# Patient Record
Sex: MALE | Race: WHITE | NOT HISPANIC OR LATINO | Employment: OTHER | URBAN - NONMETROPOLITAN AREA
[De-identification: names, ages, dates, MRNs, and addresses within clinical notes are randomized per-mention and may not be internally consistent; named-entity substitution may affect disease eponyms.]

---

## 2017-09-01 ENCOUNTER — HOSPITAL ENCOUNTER (OUTPATIENT)
Dept: GENERAL RADIOLOGY | Facility: HOSPITAL | Age: 62
Discharge: HOME | End: 2017-09-01
Admitting: NURSE PRACTITIONER

## 2017-09-01 ENCOUNTER — TRANSCRIBE ORDERS (OUTPATIENT)
Dept: ADMINISTRATIVE | Facility: HOSPITAL | Age: 62
End: 2017-09-01

## 2017-09-01 DIAGNOSIS — R10.9 ABDOMINAL PAIN, UNSPECIFIED LOCATION: ICD-10-CM

## 2017-09-01 DIAGNOSIS — R52 PAIN: ICD-10-CM

## 2017-09-01 DIAGNOSIS — R52 PAIN: Primary | ICD-10-CM

## 2017-09-01 PROCEDURE — 74000 HC ABDOMEN KUB: CPT

## 2017-09-01 PROCEDURE — 72110 X-RAY EXAM L-2 SPINE 4/>VWS: CPT

## 2019-04-19 ENCOUNTER — TRANSCRIBE ORDERS (OUTPATIENT)
Dept: ADMINISTRATIVE | Facility: HOSPITAL | Age: 64
End: 2019-04-19

## 2019-04-19 DIAGNOSIS — I65.29 CAROTID ARTERY STENOSIS, UNILATERAL: Primary | ICD-10-CM

## 2019-04-19 DIAGNOSIS — I65.23 CAROTID OCCLUSION, BILATERAL: ICD-10-CM

## 2019-04-25 ENCOUNTER — HOSPITAL ENCOUNTER (OUTPATIENT)
Dept: ULTRASOUND IMAGING | Facility: HOSPITAL | Age: 64
Discharge: HOME | End: 2019-04-25
Admitting: INTERNAL MEDICINE

## 2019-04-25 DIAGNOSIS — I65.23 CAROTID OCCLUSION, BILATERAL: ICD-10-CM

## 2019-04-25 PROCEDURE — 93880 EXTRACRANIAL BILAT STUDY: CPT

## 2019-04-25 PROCEDURE — 93880 EXTRACRANIAL BILAT STUDY: CPT | Performed by: SURGERY

## 2020-03-23 DIAGNOSIS — R07.89 OTHER CHEST PAIN: Primary | ICD-10-CM

## 2020-07-06 ENCOUNTER — OFFICE VISIT (OUTPATIENT)
Dept: WOUND CARE | Facility: HOSPITAL | Age: 65
End: 2020-07-06

## 2020-07-06 PROCEDURE — 99203 OFFICE O/P NEW LOW 30 MIN: CPT | Performed by: PODIATRIST

## 2020-07-06 PROCEDURE — 11042 DBRDMT SUBQ TIS 1ST 20SQCM/<: CPT | Performed by: PODIATRIST

## 2020-07-13 ENCOUNTER — OFFICE VISIT (OUTPATIENT)
Dept: WOUND CARE | Facility: HOSPITAL | Age: 65
End: 2020-07-13

## 2020-07-13 PROCEDURE — 11042 DBRDMT SUBQ TIS 1ST 20SQCM/<: CPT | Performed by: PODIATRIST

## 2020-07-20 ENCOUNTER — OFFICE VISIT (OUTPATIENT)
Dept: WOUND CARE | Facility: HOSPITAL | Age: 65
End: 2020-07-20

## 2020-07-20 PROCEDURE — 11042 DBRDMT SUBQ TIS 1ST 20SQCM/<: CPT | Performed by: NURSE PRACTITIONER

## 2020-07-20 PROCEDURE — 99204 OFFICE O/P NEW MOD 45 MIN: CPT | Performed by: NURSE PRACTITIONER

## 2020-07-27 ENCOUNTER — OFFICE VISIT (OUTPATIENT)
Dept: WOUND CARE | Facility: HOSPITAL | Age: 65
End: 2020-07-27

## 2020-07-27 PROCEDURE — 11042 DBRDMT SUBQ TIS 1ST 20SQCM/<: CPT | Performed by: PODIATRIST

## 2020-08-03 ENCOUNTER — OFFICE VISIT (OUTPATIENT)
Dept: WOUND CARE | Facility: HOSPITAL | Age: 65
End: 2020-08-03

## 2020-08-03 PROCEDURE — 11042 DBRDMT SUBQ TIS 1ST 20SQCM/<: CPT | Performed by: PODIATRIST

## 2020-08-10 ENCOUNTER — OFFICE VISIT (OUTPATIENT)
Dept: WOUND CARE | Facility: HOSPITAL | Age: 65
End: 2020-08-10

## 2020-08-10 PROCEDURE — 97597 DBRDMT OPN WND 1ST 20 CM/<: CPT | Performed by: NURSE PRACTITIONER

## 2020-08-17 ENCOUNTER — OFFICE VISIT (OUTPATIENT)
Dept: WOUND CARE | Facility: HOSPITAL | Age: 65
End: 2020-08-17

## 2020-08-17 PROCEDURE — 11042 DBRDMT SUBQ TIS 1ST 20SQCM/<: CPT | Performed by: PODIATRIST

## 2020-08-24 ENCOUNTER — APPOINTMENT (OUTPATIENT)
Dept: WOUND CARE | Facility: HOSPITAL | Age: 65
End: 2020-08-24

## 2020-08-25 ENCOUNTER — OFFICE VISIT (OUTPATIENT)
Dept: WOUND CARE | Facility: HOSPITAL | Age: 65
End: 2020-08-25

## 2020-08-25 PROCEDURE — 97597 DBRDMT OPN WND 1ST 20 CM/<: CPT | Performed by: NURSE PRACTITIONER

## 2020-09-01 ENCOUNTER — OFFICE VISIT (OUTPATIENT)
Dept: WOUND CARE | Facility: HOSPITAL | Age: 65
End: 2020-09-01

## 2020-09-01 PROCEDURE — 97597 DBRDMT OPN WND 1ST 20 CM/<: CPT | Performed by: NURSE PRACTITIONER

## 2020-09-09 ENCOUNTER — OFFICE VISIT (OUTPATIENT)
Dept: WOUND CARE | Facility: HOSPITAL | Age: 65
End: 2020-09-09

## 2020-09-09 PROCEDURE — 97597 DBRDMT OPN WND 1ST 20 CM/<: CPT | Performed by: NURSE PRACTITIONER

## 2020-09-18 ENCOUNTER — APPOINTMENT (OUTPATIENT)
Dept: WOUND CARE | Facility: HOSPITAL | Age: 65
End: 2020-09-18

## 2020-10-01 ENCOUNTER — OFFICE VISIT (OUTPATIENT)
Dept: WOUND CARE | Facility: HOSPITAL | Age: 65
End: 2020-10-01

## 2020-10-01 PROCEDURE — 97597 DBRDMT OPN WND 1ST 20 CM/<: CPT | Performed by: NURSE PRACTITIONER

## 2020-10-08 ENCOUNTER — APPOINTMENT (OUTPATIENT)
Dept: WOUND CARE | Facility: HOSPITAL | Age: 65
End: 2020-10-08

## 2022-06-21 ENCOUNTER — APPOINTMENT (OUTPATIENT)
Dept: GENERAL RADIOLOGY | Facility: HOSPITAL | Age: 67
End: 2022-06-21

## 2022-06-21 ENCOUNTER — HOSPITAL ENCOUNTER (EMERGENCY)
Facility: HOSPITAL | Age: 67
Discharge: HOME | End: 2022-06-21
Admitting: FAMILY MEDICINE

## 2022-06-21 VITALS
DIASTOLIC BLOOD PRESSURE: 90 MMHG | HEART RATE: 77 BPM | HEIGHT: 68 IN | WEIGHT: 200 LBS | BODY MASS INDEX: 30.31 KG/M2 | SYSTOLIC BLOOD PRESSURE: 150 MMHG | TEMPERATURE: 98.6 F | RESPIRATION RATE: 20 BRPM | OXYGEN SATURATION: 97 %

## 2022-06-21 DIAGNOSIS — S92.422B OPEN DISPLACED FRACTURE OF DISTAL PHALANX OF LEFT GREAT TOE, INITIAL ENCOUNTER: Primary | ICD-10-CM

## 2022-06-21 PROCEDURE — 90471 IMMUNIZATION ADMIN: CPT | Performed by: NURSE PRACTITIONER

## 2022-06-21 PROCEDURE — 96365 THER/PROPH/DIAG IV INF INIT: CPT

## 2022-06-21 PROCEDURE — 73630 X-RAY EXAM OF FOOT: CPT

## 2022-06-21 PROCEDURE — 25010000002 TETANUS-DIPHTH-ACELL PERTUSSIS 5-2.5-18.5 LF-MCG/0.5 SUSPENSION PREFILLED SYRINGE: Performed by: NURSE PRACTITIONER

## 2022-06-21 PROCEDURE — 99282 EMERGENCY DEPT VISIT SF MDM: CPT

## 2022-06-21 PROCEDURE — 90715 TDAP VACCINE 7 YRS/> IM: CPT | Performed by: NURSE PRACTITIONER

## 2022-06-21 PROCEDURE — 25010000002 CEFAZOLIN 1-4 GM/50ML-% SOLUTION: Performed by: NURSE PRACTITIONER

## 2022-06-21 RX ORDER — LIDOCAINE HYDROCHLORIDE 10 MG/ML
5 INJECTION, SOLUTION INFILTRATION; PERINEURAL ONCE
Status: DISCONTINUED | OUTPATIENT
Start: 2022-06-21 | End: 2022-06-21

## 2022-06-21 RX ORDER — AMOXICILLIN AND CLAVULANATE POTASSIUM 875; 125 MG/1; MG/1
1 TABLET, FILM COATED ORAL 2 TIMES DAILY
Qty: 20 TABLET | Refills: 0 | Status: SHIPPED | OUTPATIENT
Start: 2022-06-21 | End: 2022-07-01

## 2022-06-21 RX ORDER — LIDOCAINE HYDROCHLORIDE 10 MG/ML
10 INJECTION, SOLUTION INFILTRATION; PERINEURAL ONCE
Status: DISCONTINUED | OUTPATIENT
Start: 2022-06-21 | End: 2022-06-21

## 2022-06-21 RX ORDER — HYDROCODONE BITARTRATE AND ACETAMINOPHEN 5; 325 MG/1; MG/1
1 TABLET ORAL 4 TIMES DAILY PRN
Qty: 6 TABLET | Refills: 0 | Status: SHIPPED | OUTPATIENT
Start: 2022-06-21 | End: 2022-06-24

## 2022-06-21 RX ORDER — CEFAZOLIN SODIUM 1 G/50ML
1 INJECTION, SOLUTION INTRAVENOUS ONCE
Status: COMPLETED | OUTPATIENT
Start: 2022-06-21 | End: 2022-06-21

## 2022-06-21 RX ORDER — LIDOCAINE HYDROCHLORIDE 10 MG/ML
5 INJECTION, SOLUTION INFILTRATION; PERINEURAL ONCE
Status: COMPLETED | OUTPATIENT
Start: 2022-06-21 | End: 2022-06-21

## 2022-06-21 RX ADMIN — CEFAZOLIN SODIUM 1 G: 1 INJECTION, SOLUTION INTRAVENOUS at 20:04

## 2022-06-21 RX ADMIN — LIDOCAINE HYDROCHLORIDE 5 ML: 10 INJECTION, SOLUTION EPIDURAL; INFILTRATION; INTRACAUDAL; PERINEURAL at 21:13

## 2022-06-21 RX ADMIN — TETANUS TOXOID, REDUCED DIPHTHERIA TOXOID AND ACELLULAR PERTUSSIS VACCINE, ADSORBED 0.5 ML: 5; 2.5; 8; 8; 2.5 SUSPENSION INTRAMUSCULAR at 19:57

## 2024-01-09 ENCOUNTER — APPOINTMENT (OUTPATIENT)
Dept: CT IMAGING | Facility: HOSPITAL | Age: 69
End: 2024-01-09
Payer: MEDICARE

## 2024-01-09 ENCOUNTER — APPOINTMENT (OUTPATIENT)
Dept: GENERAL RADIOLOGY | Facility: HOSPITAL | Age: 69
End: 2024-01-09
Payer: MEDICARE

## 2024-01-09 ENCOUNTER — HOSPITAL ENCOUNTER (EMERGENCY)
Facility: HOSPITAL | Age: 69
Discharge: HOME | End: 2024-01-09
Admitting: EMERGENCY MEDICINE
Payer: MEDICARE

## 2024-01-09 VITALS
RESPIRATION RATE: 18 BRPM | HEART RATE: 87 BPM | SYSTOLIC BLOOD PRESSURE: 127 MMHG | WEIGHT: 201 LBS | BODY MASS INDEX: 30.46 KG/M2 | HEIGHT: 68 IN | TEMPERATURE: 98.1 F | OXYGEN SATURATION: 98 % | DIASTOLIC BLOOD PRESSURE: 88 MMHG

## 2024-01-09 DIAGNOSIS — I10 PRIMARY HYPERTENSION: Primary | ICD-10-CM

## 2024-01-09 DIAGNOSIS — R07.89 CHEST WALL PAIN: ICD-10-CM

## 2024-01-09 LAB
ALBUMIN SERPL-MCNC: 4.3 G/DL (ref 3.5–5.2)
ALBUMIN/GLOB SERPL: 1.5 G/DL
ALP SERPL-CCNC: 103 U/L (ref 39–117)
ALT SERPL W P-5'-P-CCNC: 14 U/L (ref 1–41)
ANION GAP SERPL CALCULATED.3IONS-SCNC: 9 MMOL/L (ref 5–15)
AST SERPL-CCNC: 19 U/L (ref 1–40)
B PARAPERT DNA SPEC QL NAA+PROBE: NOT DETECTED
B PERT DNA SPEC QL NAA+PROBE: NOT DETECTED
BASOPHILS # BLD AUTO: 0.05 10*3/MM3 (ref 0–0.2)
BASOPHILS NFR BLD AUTO: 0.6 % (ref 0–1.5)
BILIRUB SERPL-MCNC: 0.5 MG/DL (ref 0–1.2)
BUN SERPL-MCNC: 13 MG/DL (ref 8–23)
BUN/CREAT SERPL: 14.3 (ref 7–25)
C PNEUM DNA NPH QL NAA+NON-PROBE: NOT DETECTED
CALCIUM SPEC-SCNC: 9.5 MG/DL (ref 8.6–10.5)
CHLORIDE SERPL-SCNC: 102 MMOL/L (ref 98–107)
CO2 SERPL-SCNC: 28 MMOL/L (ref 22–29)
CREAT SERPL-MCNC: 0.91 MG/DL (ref 0.76–1.27)
D DIMER PPP FEU-MCNC: 1.62 MCGFEU/ML (ref 0–0.7)
DEPRECATED RDW RBC AUTO: 46.6 FL (ref 37–54)
EGFRCR SERPLBLD CKD-EPI 2021: 90.7 ML/MIN/1.73
EOSINOPHIL # BLD AUTO: 0.11 10*3/MM3 (ref 0–0.4)
EOSINOPHIL NFR BLD AUTO: 1.2 % (ref 0.3–6.2)
ERYTHROCYTE [DISTWIDTH] IN BLOOD BY AUTOMATED COUNT: 14 % (ref 12.3–15.4)
FLUAV SUBTYP SPEC NAA+PROBE: NOT DETECTED
FLUBV RNA ISLT QL NAA+PROBE: NOT DETECTED
GLOBULIN UR ELPH-MCNC: 2.9 GM/DL
GLUCOSE SERPL-MCNC: 127 MG/DL (ref 65–99)
HADV DNA SPEC NAA+PROBE: NOT DETECTED
HCOV 229E RNA SPEC QL NAA+PROBE: NOT DETECTED
HCOV HKU1 RNA SPEC QL NAA+PROBE: NOT DETECTED
HCOV NL63 RNA SPEC QL NAA+PROBE: NOT DETECTED
HCOV OC43 RNA SPEC QL NAA+PROBE: NOT DETECTED
HCT VFR BLD AUTO: 47.6 % (ref 37.5–51)
HGB BLD-MCNC: 15.3 G/DL (ref 13–17.7)
HMPV RNA NPH QL NAA+NON-PROBE: NOT DETECTED
HOLD SPECIMEN: NORMAL
HOLD SPECIMEN: NORMAL
HPIV1 RNA ISLT QL NAA+PROBE: NOT DETECTED
HPIV2 RNA SPEC QL NAA+PROBE: NOT DETECTED
HPIV3 RNA NPH QL NAA+PROBE: NOT DETECTED
HPIV4 P GENE NPH QL NAA+PROBE: NOT DETECTED
IMM GRANULOCYTES # BLD AUTO: 0.03 10*3/MM3 (ref 0–0.05)
IMM GRANULOCYTES NFR BLD AUTO: 0.3 % (ref 0–0.5)
INR PPP: 1 (ref 0.91–1.09)
LYMPHOCYTES # BLD AUTO: 2.22 10*3/MM3 (ref 0.7–3.1)
LYMPHOCYTES NFR BLD AUTO: 24.6 % (ref 19.6–45.3)
M PNEUMO IGG SER IA-ACNC: NOT DETECTED
MCH RBC QN AUTO: 29.1 PG (ref 26.6–33)
MCHC RBC AUTO-ENTMCNC: 32.1 G/DL (ref 31.5–35.7)
MCV RBC AUTO: 90.5 FL (ref 79–97)
MONOCYTES # BLD AUTO: 0.62 10*3/MM3 (ref 0.1–0.9)
MONOCYTES NFR BLD AUTO: 6.9 % (ref 5–12)
NEUTROPHILS NFR BLD AUTO: 6 10*3/MM3 (ref 1.7–7)
NEUTROPHILS NFR BLD AUTO: 66.4 % (ref 42.7–76)
NRBC BLD AUTO-RTO: 0 /100 WBC (ref 0–0.2)
NT-PROBNP SERPL-MCNC: 70.1 PG/ML (ref 0–900)
PLATELET # BLD AUTO: 296 10*3/MM3 (ref 140–450)
PMV BLD AUTO: 9.3 FL (ref 6–12)
POTASSIUM SERPL-SCNC: 4.3 MMOL/L (ref 3.5–5.2)
PROT SERPL-MCNC: 7.2 G/DL (ref 6–8.5)
PROTHROMBIN TIME: 13.3 SECONDS (ref 11.8–14.8)
RBC # BLD AUTO: 5.26 10*6/MM3 (ref 4.14–5.8)
RHINOVIRUS RNA SPEC NAA+PROBE: NOT DETECTED
RSV RNA NPH QL NAA+NON-PROBE: NOT DETECTED
SARS-COV-2 RNA NPH QL NAA+NON-PROBE: NOT DETECTED
SODIUM SERPL-SCNC: 139 MMOL/L (ref 136–145)
TROPONIN T SERPL HS-MCNC: 12 NG/L
TROPONIN T SERPL HS-MCNC: 13 NG/L
WBC NRBC COR # BLD AUTO: 9.03 10*3/MM3 (ref 3.4–10.8)
WHOLE BLOOD HOLD COAG: NORMAL
WHOLE BLOOD HOLD SPECIMEN: NORMAL

## 2024-01-09 PROCEDURE — 71275 CT ANGIOGRAPHY CHEST: CPT

## 2024-01-09 PROCEDURE — 96375 TX/PRO/DX INJ NEW DRUG ADDON: CPT

## 2024-01-09 PROCEDURE — 83880 ASSAY OF NATRIURETIC PEPTIDE: CPT | Performed by: NURSE PRACTITIONER

## 2024-01-09 PROCEDURE — 85025 COMPLETE CBC W/AUTO DIFF WBC: CPT | Performed by: NURSE PRACTITIONER

## 2024-01-09 PROCEDURE — 25010000002 LABETALOL 5 MG/ML SOLUTION: Performed by: NURSE PRACTITIONER

## 2024-01-09 PROCEDURE — 0202U NFCT DS 22 TRGT SARS-COV-2: CPT | Performed by: NURSE PRACTITIONER

## 2024-01-09 PROCEDURE — 71045 X-RAY EXAM CHEST 1 VIEW: CPT

## 2024-01-09 PROCEDURE — 80053 COMPREHEN METABOLIC PANEL: CPT | Performed by: NURSE PRACTITIONER

## 2024-01-09 PROCEDURE — 84484 ASSAY OF TROPONIN QUANT: CPT | Performed by: NURSE PRACTITIONER

## 2024-01-09 PROCEDURE — 99285 EMERGENCY DEPT VISIT HI MDM: CPT

## 2024-01-09 PROCEDURE — 93005 ELECTROCARDIOGRAM TRACING: CPT | Performed by: NURSE PRACTITIONER

## 2024-01-09 PROCEDURE — 25010000002 MORPHINE PER 10 MG: Performed by: NURSE PRACTITIONER

## 2024-01-09 PROCEDURE — 93005 ELECTROCARDIOGRAM TRACING: CPT | Performed by: EMERGENCY MEDICINE

## 2024-01-09 PROCEDURE — 96374 THER/PROPH/DIAG INJ IV PUSH: CPT

## 2024-01-09 PROCEDURE — 85610 PROTHROMBIN TIME: CPT | Performed by: NURSE PRACTITIONER

## 2024-01-09 PROCEDURE — 36415 COLL VENOUS BLD VENIPUNCTURE: CPT

## 2024-01-09 PROCEDURE — 25510000001 IOPAMIDOL PER 1 ML: Performed by: NURSE PRACTITIONER

## 2024-01-09 PROCEDURE — 25010000002 ONDANSETRON PER 1 MG: Performed by: NURSE PRACTITIONER

## 2024-01-09 PROCEDURE — 85379 FIBRIN DEGRADATION QUANT: CPT | Performed by: NURSE PRACTITIONER

## 2024-01-09 RX ORDER — CLONIDINE HYDROCHLORIDE 0.2 MG/1
0.2 TABLET ORAL 2 TIMES DAILY
COMMUNITY

## 2024-01-09 RX ORDER — ATORVASTATIN CALCIUM 10 MG/1
10 TABLET, FILM COATED ORAL DAILY
COMMUNITY

## 2024-01-09 RX ORDER — ONDANSETRON 2 MG/ML
4 INJECTION INTRAMUSCULAR; INTRAVENOUS ONCE
Status: COMPLETED | OUTPATIENT
Start: 2024-01-09 | End: 2024-01-09

## 2024-01-09 RX ORDER — LABETALOL HYDROCHLORIDE 5 MG/ML
20 INJECTION, SOLUTION INTRAVENOUS ONCE
Status: COMPLETED | OUTPATIENT
Start: 2024-01-09 | End: 2024-01-09

## 2024-01-09 RX ORDER — SODIUM CHLORIDE 0.9 % (FLUSH) 0.9 %
10 SYRINGE (ML) INJECTION AS NEEDED
Status: DISCONTINUED | OUTPATIENT
Start: 2024-01-09 | End: 2024-01-09 | Stop reason: HOSPADM

## 2024-01-09 RX ORDER — AMLODIPINE BESYLATE 10 MG/1
10 TABLET ORAL DAILY
COMMUNITY

## 2024-01-09 RX ORDER — LOSARTAN POTASSIUM 25 MG/1
25 TABLET ORAL DAILY
COMMUNITY

## 2024-01-09 RX ADMIN — MORPHINE SULFATE 4 MG: 4 INJECTION, SOLUTION INTRAMUSCULAR; INTRAVENOUS at 19:24

## 2024-01-09 RX ADMIN — LABETALOL HYDROCHLORIDE 20 MG: 5 INJECTION INTRAVENOUS at 19:00

## 2024-01-09 RX ADMIN — ONDANSETRON 4 MG: 2 INJECTION INTRAMUSCULAR; INTRAVENOUS at 19:24

## 2024-01-09 RX ADMIN — IOPAMIDOL 100 ML: 755 INJECTION, SOLUTION INTRAVENOUS at 18:42

## 2024-01-09 NOTE — ED PROVIDER NOTES
Subjective   History of Present Illness  Patient is a 68-year-old male presents to the emergency department with hypertension and right-sided back pain and shortness of breath.  He states he has had right-sided back pain for the past 3 days.  He states he went to his primary care provider today and was sent to the emergency department due to his blood pressure being so elevated.  He denies any chest pain.  He does have some shortness of breath which she states he has chronic shortness of breath with ambulation however he was tachypneic with conversation today.  He states he has a chronic cough because he is a smoker.  He states his cough has been nonproductive.  He denies any injury to the back.  He denies any headache.  He does have chronic numbness to the left side from previous CVA.  He denies headache or blurred vision.  He denies any abdominal pain.  No nausea or vomiting.    History provided by:  Patient   used: No        Review of Systems   Constitutional:         Patient is a 68-year-old male presents to the emergency department with hypertension and right-sided back pain and shortness of breath.  He states he has had right-sided back pain for the past 3 days.  He states he went to his primary care provider today and was sent to the emergency department due to his blood pressure being so elevated.  He denies any chest pain.  He does have some shortness of breath which she states he has chronic shortness of breath with ambulation however he was tachypneic with conversation today.  He states he has a chronic cough because he is a smoker.  He states his cough has been nonproductive.  He denies any injury to the back.  He denies any headache.  He does have chronic numbness to the left side from previous CVA.  He denies headache or blurred vision.  He denies any abdominal pain.  No nausea or vomiting.     Respiratory:  Positive for cough and shortness of breath.    Cardiovascular:          "Elevated blood pressure        Past Medical History:   Diagnosis Date    Hypertension     Stroke        No Known Allergies    Past Surgical History:   Procedure Laterality Date    CAROTID ENDARTERECTOMY      x 2       History reviewed. No pertinent family history.    Social History     Socioeconomic History    Marital status:    Tobacco Use    Smoking status: Every Day     Packs/day: 1     Types: Cigarettes   Substance and Sexual Activity    Alcohol use: Not Currently    Drug use: Not Currently       Prior to Admission medications    Not on File       /88 (BP Location: Right arm, Patient Position: Sitting)   Pulse 87   Temp 98.1 °F (36.7 °C) (Oral)   Resp 18   Ht 172.7 cm (68\")   Wt 91.2 kg (201 lb)   SpO2 98%   BMI 30.56 kg/m²     Objective   Physical Exam  Vitals and nursing note reviewed.   Constitutional:       Appearance: He is well-developed.      Comments: Chronically ill appearing. No acute distress   HENT:      Head: Normocephalic and atraumatic.   Eyes:      Conjunctiva/sclera: Conjunctivae normal.      Pupils: Pupils are equal, round, and reactive to light.   Cardiovascular:      Rate and Rhythm: Normal rate and regular rhythm.      Heart sounds: Normal heart sounds.   Pulmonary:      Effort: Tachypnea present.      Comments: Expiratory wheezing throughout - diminished lung sounds bilat bases. Becomes short of breath with conversation   Abdominal:      General: Bowel sounds are normal.      Palpations: Abdomen is soft.   Musculoskeletal:         General: Normal range of motion.      Cervical back: Normal range of motion and neck supple.      Comments: No tenderness on palpation of midthoracic area. No soft tissue swelling noted.    Skin:     General: Skin is warm and dry.   Neurological:      Mental Status: He is alert and oriented to person, place, and time.      Deep Tendon Reflexes: Reflexes are normal and symmetric.   Psychiatric:         Behavior: Behavior normal.         Thought " Content: Thought content normal.         Judgment: Judgment normal.         Procedures         Lab Results (last 24 hours)       Procedure Component Value Units Date/Time    CBC & Differential [[id number]]  (Normal) Collected: 01/09/24 1408    Specimen: Blood from Arm, Right Updated: 01/09/24 1650    Narrative:      The following orders were created for panel order CBC & Differential.  Procedure                               Abnormality         Status                     ---------                               -----------         ------                     CBC Auto Differential[[id number]]        Normal              Final result                 Please view results for these tests on the individual orders.    Comprehensive Metabolic Panel [[id number]]  (Abnormal) Collected: 01/09/24 1408    Specimen: Blood from Arm, Right Updated: 01/09/24 1659     Glucose 127 mg/dL      BUN 13 mg/dL      Creatinine 0.91 mg/dL      Sodium 139 mmol/L      Potassium 4.3 mmol/L      Comment: Slight hemolysis detected by analyzer. Result may be falsely elevated.        Chloride 102 mmol/L      CO2 28.0 mmol/L      Calcium 9.5 mg/dL      Total Protein 7.2 g/dL      Albumin 4.3 g/dL      ALT (SGPT) 14 U/L      AST (SGOT) 19 U/L      Comment: Slight hemolysis detected by analyzer. Result may be falsely elevated.        Alkaline Phosphatase 103 U/L      Total Bilirubin 0.5 mg/dL      Globulin 2.9 gm/dL      A/G Ratio 1.5 g/dL      BUN/Creatinine Ratio 14.3     Anion Gap 9.0 mmol/L      eGFR 90.7 mL/min/1.73     Narrative:      GFR Normal >60  Chronic Kidney Disease <60  Kidney Failure <15      Protime-INR [[id number]]  (Normal) Collected: 01/09/24 1408    Specimen: Blood from Arm, Right Updated: 01/09/24 1653     Protime 13.3 Seconds      INR 1.00    BNP [[id number]]  (Normal) Collected: 01/09/24 1408    Specimen: Blood from Arm, Right Updated: 01/09/24 1657     proBNP 70.1 pg/mL     Narrative:      This assay is used as an aid in the diagnosis  "of individuals suspected of having heart failure. It can be used as an aid in the diagnosis of acute decompensated heart failure (ADHF) in patients presenting with signs and symptoms of ADHF to the emergency department (ED). In addition, NT-proBNP of <300 pg/mL indicates ADHF is not likely.    Age Range Result Interpretation  NT-proBNP Concentration (pg/mL:      <50             Positive            >450                   Gray                 300-450                    Negative             <300    50-75           Positive            >900                  Gray                300-900                  Negative            <300      >75             Positive            >1800                  Gray                300-1800                  Negative            <300    D-dimer, Quantitative [[id number]]  (Abnormal) Collected: 01/09/24 1408    Specimen: Blood from Arm, Right Updated: 01/09/24 1653     D-Dimer, Quantitative 1.62 MCGFEU/mL     Narrative:      According to the assay 's published package insert, a normal (<0.50 MCGFEU/mL) D-dimer result in conjunction with a non-high clinical probability assessment, excludes deep vein thrombosis (DVT) and pulmonary embolism (PE) with high sensitivity.    D-dimer values increase with age and this can make VTE exclusion of an older population difficult. To address this, the [employer], based on best available evidence and recent guidelines, recommends that clinicians use age-adjusted D-dimer thresholds in patients greater than 50 years of age with: a) a low probability of PE who do not meet all Pulmonary Embolism Rule Out Criteria, or b) in those with intermediate probability of PE.   The formula for an age-adjusted D-dimer cut-off is \"age/100\".  For example, a 60 year old patient would have an age-adjusted cut-off of 0.60 MCGFEU/mL and an 80 year old 0.80 MCGFEU/mL.    Single High Sensitivity Troponin T [[id number]]  (Normal) Collected: 01/09/24 1408    " Specimen: Blood from Arm, Right Updated: 01/09/24 1656     HS Troponin T 13 ng/L     Narrative:      High Sensitive Troponin T Reference Range:  <14.0 ng/L- Negative Female for AMI  <22.0 ng/L- Negative Male for AMI  >=14 - Abnormal Female indicating possible myocardial injury.  >=22 - Abnormal Male indicating possible myocardial injury.   Clinicians would have to utilize clinical acumen, EKG, Troponin, and serial changes to determine if it is an Acute Myocardial Infarction or myocardial injury due to an underlying chronic condition.         CBC Auto Differential [[id number]]  (Normal) Collected: 01/09/24 1408    Specimen: Blood from Arm, Right Updated: 01/09/24 1650     WBC 9.03 10*3/mm3      RBC 5.26 10*6/mm3      Hemoglobin 15.3 g/dL      Hematocrit 47.6 %      MCV 90.5 fL      MCH 29.1 pg      MCHC 32.1 g/dL      RDW 14.0 %      RDW-SD 46.6 fl      MPV 9.3 fL      Platelets 296 10*3/mm3      Neutrophil % 66.4 %      Lymphocyte % 24.6 %      Monocyte % 6.9 %      Eosinophil % 1.2 %      Basophil % 0.6 %      Immature Grans % 0.3 %      Neutrophils, Absolute 6.00 10*3/mm3      Lymphocytes, Absolute 2.22 10*3/mm3      Monocytes, Absolute 0.62 10*3/mm3      Eosinophils, Absolute 0.11 10*3/mm3      Basophils, Absolute 0.05 10*3/mm3      Immature Grans, Absolute 0.03 10*3/mm3      nRBC 0.0 /100 WBC     Respiratory Panel PCR w/COVID-19(SARS-CoV-2) RAULITO/RAFFI/JUDITH/PAD/COR/LILY In-House, NP Swab in [facility], 2 HR TAT - Swab, Nasopharynx [[id number]]  (Normal) Collected: 01/09/24 1701    Specimen: Swab from Nasopharynx Updated: 01/09/24 1836     ADENOVIRUS, PCR Not Detected     Coronavirus 229E Not Detected     Coronavirus HKU1 Not Detected     Coronavirus NL63 Not Detected     Coronavirus OC43 Not Detected     COVID19 Not Detected     Human Metapneumovirus Not Detected     Human Rhinovirus/Enterovirus Not Detected     Influenza A PCR Not Detected     Influenza B PCR Not Detected     Parainfluenza Virus 1 Not Detected      Parainfluenza Virus 2 Not Detected     Parainfluenza Virus 3 Not Detected     Parainfluenza Virus 4 Not Detected     RSV, PCR Not Detected     Bordetella pertussis pcr Not Detected     Bordetella parapertussis PCR Not Detected     Chlamydophila pneumoniae PCR Not Detected     Mycoplasma pneumo by PCR Not Detected    Narrative:      In the setting of a positive respiratory panel with a viral infection PLUS a negative procalcitonin without other underlying concern for bacterial infection, consider observing off antibiotics or discontinuation of antibiotics and continue supportive care. If the respiratory panel is positive for atypical bacterial infection (Bordetella pertussis, Chlamydophila pneumoniae, or Mycoplasma pneumoniae), consider antibiotic de-escalation to target atypical bacterial infection.    Single High Sensitivity Troponin T [[id number]]  (Normal) Collected: 01/09/24 1921    Specimen: Blood Updated: 01/09/24 1944     HS Troponin T 12 ng/L     Narrative:      High Sensitive Troponin T Reference Range:  <14.0 ng/L- Negative Female for AMI  <22.0 ng/L- Negative Male for AMI  >=14 - Abnormal Female indicating possible myocardial injury.  >=22 - Abnormal Male indicating possible myocardial injury.   Clinicians would have to utilize clinical acumen, EKG, Troponin, and serial changes to determine if it is an Acute Myocardial Infarction or myocardial injury due to an underlying chronic condition.                 CT Angiogram Chest   Final Result   1. Respiratory motion artifact is limiting, however no convincing   evidence of acute PE is identified. Diffuse coronary artery   calcifications are present.   2. Normal aortic caliber, no evidence of dissection.   3. Diffuse changes of centrilobular emphysema, no pneumonia is   identified. Small 3 mm noncalcified nodule in the right upper lobe is   most likely benign. No evidence of intrathoracic lymphadenopathy.       This report was signed and finalized on 1/9/2024  "7:06 PM by Dr. Kieran Burnett MD.          XR Chest 1 View   Final Result           No acute abnormality.                                                                               This report was signed and finalized on 1/9/2024 5:15 PM by Dr. Kieran Burnett MD.              ED Course  ED Course as of 01/10/24 0814   Tue Jan 09, 2024   1700 Pt has elevated dimer and has dyspnea. Have ordered cta chest at this time for further.  [CW]   1911 CTA of the chest is negative for any acute pulmonary embolus.  No pneumonia.  Respiratory panel is negative.  First troponin is negative.  CMP is negative.  CBC no leukocytosis.  Chest x-ray was unremarkable.  Pending second set of heart enzymes and EKG at this time.pt is still having pain under right shoulder - will give pain medication at this time  [CW]   1919 Reviewed pt and pt care plan with Dr. Arroyo- also assessed pt and in agreement with care plan. Pt does not have pain on palpation. He states that \"it feels like its deeper\" it states that the pain is worse when he moves. Pt b/p is better at this time 156/93. Pending 2nd troponin at this time.  [CW]   1945 2nd troponin is negative. Dr arroyo has also assessed pt - advised pt can be discharged to follow up with his pcp. Pt is in agreement with care plan. Advised that pain was probably musculoskeletal  [CW]      ED Course User Index  [CW] Snehal Caro APRN        Medical Decision Making  Patient is a 68-year-old male presents to the emergency department with hypertension and right-sided back pain and shortness of breath.  He states he has had right-sided back pain for the past 3 days.  He states he went to his primary care provider today and was sent to the emergency department due to his blood pressure being so elevated.  He denies any chest pain.  He does have some shortness of breath which she states he has chronic shortness of breath with ambulation however he was tachypneic with conversation " today.  He states he has a chronic cough because he is a smoker.  He states his cough has been nonproductive.  He denies any injury to the back.  He denies any headache.  He does have chronic numbness to the left side from previous CVA.  He denies headache or blurred vision.  He denies any abdominal pain.  No nausea or vomiting.  Course of treatment in the er: Patient is a 68-year-old male presents the emergency department with hypertension.  He states he went to go see his doctor today and his blood pressure was systolic 200s over diastolic of 110s.  He denies any chest pain.  He does have some dyspnea however he does smoke about 2 packs a day.  He states he is also had some right mid scapular pain for the past 3 days.  He denies any injury.  He denies any chest pain.  No abdominal pain.  No nausea or vomiting.  His exam reveals some residual left-sided weakness from previous CVA.  Lungs with some expiratory wheezing noted throughout.  Clears with cough.  CV normal sinus rhythm.  Laboratory studies and labetalol had been ordered initially for his blood pressure which was 210/110 on his initial exam in the emergency department.  Labs Reviewed  COMPREHENSIVE METABOLIC PANEL - Abnormal; Notable for the following components:     Glucose                       127 (*)             All other components within normal limits         Narrative: GFR Normal >60                  Chronic Kidney Disease <60                  Kidney Failure <15                    D-DIMER, QUANTITATIVE - Abnormal; Notable for the following components:     D-Dimer, Quantitative         1.62 (*)            All other components within normal limits         Narrative: According to the assay 's published package insert, a normal (<0.50 MCGFEU/mL) D-dimer result in conjunction with a non-high clinical probability assessment, excludes deep vein thrombosis (DVT) and pulmonary embolism (PE) with high sensitivity.                                     "D-dimer values increase with age and this can make VTE exclusion of an older population difficult. To address this, the [employer], based on best available evidence and recent guidelines, recommends that clinicians use age-adjusted D-dimer thresholds in patients greater than 50 years of age with: a) a low probability of PE who do not meet all Pulmonary Embolism Rule Out Criteria, or b) in those with intermediate probability of PE.                   The formula for an age-adjusted D-dimer cut-off is \"age/100\".                  For example, a 60 year old patient would have an age-adjusted cut-off of 0.60 MCGFEU/mL and an 80 year old 0.80 MCGFEU/mL.  RESPIRATORY PANEL PCR W/ COVID-19 (SARS-COV-2), NP SWAB IN UTM/VTP, 2 HR TAT - Normal         Narrative: In the setting of a positive respiratory panel with a viral infection PLUS a negative procalcitonin without other underlying concern for bacterial infection, consider observing off antibiotics or discontinuation of antibiotics and continue supportive care. If the respiratory panel is positive for atypical bacterial infection (Bordetella pertussis, Chlamydophila pneumoniae, or Mycoplasma pneumoniae), consider antibiotic de-escalation to target atypical bacterial infection.  PROTIME-INR - Normal  BNP (IN-HOUSE) - Normal         Narrative: This assay is used as an aid in the diagnosis of individuals suspected of having heart failure. It can be used as an aid in the diagnosis of acute decompensated heart failure (ADHF) in patients presenting with signs and symptoms of ADHF to the emergency department (ED). In addition, NT-proBNP of <300 pg/mL indicates ADHF is not likely.                                    Age Range Result Interpretation  NT-proBNP Concentration (pg/mL:                                                      <50             Positive            >450                                   Gray                 300-450                                "     Negative             <300                                    50-75           Positive            >900                                  Carbone                300-900                                  Negative            <300                                                      >75             Positive            >1800                                  Carbone                300-1800                                  Negative            <300  SINGLE HSTROPONIN T - Normal         Narrative: High Sensitive Troponin T Reference Range:                  <14.0 ng/L- Negative Female for AMI                  <22.0 ng/L- Negative Male for AMI                  >=14 - Abnormal Female indicating possible myocardial injury.                  >=22 - Abnormal Male indicating possible myocardial injury.                   Clinicians would have to utilize clinical acumen, EKG, Troponin, and serial changes to determine if it is an Acute Myocardial Infarction or myocardial injury due to an underlying chronic condition.                                       CBC WITH AUTO DIFFERENTIAL - Normal  SINGLE HSTROPONIN T - Normal         Narrative: High Sensitive Troponin T Reference Range:                  <14.0 ng/L- Negative Female for AMI                  <22.0 ng/L- Negative Male for AMI                  >=14 - Abnormal Female indicating possible myocardial injury.                  >=22 - Abnormal Male indicating possible myocardial injury.                   Clinicians would have to utilize clinical acumen, EKG, Troponin, and serial changes to determine if it is an Acute Myocardial Infarction or myocardial injury due to an underlying chronic condition.                                       RAINBOW DRAW         Narrative: The following orders were created for panel order [place] Draw.                  Procedure                               Abnormality         Status                                     ---------                                -----------         ------                                     Green Top (Gel)[[id number]]                                  Final result                               Lavender Top[[id number]]                                     Final result                               Red Top[[id number]]                                          Final result                               Light Blue Top[[id number]]                                   Final result                                                 Please view results for these tests on the individual orders.  GREEN TOP  LAVENDER TOP  RED TOP  LIGHT BLUE TOP  CBC AND DIFFERENTIAL  CT Angiogram Chest   Final Result    1. Respiratory motion artifact is limiting, however no convincing    evidence of acute PE is identified. Diffuse coronary artery    calcifications are present.    2. Normal aortic caliber, no evidence of dissection.    3. Diffuse changes of centrilobular emphysema, no pneumonia is    identified. Small 3 mm noncalcified nodule in the right upper lobe is    most likely benign. No evidence of intrathoracic lymphadenopathy.         This report was signed and finalized on 1/9/2024 7:06 PM by Dr. Kieran Burnett MD.          XR Chest 1 View   Final Result             No acute abnormality.                                                                                     This report was signed and finalized on 1/9/2024 5:15 PM by Dr. Kieran Burnett MD.          His dimer was elevated and he did have shortness of breath however he states he does have chronic shortness of breath secondary to smoking.  He does not wear oxygen at home and his O2 sat was 98% on room air.  He did get a CTA of the chest to rule out pulmonary embolus which was negative for any pulmonary embolus.  He has had 2 sets of negative troponins.  He was given morphine for his shoulder pain.  He states the pain is worse with movement not tender on palpation but is worse with movement.  He states  that his pain was resolved after the pain medication.  His vitals have been stable 127/88, heart rate 87, respirations 18, O2 sat 98% on room air.  He states he is feeling much better and is ready to go home.  I reviewed the patient and patient care plan with Dr. Rebolledo who also assessed patient in agreement with patient care plan.  Patient was sent home to follow-up with his primary care doctor this week regarding his blood pressure.  Advised the patient to return the emergency department if he had increased pain, vomiting, chest pain, shortness of breath or any of his symptoms worsen.  Patient is in agreement with the care plan voices understanding of instructions and was discharged in stable condition.    Problems Addressed:  Chest wall pain: complicated acute illness or injury  Primary hypertension: complicated acute illness or injury    Amount and/or Complexity of Data Reviewed  Labs: ordered. Decision-making details documented in ED Course.  Radiology: ordered. Decision-making details documented in ED Course.  ECG/medicine tests: ordered. Decision-making details documented in ED Course.    Risk  Prescription drug management.         Final diagnoses:   Primary hypertension   Chest wall pain          Snehal Caro, APRN  01/10/24 0814

## 2024-01-09 NOTE — ED NOTES
[facility] Counselor, [place], on video conference with patient and mother.

## 2024-01-10 LAB
QT INTERVAL: 366 MS
QT INTERVAL: 366 MS
QTC INTERVAL: 386 MS
QTC INTERVAL: 411 MS

## 2025-01-03 ENCOUNTER — APPOINTMENT (OUTPATIENT)
Dept: CT IMAGING | Facility: HOSPITAL | Age: 70
End: 2025-01-03
Payer: MEDICARE

## 2025-01-03 ENCOUNTER — HOSPITAL ENCOUNTER (INPATIENT)
Facility: HOSPITAL | Age: 70
LOS: 12 days | Discharge: HOME | End: 2025-01-15
Attending: STUDENT IN AN ORGANIZED HEALTH CARE EDUCATION/TRAINING PROGRAM | Admitting: INTERNAL MEDICINE
Payer: MEDICARE

## 2025-01-03 ENCOUNTER — ANESTHESIA EVENT (OUTPATIENT)
Dept: PERIOP | Facility: HOSPITAL | Age: 70
End: 2025-01-03
Payer: MEDICARE

## 2025-01-03 ENCOUNTER — ANESTHESIA (OUTPATIENT)
Dept: PERIOP | Facility: HOSPITAL | Age: 70
End: 2025-01-03
Payer: MEDICARE

## 2025-01-03 ENCOUNTER — APPOINTMENT (OUTPATIENT)
Dept: GENERAL RADIOLOGY | Facility: HOSPITAL | Age: 70
End: 2025-01-03
Payer: MEDICARE

## 2025-01-03 ENCOUNTER — APPOINTMENT (OUTPATIENT)
Dept: INTERVENTIONAL RADIOLOGY/VASCULAR | Facility: HOSPITAL | Age: 70
End: 2025-01-03
Payer: MEDICARE

## 2025-01-03 DIAGNOSIS — I10 PRIMARY HYPERTENSION: ICD-10-CM

## 2025-01-03 DIAGNOSIS — I71.43 ANEURYSM OF INFRARENAL ABDOMINAL AORTA, UNSPECIFIED WHETHER RUPTURED: Primary | ICD-10-CM

## 2025-01-03 DIAGNOSIS — R10.9 ABDOMINAL PAIN, UNSPECIFIED ABDOMINAL LOCATION: ICD-10-CM

## 2025-01-03 DIAGNOSIS — I71.40 ABDOMINAL AORTIC ANEURYSM (AAA) WITHOUT RUPTURE, UNSPECIFIED PART: ICD-10-CM

## 2025-01-03 DIAGNOSIS — Z74.09 IMPAIRED MOBILITY: ICD-10-CM

## 2025-01-03 DIAGNOSIS — I48.0 PAROXYSMAL ATRIAL FIBRILLATION: ICD-10-CM

## 2025-01-03 LAB
ABO GROUP BLD: NORMAL
ALBUMIN SERPL-MCNC: 4.1 G/DL (ref 3.5–5.2)
ALBUMIN/GLOB SERPL: 1.4 G/DL
ALP SERPL-CCNC: 106 U/L (ref 39–117)
ALT SERPL W P-5'-P-CCNC: 14 U/L (ref 1–41)
ANION GAP SERPL CALCULATED.3IONS-SCNC: 10 MMOL/L (ref 5–15)
ANION GAP SERPL CALCULATED.3IONS-SCNC: 10 MMOL/L (ref 5–15)
APTT PPP: 40.6 SECONDS (ref 24.5–36)
APTT PPP: 56.1 SECONDS (ref 24.5–36)
ARTERIAL PATENCY WRIST A: ABNORMAL
ARTERIAL PATENCY WRIST A: ABNORMAL
AST SERPL-CCNC: 16 U/L (ref 1–40)
ATMOSPHERIC PRESS: 762 MMHG
ATMOSPHERIC PRESS: 763 MMHG
BASE EXCESS BLDA CALC-SCNC: -0.3 MMOL/L (ref 0–2)
BASE EXCESS BLDA CALC-SCNC: 0.6 MMOL/L (ref 0–2)
BASOPHILS # BLD AUTO: 0.02 10*3/MM3 (ref 0–0.2)
BASOPHILS # BLD AUTO: 0.03 10*3/MM3 (ref 0–0.2)
BASOPHILS # BLD AUTO: 0.05 10*3/MM3 (ref 0–0.2)
BASOPHILS NFR BLD AUTO: 0.1 % (ref 0–1.5)
BASOPHILS NFR BLD AUTO: 0.2 % (ref 0–1.5)
BASOPHILS NFR BLD AUTO: 0.5 % (ref 0–1.5)
BDY SITE: ABNORMAL
BDY SITE: ABNORMAL
BILIRUB SERPL-MCNC: 0.3 MG/DL (ref 0–1.2)
BILIRUB UR QL STRIP: NEGATIVE
BLD GP AB SCN SERPL QL: NEGATIVE
BODY TEMPERATURE: 37
BODY TEMPERATURE: 37.4
BUN SERPL-MCNC: 13 MG/DL (ref 8–23)
BUN SERPL-MCNC: 9 MG/DL (ref 8–23)
BUN/CREAT SERPL: 14.1 (ref 7–25)
BUN/CREAT SERPL: 9.6 (ref 7–25)
CA-I BLD-MCNC: 4.57 MG/DL (ref 4.6–5.4)
CALCIUM SPEC-SCNC: 7.6 MG/DL (ref 8.6–10.5)
CALCIUM SPEC-SCNC: 9.1 MG/DL (ref 8.6–10.5)
CHLORIDE SERPL-SCNC: 101 MMOL/L (ref 98–107)
CHLORIDE SERPL-SCNC: 105 MMOL/L (ref 98–107)
CLARITY UR: CLEAR
CO2 SERPL-SCNC: 24 MMOL/L (ref 22–29)
CO2 SERPL-SCNC: 28 MMOL/L (ref 22–29)
COHGB MFR BLD: 1.2 % (ref 0–5)
COLOR UR: YELLOW
CREAT SERPL-MCNC: 0.92 MG/DL (ref 0.76–1.27)
CREAT SERPL-MCNC: 0.94 MG/DL (ref 0.76–1.27)
DEPRECATED RDW RBC AUTO: 46.6 FL (ref 37–54)
DEPRECATED RDW RBC AUTO: 47 FL (ref 37–54)
DEPRECATED RDW RBC AUTO: 47.1 FL (ref 37–54)
EGFRCR SERPLBLD CKD-EPI 2021: 86.7 ML/MIN/1.73
EGFRCR SERPLBLD CKD-EPI 2021: 88.9 ML/MIN/1.73
EOSINOPHIL # BLD AUTO: 0 10*3/MM3 (ref 0–0.4)
EOSINOPHIL # BLD AUTO: 0 10*3/MM3 (ref 0–0.4)
EOSINOPHIL # BLD AUTO: 0.13 10*3/MM3 (ref 0–0.4)
EOSINOPHIL NFR BLD AUTO: 0 % (ref 0.3–6.2)
EOSINOPHIL NFR BLD AUTO: 0 % (ref 0.3–6.2)
EOSINOPHIL NFR BLD AUTO: 1.4 % (ref 0.3–6.2)
ERYTHROCYTE [DISTWIDTH] IN BLOOD BY AUTOMATED COUNT: 13.8 % (ref 12.3–15.4)
ERYTHROCYTE [DISTWIDTH] IN BLOOD BY AUTOMATED COUNT: 13.8 % (ref 12.3–15.4)
ERYTHROCYTE [DISTWIDTH] IN BLOOD BY AUTOMATED COUNT: 14.1 % (ref 12.3–15.4)
GLOBULIN UR ELPH-MCNC: 3 GM/DL
GLUCOSE SERPL-MCNC: 172 MG/DL (ref 65–99)
GLUCOSE SERPL-MCNC: 184 MG/DL (ref 65–99)
GLUCOSE UR STRIP-MCNC: ABNORMAL MG/DL
HCO3 BLDA-SCNC: 26.1 MMOL/L (ref 20–26)
HCO3 BLDA-SCNC: 26.8 MMOL/L (ref 20–26)
HCT VFR BLD AUTO: 35.5 % (ref 37.5–51)
HCT VFR BLD AUTO: 38.5 % (ref 37.5–51)
HCT VFR BLD AUTO: 44.8 % (ref 37.5–51)
HCT VFR BLD CALC: 40.2 % (ref 38–51)
HGB BLD-MCNC: 11.2 G/DL (ref 13–17.7)
HGB BLD-MCNC: 12.4 G/DL (ref 13–17.7)
HGB BLD-MCNC: 14.7 G/DL (ref 13–17.7)
HGB BLDA-MCNC: 13.1 G/DL (ref 14–18)
HGB UR QL STRIP.AUTO: NEGATIVE
HOLD SPECIMEN: NORMAL
IMM GRANULOCYTES # BLD AUTO: 0.03 10*3/MM3 (ref 0–0.05)
IMM GRANULOCYTES # BLD AUTO: 0.1 10*3/MM3 (ref 0–0.05)
IMM GRANULOCYTES # BLD AUTO: 0.16 10*3/MM3 (ref 0–0.05)
IMM GRANULOCYTES NFR BLD AUTO: 0.3 % (ref 0–0.5)
IMM GRANULOCYTES NFR BLD AUTO: 0.7 % (ref 0–0.5)
IMM GRANULOCYTES NFR BLD AUTO: 0.8 % (ref 0–0.5)
INHALED O2 CONCENTRATION: 100 %
INHALED O2 CONCENTRATION: 50 %
INR PPP: 1.11 (ref 0.91–1.09)
INR PPP: 1.19 (ref 0.91–1.09)
KETONES UR QL STRIP: NEGATIVE
LEUKOCYTE ESTERASE UR QL STRIP.AUTO: NEGATIVE
LYMPHOCYTES # BLD AUTO: 0.58 10*3/MM3 (ref 0.7–3.1)
LYMPHOCYTES # BLD AUTO: 0.7 10*3/MM3 (ref 0.7–3.1)
LYMPHOCYTES # BLD AUTO: 1.72 10*3/MM3 (ref 0.7–3.1)
LYMPHOCYTES NFR BLD AUTO: 18.1 % (ref 19.6–45.3)
LYMPHOCYTES NFR BLD AUTO: 3.7 % (ref 19.6–45.3)
LYMPHOCYTES NFR BLD AUTO: 3.9 % (ref 19.6–45.3)
Lab: ABNORMAL
Lab: ABNORMAL
MCH RBC QN AUTO: 29 PG (ref 26.6–33)
MCH RBC QN AUTO: 29.4 PG (ref 26.6–33)
MCH RBC QN AUTO: 30 PG (ref 26.6–33)
MCHC RBC AUTO-ENTMCNC: 31.5 G/DL (ref 31.5–35.7)
MCHC RBC AUTO-ENTMCNC: 32.2 G/DL (ref 31.5–35.7)
MCHC RBC AUTO-ENTMCNC: 32.8 G/DL (ref 31.5–35.7)
MCV RBC AUTO: 91.2 FL (ref 79–97)
MCV RBC AUTO: 91.4 FL (ref 79–97)
MCV RBC AUTO: 92 FL (ref 79–97)
METHGB BLD QL: 1 % (ref 0–3)
MODALITY: ABNORMAL
MODALITY: ABNORMAL
MONOCYTES # BLD AUTO: 0.65 10*3/MM3 (ref 0.1–0.9)
MONOCYTES # BLD AUTO: 0.9 10*3/MM3 (ref 0.1–0.9)
MONOCYTES # BLD AUTO: 1.25 10*3/MM3 (ref 0.1–0.9)
MONOCYTES NFR BLD AUTO: 6 % (ref 5–12)
MONOCYTES NFR BLD AUTO: 6.5 % (ref 5–12)
MONOCYTES NFR BLD AUTO: 6.8 % (ref 5–12)
NEUTROPHILS NFR BLD AUTO: 13.28 10*3/MM3 (ref 1.7–7)
NEUTROPHILS NFR BLD AUTO: 16.96 10*3/MM3 (ref 1.7–7)
NEUTROPHILS NFR BLD AUTO: 6.92 10*3/MM3 (ref 1.7–7)
NEUTROPHILS NFR BLD AUTO: 72.9 % (ref 42.7–76)
NEUTROPHILS NFR BLD AUTO: 88.9 % (ref 42.7–76)
NEUTROPHILS NFR BLD AUTO: 89.2 % (ref 42.7–76)
NITRITE UR QL STRIP: NEGATIVE
NRBC BLD AUTO-RTO: 0 /100 WBC (ref 0–0.2)
OXYHGB MFR BLDV: 96.9 % (ref 94–99)
PCO2 BLDA: 48.6 MM HG (ref 35–45)
PCO2 BLDA: 49 MM HG (ref 35–45)
PCO2 TEMP ADJ BLD: 49 MM HG (ref 35–45)
PCO2 TEMP ADJ BLD: 49.4 MM HG (ref 35–45)
PEEP RESPIRATORY: 5 CM[H2O]
PH BLDA: 7.33 PH UNITS (ref 7.35–7.45)
PH BLDA: 7.35 PH UNITS (ref 7.35–7.45)
PH UR STRIP.AUTO: 6 [PH] (ref 5–8)
PH, TEMP CORRECTED: 7.33 PH UNITS (ref 7.35–7.45)
PH, TEMP CORRECTED: 7.34 PH UNITS (ref 7.35–7.45)
PLATELET # BLD AUTO: 224 10*3/MM3 (ref 140–450)
PLATELET # BLD AUTO: 261 10*3/MM3 (ref 140–450)
PLATELET # BLD AUTO: 268 10*3/MM3 (ref 140–450)
PMV BLD AUTO: 9 FL (ref 6–12)
PO2 BLD: 148 MM[HG] (ref 0–500)
PO2 BLDA: 136 MM HG (ref 83–108)
PO2 BLDA: 74.1 MM HG (ref 83–108)
PO2 TEMP ADJ BLD: 138 MM HG (ref 83–108)
PO2 TEMP ADJ BLD: 74.1 MM HG (ref 83–108)
POTASSIUM BLDA-SCNC: 3.4 MMOL/L (ref 3.5–5.2)
POTASSIUM SERPL-SCNC: 3.9 MMOL/L (ref 3.5–5.2)
POTASSIUM SERPL-SCNC: 4 MMOL/L (ref 3.5–5.2)
PROT SERPL-MCNC: 7.1 G/DL (ref 6–8.5)
PROT UR QL STRIP: NEGATIVE
PROTHROMBIN TIME: 14.9 SECONDS (ref 11.8–14.8)
PROTHROMBIN TIME: 15.7 SECONDS (ref 11.8–14.8)
RBC # BLD AUTO: 3.86 10*6/MM3 (ref 4.14–5.8)
RBC # BLD AUTO: 4.22 10*6/MM3 (ref 4.14–5.8)
RBC # BLD AUTO: 4.9 10*6/MM3 (ref 4.14–5.8)
RH BLD: POSITIVE
SAO2 % BLDCOA: 94.8 % (ref 94–99)
SAO2 % BLDCOA: 99.1 % (ref 94–99)
SET MECH RESP RATE: 18
SODIUM BLDA-SCNC: 139 MMOL/L (ref 136–145)
SODIUM SERPL-SCNC: 139 MMOL/L (ref 136–145)
SODIUM SERPL-SCNC: 139 MMOL/L (ref 136–145)
SP GR UR STRIP: 1.02 (ref 1–1.03)
T&S EXPIRATION DATE: NORMAL
UROBILINOGEN UR QL STRIP: ABNORMAL
VENTILATOR MODE: ABNORMAL
VENTILATOR MODE: AC
VT ON VENT VENT: 550 ML
WBC NRBC COR # BLD AUTO: 14.89 10*3/MM3 (ref 3.4–10.8)
WBC NRBC COR # BLD AUTO: 19.09 10*3/MM3 (ref 3.4–10.8)
WBC NRBC COR # BLD AUTO: 9.5 10*3/MM3 (ref 3.4–10.8)
WHOLE BLOOD HOLD COAG: NORMAL
WHOLE BLOOD HOLD SPECIMEN: NORMAL

## 2025-01-03 PROCEDURE — B41G1ZZ FLUOROSCOPY OF LEFT LOWER EXTREMITY ARTERIES USING LOW OSMOLAR CONTRAST: ICD-10-PCS | Performed by: STUDENT IN AN ORGANIZED HEALTH CARE EDUCATION/TRAINING PROGRAM

## 2025-01-03 PROCEDURE — C1894 INTRO/SHEATH, NON-LASER: HCPCS | Performed by: SURGERY

## 2025-01-03 PROCEDURE — 25010000002 CEFAZOLIN PER 500 MG: Performed by: SURGERY

## 2025-01-03 PROCEDURE — 04VJ3DZ RESTRICTION OF LEFT EXTERNAL ILIAC ARTERY WITH INTRALUMINAL DEVICE, PERCUTANEOUS APPROACH: ICD-10-PCS | Performed by: STUDENT IN AN ORGANIZED HEALTH CARE EDUCATION/TRAINING PROGRAM

## 2025-01-03 PROCEDURE — 25010000002 FENTANYL CITRATE (PF) 250 MCG/5ML SOLUTION: Performed by: NURSE ANESTHETIST, CERTIFIED REGISTERED

## 2025-01-03 PROCEDURE — 94799 UNLISTED PULMONARY SVC/PX: CPT

## 2025-01-03 PROCEDURE — 85610 PROTHROMBIN TIME: CPT | Performed by: STUDENT IN AN ORGANIZED HEALTH CARE EDUCATION/TRAINING PROGRAM

## 2025-01-03 PROCEDURE — 94761 N-INVAS EAR/PLS OXIMETRY MLT: CPT

## 2025-01-03 PROCEDURE — 35226 REPAIR BLOOD VESSEL DIR LXTR: CPT | Performed by: SURGERY

## 2025-01-03 PROCEDURE — C1769 GUIDE WIRE: HCPCS | Performed by: SURGERY

## 2025-01-03 PROCEDURE — 34706 EVASC RPR A-BIILIAC RPT: CPT | Performed by: STUDENT IN AN ORGANIZED HEALTH CARE EDUCATION/TRAINING PROGRAM

## 2025-01-03 PROCEDURE — C1889 IMPLANT/INSERT DEVICE, NOC: HCPCS | Performed by: SURGERY

## 2025-01-03 PROCEDURE — 85025 COMPLETE CBC W/AUTO DIFF WBC: CPT | Performed by: STUDENT IN AN ORGANIZED HEALTH CARE EDUCATION/TRAINING PROGRAM

## 2025-01-03 PROCEDURE — 25010000002 ONDANSETRON PER 1 MG: Performed by: NURSE ANESTHETIST, CERTIFIED REGISTERED

## 2025-01-03 PROCEDURE — 25010000002 HYDROMORPHONE 1 MG/ML SOLUTION

## 2025-01-03 PROCEDURE — 74178 CT ABD&PLV WO CNTR FLWD CNTR: CPT

## 2025-01-03 PROCEDURE — 25810000003 SODIUM CHLORIDE 0.9 % SOLUTION: Performed by: STUDENT IN AN ORGANIZED HEALTH CARE EDUCATION/TRAINING PROGRAM

## 2025-01-03 PROCEDURE — 25510000001 IOPAMIDOL 61 % SOLUTION: Performed by: STUDENT IN AN ORGANIZED HEALTH CARE EDUCATION/TRAINING PROGRAM

## 2025-01-03 PROCEDURE — C1760 CLOSURE DEV, VASC: HCPCS

## 2025-01-03 PROCEDURE — 35226 REPAIR BLOOD VESSEL DIR LXTR: CPT | Performed by: STUDENT IN AN ORGANIZED HEALTH CARE EDUCATION/TRAINING PROGRAM

## 2025-01-03 PROCEDURE — 25510000001 IOPAMIDOL 61 % SOLUTION: Performed by: SURGERY

## 2025-01-03 PROCEDURE — 71045 X-RAY EXAM CHEST 1 VIEW: CPT

## 2025-01-03 PROCEDURE — C1876 STENT, NON-COA/NON-COV W/DEL: HCPCS | Performed by: SURGERY

## 2025-01-03 PROCEDURE — 25010000002 HEPARIN (PORCINE) PER 1000 UNITS: Performed by: SURGERY

## 2025-01-03 PROCEDURE — 81003 URINALYSIS AUTO W/O SCOPE: CPT | Performed by: STUDENT IN AN ORGANIZED HEALTH CARE EDUCATION/TRAINING PROGRAM

## 2025-01-03 PROCEDURE — 25010000002 HEPARIN (PORCINE) 25000-0.45 UT/250ML-% SOLUTION: Performed by: STUDENT IN AN ORGANIZED HEALTH CARE EDUCATION/TRAINING PROGRAM

## 2025-01-03 PROCEDURE — 94002 VENT MGMT INPAT INIT DAY: CPT

## 2025-01-03 PROCEDURE — 02HV33Z INSERTION OF INFUSION DEVICE INTO SUPERIOR VENA CAVA, PERCUTANEOUS APPROACH: ICD-10-PCS | Performed by: STUDENT IN AN ORGANIZED HEALTH CARE EDUCATION/TRAINING PROGRAM

## 2025-01-03 PROCEDURE — 25010000002 GLYCOPYRROLATE 0.4 MG/2ML SOLUTION

## 2025-01-03 PROCEDURE — 25010000002 PROPOFOL 1000 MG/100ML EMULSION

## 2025-01-03 PROCEDURE — 25010000002 NICARDIPINE HCL IN NACL 20-0.9 MG/200ML-% SOLUTION: Performed by: PHYSICIAN ASSISTANT

## 2025-01-03 PROCEDURE — 99285 EMERGENCY DEPT VISIT HI MDM: CPT

## 2025-01-03 PROCEDURE — 25810000003 LACTATED RINGERS PER 1000 ML: Performed by: ANESTHESIOLOGY

## 2025-01-03 PROCEDURE — C1887 CATHETER, GUIDING: HCPCS | Performed by: SURGERY

## 2025-01-03 PROCEDURE — 76000 FLUOROSCOPY <1 HR PHYS/QHP: CPT

## 2025-01-03 PROCEDURE — 85730 THROMBOPLASTIN TIME PARTIAL: CPT | Performed by: STUDENT IN AN ORGANIZED HEALTH CARE EDUCATION/TRAINING PROGRAM

## 2025-01-03 PROCEDURE — 82803 BLOOD GASES ANY COMBINATION: CPT

## 2025-01-03 PROCEDURE — 80053 COMPREHEN METABOLIC PANEL: CPT | Performed by: STUDENT IN AN ORGANIZED HEALTH CARE EDUCATION/TRAINING PROGRAM

## 2025-01-03 PROCEDURE — 86901 BLOOD TYPING SEROLOGIC RH(D): CPT | Performed by: PHYSICIAN ASSISTANT

## 2025-01-03 PROCEDURE — 25010000002 MORPHINE PER 10 MG: Performed by: STUDENT IN AN ORGANIZED HEALTH CARE EDUCATION/TRAINING PROGRAM

## 2025-01-03 PROCEDURE — 25010000002 CEFAZOLIN PER 500 MG: Performed by: NURSE PRACTITIONER

## 2025-01-03 PROCEDURE — 82805 BLOOD GASES W/O2 SATURATION: CPT

## 2025-01-03 PROCEDURE — C1751 CATH, INF, PER/CENT/MIDLINE: HCPCS

## 2025-01-03 PROCEDURE — 34713 PERQ ACCESS & CLSR FEM ART: CPT | Performed by: STUDENT IN AN ORGANIZED HEALTH CARE EDUCATION/TRAINING PROGRAM

## 2025-01-03 PROCEDURE — 86850 RBC ANTIBODY SCREEN: CPT | Performed by: PHYSICIAN ASSISTANT

## 2025-01-03 PROCEDURE — C1725 CATH, TRANSLUMIN NON-LASER: HCPCS | Performed by: SURGERY

## 2025-01-03 PROCEDURE — 04V03DZ RESTRICTION OF ABDOMINAL AORTA WITH INTRALUMINAL DEVICE, PERCUTANEOUS APPROACH: ICD-10-PCS | Performed by: STUDENT IN AN ORGANIZED HEALTH CARE EDUCATION/TRAINING PROGRAM

## 2025-01-03 PROCEDURE — 25010000002 PROPOFOL 10 MG/ML EMULSION: Performed by: ANESTHESIOLOGY

## 2025-01-03 PROCEDURE — 83050 HGB METHEMOGLOBIN QUAN: CPT

## 2025-01-03 PROCEDURE — 86900 BLOOD TYPING SEROLOGIC ABO: CPT | Performed by: PHYSICIAN ASSISTANT

## 2025-01-03 PROCEDURE — 25010000002 LIDOCAINE PF 2% 2 % SOLUTION: Performed by: NURSE ANESTHETIST, CERTIFIED REGISTERED

## 2025-01-03 PROCEDURE — 25010000002 MIDAZOLAM PER 1 MG: Performed by: ANESTHESIOLOGY

## 2025-01-03 PROCEDURE — 37221 PR REVSC OPN/PRQ ILIAC ART W/STNT PLMT & ANGIOPLSTY: CPT | Performed by: STUDENT IN AN ORGANIZED HEALTH CARE EDUCATION/TRAINING PROGRAM

## 2025-01-03 PROCEDURE — 25010000002 BUPIVACAINE 0.5 % SOLUTION: Performed by: SURGERY

## 2025-01-03 PROCEDURE — 37221 PR REVSC OPN/PRQ ILIAC ART W/STNT PLMT & ANGIOPLSTY: CPT | Performed by: SURGERY

## 2025-01-03 PROCEDURE — 25010000002 PROPOFOL 10 MG/ML EMULSION: Performed by: NURSE ANESTHETIST, CERTIFIED REGISTERED

## 2025-01-03 PROCEDURE — 25010000002 HEPARIN (PORCINE) PER 1000 UNITS: Performed by: NURSE ANESTHETIST, CERTIFIED REGISTERED

## 2025-01-03 PROCEDURE — 25010000002 PROTAMINE SULFATE PER 10 MG: Performed by: NURSE ANESTHETIST, CERTIFIED REGISTERED

## 2025-01-03 PROCEDURE — 25010000002 FUROSEMIDE PER 20 MG: Performed by: NURSE ANESTHETIST, CERTIFIED REGISTERED

## 2025-01-03 PROCEDURE — B4101ZZ FLUOROSCOPY OF ABDOMINAL AORTA USING LOW OSMOLAR CONTRAST: ICD-10-PCS | Performed by: STUDENT IN AN ORGANIZED HEALTH CARE EDUCATION/TRAINING PROGRAM

## 2025-01-03 PROCEDURE — 25010000002 DROPERIDOL PER 5 MG: Performed by: NURSE ANESTHETIST, CERTIFIED REGISTERED

## 2025-01-03 PROCEDURE — 82375 ASSAY CARBOXYHB QUANT: CPT

## 2025-01-03 PROCEDURE — 047J3DZ DILATION OF LEFT EXTERNAL ILIAC ARTERY WITH INTRALUMINAL DEVICE, PERCUTANEOUS APPROACH: ICD-10-PCS | Performed by: STUDENT IN AN ORGANIZED HEALTH CARE EDUCATION/TRAINING PROGRAM

## 2025-01-03 DEVICE — STENTGR AAA ENDURANT2 LW 14F 16X16X124MM: Type: IMPLANTABLE DEVICE | Site: ARTERY ILIAC | Status: FUNCTIONAL

## 2025-01-03 DEVICE — OMNILINK ELITE VASCULAR BALLOON-EXPANDABLE STENT SYSTEM 9.0 MM X 39 MM X 80 CM OVER-THE-WIRE
Type: IMPLANTABLE DEVICE | Site: ARTERY ILIAC | Status: FUNCTIONAL
Brand: OMNILINK ELITE

## 2025-01-03 DEVICE — STENTGR AAA ENDURANT2 LW 16F 16X10X156MM: Type: IMPLANTABLE DEVICE | Site: ARTERY ILIAC | Status: FUNCTIONAL

## 2025-01-03 DEVICE — STENTGR AAA ENDURANT2 LW 16F 16X20X124MM: Type: IMPLANTABLE DEVICE | Site: ARTERY ILIAC | Status: FUNCTIONAL

## 2025-01-03 DEVICE — IMPLANTABLE DEVICE: Type: IMPLANTABLE DEVICE | Site: AORTA | Status: FUNCTIONAL

## 2025-01-03 RX ORDER — SODIUM CHLORIDE 0.9 % (FLUSH) 0.9 %
10 SYRINGE (ML) INJECTION AS NEEDED
Status: DISCONTINUED | OUTPATIENT
Start: 2025-01-03 | End: 2025-01-15 | Stop reason: HOSPADM

## 2025-01-03 RX ORDER — SODIUM CHLORIDE 9 MG/ML
110 INJECTION, SOLUTION INTRAVENOUS CONTINUOUS
Status: DISPENSED | OUTPATIENT
Start: 2025-01-03 | End: 2025-01-05

## 2025-01-03 RX ORDER — CHLORHEXIDINE GLUCONATE 500 MG/1
1 CLOTH TOPICAL EVERY 24 HOURS
Status: DISCONTINUED | OUTPATIENT
Start: 2025-01-04 | End: 2025-01-08

## 2025-01-03 RX ORDER — NEOSTIGMINE METHYLSULFATE 5 MG/5 ML
SYRINGE (ML) INTRAVENOUS AS NEEDED
Status: DISCONTINUED | OUTPATIENT
Start: 2025-01-03 | End: 2025-01-03 | Stop reason: SURG

## 2025-01-03 RX ORDER — HEPARIN SODIUM 1000 [USP'U]/ML
4000 INJECTION, SOLUTION INTRAVENOUS; SUBCUTANEOUS AS NEEDED
Status: DISCONTINUED | OUTPATIENT
Start: 2025-01-03 | End: 2025-01-05

## 2025-01-03 RX ORDER — NITROGLYCERIN 0.4 MG/1
0.4 TABLET SUBLINGUAL
Status: DISCONTINUED | OUTPATIENT
Start: 2025-01-03 | End: 2025-01-03 | Stop reason: SDUPTHER

## 2025-01-03 RX ORDER — SODIUM CHLORIDE 9 MG/ML
40 INJECTION, SOLUTION INTRAVENOUS AS NEEDED
Status: DISCONTINUED | OUTPATIENT
Start: 2025-01-03 | End: 2025-01-15 | Stop reason: HOSPADM

## 2025-01-03 RX ORDER — SODIUM CHLORIDE 0.9 % (FLUSH) 0.9 %
10 SYRINGE (ML) INJECTION EVERY 12 HOURS SCHEDULED
Status: DISCONTINUED | OUTPATIENT
Start: 2025-01-03 | End: 2025-01-15 | Stop reason: HOSPADM

## 2025-01-03 RX ORDER — FENTANYL CITRATE 50 UG/ML
50 INJECTION, SOLUTION INTRAMUSCULAR; INTRAVENOUS
Status: DISCONTINUED | OUTPATIENT
Start: 2025-01-03 | End: 2025-01-03 | Stop reason: HOSPADM

## 2025-01-03 RX ORDER — ENALAPRILAT 1.25 MG/ML
1.25 INJECTION INTRAVENOUS ONCE
Status: COMPLETED | OUTPATIENT
Start: 2025-01-03 | End: 2025-01-03

## 2025-01-03 RX ORDER — PROTAMINE SULFATE 10 MG/ML
INJECTION, SOLUTION INTRAVENOUS AS NEEDED
Status: DISCONTINUED | OUTPATIENT
Start: 2025-01-03 | End: 2025-01-03 | Stop reason: SURG

## 2025-01-03 RX ORDER — ACETAMINOPHEN 325 MG/1
650 TABLET ORAL EVERY 8 HOURS
Status: DISPENSED | OUTPATIENT
Start: 2025-01-03 | End: 2025-01-05

## 2025-01-03 RX ORDER — CLONIDINE HYDROCHLORIDE 0.1 MG/1
0.2 TABLET ORAL 2 TIMES DAILY
Status: DISCONTINUED | OUTPATIENT
Start: 2025-01-03 | End: 2025-01-03

## 2025-01-03 RX ORDER — BISACODYL 5 MG/1
5 TABLET, DELAYED RELEASE ORAL DAILY PRN
Status: DISCONTINUED | OUTPATIENT
Start: 2025-01-03 | End: 2025-01-08

## 2025-01-03 RX ORDER — HEPARIN SODIUM 1000 [USP'U]/ML
INJECTION, SOLUTION INTRAVENOUS; SUBCUTANEOUS AS NEEDED
Status: DISCONTINUED | OUTPATIENT
Start: 2025-01-03 | End: 2025-01-03 | Stop reason: SURG

## 2025-01-03 RX ORDER — PROPOFOL 10 MG/ML
INJECTION, EMULSION INTRAVENOUS
Status: COMPLETED
Start: 2025-01-03 | End: 2025-01-03

## 2025-01-03 RX ORDER — FENTANYL CITRATE 50 UG/ML
INJECTION, SOLUTION INTRAMUSCULAR; INTRAVENOUS AS NEEDED
Status: DISCONTINUED | OUTPATIENT
Start: 2025-01-03 | End: 2025-01-03 | Stop reason: SURG

## 2025-01-03 RX ORDER — AMOXICILLIN 250 MG
2 CAPSULE ORAL 2 TIMES DAILY
Status: DISCONTINUED | OUTPATIENT
Start: 2025-01-03 | End: 2025-01-08

## 2025-01-03 RX ORDER — LOSARTAN POTASSIUM 50 MG/1
25 TABLET ORAL DAILY
Status: DISCONTINUED | OUTPATIENT
Start: 2025-01-03 | End: 2025-01-03

## 2025-01-03 RX ORDER — AMLODIPINE BESYLATE 10 MG/1
10 TABLET ORAL DAILY
Status: DISCONTINUED | OUTPATIENT
Start: 2025-01-04 | End: 2025-01-08

## 2025-01-03 RX ORDER — LABETALOL HYDROCHLORIDE 5 MG/ML
5 INJECTION, SOLUTION INTRAVENOUS
Status: DISCONTINUED | OUTPATIENT
Start: 2025-01-03 | End: 2025-01-03 | Stop reason: HOSPADM

## 2025-01-03 RX ORDER — ONDANSETRON 4 MG/1
4 TABLET, ORALLY DISINTEGRATING ORAL EVERY 6 HOURS PRN
Status: DISCONTINUED | OUTPATIENT
Start: 2025-01-03 | End: 2025-01-15 | Stop reason: HOSPADM

## 2025-01-03 RX ORDER — LIDOCAINE HYDROCHLORIDE 20 MG/ML
INJECTION, SOLUTION EPIDURAL; INFILTRATION; INTRACAUDAL; PERINEURAL AS NEEDED
Status: DISCONTINUED | OUTPATIENT
Start: 2025-01-03 | End: 2025-01-03 | Stop reason: SURG

## 2025-01-03 RX ORDER — NOREPINEPHRINE BITARTRATE 0.03 MG/ML
.02-.3 INJECTION, SOLUTION INTRAVENOUS
Status: DISCONTINUED | OUTPATIENT
Start: 2025-01-03 | End: 2025-01-04

## 2025-01-03 RX ORDER — NALOXONE HCL 0.4 MG/ML
0.04 VIAL (ML) INJECTION AS NEEDED
Status: DISCONTINUED | OUTPATIENT
Start: 2025-01-03 | End: 2025-01-03 | Stop reason: HOSPADM

## 2025-01-03 RX ORDER — METOPROLOL TARTRATE 25 MG/1
25 TABLET, FILM COATED ORAL 2 TIMES DAILY
Status: DISCONTINUED | OUTPATIENT
Start: 2025-01-03 | End: 2025-01-03

## 2025-01-03 RX ORDER — HYDROCODONE BITARTRATE AND ACETAMINOPHEN 5; 325 MG/1; MG/1
1 TABLET ORAL EVERY 6 HOURS PRN
Status: DISCONTINUED | OUTPATIENT
Start: 2025-01-03 | End: 2025-01-08

## 2025-01-03 RX ORDER — CLONIDINE HYDROCHLORIDE 0.1 MG/1
0.2 TABLET ORAL 2 TIMES DAILY
Status: DISCONTINUED | OUTPATIENT
Start: 2025-01-04 | End: 2025-01-08

## 2025-01-03 RX ORDER — SODIUM CHLORIDE, SODIUM LACTATE, POTASSIUM CHLORIDE, CALCIUM CHLORIDE 600; 310; 30; 20 MG/100ML; MG/100ML; MG/100ML; MG/100ML
100 INJECTION, SOLUTION INTRAVENOUS CONTINUOUS
Status: DISCONTINUED | OUTPATIENT
Start: 2025-01-03 | End: 2025-01-04

## 2025-01-03 RX ORDER — ACETAMINOPHEN 160 MG/5ML
650 SOLUTION ORAL EVERY 8 HOURS
Status: ACTIVE | OUTPATIENT
Start: 2025-01-03 | End: 2025-01-05

## 2025-01-03 RX ORDER — EPHEDRINE SULFATE 50 MG/ML
INJECTION INTRAVENOUS AS NEEDED
Status: DISCONTINUED | OUTPATIENT
Start: 2025-01-03 | End: 2025-01-03 | Stop reason: SURG

## 2025-01-03 RX ORDER — SODIUM CHLORIDE 0.9 % (FLUSH) 0.9 %
3-10 SYRINGE (ML) INJECTION AS NEEDED
Status: DISCONTINUED | OUTPATIENT
Start: 2025-01-03 | End: 2025-01-03 | Stop reason: HOSPADM

## 2025-01-03 RX ORDER — CHLORHEXIDINE GLUCONATE 500 MG/1
1 CLOTH TOPICAL ONCE
Status: DISCONTINUED | OUTPATIENT
Start: 2025-01-03 | End: 2025-01-06

## 2025-01-03 RX ORDER — GLYCOPYRROLATE 0.2 MG/ML
INJECTION INTRAMUSCULAR; INTRAVENOUS AS NEEDED
Status: DISCONTINUED | OUTPATIENT
Start: 2025-01-03 | End: 2025-01-03 | Stop reason: SURG

## 2025-01-03 RX ORDER — SODIUM CHLORIDE 0.9 % (FLUSH) 0.9 %
3 SYRINGE (ML) INJECTION EVERY 12 HOURS SCHEDULED
Status: DISCONTINUED | OUTPATIENT
Start: 2025-01-03 | End: 2025-01-03 | Stop reason: HOSPADM

## 2025-01-03 RX ORDER — HEPARIN SODIUM 10000 [USP'U]/100ML
10.97 INJECTION, SOLUTION INTRAVENOUS
Status: DISCONTINUED | OUTPATIENT
Start: 2025-01-03 | End: 2025-01-05

## 2025-01-03 RX ORDER — HYDRALAZINE HYDROCHLORIDE 25 MG/1
25 TABLET, FILM COATED ORAL 3 TIMES DAILY
COMMUNITY
End: 2025-01-15 | Stop reason: HOSPADM

## 2025-01-03 RX ORDER — BISACODYL 10 MG
10 SUPPOSITORY, RECTAL RECTAL DAILY PRN
Status: DISCONTINUED | OUTPATIENT
Start: 2025-01-03 | End: 2025-01-08

## 2025-01-03 RX ORDER — HEPARIN SODIUM 1000 [USP'U]/ML
2000 INJECTION, SOLUTION INTRAVENOUS; SUBCUTANEOUS AS NEEDED
Status: DISCONTINUED | OUTPATIENT
Start: 2025-01-03 | End: 2025-01-05

## 2025-01-03 RX ORDER — HYDROMORPHONE HYDROCHLORIDE 1 MG/ML
0.5 INJECTION, SOLUTION INTRAMUSCULAR; INTRAVENOUS; SUBCUTANEOUS
Status: DISCONTINUED | OUTPATIENT
Start: 2025-01-03 | End: 2025-01-03 | Stop reason: HOSPADM

## 2025-01-03 RX ORDER — OXYCODONE AND ACETAMINOPHEN 10; 325 MG/1; MG/1
1 TABLET ORAL EVERY 4 HOURS PRN
Status: DISCONTINUED | OUTPATIENT
Start: 2025-01-03 | End: 2025-01-03 | Stop reason: HOSPADM

## 2025-01-03 RX ORDER — BUPIVACAINE HYDROCHLORIDE 5 MG/ML
INJECTION, SOLUTION PERINEURAL AS NEEDED
Status: DISCONTINUED | OUTPATIENT
Start: 2025-01-03 | End: 2025-01-03 | Stop reason: HOSPADM

## 2025-01-03 RX ORDER — FUROSEMIDE 10 MG/ML
INJECTION INTRAMUSCULAR; INTRAVENOUS AS NEEDED
Status: DISCONTINUED | OUTPATIENT
Start: 2025-01-03 | End: 2025-01-03 | Stop reason: SURG

## 2025-01-03 RX ORDER — IOPAMIDOL 612 MG/ML
INJECTION, SOLUTION INTRAVASCULAR AS NEEDED
Status: DISCONTINUED | OUTPATIENT
Start: 2025-01-03 | End: 2025-01-03 | Stop reason: HOSPADM

## 2025-01-03 RX ORDER — METOPROLOL SUCCINATE 50 MG/1
50 TABLET, EXTENDED RELEASE ORAL 2 TIMES DAILY
COMMUNITY
End: 2025-01-15 | Stop reason: HOSPADM

## 2025-01-03 RX ORDER — IOPAMIDOL 612 MG/ML
100 INJECTION, SOLUTION INTRAVASCULAR
Status: COMPLETED | OUTPATIENT
Start: 2025-01-03 | End: 2025-01-03

## 2025-01-03 RX ORDER — POLYETHYLENE GLYCOL 3350 17 G/17G
17 POWDER, FOR SOLUTION ORAL DAILY PRN
Status: DISCONTINUED | OUTPATIENT
Start: 2025-01-03 | End: 2025-01-08

## 2025-01-03 RX ORDER — HYDRALAZINE HYDROCHLORIDE 50 MG/1
25 TABLET, FILM COATED ORAL 3 TIMES DAILY
Status: DISCONTINUED | OUTPATIENT
Start: 2025-01-04 | End: 2025-01-03

## 2025-01-03 RX ORDER — HYDRALAZINE HYDROCHLORIDE 50 MG/1
25 TABLET, FILM COATED ORAL 3 TIMES DAILY
Status: DISCONTINUED | OUTPATIENT
Start: 2025-01-03 | End: 2025-01-03

## 2025-01-03 RX ORDER — AMLODIPINE BESYLATE 5 MG/1
10 TABLET ORAL DAILY
Status: DISCONTINUED | OUTPATIENT
Start: 2025-01-04 | End: 2025-01-03

## 2025-01-03 RX ORDER — ASPIRIN 325 MG
325 TABLET, DELAYED RELEASE (ENTERIC COATED) ORAL EVERY 6 HOURS PRN
COMMUNITY
End: 2025-01-15 | Stop reason: HOSPADM

## 2025-01-03 RX ORDER — ASPIRIN 81 MG/1
81 TABLET, CHEWABLE ORAL DAILY
Status: DISCONTINUED | OUTPATIENT
Start: 2025-01-04 | End: 2025-01-15 | Stop reason: HOSPADM

## 2025-01-03 RX ORDER — VECURONIUM BROMIDE 1 MG/ML
INJECTION, POWDER, LYOPHILIZED, FOR SOLUTION INTRAVENOUS AS NEEDED
Status: DISCONTINUED | OUTPATIENT
Start: 2025-01-03 | End: 2025-01-03 | Stop reason: SURG

## 2025-01-03 RX ORDER — FLUMAZENIL 0.1 MG/ML
0.2 INJECTION INTRAVENOUS AS NEEDED
Status: DISCONTINUED | OUTPATIENT
Start: 2025-01-03 | End: 2025-01-03 | Stop reason: HOSPADM

## 2025-01-03 RX ORDER — NALOXONE HCL 0.4 MG/ML
0.4 VIAL (ML) INJECTION
Status: DISCONTINUED | OUTPATIENT
Start: 2025-01-03 | End: 2025-01-05

## 2025-01-03 RX ORDER — HYDRALAZINE HYDROCHLORIDE 25 MG/1
25 TABLET, FILM COATED ORAL 3 TIMES DAILY
Status: DISCONTINUED | OUTPATIENT
Start: 2025-01-04 | End: 2025-01-05

## 2025-01-03 RX ORDER — ATORVASTATIN CALCIUM 10 MG/1
10 TABLET, FILM COATED ORAL DAILY
Status: DISCONTINUED | OUTPATIENT
Start: 2025-01-03 | End: 2025-01-15 | Stop reason: HOSPADM

## 2025-01-03 RX ORDER — CLONIDINE HYDROCHLORIDE 0.1 MG/1
0.2 TABLET ORAL 2 TIMES DAILY
Status: DISCONTINUED | OUTPATIENT
Start: 2025-01-04 | End: 2025-01-03

## 2025-01-03 RX ORDER — PROPOFOL 10 MG/ML
VIAL (ML) INTRAVENOUS AS NEEDED
Status: DISCONTINUED | OUTPATIENT
Start: 2025-01-03 | End: 2025-01-03 | Stop reason: SURG

## 2025-01-03 RX ORDER — HYDRALAZINE HYDROCHLORIDE 20 MG/ML
10 INJECTION INTRAMUSCULAR; INTRAVENOUS EVERY 4 HOURS PRN
Status: DISCONTINUED | OUTPATIENT
Start: 2025-01-03 | End: 2025-01-15 | Stop reason: HOSPADM

## 2025-01-03 RX ORDER — MIDAZOLAM HYDROCHLORIDE 2 MG/2ML
INJECTION, SOLUTION INTRAMUSCULAR; INTRAVENOUS
Status: DISPENSED
Start: 2025-01-03 | End: 2025-01-04

## 2025-01-03 RX ORDER — DROPERIDOL 2.5 MG/ML
INJECTION, SOLUTION INTRAMUSCULAR; INTRAVENOUS AS NEEDED
Status: DISCONTINUED | OUTPATIENT
Start: 2025-01-03 | End: 2025-01-03 | Stop reason: SURG

## 2025-01-03 RX ORDER — METOPROLOL TARTRATE 25 MG/1
25 TABLET, FILM COATED ORAL 2 TIMES DAILY
Status: DISCONTINUED | OUTPATIENT
Start: 2025-01-04 | End: 2025-01-04

## 2025-01-03 RX ORDER — MIDAZOLAM HYDROCHLORIDE 1 MG/ML
INJECTION, SOLUTION INTRAMUSCULAR; INTRAVENOUS AS NEEDED
Status: DISCONTINUED | OUTPATIENT
Start: 2025-01-03 | End: 2025-01-03 | Stop reason: SURG

## 2025-01-03 RX ORDER — ONDANSETRON 2 MG/ML
INJECTION INTRAMUSCULAR; INTRAVENOUS AS NEEDED
Status: DISCONTINUED | OUTPATIENT
Start: 2025-01-03 | End: 2025-01-03 | Stop reason: SURG

## 2025-01-03 RX ORDER — ONDANSETRON 2 MG/ML
4 INJECTION INTRAMUSCULAR; INTRAVENOUS
Status: DISCONTINUED | OUTPATIENT
Start: 2025-01-03 | End: 2025-01-03 | Stop reason: HOSPADM

## 2025-01-03 RX ORDER — HYDROMORPHONE HYDROCHLORIDE 1 MG/ML
0.5 INJECTION, SOLUTION INTRAMUSCULAR; INTRAVENOUS; SUBCUTANEOUS
Status: DISCONTINUED | OUTPATIENT
Start: 2025-01-03 | End: 2025-01-05

## 2025-01-03 RX ORDER — NITROGLYCERIN 0.4 MG/1
0.4 TABLET SUBLINGUAL
Status: DISCONTINUED | OUTPATIENT
Start: 2025-01-03 | End: 2025-01-15 | Stop reason: HOSPADM

## 2025-01-03 RX ORDER — AMLODIPINE BESYLATE 10 MG/1
10 TABLET ORAL DAILY
Status: DISCONTINUED | OUTPATIENT
Start: 2025-01-03 | End: 2025-01-03

## 2025-01-03 RX ORDER — LOSARTAN POTASSIUM 25 MG/1
25 TABLET ORAL DAILY
Status: DISCONTINUED | OUTPATIENT
Start: 2025-01-04 | End: 2025-01-05

## 2025-01-03 RX ORDER — SODIUM CHLORIDE 9 MG/ML
40 INJECTION, SOLUTION INTRAVENOUS AS NEEDED
Status: DISCONTINUED | OUTPATIENT
Start: 2025-01-03 | End: 2025-01-03 | Stop reason: HOSPADM

## 2025-01-03 RX ADMIN — MIDAZOLAM 2 MG: 1 INJECTION INTRAMUSCULAR; INTRAVENOUS at 16:51

## 2025-01-03 RX ADMIN — HYDROMORPHONE HYDROCHLORIDE 1 MG: 1 INJECTION, SOLUTION INTRAMUSCULAR; INTRAVENOUS; SUBCUTANEOUS at 16:29

## 2025-01-03 RX ADMIN — EPHEDRINE SULFATE 5 MG: 50 INJECTION INTRAVENOUS at 13:18

## 2025-01-03 RX ADMIN — HEPARIN SODIUM 3000 UNITS: 1000 INJECTION, SOLUTION INTRAVENOUS; SUBCUTANEOUS at 15:47

## 2025-01-03 RX ADMIN — ONDANSETRON 4 MG: 2 INJECTION INTRAMUSCULAR; INTRAVENOUS at 12:54

## 2025-01-03 RX ADMIN — NOREPINEPHRINE BITARTRATE 0.02 MCG/KG/MIN: 0.03 INJECTION, SOLUTION INTRAVENOUS at 19:47

## 2025-01-03 RX ADMIN — CEFAZOLIN 2 G: 2 INJECTION, POWDER, FOR SOLUTION INTRAMUSCULAR; INTRAVENOUS at 12:54

## 2025-01-03 RX ADMIN — FENTANYL CITRATE 100 MCG: 50 INJECTION, SOLUTION INTRAMUSCULAR; INTRAVENOUS at 13:54

## 2025-01-03 RX ADMIN — MORPHINE SULFATE 4 MG: 4 INJECTION, SOLUTION INTRAMUSCULAR; INTRAVENOUS at 03:56

## 2025-01-03 RX ADMIN — EPHEDRINE SULFATE 10 MG: 50 INJECTION INTRAVENOUS at 13:34

## 2025-01-03 RX ADMIN — FENTANYL CITRATE 250 MCG: 50 INJECTION, SOLUTION INTRAMUSCULAR; INTRAVENOUS at 12:56

## 2025-01-03 RX ADMIN — DROPERIDOL 1.25 MG: 2.5 INJECTION, SOLUTION INTRAMUSCULAR; INTRAVENOUS at 12:54

## 2025-01-03 RX ADMIN — Medication 3 MG: at 16:26

## 2025-01-03 RX ADMIN — HEPARIN SODIUM 2000 UNITS: 1000 INJECTION, SOLUTION INTRAVENOUS; SUBCUTANEOUS at 14:27

## 2025-01-03 RX ADMIN — PROPOFOL 100 MG: 10 INJECTION, EMULSION INTRAVENOUS at 12:56

## 2025-01-03 RX ADMIN — SODIUM CHLORIDE, POTASSIUM CHLORIDE, SODIUM LACTATE AND CALCIUM CHLORIDE: 600; 310; 30; 20 INJECTION, SOLUTION INTRAVENOUS at 12:54

## 2025-01-03 RX ADMIN — EPHEDRINE SULFATE 10 MG: 50 INJECTION INTRAVENOUS at 13:06

## 2025-01-03 RX ADMIN — PROTAMINE SULFATE 20 MG: 10 INJECTION, SOLUTION INTRAVENOUS at 15:38

## 2025-01-03 RX ADMIN — Medication 1 APPLICATION: at 20:33

## 2025-01-03 RX ADMIN — PROPOFOL INJECTABLE EMULSION 50 MCG/KG/MIN: 10 INJECTION, EMULSION INTRAVENOUS at 17:39

## 2025-01-03 RX ADMIN — IOPAMIDOL 100 ML: 612 INJECTION, SOLUTION INTRAVENOUS at 04:19

## 2025-01-03 RX ADMIN — HEPARIN SODIUM 10.97 UNITS/KG/HR: 10000 INJECTION, SOLUTION INTRAVENOUS at 20:21

## 2025-01-03 RX ADMIN — PROPOFOL INJECTABLE EMULSION 40 MCG/KG/MIN: 10 INJECTION, EMULSION INTRAVENOUS at 22:03

## 2025-01-03 RX ADMIN — NICARDIPINE HYDROCHLORIDE 5 MG/HR: 0.1 INJECTION INTRAVENOUS at 11:21

## 2025-01-03 RX ADMIN — FENTANYL CITRATE 100 MCG: 50 INJECTION, SOLUTION INTRAMUSCULAR; INTRAVENOUS at 14:57

## 2025-01-03 RX ADMIN — FUROSEMIDE 10 MG: 10 INJECTION, SOLUTION INTRAVENOUS at 13:10

## 2025-01-03 RX ADMIN — LIDOCAINE HYDROCHLORIDE 200 MG: 20 INJECTION, SOLUTION EPIDURAL; INFILTRATION; INTRACAUDAL; PERINEURAL at 12:56

## 2025-01-03 RX ADMIN — EPHEDRINE SULFATE 10 MG: 50 INJECTION INTRAVENOUS at 14:46

## 2025-01-03 RX ADMIN — GLYCOPYRROLATE 0.4 MG: 0.2 INJECTION INTRAMUSCULAR; INTRAVENOUS at 16:26

## 2025-01-03 RX ADMIN — PROPOFOL 50 MCG/KG/MIN: 10 INJECTION, EMULSION INTRAVENOUS at 17:39

## 2025-01-03 RX ADMIN — Medication 10 ML: at 20:34

## 2025-01-03 RX ADMIN — HEPARIN SODIUM 1000 UNITS: 1000 INJECTION, SOLUTION INTRAVENOUS; SUBCUTANEOUS at 13:17

## 2025-01-03 RX ADMIN — ENALAPRILAT 1.25 MG: 2.5 INJECTION INTRAVENOUS at 08:17

## 2025-01-03 RX ADMIN — SODIUM CHLORIDE 1000 ML: 9 INJECTION, SOLUTION INTRAVENOUS at 03:56

## 2025-01-03 RX ADMIN — HEPARIN SODIUM 7000 UNITS: 1000 INJECTION, SOLUTION INTRAVENOUS; SUBCUTANEOUS at 13:24

## 2025-01-03 RX ADMIN — VECURONIUM BROMIDE 3 MG: 1 INJECTION, POWDER, LYOPHILIZED, FOR SOLUTION INTRAVENOUS at 14:56

## 2025-01-03 RX ADMIN — VECURONIUM BROMIDE 3 MG: 1 INJECTION, POWDER, LYOPHILIZED, FOR SOLUTION INTRAVENOUS at 14:07

## 2025-01-03 RX ADMIN — EPHEDRINE SULFATE 10 MG: 50 INJECTION INTRAVENOUS at 15:07

## 2025-01-03 RX ADMIN — VECURONIUM BROMIDE 7 MG: 1 INJECTION, POWDER, LYOPHILIZED, FOR SOLUTION INTRAVENOUS at 12:56

## 2025-01-03 RX ADMIN — FUROSEMIDE 10 MG: 10 INJECTION, SOLUTION INTRAVENOUS at 14:03

## 2025-01-03 NOTE — ANESTHESIA PREPROCEDURE EVALUATION
Anesthesia Evaluation     Patient summary reviewed   no history of anesthetic complications:   NPO Solid Status: > 8 hours  NPO Liquid Status: > 8 hours           Airway   Mallampati: II  TM distance: >3 FB  Neck ROM: full  No difficulty expected  Dental - normal exam     Pulmonary    (+) a smoker Current,  (-) asthma, sleep apnea  Cardiovascular     (+) hypertension 2 medications or greater, PVD,  carotid artery disease    ROS comment: CT Abdomen  IMPRESSION:  1. Fusiform aneurysm of the distal abdominal aorta as detailed above.  There is partial lumen circumferential mural thrombosis of the aneurysm.  There is evidence of hematoma/hemorrhage at the posterior wall of the  aneurysm as detailed above. Further evaluation with CT angiography of  the abdomen may be obtained.  2. A saccular aneurysm of the mid abdominal aorta adjacent and below the  level of the right renal arteries and posterior to the IVC. Details  given above.  3. No other acute findings in the abdomen or pelvis to account for  patient's symptoms. No renal or ureteral calculi or obstructive  uropathy.  4. No gallstones. No finding to suggest appendicitis.            Neuro/Psych  (+) CVA (9 years ago, weakness and numbness on left upper and lower extremity uses cane for mobility)  (-) seizures, TIA  GI/Hepatic/Renal/Endo    (+) diabetes mellitus (borderline)  (-) liver disease, no renal disease    Musculoskeletal     Abdominal    Substance History      OB/GYN          Other                      Anesthesia Plan    ASA 5 - emergent     general and Kelly     intravenous induction     Anesthetic plan, risks, benefits, and alternatives have been provided, discussed and informed consent has been obtained with: patient.    Use of blood products discussed with patient  Consented to blood products.      CODE STATUS:    Code Status (Patient has no pulse and is not breathing): CPR (Attempt to Resuscitate)  Medical Interventions (Patient has pulse or is  breathing): Full Support

## 2025-01-03 NOTE — ED PROVIDER NOTES
Subjective   History of Present Illness  This is a 69 y.o M with hx of stroke, cigarette smokin,HTN, L sided residual deficits, negative for hx of kidney stone or abd surgeries, in the emergency room for concerns of L flank pain, and LLQ pain that started at 1am after he had a BM. Positive for resolved nausea. No diaphoresis, chest pain or shortness of breath. Negative for bloody stools, abd distention or urinary symptoms. Pain radiates to his L groin. Denies swelling or concerns for hernia. Negative for previous similar pain in the past.         Review of Systems   Gastrointestinal:  Positive for abdominal pain.   All other systems reviewed and are negative.      Past Medical History:   Diagnosis Date    Hypertension     Stroke        No Known Allergies    Past Surgical History:   Procedure Laterality Date    CAROTID ENDARTERECTOMY      x 2       History reviewed. No pertinent family history.    Social History     Socioeconomic History    Marital status:    Tobacco Use    Smoking status: Every Day     Current packs/day: 1.00     Types: Cigarettes   Substance and Sexual Activity    Alcohol use: Not Currently    Drug use: Not Currently           Objective   Physical Exam  Constitutional:       Appearance: He is well-developed.   HENT:      Head: Normocephalic and atraumatic.   Cardiovascular:      Rate and Rhythm: Normal rate and regular rhythm.      Heart sounds: Normal heart sounds. No murmur heard.  Pulmonary:      Effort: No respiratory distress.      Breath sounds: Normal breath sounds. No stridor. No wheezing, rhonchi or rales.   Chest:      Chest wall: No tenderness.   Abdominal:      General: Bowel sounds are normal. There is no distension.      Comments: LLQ tenderness to palpation, L cva tenderness.    No guarding or rebound. No distention   Skin:     General: Skin is warm.      Capillary Refill: Capillary refill takes less than 2 seconds.   Neurological:      Mental Status: He is alert and oriented  to person, place, and time.      Comments: L residual weakness   Psychiatric:         Mood and Affect: Mood normal.         Procedures           ED Course  ED Course as of 01/03/25 0953   Fri Jan 03, 2025   0328 [place] Urine - Urine, Clean Catch [SG]   0806 Please refer to  report for the details vascular surgery was consulted on this patient with a possible aneurysm.  His blood pressure has been elevated in the ED.  Vascular surgery came and saw the patient was the patient be admitted to medicine service but he will be going to the OR to repair this. [TS]   0810 Vascular surgery does not think that the patient requires emergent surgery but they will take to the OR later today to fix aneurysms.  The 1 medicine admit the patient.  The patient blood pressure is elevated his pain is under control.  Will give Vasotec in the ED and admitted to medicine service. [TS]   0951 Case were discussed with the medicine service they wanted the patient to be admitted to ICU Case discussed with ICU the patient will be admitted on a Cardene drip for blood pressure control. [TS]      ED Course User Index  [SG] Santosh Terrell MD  [TS] Aj Gamboa MD                                                       Medical Decision Making  69 y.o M stable, positive for LLQ, L flank pain s/p Bm. Pending evaluation with CT with and without contrast Abd pelvis. Pending UA, cbc, chemistry. Morphine ordered for pain and IV fluids. Concerns for kidney stone, vs colitis, vs diverticulitis, vs rule out perforation after BM    Blood work reassuring, cbc reassuring. Pending recommendations for concerns of aneurysm possibly bleeding by vascular surgery. Call out was made.     Spoke to vascular NP Joie, waiting for vascular attending recommendations.     Problems Addressed:  Abdominal aortic aneurysm (AAA) without rupture, unspecified part: complicated acute illness or injury  Abdominal pain, unspecified abdominal location: complicated acute  illness or injury  Primary hypertension: complicated acute illness or injury    Amount and/or Complexity of Data Reviewed  Labs: ordered. Decision-making details documented in ED Course.     Details: As reviewed  Radiology: ordered.  Discussion of management or test interpretation with external provider(s): Discussed with vascular surgery Dr. Finney and with the hospitalist RAMIREZ    Risk  Prescription drug management.        Final diagnoses:   Abdominal pain, unspecified abdominal location   Abdominal aortic aneurysm (AAA) without rupture, unspecified part   Primary hypertension       ED Disposition  ED Disposition       ED Disposition   Decision to Admit    Condition   --    Comment   Level of Care: Critical Care [6]   Diagnosis: Abdominal aortic aneurysm [[id number]]   Admitting Physician: BEATRICE VERDE [[id number]]   Attending Physician: BEATRICE VERDE [[id number]]   Certification: I Certify That Inpatient Hospital Services Are Medically Necessary For Greater Than 2 Midnights                 No follow-up provider specified.       Medication List      No changes were made to your prescriptions during this visit.            Aj Gamboa MD  01/03/25 0813       Aj Gamboa MD  01/03/25 0953

## 2025-01-03 NOTE — BRIEF OP NOTE
ABDOMINAL AORTIC ANEURYSM REPAIR WITH ENDOGRAFT  Progress Note    Brijesh Grande  1/3/2025    Pre-op Diagnosis:   Abdominal pain, unspecified abdominal location [R10.9]       Post-Op Diagnosis Codes:     * Abdominal pain, unspecified abdominal location [R10.9]    Procedure/CPT® Codes:  Percutaneous access and closure of right common femoral artery  Placement of aortobiiliac endograft rupture  Concurrent placement of the extension stent down into left external iliac artery  Attempted left internal iliac artery coil embolization  Left common femoral artery cutdown with primary repair      Procedure(s):  ABDOMINAL AORTIC ANEURYSM REPAIR WITH ENDOGRAFT              Surgeon(s):  Eduardo Whiteside DO Sapp, Alexander, MD    Anesthesia: General    Staff:   Circulator: Zara Balbuena RN  Scrub Person: Ryan Walton  Assistant: Sury Cisneros  Vascular Radiology Technician: Radha Robb  Assistant: Sury Cisneros      Estimated Blood Loss: 100ml    Urine Voided: 1000 mL    Specimens:                None          Drains:   Urethral Catheter Silicone 16 Fr. (Active)           Complications: Rupture left external iliac artery, left lower extremity ischemia    Assistant: Sury Cisneros  was responsible for performing the following activities: Retraction, Suction, Irrigation, and Suturing and their skilled assistance was necessary for the success of this case.    Timmy Finney MD     Date: 1/3/2025  Time: 16:39 CST

## 2025-01-03 NOTE — CONSULTS
Brijesh Grande  [id number]  [id number]  [facility] OR/MAIN OR  Nathan Cortes MD  1/3/2025    Chief Complaint   Patient presents with    Abdominal Pain       HPI: Brijesh Grande is a 69 y.o. male who presented with left lower quadrant abdominal pain, left flank pain, back pain.  The patient had a previous left-sided stroke and has sensory deficits to the left side.  He states that he had sudden onset pain starting this a.m.  He then presented to the ER where he was found to have a greater than 5 cm aneurysm along with a left common iliac artery aneurysm.  He also had 2 saccular aneurysms in the more proximal infrarenal abdominal aorta.  Patient states that his pain has improved but is still present after pain medicine.  He does smoke.  There is also concern for potential contained hemorrhage along the posterior wall aneurysm.    Past Medical History:   Diagnosis Date    Hypertension     Stroke        Past Surgical History:   Procedure Laterality Date    CAROTID ENDARTERECTOMY      x 2       History reviewed. No pertinent family history.    Social History     Socioeconomic History    Marital status:    Tobacco Use    Smoking status: Every Day     Current packs/day: 1.00     Types: Cigarettes   Substance and Sexual Activity    Alcohol use: Not Currently    Drug use: Not Currently       No Known Allergies    Hospital Medications (active)         Dose Frequency Start End    amLODIPine (NORVASC) tablet 10 mg 10 mg Daily 1/4/2025 --    Admin Instructions: Hold for SBP less than 100, DBP less than 60.  Caution: Look alike/sound alike drug alert. Avoid grapefruit juice.    Route: Oral    Cosign for Ordering: Required by Nathan Cortes MD    atorvastatin (LIPITOR) tablet 10 mg 10 mg Daily 1/3/2025 --    Admin Instructions: Avoid grapefruit juice.    Route: Oral    Cosign for Ordering: Required by Nathan Cortes MD    bisacodyl (DULCOLAX) EC tablet 5 mg 5 mg Daily PRN 1/3/2025 --    Admin Instructions: Use if no bowel  "movement after 12 hours.  Swallow whole. Do not crush, split, or chew tablet.    Route: Oral    Cosign for Ordering: Required by Nathan Cortes MD    Linked Group 1: Placed in \"And\" Linked Group        bisacodyl (DULCOLAX) suppository 10 mg 10 mg Daily PRN 1/3/2025 --    Admin Instructions: Use if no bowel movement after 12 hours.  Hold for diarrhea    Route: Rectal    Cosign for Ordering: Required by Nathan Cortes MD    Linked Group 1: Placed in \"And\" Linked Group        ceFAZolin 2000 mg IVPB in 100 mL NS (MBP) 2,000 mg Once 1/3/2025 --    Admin Instructions: Administer Within 1 Hour of Surgical Incision.  Redose 4 Hours From Pre-Op Dose if Procedure Ongoing or Blood Loss Greater  Than 1.5 L    Caution: Look alike/sound alike drug alert    Route: Intravenous    Chlorhexidine Gluconate Cloth 2 % pads 1 Application 1 Application Once 1/3/2025 --    Admin Instructions: Use for admission bath and continue for length of critical care stay.    Route: Topical    Cosign for Ordering: Required by Nathan Cortes MD    Chlorhexidine Gluconate Cloth 2 % pads 1 Application 1 Application Every 24 Hours 1/4/2025 --    Admin Instructions: Use for admission bath and continue for length of critical care stay.    Route: Topical    Cosign for Ordering: Required by Nathan Cortes MD    cloNIDine (CATAPRES) tablet 0.2 mg 0.2 mg 2 Times Daily 1/4/2025 --    Admin Instructions: Hold for SBP less than 100, DBP less than 60, or heart rate less than 50. If a dose is held, please contact the provider.  Caution: Look alike/sound alike drug alert.    Route: Oral    Cosign for Ordering: Required by Nathan Cortes MD    hydrALAZINE (APRESOLINE) injection 10 mg 10 mg Every 4 Hours PRN 1/3/2025 --    Admin Instructions: Hold for SBP less than 100, DBP less than 60.  Caution: Look alike/sound alike drug alert    Route: Intravenous    Cosign for Ordering: Required by Nathan Cortes MD    hydrALAZINE (APRESOLINE) tablet 25 mg 25 mg 3 " Times Daily 1/4/2025 --    Admin Instructions: Hold for SBP less than 100, DBP less than 60.  Caution: Look alike/sound alike drug alert    Route: Oral    Cosign for Ordering: Required by Nathan Cortes MD    lactated ringers infusion 100 mL/hr Continuous 1/3/2025 1/4/2025    Route: Intravenous    losartan (COZAAR) tablet 25 mg 25 mg Daily 1/4/2025 --    Admin Instructions: Hold for SBP less than 100, DBP less than 60.    Route: Oral    Cosign for Ordering: Required by Nathan Cortes MD    metoprolol tartrate (LOPRESSOR) tablet 25 mg 25 mg 2 Times Daily 1/4/2025 --    Admin Instructions: Hold for SBP less than 100, DBP less than 60, or heart rate less than 50. If a dose is held, please contact the provider.    Route: Oral    Cosign for Ordering: Required by Nathan Cortes MD    mupirocin (BACTROBAN) 2 % nasal ointment 1 Application 1 Application 2 Times Daily 1/3/2025 1/8/2025    Admin Instructions: Begin on day 1 of ICU admission and administer for 5 days, even if patient transferred out of critical care.    MUPIROCIN APPLICATION:  1. Place patient's bed at 30 degrees, if tolerated.  2. Wash your hands with warm soapy water or use hand  .  3. Open the tube of mupirocin 2%.  4. Squeeze about 0.5 g (blueberry-size) of mupirocin from the  tube onto a sterile applicator or a cotton swab.  5. Apply the swab directly into nostril. Ensure coating of the  sides of the nostril.  6. Repeat with second sterile applicator for other nostril.  7. Gently press the sides of the nostrils together and massage  gently for 60 seconds.        Route: Each Nare    Cosign for Ordering: Required by Nathan Cortes MD    niCARdipine (CARDENE) 20 mg in 200 mL NS infusion 5-15 mg/hr Titrated 1/3/2025 --    Admin Instructions: For Acute Aortic Dissection - Initiate infusion at 5 mg/hr and titrate up or down by 2.5 - 5 mg/hr every 15 minutes if HR 60-80 but unable to achieve SBP less than or equal to 120 mm Hg with pain control  "and parenteral beta=blockers.  Maximum Dose = 15 mg/hr. Hold infusion for SBP less than 100 mm Hg. Contact provider if unable to maintain SBP Less Than or equal to 120 mm Hg on maximum dose.  Once SBP target achieved obtain vitals a minimum of every 30 minutes. Administer as a slow continuous infusion via central line or through a large peripheral vein. Peripheral venous irritation may be minimized by changing the site of infusion every 12 hours. Nicardipine 0.2mg/mL concentration must be infused via central line ONLY.  Caution: Look alike/sound alike drug alert    Route: Intravenous    Cosign for Ordering: Required by Nathan Cortes MD    nitroglycerin (NITROSTAT) SL tablet 0.4 mg 0.4 mg Every 5 Minutes PRN 1/3/2025 --    Admin Instructions: If Pain Unrelieved After 3 Doses Notify MD  May administer up to 3 doses per episode.    Route: Sublingual    Cosign for Ordering: Required by Nathan Cortes MD    polyethylene glycol (MIRALAX) packet 17 g 17 g Daily PRN 1/3/2025 --    Admin Instructions: Use if no bowel movement after 12 hours. Mix in 6-8 ounces of water.  Use 4-8 ounces of water, tea, or juice for each 17 gram dose.    Route: Oral    Cosign for Ordering: Required by Nathan Cortes MD    Linked Group 1: Placed in \"And\" Linked Group        sennosides-docusate (PERICOLACE) 8.6-50 MG per tablet 2 tablet 2 tablet 2 Times Daily 1/3/2025 --    Admin Instructions: HOLD MEDICATION IF PATIENT HAS HAD BOWEL MOVEMENT. Start bowel management regimen if patient has not had a bowel movement after 12 hours.    Route: Oral    Cosign for Ordering: Required by Nathan Cortes MD    Linked Group 1: Placed in \"And\" Linked Group        sodium chloride 0.9 % flush 10 mL 10 mL As Needed 1/3/2025 --    Route: Intravenous    Cosign for Ordering: Required by Santosh Terrell MD    sodium chloride 0.9 % flush 10 mL 10 mL Every 12 Hours Scheduled 1/3/2025 --    Route: Intravenous    Cosign for Ordering: Required by Nathan Cortes " MD AL    sodium chloride 0.9 % flush 10 mL 10 mL As Needed 1/3/2025 --    Route: Intravenous    Cosign for Ordering: Required by Nathan Cortes MD    sodium chloride 0.9 % flush 3 mL 3 mL Every 12 Hours Scheduled 1/3/2025 --    Route: Intravenous    sodium chloride 0.9 % flush 3-10 mL 3-10 mL As Needed 1/3/2025 --    Route: Intravenous    sodium chloride 0.9 % infusion 40 mL 40 mL As Needed 1/3/2025 --    Admin Instructions: Following administration of an IV intermittent medication, flush line with 40mL NS at 100mL/hr.    Route: Intravenous    Cosign for Ordering: Required by Nathan Cortes MD    sodium chloride 0.9 % infusion 40 mL 40 mL As Needed 1/3/2025 1/4/2025    Admin Instructions: Following administration of an IV intermittent medication, flush line with 40mL NS at 100mL/hr.    Route: Intravenous            Review of Systems   Constitutional:  Negative for diaphoresis and fever.   HENT: Negative.     Eyes: Negative.    Respiratory: Negative.  Negative for shortness of breath and wheezing.    Cardiovascular: Negative.  Negative for chest pain and leg swelling.   Gastrointestinal:  Positive for abdominal pain.   Endocrine: Negative.    Genitourinary: Negative.    Musculoskeletal: Negative.    Skin: Negative.    Allergic/Immunologic: Negative.    Neurological:  Positive for weakness. Negative for dizziness.   Hematological: Negative.    Psychiatric/Behavioral: Negative.         /98   Pulse 87   Temp 97.5 °F (36.4 °C) (Oral)   Resp 18   SpO2 91%    Physical Exam  Vitals and nursing note reviewed.   Constitutional:       General: He is not in acute distress.     Appearance: Normal appearance. He is not diaphoretic.   HENT:      Head: Normocephalic. No right periorbital erythema or left periorbital erythema.      Nose: Nose normal.   Eyes:      General: No scleral icterus.     Pupils: Pupils are equal.   Cardiovascular:      Rate and Rhythm: Normal rate and regular rhythm.      Pulses:            Femoral pulses are 2+ on the right side and 2+ on the left side.       Posterior tibial pulses are 2+ on the left side.   Pulmonary:      Effort: Pulmonary effort is normal. No respiratory distress.      Breath sounds: Normal breath sounds.   Abdominal:      General: Bowel sounds are normal. There is no distension.      Palpations: Abdomen is soft.      Tenderness: There is no abdominal tenderness. There is no guarding.   Musculoskeletal:         General: No swelling or tenderness. Normal range of motion.      Cervical back: Normal range of motion and neck supple.      Right lower leg: No edema.      Left lower leg: No edema.      Comments: Residual left-sided weakness from previous stroke.   Feet:      Right foot:      Skin integrity: Skin integrity normal.      Left foot:      Skin integrity: Skin integrity normal.   Skin:     General: Skin is warm and dry.      Findings: No erythema or rash.   Neurological:      Mental Status: He is alert and oriented to person, place, and time. Mental status is at baseline.      Cranial Nerves: No cranial nerve deficit.   Psychiatric:         Attention and Perception: Attention normal.         Mood and Affect: Mood normal.         Behavior: Behavior normal.         Thought Content: Thought content normal.         Judgment: Judgment normal.         Laboratory Data:  Results from last 7 days   Lab Units 01/03/25  0309   WBC 10*3/mm3 9.50   HEMOGLOBIN g/dL 14.7   HEMATOCRIT % 44.8   PLATELETS 10*3/mm3 261       Results from last 7 days   Lab Units 01/03/25  0309   SODIUM mmol/L 139   POTASSIUM mmol/L 3.9   CHLORIDE mmol/L 101   CO2 mmol/L 28.0   BUN mg/dL 13   CREATININE mg/dL 0.92   CALCIUM mg/dL 9.1   BILIRUBIN mg/dL 0.3   ALK PHOS U/L 106   ALT (SGPT) U/L 14   AST (SGOT) U/L 16   GLUCOSE mg/dL 184*             Diagnostic Data:  Imaging Results (Last 24 Hours)       Procedure Component Value Units Date/Time    IR Prox Dis Prost Endov Rep Initial Ves [[id number]] Resulted: 01/03/25  1231     Updated: 01/03/25 1231    CT Abdomen Pelvis With & Without Contrast [[id number]] Collected: 01/03/25 0541     Updated: 01/03/25 0601    Narrative:      EXAMINATION: CT ABDOMEN PELVIS W WO CONTRAST-      1/3/2025 2:47 AM     HISTORY: LLQ pain, L flank pain     In order to have a CT radiation dose as low as reasonably achievable  Automated Exposure Control was utilized for adjustment of the mA and/or  KV according to patient size.     Total DLP = 650.92 mGy.cm     The CT scan of the abdomen and pelvis is performed before and after  intravenous contrast enhancement.     Images are acquired in the axial plane and subsequent reconstruction in  the coronal and sagittal planes.     There is no previous similar study for comparison.     The lung bases included in the study are unremarkable.     Limited visualized cardiomediastinal structures show moderate  cardiomegaly, atheromatous changes of the thoracic aorta and coronary  arteries.     The liver and spleen are normal.     No radiopaque gallstone.     Relatively small pancreas seen. No mass. No ductal dilatation.     Left adrenal nodule is seen measuring 2 cm x 1.4 cm. CT density is  nonfatty. Normal right adrenal gland.     Moderate lobulation of renal contour bilaterally. There is a tiny  exophytic nodule from the lower pole of the left kidney posteriorly  measuring 8 mm in diameter. This is too small to be further  characterized. No radiopaque calculi in either kidney. No hydronephrosis  on either side. Limited visualized ureters are nondilated. The urinary  bladder is incompletely distended with a mild to moderate wall  thickening. No focal intrinsic abnormality.     The prostate is enlarged with intrinsic calcification. It compresses and  elevates the floor of the urinary bladder.     There is small fat-containing inguinal hernias. There is a tiny  fat-containing umbilical hernia.     Stomach is decompressed with diffuse wall thickening. No  focal  abnormality. Duodenum is normal. Small bowel is nondistended. No finding  to suggest appendicitis. Moderate gas and stool is seen in the colon. No  finding to suggest obstruction.     Atheromatous changes of the abdominal aorta and iliac arteries. There is  a saccular aneurysm from the right posterolateral aspect of the mid  abdominal aorta opposite and posterior to the inferior vena cava,  measuring 2.3 x 1.8 cm. There is fusiform aneurysmal dilatation of  distal abdominal aorta with a maximum lumen of the abdominal aorta  measuring 5.7 x 4.7 at and just above the level of bifurcation. 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 There is an ill-defined area  of increased density in the posterior aspect of the thrombus which may  represent a hematoma in the posterior wall of the aneurysm?. The  aneurysm extends into the left common iliac artery which measures 3.7 x  3.4 cm.     There is no evidence of abdominal or pelvic lymphadenopathy.     Images reviewed in bone window show chronic degenerative changes of the  lumbar and thoracic spine. No acute bony abnormality.       Impression:      1. Fusiform aneurysm of the distal abdominal aorta as detailed above.  There is partial lumen circumferential mural thrombosis of the aneurysm.  There is evidence of hematoma/hemorrhage at the posterior wall of the  aneurysm as detailed above. Further evaluation with CT angiography of  the abdomen may be obtained.  2. A saccular aneurysm of the mid abdominal aorta adjacent and below the  level of the right renal arteries and posterior to the IVC. Details  given above.  3. No other acute findings in the abdomen or pelvis to account for  patient's symptoms. No renal or ureteral calculi or obstructive  uropathy.  4. No gallstones. No finding to suggest appendicitis.                    This report was signed and finalized on 1/3/2025 5:58 AM by   Andra Scott MD.               Impression: 69-year-old male presenting with left lower quadrant abdominal pain, flank pain and back pain who was found to have a contained ruptured infrarenal abdominal aortic aneurysm.    Abdominal aortic aneurysm    AAA (abdominal aortic aneurysm)    Abdominal pain      Plan: After thoroughly evaluating Brijesh AL Christiane, I believe the best course of action is to proceed with EVAR.  Risks/benefits were explained at great length to the patient which include but are not limited to bleeding, infection, vessel rupture, bowel damage, renal failure, MI, stroke, and death.  Also discussed with the patient the possibility of having to coil his internal iliac artery on the left with the risk of erectile dysfunction and pelvic ischemia.  The patient understands and would like to proceed with surgery.    Thank you for allowing me to participate in the care of your patient.  Please do not hesitate to call with any questions or concerns.  Timmy Finney MD   Vascular Surgery  [phone]

## 2025-01-03 NOTE — ANESTHESIA PROCEDURE NOTES
Airway  Urgency: elective    Date/Time: 1/3/2025 12:56 PM  Airway not difficult    General Information and Staff    Patient location during procedure: OR  CRNA/CAA: Hi Campos CRNA    Indications and Patient Condition  Indications for airway management: airway protection    Preoxygenated: yes  MILS maintained throughout  Mask difficulty assessment: 1 - vent by mask    Final Airway Details  Final airway type: endotracheal airway      Successful airway: ETT  Cuffed: yes   Successful intubation technique: direct laryngoscopy  Endotracheal tube insertion site: oral  Blade: Daigle  Blade size: 2  ETT size (mm): 7.5  Cormack-Lehane Classification: grade I - full view of glottis  Placement verified by: chest auscultation and capnometry   Cuff volume (mL): 5  Measured from: lips  ETT/EBT  to lips (cm): 20  Number of attempts at approach: 1  Assessment: lips, teeth, and gum same as pre-op and atraumatic intubation

## 2025-01-03 NOTE — ED NOTES
Nursing report ED to floor  Brijesh Grande  69 y.o.  male    HPI:   Chief Complaint   Patient presents with    Abdominal Pain       Admitting doctor:   Nathan Cortes MD    Consulting provider(s):  Consults       No orders found from 12/5/2024 to 1/4/2025.             Admitting diagnosis:   The primary encounter diagnosis was Abdominal pain, unspecified abdominal location. Diagnoses of Abdominal aortic aneurysm (AAA) without rupture, unspecified part and Primary hypertension were also pertinent to this visit.    Code status:   Current Code Status       Date Active Code Status Order ID Comments User Context       1/3/2025 0946 CPR (Attempt to Resuscitate) [id number]  Abrahan Haddad PA-C ED        Question Answer    Code Status (Patient has no pulse and is not breathing) CPR (Attempt to Resuscitate)    Medical Interventions (Patient has pulse or is breathing) Full Support                    Allergies:   Patient has no known allergies.    Intake and Output  No intake or output data in the 24 hours ending 01/03/25 1018    Weight:   There were no vitals filed for this visit.    Most recent vitals:   Vitals:    01/03/25 0846 01/03/25 0901 01/03/25 1001 01/03/25 1013   BP: 141/91 158/95 164/99 173/98   Pulse: 63 69 69 65   Resp:       Temp:       TempSrc:       SpO2: 92% 91% 90% 93%     Oxygen Therapy: .    Active LDAs/IV Access:   Lines, Drains & Airways       Active LDAs       Name Placement date Placement time Site Days    Peripheral IV 01/03/25 0314 Right Antecubital 01/03/25  0314  Antecubital  less than 1    Peripheral IV 01/03/25 1011 Posterior;Right Forearm 01/03/25  1011  Forearm  less than 1                    Labs (abnormal labs have a star):   Labs Reviewed   COMPREHENSIVE METABOLIC PANEL - Abnormal; Notable for the following components:       Result Value    Glucose 184 (*)     All other components within normal limits    Narrative:     GFR Categories in Chronic Kidney Disease (CKD)      GFR Category           GFR (mL/min/1.73)    Interpretation  G1                     90 or greater         Normal or high (1)  G2                      60-89                Mild decrease (1)  G3a                   45-59                Mild to moderate decrease  G3b                   30-44                Moderate to severe decrease  G4                    15-29                Severe decrease  G5                    14 or less           Kidney failure          (1)In the absence of evidence of kidney disease, neither GFR category G1 or G2 fulfill the criteria for CKD.    eGFR calculation 2021 CKD-EPI creatinine equation, which does not include race as a factor   URINALYSIS W/ CULTURE IF INDICATED - Abnormal; Notable for the following components:    Glucose,  mg/dL (1+) (*)     All other components within normal limits    Narrative:     In absence of clinical symptoms, the presence of pyuria, bacteria, and/or nitrites on the urinalysis result does not correlate with infection.  Urine microscopic not indicated.   CBC WITH AUTO DIFFERENTIAL - Abnormal; Notable for the following components:    Lymphocyte % 18.1 (*)     All other components within normal limits   RAINBOW DRAW    Narrative:     The following orders were created for panel order [place] Draw.  Procedure                               Abnormality         Status                     ---------                               -----------         ------                     Green Top (Gel)[[id number]]                                  Final result               Lavender Top[[id number]]                                     Final result               Red Top[[id number]]                                          Final result               Carbone Top[[id number]]                                         Final result               Light Blue Top[[id number]]                                   Final result                 Please view results for these tests on the individual orders.   RAINBOW URINE    COMPREHENSIVE METABOLIC PANEL   GREEN TOP   LAVENDER TOP   RED TOP   GRAY TOP   LIGHT BLUE TOP   CBC AND DIFFERENTIAL    Narrative:     The following orders were created for panel order CBC & Differential.  Procedure                               Abnormality         Status                     ---------                               -----------         ------                     CBC Auto Differential[[id number]]        Abnormal            Final result                 Please view results for these tests on the individual orders.       Meds given in ED:   Medications   sodium chloride 0.9 % flush 10 mL (has no administration in time range)   nitroglycerin (NITROSTAT) SL tablet 0.4 mg (has no administration in time range)   sodium chloride 0.9 % flush 10 mL (has no administration in time range)   sodium chloride 0.9 % flush 10 mL (has no administration in time range)   sodium chloride 0.9 % infusion 40 mL (has no administration in time range)   mupirocin (BACTROBAN) 2 % nasal ointment 1 Application (has no administration in time range)   Chlorhexidine Gluconate Cloth 2 % pads 1 Application (has no administration in time range)   Chlorhexidine Gluconate Cloth 2 % pads 1 Application (has no administration in time range)   sennosides-docusate (PERICOLACE) 8.6-50 MG per tablet 2 tablet (has no administration in time range)     And   polyethylene glycol (MIRALAX) packet 17 g (has no administration in time range)     And   bisacodyl (DULCOLAX) EC tablet 5 mg (has no administration in time range)     And   bisacodyl (DULCOLAX) suppository 10 mg (has no administration in time range)   niCARdipine (CARDENE) 20 mg in 200 mL NS infusion (has no administration in time range)   morphine injection 4 mg (4 mg Intravenous Given 1/3/25 1401)   sodium chloride 0.9 % bolus 1,000 mL (0 mL Intravenous Stopped 1/3/25 0110)   iopamidol (ISOVUE-300) 61 % injection 100 mL (100 mL Intravenous Given 1/3/25 4948)   Enalaprilat (VASOTEC) injection  1.25 mg (1.25 mg Intravenous Given 1/3/25 0817)     niCARdipine, 5-15 mg/hr         NIH Stroke Scale:       Isolation/Infection(s):  No active isolations   No active infections     COVID Testing  Collected .  Resulted .    Nursing report ED to floor:  Mental status: A&O x4.  Ambulatory status: up ad cathy.  Precautions: none.    ED nurse phone extentsion- [phone]..

## 2025-01-03 NOTE — H&P
[facility] Intensivist Services  HISTORY AND PHYSICAL    Date of Admission: 1/3/2025  Primary Care Physician: Eliseo Smith MD    Subjective   Primary Historian: Patient    Chief Complaint: Abdominal pain, left flank pain, back pain    History of Present Illness  69-year-old male with a past medical history of hypertension and previous stroke in January 2016 with residual left-sided weakness and abnormal sensation admitted after presenting to ED with complaints of abdominal pain, left flank pain, and back pain.  The patient is also a smoker, and he continues to smoke 2 packs/day.    Significant findings from ED include glucose elevated 194, but otherwise normal CMP.  CBC was completely normal.  UA was positive for glucose, but was otherwise normal.  CT scan of the abdomen and pelvis showed a fusiform aneurysm of the distal abdominal aorta.  There is partial lumen circumferential mural thrombus Boces of the aneurysm.  There is evidence of hematoma/hemorrhage at the posterior wall of the aneurysm.  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  Patient was noted to be hypertensive in the emergency department.    Patient is being admitted to the CCU for nicardipine infusion for blood pressure control and for close monitoring.  I saw the patient while he was still in the emergency department.  He states the pain he was having in his abdomen and back are improved.  He reports that he has had an abnormal sensation to the left side of his body since his stroke from 9 years ago.  However, he noticed very intense pain in his abdomen and back earlier this morning, which brought him to the emergency department.  He has no other complaints for me at this time.    Review of Systems   Otherwise complete ROS reviewed and negative except as mentioned in the HPI.    Past Medical History:    Past Medical History:   Diagnosis Date    Hypertension     Stroke      Past Surgical History:  Past Surgical History:   Procedure Laterality Date    CAROTID ENDARTERECTOMY      x 2     Social History:  reports that he has been smoking cigarettes. He does not have any smokeless tobacco history on file. He reports that he does not currently use alcohol. He reports that he does not currently use drugs.    Family History: family history is not on file.       Allergies:  No Known Allergies    Medications:  Prior to Admission medications    Medication Sig Start Date End Date Taking? Authorizing Provider   amLODIPine (NORVASC) 10 MG tablet Take 1 tablet by mouth Daily.    Raulito Gleason MD   aspirin 325 MG EC tablet Take 1 tablet by mouth Every 6 (Six) Hours As Needed for Mild Pain.    Raulito Gleason MD   atorvastatin (LIPITOR) 10 MG tablet Take 1 tablet by mouth Daily.    Raulito Gleason MD   cloNIDine (CATAPRES) 0.2 MG tablet Take 1 tablet by mouth 2 (Two) Times a Day.    Raulito Gleason MD   hydrALAZINE (APRESOLINE) 25 MG tablet Take 1 tablet by mouth 3 (Three) Times a Day.    Raulito Gleason MD   losartan (COZAAR) 25 MG tablet Take 1 tablet by mouth Daily.    Raulito Gleason MD   metoprolol tartrate (LOPRESSOR) 25 MG tablet Take 1 tablet by mouth 2 (Two) Times a Day.    Raulito Gleason MD     I have utilized all available immediate resources to obtain, update, or review the patient's current medications (including all prescriptions, over-the-counter products, herbals, cannabis/cannabidiol products, and vitamin/mineral/dietary (nutritional) supplements).    Objective     Vital Signs: /95   Pulse 69   Temp 97.5 °F (36.4 °C) (Oral)   Resp 18   SpO2 91%   Physical Exam  Constitutional:       General: He is not in acute distress.     Appearance: He is not toxic-appearing or diaphoretic.   HENT:      Head: Normocephalic and atraumatic.      Mouth/Throat:      Mouth:  Mucous membranes are moist.   Eyes:      Extraocular Movements: Extraocular movements intact.   Cardiovascular:      Rate and Rhythm: Normal rate and regular rhythm.      Comments: DP and PT pulses RLE 2+, DP and PT pulses LLE 1+.   Pulmonary:      Effort: Pulmonary effort is normal.      Breath sounds: Normal breath sounds.   Abdominal:      General: Bowel sounds are normal. There is distension.      Tenderness: There is no abdominal tenderness.   Skin:     General: Skin is warm.      Capillary Refill: Capillary refill takes less than 2 seconds.   Neurological:      Mental Status: He is alert and oriented to person, place, and time. Mental status is at baseline.      Comments: LUE and LLE weakness present. Patient states this is chronic since his stroke from January 2016.         Results Reviewed:  Lab Results (last 24 hours)       Procedure Component Value Units Date/Time    [place] Urine - Urine, Clean Catch [[id number]] Collected: 01/03/25 0351    Specimen: Urine, Clean Catch Updated: 01/03/25 0400     Extra Tube Hold for add-ons.     Comment: Auto resulted.       Urinalysis With Culture If Indicated - Urine, Clean Catch [[id number]]  (Abnormal) Collected: 01/03/25 0351    Specimen: Urine, Clean Catch Updated: 01/03/25 0359     Color, UA Yellow     Appearance, UA Clear     pH, UA 6.0     Specific Gravity, UA 1.019     Glucose,  mg/dL (1+)     Ketones, UA Negative     Bilirubin, UA Negative     Blood, UA Negative     Protein, UA Negative     Leuk Esterase, UA Negative     Nitrite, UA Negative     Urobilinogen, UA 0.2 E.U./dL    Narrative:      In absence of clinical symptoms, the presence of pyuria, bacteria, and/or nitrites on the urinalysis result does not correlate with infection.  Urine microscopic not indicated.    Comprehensive Metabolic Panel [[id number]]  (Abnormal) Collected: 01/03/25 0309    Specimen: Blood Updated: 01/03/25 0343     Glucose 184 mg/dL      BUN 13 mg/dL      Creatinine 0.92 mg/dL       Sodium 139 mmol/L      Potassium 3.9 mmol/L      Comment: Slight hemolysis detected by analyzer. Result may be falsely elevated.        Chloride 101 mmol/L      CO2 28.0 mmol/L      Calcium 9.1 mg/dL      Total Protein 7.1 g/dL      Albumin 4.1 g/dL      ALT (SGPT) 14 U/L      AST (SGOT) 16 U/L      Alkaline Phosphatase 106 U/L      Total Bilirubin 0.3 mg/dL      Globulin 3.0 gm/dL      A/G Ratio 1.4 g/dL      BUN/Creatinine Ratio 14.1     Anion Gap 10.0 mmol/L      eGFR 88.9 mL/min/1.73     Narrative:      GFR Categories in Chronic Kidney Disease (CKD)      GFR Category          GFR (mL/min/1.73)    Interpretation  G1                     90 or greater         Normal or high (1)  G2                      60-89                Mild decrease (1)  G3a                   45-59                Mild to moderate decrease  G3b                   30-44                Moderate to severe decrease  G4                    15-29                Severe decrease  G5                    14 or less           Kidney failure          (1)In the absence of evidence of kidney disease, neither GFR category G1 or G2 fulfill the criteria for CKD.    eGFR calculation 2021 CKD-EPI creatinine equation, which does not include race as a factor    CBC & Differential [[id number]]  (Abnormal) Collected: 01/03/25 0309    Specimen: Blood Updated: 01/03/25 0340    Narrative:      The following orders were created for panel order CBC & Differential.  Procedure                               Abnormality         Status                     ---------                               -----------         ------                     CBC Auto Differential[[id number]]        Abnormal            Final result                 Please view results for these tests on the individual orders.    CBC Auto Differential [[id number]]  (Abnormal) Collected: 01/03/25 0309    Specimen: Blood Updated: 01/03/25 0340     WBC 9.50 10*3/mm3      RBC 4.90 10*6/mm3      Hemoglobin 14.7 g/dL       Hematocrit 44.8 %      MCV 91.4 fL      MCH 30.0 pg      MCHC 32.8 g/dL      RDW 13.8 %      RDW-SD 46.6 fl      MPV 9.0 fL      Platelets 261 10*3/mm3      Neutrophil % 72.9 %      Lymphocyte % 18.1 %      Monocyte % 6.8 %      Eosinophil % 1.4 %      Basophil % 0.5 %      Immature Grans % 0.3 %      Neutrophils, Absolute 6.92 10*3/mm3      Lymphocytes, Absolute 1.72 10*3/mm3      Monocytes, Absolute 0.65 10*3/mm3      Eosinophils, Absolute 0.13 10*3/mm3      Basophils, Absolute 0.05 10*3/mm3      Immature Grans, Absolute 0.03 10*3/mm3      nRBC 0.0 /100 WBC     [place] Draw [[id number]] Collected: 01/03/25 0309    Specimen: Blood Updated: 01/03/25 0315    Narrative:      The following orders were created for panel order [place] Draw.  Procedure                               Abnormality         Status                     ---------                               -----------         ------                     Green Top (Gel)[[id number]]                                  Final result               Lavender Top[[id number]]                                     Final result               Red Top[[id number]]                                          Final result               Carbone Top[[id number]]                                         Final result               Light Blue Top[[id number]]                                   Final result                 Please view results for these tests on the individual orders.    Green Top (Gel) [[id number]] Collected: 01/03/25 0309    Specimen: Blood Updated: 01/03/25 0315     Extra Tube Hold for add-ons.     Comment: Auto resulted.       Lavender Top [[id number]] Collected: 01/03/25 0309    Specimen: Blood Updated: 01/03/25 0315     Extra Tube hold for add-on     Comment: Auto resulted       Red Top [[id number]] Collected: 01/03/25 0309    Specimen: Blood Updated: 01/03/25 0315     Extra Tube Hold for add-ons.     Comment: Auto resulted.       Gray Top [[id number]] Collected: 01/03/25 0309     Specimen: Blood Updated: 01/03/25 0315     Extra Tube Hold for add-ons.     Comment: Auto resulted.       Light Blue Top [[id number]] Collected: 01/03/25 0309    Specimen: Blood Updated: 01/03/25 0315     Extra Tube Hold for add-ons.     Comment: Auto resulted                CT Abdomen Pelvis With & Without Contrast    Result Date: 1/3/2025  1. Fusiform aneurysm of the distal abdominal aorta as detailed above. There is partial lumen circumferential mural thrombosis of the aneurysm. There is evidence of hematoma/hemorrhage at the posterior wall of the aneurysm as detailed above. Further evaluation with CT angiography of the abdomen may be obtained. 2. A saccular aneurysm of the mid abdominal aorta adjacent and below the level of the right renal arteries and posterior to the IVC. Details given above. 3. No other acute findings in the abdomen or pelvis to account for patient's symptoms. No renal or ureteral calculi or obstructive uropathy. 4. No gallstones. No finding to suggest appendicitis.       This report was signed and finalized on 1/3/2025 5:58 AM by Dr. Andra Scott MD.       Result Review:  I have personally reviewed the results from the time of this admission to 1/3/2025 09:44 CST and agree with these findings:  [x]  Laboratory list / accordion  []  Microbiology  [x]  Radiology  []  EKG/Telemetry   []  Cardiology/Vascular   []  Pathology  []  Old records  []  Other:  Most notable findings include: CT scan of the abdomen and pelvis showed a fusiform aneurysm of the distal abdominal aorta.  There is partial lumen circumferential mural thrombus Boces of the aneurysm.  There is evidence of hematoma/hemorrhage at the posterior wall of the aneurysm.  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      I have personally reviewed and interpreted the radiology studies and ECG obtained at time of admission.      Assessment / Plan   Assessment:   Active Hospital Problems    Diagnosis     AAA (abdominal aortic aneurysm)    69-year-old male with a past medical history of previous stroke, tobacco use, and hypertension admitted after presenting to the ED with complaints of abdominal pain and back pain.  He was found to have a AAA with evidence of hematoma/hemorrhage at the posterior wall of the aneurysm.  He is admitted to the CCU for close monitoring and will be started on a Cardene infusion to keep systolic blood pressure <120.  Vascular surgery has been consulted.  Patient may potentially go to the OR this afternoon.    Treatment Plan  The patient will be admitted to Intensivist service here at [facility].     AAA  -With evidence of hematoma/hemorrhage at the posterior wall of the aneurysm  -Starting patient on Cardene infusion now with goal SBP < 120  -Renal function is currently normal; however, given area of aneurysm, we will monitor UOP and renal function closely   -Serial pulse checks to BLE   -Vital signs per CCU protocol  -Pt to be kept NPO for possible surgery  -Further recommendations per vascular surgery  -Vascular surgery following, we appreciate their recommendations    Hypertension  -Starting cardene infusion to keep SBP <120 in setting of AAA as mentioned above  -Patient takes amlodipine, clonidine, hydralazine, losartan, and metoprolol. These medications have been ordered to start tomorrow as patient is currently NPO as he may be going to OR today.   -VS per CCU protocol    Tobacco abuse  -Patient smokes 2 PPD  -Nicotine patch ordered, but patient does not want one at this time.     4. History of Stroke  -Patient had stroke in Jan 2016 with left-sided residual deficits as mentioned above    Medical Decision Making  Number and Complexity of problems: 4  Differential Diagnosis: AAA, rupture of abdominal aortic aneurysm, renal colic, pyelonephritis, splenic injury, appendicitis    Conditions and  Status        Condition is unchanged as patient just arrived from ED.     [facility] Data  External documents reviewed: N/A  Cardiac tracing (EKG, telemetry) interpretation: Sinus rhythm, rate controlled at time my exam  Radiology interpretation: Yes, see above  Labs reviewed: Yes, see above  Any tests that were considered but not ordered: N/A     Decision rules/scores evaluated (example [id number], Wells, etc): N/A     Discussed with: Attending, patient, patient's ex-wife, nurse     Care Planning  Shared decision making: Attending  Code status and discussions: Full    Disposition  Social Determinants of Health that impact treatment or disposition: N/A  Estimated length of stay is 3-5 days.     I confirmed that the patient's advanced care plan is present, code status is documented, and a surrogate decision maker is listed in the patient's medical record.     The patient's surrogate decision maker is his son, Alexis.     The patient was seen and examined by me on 1/3/2025.    I provided 35 minutes of total critical care time. Due to the high probability of clinically significant, life-threatening deterioration, the patient required my direct and personal management. The critical care time does not include time spent on separately billable procedures.    Electronically signed by Abrahan Haddad PA-C on 1/3/2025 at 09:44 CST

## 2025-01-04 LAB
ALBUMIN SERPL-MCNC: 3.3 G/DL (ref 3.5–5.2)
ALBUMIN/GLOB SERPL: 1.3 G/DL
ALP SERPL-CCNC: 73 U/L (ref 39–117)
ALT SERPL W P-5'-P-CCNC: 10 U/L (ref 1–41)
ANION GAP SERPL CALCULATED.3IONS-SCNC: 10 MMOL/L (ref 5–15)
ANION GAP SERPL CALCULATED.3IONS-SCNC: 11 MMOL/L (ref 5–15)
APTT PPP: 68 SECONDS (ref 24.5–36)
APTT PPP: 82.3 SECONDS (ref 24.5–36)
APTT PPP: 89.4 SECONDS (ref 24.5–36)
ARTERIAL PATENCY WRIST A: ABNORMAL
AST SERPL-CCNC: 29 U/L (ref 1–40)
ATMOSPHERIC PRESS: 761 MMHG
BASE EXCESS BLDA CALC-SCNC: 1.8 MMOL/L (ref 0–2)
BASOPHILS # BLD AUTO: 0.03 10*3/MM3 (ref 0–0.2)
BASOPHILS # BLD AUTO: 0.04 10*3/MM3 (ref 0–0.2)
BASOPHILS NFR BLD AUTO: 0.2 % (ref 0–1.5)
BASOPHILS NFR BLD AUTO: 0.3 % (ref 0–1.5)
BDY SITE: ABNORMAL
BILIRUB SERPL-MCNC: 0.3 MG/DL (ref 0–1.2)
BODY TEMPERATURE: 37
BUN SERPL-MCNC: 13 MG/DL (ref 8–23)
BUN SERPL-MCNC: 13 MG/DL (ref 8–23)
BUN/CREAT SERPL: 14.1 (ref 7–25)
BUN/CREAT SERPL: 14.9 (ref 7–25)
CALCIUM SPEC-SCNC: 8 MG/DL (ref 8.6–10.5)
CALCIUM SPEC-SCNC: 8.2 MG/DL (ref 8.6–10.5)
CHLORIDE SERPL-SCNC: 100 MMOL/L (ref 98–107)
CHLORIDE SERPL-SCNC: 100 MMOL/L (ref 98–107)
CO2 SERPL-SCNC: 24 MMOL/L (ref 22–29)
CO2 SERPL-SCNC: 25 MMOL/L (ref 22–29)
CREAT SERPL-MCNC: 0.87 MG/DL (ref 0.76–1.27)
CREAT SERPL-MCNC: 0.92 MG/DL (ref 0.76–1.27)
DEPRECATED RDW RBC AUTO: 47.4 FL (ref 37–54)
DEPRECATED RDW RBC AUTO: 48.7 FL (ref 37–54)
EGFRCR SERPLBLD CKD-EPI 2021: 88.9 ML/MIN/1.73
EGFRCR SERPLBLD CKD-EPI 2021: 92.2 ML/MIN/1.73
EOSINOPHIL # BLD AUTO: 0 10*3/MM3 (ref 0–0.4)
EOSINOPHIL # BLD AUTO: 0.02 10*3/MM3 (ref 0–0.4)
EOSINOPHIL NFR BLD AUTO: 0 % (ref 0.3–6.2)
EOSINOPHIL NFR BLD AUTO: 0.1 % (ref 0.3–6.2)
ERYTHROCYTE [DISTWIDTH] IN BLOOD BY AUTOMATED COUNT: 14 % (ref 12.3–15.4)
ERYTHROCYTE [DISTWIDTH] IN BLOOD BY AUTOMATED COUNT: 14.1 % (ref 12.3–15.4)
GLOBULIN UR ELPH-MCNC: 2.5 GM/DL
GLUCOSE SERPL-MCNC: 161 MG/DL (ref 65–99)
GLUCOSE SERPL-MCNC: 164 MG/DL (ref 65–99)
HCO3 BLDA-SCNC: 28.2 MMOL/L (ref 20–26)
HCT VFR BLD AUTO: 37 % (ref 37.5–51)
HCT VFR BLD AUTO: 37.9 % (ref 37.5–51)
HGB BLD-MCNC: 11.6 G/DL (ref 13–17.7)
HGB BLD-MCNC: 12.3 G/DL (ref 13–17.7)
IMM GRANULOCYTES # BLD AUTO: 0.08 10*3/MM3 (ref 0–0.05)
IMM GRANULOCYTES # BLD AUTO: 0.08 10*3/MM3 (ref 0–0.05)
IMM GRANULOCYTES NFR BLD AUTO: 0.5 % (ref 0–0.5)
IMM GRANULOCYTES NFR BLD AUTO: 0.5 % (ref 0–0.5)
INHALED O2 CONCENTRATION: 40 %
INR PPP: 1.1 (ref 0.91–1.09)
LYMPHOCYTES # BLD AUTO: 0.98 10*3/MM3 (ref 0.7–3.1)
LYMPHOCYTES # BLD AUTO: 1.1 10*3/MM3 (ref 0.7–3.1)
LYMPHOCYTES NFR BLD AUTO: 5.8 % (ref 19.6–45.3)
LYMPHOCYTES NFR BLD AUTO: 7.2 % (ref 19.6–45.3)
Lab: ABNORMAL
MAGNESIUM SERPL-MCNC: 2 MG/DL (ref 1.6–2.4)
MCH RBC QN AUTO: 29.2 PG (ref 26.6–33)
MCH RBC QN AUTO: 29.7 PG (ref 26.6–33)
MCHC RBC AUTO-ENTMCNC: 31.4 G/DL (ref 31.5–35.7)
MCHC RBC AUTO-ENTMCNC: 32.5 G/DL (ref 31.5–35.7)
MCV RBC AUTO: 91.5 FL (ref 79–97)
MCV RBC AUTO: 93.2 FL (ref 79–97)
MODALITY: ABNORMAL
MONOCYTES # BLD AUTO: 1.44 10*3/MM3 (ref 0.1–0.9)
MONOCYTES # BLD AUTO: 1.5 10*3/MM3 (ref 0.1–0.9)
MONOCYTES NFR BLD AUTO: 8.6 % (ref 5–12)
MONOCYTES NFR BLD AUTO: 9.8 % (ref 5–12)
NEUTROPHILS NFR BLD AUTO: 12.54 10*3/MM3 (ref 1.7–7)
NEUTROPHILS NFR BLD AUTO: 14.26 10*3/MM3 (ref 1.7–7)
NEUTROPHILS NFR BLD AUTO: 82.1 % (ref 42.7–76)
NEUTROPHILS NFR BLD AUTO: 84.9 % (ref 42.7–76)
NRBC BLD AUTO-RTO: 0 /100 WBC (ref 0–0.2)
NRBC BLD AUTO-RTO: 0 /100 WBC (ref 0–0.2)
PCO2 BLDA: 51.4 MM HG (ref 35–45)
PCO2 TEMP ADJ BLD: 51.4 MM HG (ref 35–45)
PEEP RESPIRATORY: 5 CM[H2O]
PH BLDA: 7.35 PH UNITS (ref 7.35–7.45)
PH, TEMP CORRECTED: 7.35 PH UNITS (ref 7.35–7.45)
PHOSPHATE SERPL-MCNC: 4.1 MG/DL (ref 2.5–4.5)
PLATELET # BLD AUTO: 214 10*3/MM3 (ref 140–450)
PLATELET # BLD AUTO: 270 10*3/MM3 (ref 140–450)
PMV BLD AUTO: 9.1 FL (ref 6–12)
PMV BLD AUTO: 9.5 FL (ref 6–12)
PO2 BLD: 190 MM[HG] (ref 0–500)
PO2 BLDA: 75.8 MM HG (ref 83–108)
PO2 TEMP ADJ BLD: 75.8 MM HG (ref 83–108)
POTASSIUM SERPL-SCNC: 4.5 MMOL/L (ref 3.5–5.2)
POTASSIUM SERPL-SCNC: 4.6 MMOL/L (ref 3.5–5.2)
PROT SERPL-MCNC: 5.8 G/DL (ref 6–8.5)
PROTHROMBIN TIME: 14.8 SECONDS (ref 11.8–14.8)
PSV: 5 CMH2O
RBC # BLD AUTO: 3.97 10*6/MM3 (ref 4.14–5.8)
RBC # BLD AUTO: 4.14 10*6/MM3 (ref 4.14–5.8)
SAO2 % BLDCOA: 95.2 % (ref 94–99)
SODIUM SERPL-SCNC: 135 MMOL/L (ref 136–145)
SODIUM SERPL-SCNC: 135 MMOL/L (ref 136–145)
VENTILATOR MODE: ABNORMAL
WBC NRBC COR # BLD AUTO: 15.28 10*3/MM3 (ref 3.4–10.8)
WBC NRBC COR # BLD AUTO: 16.79 10*3/MM3 (ref 3.4–10.8)

## 2025-01-04 PROCEDURE — 25010000002 PROPOFOL 10 MG/ML EMULSION: Performed by: ANESTHESIOLOGY

## 2025-01-04 PROCEDURE — 0BH17EZ INSERTION OF ENDOTRACHEAL AIRWAY INTO TRACHEA, VIA NATURAL OR ARTIFICIAL OPENING: ICD-10-PCS | Performed by: STUDENT IN AN ORGANIZED HEALTH CARE EDUCATION/TRAINING PROGRAM

## 2025-01-04 PROCEDURE — 80053 COMPREHEN METABOLIC PANEL: CPT | Performed by: PHYSICIAN ASSISTANT

## 2025-01-04 PROCEDURE — 25010000002 HYDROMORPHONE PER 4 MG: Performed by: STUDENT IN AN ORGANIZED HEALTH CARE EDUCATION/TRAINING PROGRAM

## 2025-01-04 PROCEDURE — 85730 THROMBOPLASTIN TIME PARTIAL: CPT | Performed by: STUDENT IN AN ORGANIZED HEALTH CARE EDUCATION/TRAINING PROGRAM

## 2025-01-04 PROCEDURE — 94799 UNLISTED PULMONARY SVC/PX: CPT

## 2025-01-04 PROCEDURE — 25810000003 SODIUM CHLORIDE 0.9 % SOLUTION: Performed by: INTERNAL MEDICINE

## 2025-01-04 PROCEDURE — 25010000002 HEPARIN (PORCINE) PER 1000 UNITS: Performed by: STUDENT IN AN ORGANIZED HEALTH CARE EDUCATION/TRAINING PROGRAM

## 2025-01-04 PROCEDURE — 94003 VENT MGMT INPAT SUBQ DAY: CPT

## 2025-01-04 PROCEDURE — 25810000003 SODIUM CHLORIDE 0.9 % SOLUTION: Performed by: STUDENT IN AN ORGANIZED HEALTH CARE EDUCATION/TRAINING PROGRAM

## 2025-01-04 PROCEDURE — 99024 POSTOP FOLLOW-UP VISIT: CPT | Performed by: NURSE PRACTITIONER

## 2025-01-04 PROCEDURE — 25010000002 DEXAMETHASONE PER 1 MG: Performed by: INTERNAL MEDICINE

## 2025-01-04 PROCEDURE — 25010000002 FENTANYL CITRATE (PF) 2500 MCG/50ML SOLUTION: Performed by: INTERNAL MEDICINE

## 2025-01-04 PROCEDURE — 25010000002 HEPARIN (PORCINE) 25000-0.45 UT/250ML-% SOLUTION: Performed by: STUDENT IN AN ORGANIZED HEALTH CARE EDUCATION/TRAINING PROGRAM

## 2025-01-04 PROCEDURE — 85025 COMPLETE CBC W/AUTO DIFF WBC: CPT | Performed by: INTERNAL MEDICINE

## 2025-01-04 PROCEDURE — 94640 AIRWAY INHALATION TREATMENT: CPT

## 2025-01-04 PROCEDURE — 83735 ASSAY OF MAGNESIUM: CPT | Performed by: PHYSICIAN ASSISTANT

## 2025-01-04 PROCEDURE — 85610 PROTHROMBIN TIME: CPT | Performed by: STUDENT IN AN ORGANIZED HEALTH CARE EDUCATION/TRAINING PROGRAM

## 2025-01-04 PROCEDURE — 85025 COMPLETE CBC W/AUTO DIFF WBC: CPT | Performed by: STUDENT IN AN ORGANIZED HEALTH CARE EDUCATION/TRAINING PROGRAM

## 2025-01-04 PROCEDURE — 36415 COLL VENOUS BLD VENIPUNCTURE: CPT | Performed by: PHYSICIAN ASSISTANT

## 2025-01-04 PROCEDURE — 82803 BLOOD GASES ANY COMBINATION: CPT

## 2025-01-04 PROCEDURE — 94761 N-INVAS EAR/PLS OXIMETRY MLT: CPT

## 2025-01-04 PROCEDURE — 5A1935Z RESPIRATORY VENTILATION, LESS THAN 24 CONSECUTIVE HOURS: ICD-10-PCS | Performed by: STUDENT IN AN ORGANIZED HEALTH CARE EDUCATION/TRAINING PROGRAM

## 2025-01-04 PROCEDURE — 84100 ASSAY OF PHOSPHORUS: CPT | Performed by: PHYSICIAN ASSISTANT

## 2025-01-04 RX ORDER — DEXMEDETOMIDINE HYDROCHLORIDE 4 UG/ML
.2-1.5 INJECTION, SOLUTION INTRAVENOUS
Status: DISCONTINUED | OUTPATIENT
Start: 2025-01-04 | End: 2025-01-06

## 2025-01-04 RX ORDER — DEXAMETHASONE SODIUM PHOSPHATE 10 MG/ML
10 INJECTION INTRAMUSCULAR; INTRAVENOUS ONCE
Status: COMPLETED | OUTPATIENT
Start: 2025-01-04 | End: 2025-01-04

## 2025-01-04 RX ORDER — METOPROLOL SUCCINATE 50 MG/1
50 TABLET, EXTENDED RELEASE ORAL EVERY 12 HOURS SCHEDULED
Status: DISCONTINUED | OUTPATIENT
Start: 2025-01-04 | End: 2025-01-05

## 2025-01-04 RX ORDER — IPRATROPIUM BROMIDE AND ALBUTEROL SULFATE 2.5; .5 MG/3ML; MG/3ML
3 SOLUTION RESPIRATORY (INHALATION)
Status: DISCONTINUED | OUTPATIENT
Start: 2025-01-04 | End: 2025-01-07

## 2025-01-04 RX ADMIN — PROPOFOL INJECTABLE EMULSION 50 MCG/KG/MIN: 10 INJECTION, EMULSION INTRAVENOUS at 01:55

## 2025-01-04 RX ADMIN — IPRATROPIUM BROMIDE AND ALBUTEROL SULFATE 3 ML: .5; 3 SOLUTION RESPIRATORY (INHALATION) at 19:12

## 2025-01-04 RX ADMIN — IPRATROPIUM BROMIDE AND ALBUTEROL SULFATE 3 ML: .5; 3 SOLUTION RESPIRATORY (INHALATION) at 09:14

## 2025-01-04 RX ADMIN — Medication 10 ML: at 09:00

## 2025-01-04 RX ADMIN — FENTANYL CITRATE 50 MCG/HR: 50 INJECTION, SOLUTION INTRAMUSCULAR; INTRAVENOUS at 10:44

## 2025-01-04 RX ADMIN — DEXAMETHASONE SODIUM PHOSPHATE 10 MG: 10 INJECTION INTRAMUSCULAR; INTRAVENOUS at 17:43

## 2025-01-04 RX ADMIN — Medication 10 ML: at 21:54

## 2025-01-04 RX ADMIN — CHLORHEXIDINE GLUCONATE 1 APPLICATION: 500 CLOTH TOPICAL at 05:00

## 2025-01-04 RX ADMIN — ACETAMINOPHEN 650 MG: 325 SUPPOSITORY RECTAL at 01:55

## 2025-01-04 RX ADMIN — SODIUM CHLORIDE 110 ML/HR: 9 INJECTION, SOLUTION INTRAVENOUS at 19:53

## 2025-01-04 RX ADMIN — DEXMEDETOMIDINE HYDROCHLORIDE 0.2 MCG/KG/HR: 4 INJECTION, SOLUTION INTRAVENOUS at 22:20

## 2025-01-04 RX ADMIN — ACETAMINOPHEN 650 MG: 325 TABLET ORAL at 17:39

## 2025-01-04 RX ADMIN — DEXMEDETOMIDINE HYDROCHLORIDE 0.2 MCG/KG/HR: 4 INJECTION, SOLUTION INTRAVENOUS at 10:09

## 2025-01-04 RX ADMIN — HYDROMORPHONE HYDROCHLORIDE 0.5 MG: 1 INJECTION, SOLUTION INTRAMUSCULAR; INTRAVENOUS; SUBCUTANEOUS at 04:35

## 2025-01-04 RX ADMIN — SODIUM CHLORIDE 110 ML/HR: 9 INJECTION, SOLUTION INTRAVENOUS at 10:09

## 2025-01-04 RX ADMIN — PROPOFOL INJECTABLE EMULSION 45 MCG/KG/MIN: 10 INJECTION, EMULSION INTRAVENOUS at 08:59

## 2025-01-04 RX ADMIN — Medication 1 APPLICATION: at 09:10

## 2025-01-04 RX ADMIN — Medication 1 APPLICATION: at 21:54

## 2025-01-04 RX ADMIN — HEPARIN SODIUM 4000 UNITS: 1000 INJECTION INTRAVENOUS; SUBCUTANEOUS at 00:22

## 2025-01-04 RX ADMIN — IPRATROPIUM BROMIDE AND ALBUTEROL SULFATE 3 ML: .5; 3 SOLUTION RESPIRATORY (INHALATION) at 15:46

## 2025-01-04 RX ADMIN — HEPARIN SODIUM 13.16 UNITS/KG/HR: 10000 INJECTION, SOLUTION INTRAVENOUS at 17:33

## 2025-01-04 RX ADMIN — ACETAMINOPHEN 650 MG: 325 SUPPOSITORY RECTAL at 10:10

## 2025-01-04 RX ADMIN — HYDROCODONE BITARTRATE AND ACETAMINOPHEN 1 TABLET: 5; 325 TABLET ORAL at 18:41

## 2025-01-04 RX ADMIN — HYDROMORPHONE HYDROCHLORIDE 0.5 MG: 1 INJECTION, SOLUTION INTRAMUSCULAR; INTRAVENOUS; SUBCUTANEOUS at 08:25

## 2025-01-04 RX ADMIN — IPRATROPIUM BROMIDE AND ALBUTEROL SULFATE 3 ML: .5; 3 SOLUTION RESPIRATORY (INHALATION) at 23:49

## 2025-01-04 RX ADMIN — PROPOFOL INJECTABLE EMULSION 50 MCG/KG/MIN: 10 INJECTION, EMULSION INTRAVENOUS at 04:35

## 2025-01-04 NOTE — PROGRESS NOTES
Recieved pt intubated from PACU at 1800. Report received from Angie PIRES. ETT 7.5 @ 24 DEBRA. Settings AC 18  550 +5 60%. Dr. Cortes at bedside. Will  cont to monitor.

## 2025-01-04 NOTE — SIGNIFICANT NOTE
01/04/25 0836   B = Both Spontaneous Awakening and Breathing Trials   Spontaneous Breathing Trial (SBT) Outcome Other (see comments) - SBT Failure  (SBT failed due to agitation and increased HR)     O825 pt on PS 5 and 5 of peep. SBT failed and placed back on previous settings.   Dr. Gallego at bed side.

## 2025-01-04 NOTE — PROGRESS NOTES
[facility]  INPATIENT PROGRESS NOTE    Patient Name: Brijesh Grande  Date of Admission: 1/3/2025  Today's Date: 01/04/25  Length of Stay: 1  Primary Care Physician: Eliseo Smith MD    Subjective   Chief Complaint: Abdominal pain  Abdominal Pain       69-year-old male with a past medical history of hypertension and previous stroke in January 2016 with residual left-sided weakness and abnormal sensation admitted after presenting to ED with complaints of abdominal pain, left flank pain, and back pain.  The patient is also a smoker, and he continues to smoke 2 packs/day.     Significant findings from ED include glucose elevated 194, but otherwise normal CMP.  CBC was completely normal.  UA was positive for glucose, but was otherwise normal.  CT scan of the abdomen and pelvis showed a fusiform aneurysm of the distal abdominal aorta.  There is partial lumen circumferential mural thrombus Boces of the aneurysm.  There is evidence of hematoma/hemorrhage at the posterior wall of the aneurysm.  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  Patient was noted to be hypertensive in the emergency department.     Patient is being admitted to the CCU for nicardipine infusion for blood pressure control and for close monitoring.  I saw the patient while he was still in the emergency department.  He states the pain he was having in his abdomen and back are improved.  He reports that he has had an abnormal sensation to the left side of his body since his stroke from 9 years ago.  However, he noticed very intense pain in his abdomen and back earlier this morning, which brought him to the emergency department.  He has no other complaints for me at this time      Interval history:  1/4/2025 patient is intubated sedated he is status post placement of aortobiiliac endograft for  ruptured AAA and left external iliac artery stent.  Patient sedation was weaned down this morning and patient was very agitated not following commands and had to be resedated again.  Patient is being started on Precedex and fentanyl drip.  As per discussion with vascular surgery at bedside today patient had a similar episode after surgery yesterday he was very agitated and had to be reintubated and was found to have some vocal cord edema.    Review of Systems   Gastrointestinal:  Positive for abdominal pain.      Could not be obtained due to sedation.     Objective    Temp:  [97.3 °F (36.3 °C)-99.2 °F (37.3 °C)] 99.2 °F (37.3 °C)  Heart Rate:  [] 101  Resp:  [12-30] 18  BP: ()/() 129/72  FiO2 (%):  [50 %-60 %] 50 %  Physical Exam  Constitutional:       Appearance: He is ill-appearing.   HENT:      Head: Normocephalic.      Comments: Orally intubated     Mouth/Throat:      Mouth: Mucous membranes are moist.   Eyes:      Pupils: Pupils are equal, round, and reactive to light.   Cardiovascular:      Rate and Rhythm: Normal rate and regular rhythm.   Pulmonary:      Effort: Respiratory distress present.      Breath sounds: Wheezing present.   Abdominal:      General: Bowel sounds are normal. There is no distension.      Palpations: Abdomen is soft.      Tenderness: There is no abdominal tenderness.   Musculoskeletal:      Right lower leg: Edema present.      Left lower leg: Edema present.   Skin:     General: Skin is warm.   Neurological:      Comments: Moving all extremities opening his eyes but very agitated not following commands             Results Review:  Lab Results (last 24 hours)       Procedure Component Value Units Date/Time    Basic Metabolic Panel [[id number]]  (Abnormal) Collected: 01/04/25 0721    Specimen: Blood Updated: 01/04/25 0749     Glucose 161 mg/dL      BUN 13 mg/dL      Creatinine 0.87 mg/dL      Sodium 135 mmol/L      Potassium 4.6 mmol/L      Comment: Slight hemolysis detected  by analyzer. Result may be falsely elevated.        Chloride 100 mmol/L      CO2 25.0 mmol/L      Calcium 8.0 mg/dL      BUN/Creatinine Ratio 14.9     Anion Gap 10.0 mmol/L      eGFR 92.2 mL/min/1.73     Narrative:      GFR Categories in Chronic Kidney Disease (CKD)      GFR Category          GFR (mL/min/1.73)    Interpretation  G1                     90 or greater         Normal or high (1)  G2                      60-89                Mild decrease (1)  G3a                   45-59                Mild to moderate decrease  G3b                   30-44                Moderate to severe decrease  G4                    15-29                Severe decrease  G5                    14 or less           Kidney failure          (1)In the absence of evidence of kidney disease, neither GFR category G1 or G2 fulfill the criteria for CKD.    eGFR calculation 2021 CKD-EPI creatinine equation, which does not include race as a factor    Magnesium [[id number]]  (Normal) Collected: 01/04/25 0600    Specimen: Blood Updated: 01/04/25 0637     Magnesium 2.0 mg/dL     Comprehensive Metabolic Panel [[id number]]  (Abnormal) Collected: 01/04/25 0600    Specimen: Blood Updated: 01/04/25 0637     Glucose 164 mg/dL      BUN 13 mg/dL      Creatinine 0.92 mg/dL      Sodium 135 mmol/L      Potassium 4.5 mmol/L      Comment: Slight hemolysis detected by analyzer. Result may be falsely elevated.        Chloride 100 mmol/L      CO2 24.0 mmol/L      Calcium 8.2 mg/dL      Total Protein 5.8 g/dL      Albumin 3.3 g/dL      ALT (SGPT) 10 U/L      AST (SGOT) 29 U/L      Comment: Slight hemolysis detected by analyzer. Result may be falsely elevated.        Alkaline Phosphatase 73 U/L      Total Bilirubin 0.3 mg/dL      Globulin 2.5 gm/dL      A/G Ratio 1.3 g/dL      BUN/Creatinine Ratio 14.1     Anion Gap 11.0 mmol/L      eGFR 88.9 mL/min/1.73     Narrative:      GFR Categories in Chronic Kidney Disease (CKD)      GFR Category          GFR (mL/min/1.73)     Interpretation  G1                     90 or greater         Normal or high (1)  G2                      60-89                Mild decrease (1)  G3a                   45-59                Mild to moderate decrease  G3b                   30-44                Moderate to severe decrease  G4                    15-29                Severe decrease  G5                    14 or less           Kidney failure          (1)In the absence of evidence of kidney disease, neither GFR category G1 or G2 fulfill the criteria for CKD.    eGFR calculation 2021 CKD-EPI creatinine equation, which does not include race as a factor    Phosphorus [[id number]]  (Normal) Collected: 01/04/25 0600    Specimen: Blood Updated: 01/04/25 0636     Phosphorus 4.1 mg/dL     aPTT [[id number]]  (Abnormal) Collected: 01/04/25 0600    Specimen: Blood Updated: 01/04/25 0625     PTT 82.3 seconds     Protime-INR [[id number]]  (Abnormal) Collected: 01/04/25 0600    Specimen: Blood Updated: 01/04/25 0625     Protime 14.8 Seconds      INR 1.10    CBC & Differential [[id number]]  (Abnormal) Collected: 01/04/25 0600    Specimen: Blood Updated: 01/04/25 0613    Narrative:      The following orders were created for panel order CBC & Differential.  Procedure                               Abnormality         Status                     ---------                               -----------         ------                     CBC Auto Differential[[id number]]        Abnormal            Final result                 Please view results for these tests on the individual orders.    CBC Auto Differential [[id number]]  (Abnormal) Collected: 01/04/25 0600    Specimen: Blood Updated: 01/04/25 0613     WBC 16.79 10*3/mm3      RBC 4.14 10*6/mm3      Hemoglobin 12.3 g/dL      Hematocrit 37.9 %      MCV 91.5 fL      MCH 29.7 pg      MCHC 32.5 g/dL      RDW 14.0 %      RDW-SD 47.4 fl      MPV 9.5 fL      Platelets 270 10*3/mm3      Neutrophil % 84.9 %      Lymphocyte % 5.8 %      Monocyte %  8.6 %      Eosinophil % 0.0 %      Basophil % 0.2 %      Immature Grans % 0.5 %      Neutrophils, Absolute 14.26 10*3/mm3      Lymphocytes, Absolute 0.98 10*3/mm3      Monocytes, Absolute 1.44 10*3/mm3      Eosinophils, Absolute 0.00 10*3/mm3      Basophils, Absolute 0.03 10*3/mm3      Immature Grans, Absolute 0.08 10*3/mm3      nRBC 0.0 /100 WBC     aPTT [[id number]]  (Abnormal) Collected: 01/03/25 2331    Specimen: Blood Updated: 01/03/25 2355     PTT 40.6 seconds     Protime-INR [[id number]]  (Abnormal) Collected: 01/03/25 2331    Specimen: Blood Updated: 01/03/25 2355     Protime 14.9 Seconds      INR 1.11    CBC & Differential [[id number]]  (Abnormal) Collected: 01/03/25 2331    Specimen: Blood Updated: 01/03/25 2344    Narrative:      The following orders were created for panel order CBC & Differential.  Procedure                               Abnormality         Status                     ---------                               -----------         ------                     CBC Auto Differential[[id number]]        Abnormal            Final result                 Please view results for these tests on the individual orders.    CBC Auto Differential [[id number]]  (Abnormal) Collected: 01/03/25 2331    Specimen: Blood Updated: 01/03/25 2344     WBC 19.09 10*3/mm3      RBC 4.22 10*6/mm3      Hemoglobin 12.4 g/dL      Hematocrit 38.5 %      MCV 91.2 fL      MCH 29.4 pg      MCHC 32.2 g/dL      RDW 14.1 %      RDW-SD 47.1 fl      MPV 9.0 fL      Platelets 268 10*3/mm3      Neutrophil % 88.9 %      Lymphocyte % 3.7 %      Monocyte % 6.5 %      Eosinophil % 0.0 %      Basophil % 0.1 %      Immature Grans % 0.8 %      Neutrophils, Absolute 16.96 10*3/mm3      Lymphocytes, Absolute 0.70 10*3/mm3      Monocytes, Absolute 1.25 10*3/mm3      Eosinophils, Absolute 0.00 10*3/mm3      Basophils, Absolute 0.02 10*3/mm3      Immature Grans, Absolute 0.16 10*3/mm3      nRBC 0.0 /100 WBC     Blood Gas, Arterial - [[id number]]   (Abnormal) Collected: 01/03/25 2019    Specimen: Arterial Blood Updated: 01/03/25 2021     Site Arterial Line     Migue's Test N/A     pH, Arterial 7.335 pH units      Comment: 84 Value below reference range        pCO2, Arterial 49.0 mm Hg      Comment: 83 Value above reference range        pO2, Arterial 74.1 mm Hg      Comment: 84 Value below reference range        HCO3, Arterial 26.1 mmol/L      Comment: 83 Value above reference range        Base Excess, Arterial -0.3 mmol/L      Comment: 84 Value below reference range        O2 Saturation, Arterial 94.8 %      Temperature 37.0     Barometric Pressure for Blood Gas 762 mmHg      Modality Ventilator     FIO2 50 %      Ventilator Mode AC     Set Tidal Volume 550.000     Set Mech Resp Rate 18.0     PEEP 5.0     Collected by MIKE     Comment: Meter: [id number]     :  MIKE        pCO2, Temperature Corrected 49.0 mm Hg      pH, Temp Corrected 7.335 pH Units      pO2, Temperature Corrected 74.1 mm Hg      PO2/FIO2 148    Basic Metabolic Panel [[id number]]  (Abnormal) Collected: 01/03/25 1831    Specimen: Blood Updated: 01/03/25 1907     Glucose 172 mg/dL      BUN 9 mg/dL      Creatinine 0.94 mg/dL      Sodium 139 mmol/L      Potassium 4.0 mmol/L      Chloride 105 mmol/L      CO2 24.0 mmol/L      Calcium 7.6 mg/dL      BUN/Creatinine Ratio 9.6     Anion Gap 10.0 mmol/L      eGFR 86.7 mL/min/1.73     Narrative:      GFR Categories in Chronic Kidney Disease (CKD)      GFR Category          GFR (mL/min/1.73)    Interpretation  G1                     90 or greater         Normal or high (1)  G2                      60-89                Mild decrease (1)  G3a                   45-59                Mild to moderate decrease  G3b                   30-44                Moderate to severe decrease  G4                    15-29                Severe decrease  G5                    14 or less           Kidney failure          (1)In the absence of evidence of  kidney disease, neither GFR category G1 or G2 fulfill the criteria for CKD.    eGFR calculation 2021 CKD-EPI creatinine equation, which does not include race as a factor    aPTT [[id number]]  (Abnormal) Collected: 01/03/25 1831    Specimen: Blood Updated: 01/03/25 1859     PTT 56.1 seconds     Protime-INR [[id number]]  (Abnormal) Collected: 01/03/25 1831    Specimen: Blood Updated: 01/03/25 1859     Protime 15.7 Seconds      INR 1.19    CBC & Differential [[id number]]  (Abnormal) Collected: 01/03/25 1831    Specimen: Blood Updated: 01/03/25 1856    Narrative:      The following orders were created for panel order CBC & Differential.  Procedure                               Abnormality         Status                     ---------                               -----------         ------                     CBC Auto Differential[[id number]]        Abnormal            Final result                 Please view results for these tests on the individual orders.    CBC Auto Differential [[id number]]  (Abnormal) Collected: 01/03/25 1831    Specimen: Blood Updated: 01/03/25 1856     WBC 14.89 10*3/mm3      RBC 3.86 10*6/mm3      Hemoglobin 11.2 g/dL      Hematocrit 35.5 %      MCV 92.0 fL      MCH 29.0 pg      MCHC 31.5 g/dL      RDW 13.8 %      RDW-SD 47.0 fl      MPV 9.0 fL      Platelets 224 10*3/mm3      Neutrophil % 89.2 %      Lymphocyte % 3.9 %      Monocyte % 6.0 %      Eosinophil % 0.0 %      Basophil % 0.2 %      Immature Grans % 0.7 %      Neutrophils, Absolute 13.28 10*3/mm3      Lymphocytes, Absolute 0.58 10*3/mm3      Monocytes, Absolute 0.90 10*3/mm3      Eosinophils, Absolute 0.00 10*3/mm3      Basophils, Absolute 0.03 10*3/mm3      Immature Grans, Absolute 0.10 10*3/mm3      nRBC 0.0 /100 WBC     Blood Gas, Arterial With Co-Ox [[id number]]  (Abnormal) Collected: 01/03/25 1530    Specimen: Arterial Blood Updated: 01/03/25 1526     Site Arterial Line     Migue's Test N/A     pH, Arterial 7.350 pH units      pCO2,  Arterial 48.6 mm Hg      Comment: 83 Value above reference range        pO2, Arterial 136.0 mm Hg      Comment: 83 Value above reference range        HCO3, Arterial 26.8 mmol/L      Comment: 83 Value above reference range        Base Excess, Arterial 0.6 mmol/L      O2 Saturation, Arterial 99.1 %      Comment: 83 Value above reference range        Hemoglobin, Blood Gas 13.1 g/dL      Comment: 84 Value below reference range        Hematocrit, Blood Gas 40.2 %      Oxyhemoglobin 96.9 %      Methemoglobin 1.00 %      Carboxyhemoglobin 1.2 %      Temperature 37.4     Sodium, Arterial 139 mmol/L      Potassium, Arterial 3.4 mmol/L      Comment: 84 Value below reference range        Ionized Calcium 4.57 mg/dL      Comment: 84 Value below reference range        Barometric Pressure for Blood Gas 763 mmHg      Modality Ventilator     FIO2 100 %      Ventilator Mode NA     Collected by SURGERY     Comment: Meter: [id number]     :  Jessica Pickard, LEXIS        pH, Temp Corrected 7.345 pH Units      pCO2, Temperature Corrected 49.4 mm Hg      pO2, Temperature Corrected 138 mm Hg              XR Chest 1 View    Result Date: 1/3/2025  1. Right IJ central line placed and well-positioned without complication. 2. Endotracheal tube remains well-positioned. The lungs remain clear.  This report was signed and finalized on 1/3/2025 7:29 PM by Dr. Ryder Rolle MD.      XR Chest 1 View    Result Date: 1/3/2025  1.. Lungs clear. 2. Endotracheal tube well-positioned.  This report was signed and finalized on 1/3/2025 6:46 PM by Dr. Ryder Rolle MD.      CT Abdomen Pelvis With & Without Contrast    Result Date: 1/3/2025  1. Fusiform aneurysm of the distal abdominal aorta as detailed above. There is partial lumen circumferential mural thrombosis of the aneurysm. There is evidence of hematoma/hemorrhage at the posterior wall of the aneurysm as detailed above. Further evaluation with CT angiography of the abdomen may be  "obtained. 2. A saccular aneurysm of the mid abdominal aorta adjacent and below the level of the right renal arteries and posterior to the IVC. Details given above. 3. No other acute findings in the abdomen or pelvis to account for patient's symptoms. No renal or ureteral calculi or obstructive uropathy. 4. No gallstones. No finding to suggest appendicitis.       This report was signed and finalized on 1/3/2025 5:58 AM by Dr. Andra Scott MD.       Result Review:  I have personally reviewed the results from the time of this admission to 1/4/2025 09:43 CST and agree with these findings:  [x]  Laboratory list / accordion  []  Microbiology  [x]  Radiology  []  EKG/Telemetry   []  Cardiology/Vascular   []  Pathology  []  Old records  []  Other:  Most notable findings include:       Culture Data:   No results found for: \"BLOODCX\", \"URINECX\", \"WOUNDCX\", \"MRSACX\", \"RESPCX\", \"STOOLCX\"    I have reviewed the patient's current medications.     Assessment/Plan   Assessment  Active Hospital Problems    Diagnosis     **Abdominal aortic aneurysm     AAA (abdominal aortic aneurysm)     Abdominal pain    -Ruptured infrarenal abdominal aortic aneurysm status post L4 to biiliac endograft left external iliac stent  -Acute hypoxemic respiratory failure requiring mechanical ventilation  -Laryngeal edema  -Severe agitation  -Suspected COPD  -Tobacco abuse  -ICU delirium      Plan:  -SAT and SBT was tried today but patient was very agitated thrashing in bed not following commands  -Propofol was restarted  -I will start patient on Precedex and fentanyl to control pain  -We will retry awakening trial later after starting Precedex and fentanyl  -Adjust mechanical ventilation use lumped active strategy keep pulse ox above 90%  -Start scheduled DuoNeb  -Consider checking for cuff leak prior to extubation  -We will hold oral antihypertensives for now due to borderline blood pressure with sedation  - We will hold off on starting Decadron-for " now not to add to his agitation and delirium but if patient does not have cuff leak we will consider that.  -Consider starting Cardene drip and titrate if blood pressure rises  -Discussed with vascular surgery  -Patient ex-wife and son were updated at bedside      I have spent 45 minutes of critical care time this time was spent at bedside over the unit this time was exclusive of any billable procedures patient is needing intensive care due to complex medical problems requiring complex medical decisions  Electronically signed by Shaun Gallego MD on 1/4/2025 at 09:43 CST

## 2025-01-04 NOTE — PLAN OF CARE
Problem: Mechanical Ventilation Invasive  Goal: Mechanical Ventilation Liberation  Outcome: Met   Goal Outcome Evaluation:      Pt successfully extubated today to 6 lpm oxygen

## 2025-01-04 NOTE — PROGRESS NOTES
LOS: 1 day   Patient Care Team:  Eliseo Smith MD as PCP - General  Eliseo Smith MD as PCP - Family Medicine    Chief Complaint:  Post op day #1-EVAR    Subjective     Patient Complaints: Patient seen/examined lying in bed.  Patient on ventilator.  Bilateral feet warm.  RLE pulses palpable.  LLE dopplered.      Objective     Vital Signs  Temp:  [97.3 °F (36.3 °C)-99.2 °F (37.3 °C)] 99.2 °F (37.3 °C)  Heart Rate:  [] 100  Resp:  [12-30] 18  BP: ()/() 136/78  FiO2 (%):  [50 %-60 %] 50 %    Physical Exam  Vitals and nursing note reviewed.   Constitutional:       General: He is not in acute distress.     Appearance: Normal appearance. He is overweight. He is not diaphoretic.      Interventions: He is intubated.   HENT:      Head: Normocephalic. No right periorbital erythema or left periorbital erythema.      Nose: Nose normal.   Eyes:      General: No scleral icterus.     Pupils: Pupils are equal.   Cardiovascular:      Rate and Rhythm: Normal rate and regular rhythm.      Pulses: Normal pulses.           Dorsalis pedis pulses are 2+ on the right side and detected w/ Doppler on the left side.        Posterior tibial pulses are 2+ on the right side and detected w/ Doppler on the left side.      Heart sounds: Normal heart sounds. No murmur heard.     Comments: Bilateral groin incisions c/d/I-soft and free of hematoma  Pulmonary:      Effort: No respiratory distress. He is intubated.      Breath sounds: Normal breath sounds.   Abdominal:      General: Bowel sounds are normal. There is no distension.      Palpations: Abdomen is soft.      Tenderness: There is no abdominal tenderness. There is no guarding.      Comments: Abdomen soft, nondistended   Musculoskeletal:         General: No swelling or tenderness. Normal range of motion.      Cervical back: Normal range of motion and neck supple.      Right lower leg: No edema.      Left lower leg: No edema.   Feet:      Right foot:      Skin  integrity: Skin integrity normal.      Left foot:      Skin integrity: Skin integrity normal.   Skin:     General: Skin is warm and dry.      Findings: No erythema or rash.   Neurological:      General: No focal deficit present.      Mental Status: He is alert and oriented to person, place, and time. Mental status is at baseline.      Cranial Nerves: No cranial nerve deficit.      Gait: Gait normal.   Psychiatric:         Attention and Perception: Attention normal.         Mood and Affect: Mood normal.         Behavior: Behavior normal.         Thought Content: Thought content normal.         Judgment: Judgment normal.         Laboratory Data:   Results from last 7 days   Lab Units 01/04/25  0600 01/03/25 2331 01/03/25  1831   WBC 10*3/mm3 16.79* 19.09* 14.89*   HEMOGLOBIN g/dL 12.3* 12.4* 11.2*   HEMATOCRIT % 37.9 38.5 35.5*   PLATELETS 10*3/mm3 270 268 224       Results from last 7 days   Lab Units 01/04/25  0721 01/04/25  0600 01/03/25  1831 01/03/25  1530 01/03/25  0309   SODIUM mmol/L 135* 135* 139  --  139   SODIUM, ARTERIAL   --   --   --    < >  --    POTASSIUM mmol/L 4.6 4.5 4.0  --  3.9   CHLORIDE mmol/L 100 100 105  --  101   CO2 mmol/L 25.0 24.0 24.0  --  28.0   BUN mg/dL 13 13 9  --  13   CREATININE mg/dL 0.87 0.92 0.94  --  0.92   CALCIUM mg/dL 8.0* 8.2* 7.6*  --  9.1   BILIRUBIN mg/dL  --  0.3  --   --  0.3   ALK PHOS U/L  --  73  --   --  106   ALT (SGPT) U/L  --  10  --   --  14   AST (SGOT) U/L  --  29  --   --  16   GLUCOSE mg/dL 161* 164* 172*  --  184*    < > = values in this interval not displayed.     Results from last 7 days   Lab Units 01/04/25 0600 01/03/25 2331 01/03/25  1831   PROTIME Seconds 14.8 14.9* 15.7*   INR  1.10* 1.11* 1.19*   APTT seconds 82.3* 40.6* 56.1*            Medication Review: Reviewed    Assessment & Plan       Abdominal aortic aneurysm    AAA (abdominal aortic aneurysm)    Abdominal pain          Plan for disposition:  Start NS at 110 ml/hr verify with   Lashonda  Continue Heparin drip  Monitor labs-CBC/BMP    MENDEZ Mckeon  Vascular Surgery  [phone]  01/04/25  10:22 CST

## 2025-01-04 NOTE — PROGRESS NOTES
RT EQUIPMENT DEVICE RELATED - SKIN ASSESSMENT    Teddy Score:        RT Medical Equipment/Device:     ETT Grey/Anchorfast    Skin Assessment:      Cheek:  Intact  Neck:  Intact  Lips:  Intact  Mouth:  Intact    Device Skin Pressure Protection:  Skin-to-device areas padded:  Anchorfast    Nurse Notification:  Xena Latif, RRT

## 2025-01-04 NOTE — SIGNIFICANT NOTE
01/04/25 1543   B = Both Spontaneous Awakening and Breathing Trials   Spontaneous Breathing Trial (SBT) Outcome SBT Passed   $ SBT Readiness to Wean yes   Vt Spontaneous (mL) 579 mL   Vital Capacity (mL)   (Not done Per Dr Gallego)   RSBI 32   Negative Inspiratory Force (cm H2O)   (not done per Dr Gallego)   Resp Rate (Obs) 20     1543 Pt extubated no FVC or NIF done per Dr. Gallego at bed side. Pt had a good cough leak prior to extubation.

## 2025-01-04 NOTE — PROGRESS NOTES
Patient transported to CCU#5 from PACU. Patient bagged via ETT @ 25 lpm. ETT 7.5 @ 24 lip. END tidal 36. No issues or complications during transport. Patient returned to ventilator upon arrival to CCU with previous ventilator settings.  Report given to Karrie Peck RRT.

## 2025-01-04 NOTE — OP NOTE
Brijesh Grande  1/3/2025     PREOPERATIVE DIAGNOSIS: Abdominal pain, unspecified abdominal location [R10.9]  Ruptured abdominal aortic aneurysm     POSTOPERATIVE DIAGNOSIS: Post-Op Diagnosis Codes:     * Abdominal pain, unspecified abdominal location [R10.9]  Ruptured abdominal aortic aneurysm     PROCEDURE PERFORMED:   Percutaneous access and closure of right common femoral artery  Ultrasound-guided access of left common femoral artery  Placement of aortobiiliac endograft for rupture  Concurrent placement of extension stent to left external iliac artery  Left external iliac artery stent placement  Exposure of left common femoral artery with primary repair  Attempted left internal iliac artery coil embolization      IMPLANTS:   Main body was a 28 x 16 Endurant stent graft placed via the right femoral artery  The left was extended down with a 16 x 16 and 16 x 10 Endurant limb  The right was extended down with a 16 x 20 indurate limb  A 9 x 39 Omnilink balloon expandable stent was placed into the left external iliac artery       SURGEON: Romero Finney MD    ASSISTANT: Eduardo Whiteside DO     ANESTHESIA: General.    PREPARATION: Routine.    STAFF: Circulator: Zara Balbuena RN  Scrub Person: Ryan Walton  Assistant: Sury Cisneros  Vascular Radiology Technician: Radha Robb    Estimated Blood Loss: 100ml    SPECIMENS: None    COMPLICATIONS: Ruptured left external iliac artery and Perclose failure left common femoral artery    INDICATIONS: Brijesh Grande is a 69 y.o. male who presented with sudden onset left lower quadrant abdominal, flank and back pain.  A CT scan of the abdomen pelvis was obtained which showed a 5.7 cm infrarenal abdominal aortic aneurysm with concern for rupture on the posterior wall.  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  The patients vital signs were noted to be stable. Risks of abdominal aortic aneurysm repair include, but are not limited to, bleeding, infection, vessel  rupture, MI, stroke, and damage to kidney or bowel.  Due to the left common iliac artery aneurysm also discussed the possibility of erectile dysfunction and pelvic ischemia with likely the need for cooling of the left internal iliac artery.  The indications, risks, and possible complications of the procedure were explained to the patient, who voiced understanding and wished to proceed with surgery.     PROCEDURE IN DETAIL:   The patient was taken to the operating room and placed on the operating table in a supine position. After general anesthesia was obtained, the abdomen and bilateral groins were prepped and draped in a sterile manner.  Under ultrasound guidance and using a micropuncture technique the right common femoral artery was cannulated and a micro-sheath was placed.  A small stab incision was made with 11 blade.  The Advantage Glidewire was advanced up into the aorta under fluoroscopic guidance.  The 7 French sheath was placed for predilatation.  The first Perclose device was placed and deployed at the 2 o'clock position.  The second one was placed and deployed at the 10 o'clock position.  The 11 French sheath was placed.  Patient was systemically heparinized and additional boluses were given as needed throughout the procedure.  The same procedure was performed in the left groin.  Under ultrasound guidance and using a micropuncture technique the left common femoral artery was cannulated and a micro-sheath was placed.  A small stab incision was made with 11 blade.  The Advantage Glidewire was advanced into the aorta under fluoroscopic guidance.  The 7 French sheath was placed for predilatation.  The first Perclose device was placed and deployed at the 2 o'clock position.  The second was placed and deployed at the 10 o'clock position.  The 11 French sheath was placed.  A limited pelvic arteriogram was obtained to the left common femoral sheath which showed the left internal iliac artery was patent however  was severely diseased.  This was questionable on the preoperative CT scan.  Due to concern for possible pelvic ischemia due to the extensive collateralization seen this was repeated on the right side.  On the right the right internal iliac artery was more widely patent and there was good collateralization going to the left prompting us to proceed with left internal iliac artery embolization.  We attempted to cannulate the left internal iliac artery with a series of wires and catheters were ultimately unsuccessful.  We then advanced a 6.5  sheath through the right common femoral sheath to aid in cannulation of the internal iliac artery.  Despite multiple arteriograms and the use of multiple catheters and wires were unable to do so from the right common femoral sheath.  We then attempted to cannulate it from the left common femoral sheath with the  as well as multiple wires and catheters.  Ultimately we were unable to cannulate left internal iliac artery likely due to his advanced disease.  Considering the disease state and stenosis of the left internal iliac artery we then elected to continue with the EVAR with attempts to leave the wire behind for coiling of the left common iliac artery and left internal iliac artery origin.  Both flush catheters were advanced into the aorta and an aortoiliac angiogram was performed.  The appropriate measurements were taken.  The [employer]onic Endurant modular bifurcated device with 2 docking limbs measuring 28 cm was placed from the right side up to the level of the renal arteries.  A magged up view of the renal arteries was performed.  The graft was then successfully deployed.  The suprarenal stent was successfully deployed.  Next, the flush catheter was captured from the left side and the gate was successful cannulated.  The catheter was spun to ensure that we were in true lumen.  We then upsized the left common femoral sheath to a 16 Macanese sheath to allow for  the vasile wire behind the graft for cooling of the left common iliac and internal iliac artery if needed.  With the C-arm at 20° POP a retrograde angiogram was performed from the left side.  The hypogastric was marked on the screen.   During this process we discovered that the left external iliac artery was ruptured distally this was marked as well.  The dilator was placed back onto the sheath and the sheath was advanced more proximally where it was occlusive to control the bleeding.  The contralateral limbs were successfully placed and deployed to exclude the left common iliac artery aneurysm and the rupture left external iliac artery.  This was done with the 014 wire placed around the stent into the aneurysm sac.  Further arteriogram showed exclusion of the ruptured area no further bleeding.  The rest of the main body device was successfully deployed.  The delivery device was captured and a 16 Nepalese sheath was placed.  With the C-arm at 20° KAMILA a retrograde angiogram was performed from the right side.  Hypogastric was marked on the screen.  The ipsilateral extension limb was placed successfully.  The right balloon was then used to angioplasty the main body in the right limb.  We then attempted to advance the catheter over the 014 wire into the aneurysm sac for coil embolization left internal iliac artery.  However we were ultimately unsuccessful as this would not advance beyond the distal edge of the stent.  UF catheter was then advanced over the wire from the right side and an angiogram was performed down through the graft and through the iliac arteries.    There was no evidence of type I, type II, type III, or type IV endoleaks.  Due to no evidence of endoleak from the left internal iliac artery we then elected to abandon the left internal iliac artery embolization.  The 014 wire was then removed.  The Reliant balloon was then used for balloon angioplasty through the left limb.  Due to the severe tortuosity and  stenosis of the left external iliac artery we were concern for kinking of the limb and possible eventual occlusion.  The wire was withdrawn until the floppy end was in the tortuous and stenosed portion.  An angiogram was then shot through the left common femoral sheath showing stenosis in mild remaining tortuosity.  Due to the stenosis and previous tortuosity a 9 x 39 Omnilink balloon expandable stent was placed over this portion.  Completion angiogram was performed which showed rapid flow down through the graft and out through the iliac arteries without any evidence of stenosis or occlusion.    There was no evidence of type I, type II, type III, or type IV endoleaks.  At this point I felt no further intervention was warranted.  The sheaths were removed.  The Perclose devices were used to seal down the artery.  Direct pressure was held for an additional 10 minutes of ensure hemostasis.  5 mL of 0.5% Marcaine plain was used to infiltrate each groin for local anesthesia.  Pedal pulses were then checked with a Doppler.  Signals on the right were unchanged from preop, however the left had very weak signals and very sluggish refill.  We then evaluated the left common femoral with a ultrasound and noted what was appear to be a diminished pulse distally.  At this point we elected for open exploration of the left common femoral artery.  A transverse skin incision was then made over the left common femoral artery this was carried down through the skin and subcutaneous tissues.  We then entered the femoral sheath sharply and dissected the proximal common femoral artery free from the surrounding structures.  We then continued to dissect down distally where the proximal SFA and profunda were dissected free from surrounding structures.  A Silastic vessel loop was then used to encircle the proximal SFA, profunda, and proximal right common femoral artery.  The proximal common femoral artery was then clamped and tension was applied  to the Silastic Vesseloops for distal control.  The Perclose's which appeared to be well-placed were then cut and removed.  The defect in the left common femoral artery was then examined and there was no evidence of occlusion or dissection from the Perclose.  A 5-0 Prolene was then used to close the defect in the typical fashion.  Prior to completion of the repair of the arteriotomy there was flushed and de-aired.  The distal and proximal clamps were then removed, and there was noted to be a good pulse proximal and distal to the area of repair.  At this point we discussed a possible arteriogram, however we were unsure of the patient's baseline disease.  We also elected not to do this due to the amount of contrast we had already used on top of the CT scan he had received previously in the day.  The left foot was then reexamined and there was improved cap refill with still poor signals.  At this point we were concerned that this could be spasm due to occlusion of the left external iliac artery with the sheaths in place.  Wound was then irrigated.  The skin on the right side was then reapproximated using a 4-0 Monocryl in a subcuticular fashion.  A multilayer closure was then performed in the left groin in the typical fashion.  Sterile dressings were applied. The patient tolerated the procedure well. Sponge and needle counts were correct. The patient was then awakened and extubated in the operating room and taken to the recovery room in good condition.    Dr. Whiteside was present throughout the procedure and assisted with access, internal iliac artery cannulation, gate cannulation, exposure, and closure.  Timmy Finney MD  Date: 1/3/2025 Time: 18:05 CST

## 2025-01-04 NOTE — ANESTHESIA POSTPROCEDURE EVALUATION
Patient: Brijesh Grande    Procedure Summary       Date: 01/03/25 Room / Location:  [facility] OR  /  [facility]    Anesthesia Start: 1254 Anesthesia Stop: 1635    Procedure: ABDOMINAL AORTIC ANEURYSM REPAIR WITH ENDOGRAFT (Thigh) Diagnosis:       Abdominal pain, unspecified abdominal location      (Abdominal pain, unspecified abdominal location [R10.9])    Surgeons: Eduardo Whiteside DO Provider: Hi Campos CRNA    Anesthesia Type: generalKelly ASA Status: 5 - Emergent            Anesthesia Type: general, Shirley    Vitals  Vitals Value Taken Time   BP 84/55 01/03/25 1751   Temp 97.3 °F (36.3 °C) 01/03/25 1635   Pulse 81 01/03/25 1811   Resp 18 01/03/25 1753   SpO2 97 % 01/03/25 1754   Vitals shown include unfiled device data.        Post Anesthesia Care and Evaluation    Patient location during evaluation: PACU  Level of consciousness: obtunded/minimal responses  Pain management: adequate    Airway patency: patent  Anesthesia complication: Delirium/Agitation.  PONV Status: none  Cardiovascular status: acceptable  Respiratory status: acceptable and ETT  Hydration status: acceptable    Comments: Blood pressure (!) 84/55, pulse 76, temperature 97.3 °F (36.3 °C), temperature source Temporal, resp. rate 18, weight 91.2 kg (201 lb 1 oz), SpO2 94%.    See notes in intra-op documentation for initial PACU discussion. Called to bedside twice for severe agitation/delirium and patient safety threatened by behavior. Initial attempt to improve with precedex and versed as documented. My concerns were discussed with Dr. Finney and Dr. Whiteside regarding patient ripping out IVs, attempting to punch staff and not keeping legs still. It required multiple nurses and techs to keep the patient safe. The sedation initially helped, however approximately 25 minutes later the patient had and additional episode and both myself and Dr Finney presented to bedside and decision was made to intubate the patient for his own safety as his  delirium/agitation and aggressive movements and thrashing posed a risk to his safety and increased risk of post operative complication. Intubation was performed by Puneet Remy CRNA with my assistance without complication under direct visualization using Trevizo 4. Cords did appear swollen and edematous at that time and I discussed with respiratory to ensure leak test prior to extubating. Additional IV secured by PACU RN and report was called to CCU team

## 2025-01-04 NOTE — PLAN OF CARE
Goal Outcome Evaluation:      Propofol at 50. Heparin at 12. Levophed at 0.04. Patient becomes very agitated when Propofol is decreased. Pulses Doppled in bilateral lower extremities and heard well. Adequate urine output. Mottling noted on lower left extremity. Maintained reverse Trendelenburg and HOB flat. NPO.

## 2025-01-04 NOTE — PROCEDURES
"Insert Central Line At Bedside    Date/Time: 1/3/2025 9:34 PM    Performed by: Edgard Daigle APRN  Authorized by: Edgard Daigle APRN  Consent: Written consent obtained.  Risks and benefits: risks, benefits and alternatives were discussed  Consent given by: power of  and spouse  Patient understanding: patient states understanding of the procedure being performed  Patient consent: the patient's understanding of the procedure matches consent given  Procedure consent: procedure consent matches procedure scheduled  Relevant documents: relevant documents present and verified  Test results: test results available and properly labeled  Site marked: the operative site was marked  Imaging studies: imaging studies available  Patient identity confirmed: arm band and hospital-assigned identification number  Time out: Immediately prior to procedure a \"time out\" was called to verify the correct patient, procedure, equipment, support staff and site/side marked as required.  Indications: vascular access    Anesthesia:  Local Anesthetic: lidocaine 2% without epinephrine  Anesthetic total: 3 mL    Sedation:  Patient sedated: yes  Sedatives: propofol  Vitals: Vital signs were monitored during sedation.    Preparation: skin prepped with 2% chlorhexidine  Skin prep agent dried: skin prep agent completely dried prior to procedure  Sterile barriers: all five maximum sterile barriers used - cap, mask, sterile gown, sterile gloves, and large sterile sheet  Hand hygiene: hand hygiene performed prior to central venous catheter insertion  Location details: right internal jugular  Patient position: flat  Catheter type: triple lumen  Catheter size: 7 Fr  Pre-procedure: landmarks identified  Ultrasound guidance: yes  Sterile ultrasound techniques: sterile gel and sterile probe covers were used  Number of attempts: 1  Successful placement: yes  Post-procedure: line sutured and dressing applied  Assessment: blood return through all " ports, placement verified by x-ray and no pneumothorax on x-ray  Patient tolerance: patient tolerated the procedure well with no immediate complications

## 2025-01-04 NOTE — SIGNIFICANT NOTE
01/04/25 0755   Readings   PEEP Intrinsic (cm H2O) 4 cm H2O   Plateau Pressure (cm H2O) 22 cm H2O   Driving Pressure (cm H2O) 17.5 cm H2O   Static Compliance (L/cm H2O) 16   Dynamic Compliance (L/cm H2O) 7.2 L/cm H2O

## 2025-01-04 NOTE — PROGRESS NOTES
RT EQUIPMENT DEVICE RELATED - SKIN ASSESSMENT    Teddy Score:  Teddy Score: 19     RT Medical Equipment/Device:     ETT Grey/Anchorfast    Skin Assessment:      Cheek:  Intact  Lips:  Intact    Device Skin Pressure Protection:  Positioning supports utilized, Pressure points protected, and Skin-to-device areas padded:  Anchorfast    Nurse Notification:  Xena Mccrary, RRT

## 2025-01-05 LAB
ANION GAP SERPL CALCULATED.3IONS-SCNC: 8 MMOL/L (ref 5–15)
APTT PPP: 31.5 SECONDS (ref 24.5–36)
APTT PPP: 41.6 SECONDS (ref 24.5–36)
APTT PPP: 79.7 SECONDS (ref 24.5–36)
APTT PPP: 80.5 SECONDS (ref 24.5–36)
APTT PPP: 85.2 SECONDS (ref 24.5–36)
BASOPHILS # BLD AUTO: 0.01 10*3/MM3 (ref 0–0.2)
BASOPHILS NFR BLD AUTO: 0.1 % (ref 0–1.5)
BUN SERPL-MCNC: 16 MG/DL (ref 8–23)
BUN/CREAT SERPL: 23.9 (ref 7–25)
CALCIUM SPEC-SCNC: 8.2 MG/DL (ref 8.6–10.5)
CHLORIDE SERPL-SCNC: 106 MMOL/L (ref 98–107)
CK SERPL-CCNC: 2436 U/L (ref 20–200)
CO2 SERPL-SCNC: 24 MMOL/L (ref 22–29)
CREAT SERPL-MCNC: 0.67 MG/DL (ref 0.76–1.27)
D-LACTATE SERPL-SCNC: 1.8 MMOL/L (ref 0.5–2)
DEPRECATED RDW RBC AUTO: 46.5 FL (ref 37–54)
EGFRCR SERPLBLD CKD-EPI 2021: 99.8 ML/MIN/1.73
EOSINOPHIL # BLD AUTO: 0 10*3/MM3 (ref 0–0.4)
EOSINOPHIL NFR BLD AUTO: 0 % (ref 0.3–6.2)
ERYTHROCYTE [DISTWIDTH] IN BLOOD BY AUTOMATED COUNT: 13.8 % (ref 12.3–15.4)
GLUCOSE SERPL-MCNC: 145 MG/DL (ref 65–99)
HCT VFR BLD AUTO: 30.7 % (ref 37.5–51)
HGB BLD-MCNC: 9.8 G/DL (ref 13–17.7)
IMM GRANULOCYTES # BLD AUTO: 0.07 10*3/MM3 (ref 0–0.05)
IMM GRANULOCYTES NFR BLD AUTO: 0.5 % (ref 0–0.5)
INR PPP: 1.13 (ref 0.91–1.09)
LDH SERPL-CCNC: 235 U/L (ref 135–225)
LYMPHOCYTES # BLD AUTO: 0.88 10*3/MM3 (ref 0.7–3.1)
LYMPHOCYTES NFR BLD AUTO: 6.4 % (ref 19.6–45.3)
MAGNESIUM SERPL-MCNC: 2.2 MG/DL (ref 1.6–2.4)
MCH RBC QN AUTO: 29.4 PG (ref 26.6–33)
MCHC RBC AUTO-ENTMCNC: 31.9 G/DL (ref 31.5–35.7)
MCV RBC AUTO: 92.2 FL (ref 79–97)
MONOCYTES # BLD AUTO: 1.02 10*3/MM3 (ref 0.1–0.9)
MONOCYTES NFR BLD AUTO: 7.5 % (ref 5–12)
NEUTROPHILS NFR BLD AUTO: 11.67 10*3/MM3 (ref 1.7–7)
NEUTROPHILS NFR BLD AUTO: 85.5 % (ref 42.7–76)
NRBC BLD AUTO-RTO: 0 /100 WBC (ref 0–0.2)
PHOSPHATE SERPL-MCNC: 2.2 MG/DL (ref 2.5–4.5)
PLATELET # BLD AUTO: 161 10*3/MM3 (ref 140–450)
PMV BLD AUTO: 9.2 FL (ref 6–12)
POTASSIUM SERPL-SCNC: 4.4 MMOL/L (ref 3.5–5.2)
PROTHROMBIN TIME: 15.1 SECONDS (ref 11.8–14.8)
RBC # BLD AUTO: 3.33 10*6/MM3 (ref 4.14–5.8)
SODIUM SERPL-SCNC: 138 MMOL/L (ref 136–145)
WBC NRBC COR # BLD AUTO: 13.65 10*3/MM3 (ref 3.4–10.8)

## 2025-01-05 PROCEDURE — 84100 ASSAY OF PHOSPHORUS: CPT | Performed by: STUDENT IN AN ORGANIZED HEALTH CARE EDUCATION/TRAINING PROGRAM

## 2025-01-05 PROCEDURE — 85025 COMPLETE CBC W/AUTO DIFF WBC: CPT | Performed by: INTERNAL MEDICINE

## 2025-01-05 PROCEDURE — 80048 BASIC METABOLIC PNL TOTAL CA: CPT | Performed by: INTERNAL MEDICINE

## 2025-01-05 PROCEDURE — 25010000002 HYDRALAZINE PER 20 MG: Performed by: STUDENT IN AN ORGANIZED HEALTH CARE EDUCATION/TRAINING PROGRAM

## 2025-01-05 PROCEDURE — 94664 DEMO&/EVAL PT USE INHALER: CPT

## 2025-01-05 PROCEDURE — 85730 THROMBOPLASTIN TIME PARTIAL: CPT | Performed by: STUDENT IN AN ORGANIZED HEALTH CARE EDUCATION/TRAINING PROGRAM

## 2025-01-05 PROCEDURE — 94799 UNLISTED PULMONARY SVC/PX: CPT

## 2025-01-05 PROCEDURE — 94761 N-INVAS EAR/PLS OXIMETRY MLT: CPT

## 2025-01-05 PROCEDURE — 83615 LACTATE (LD) (LDH) ENZYME: CPT | Performed by: INTERNAL MEDICINE

## 2025-01-05 PROCEDURE — 83735 ASSAY OF MAGNESIUM: CPT | Performed by: STUDENT IN AN ORGANIZED HEALTH CARE EDUCATION/TRAINING PROGRAM

## 2025-01-05 PROCEDURE — 25810000003 SODIUM CHLORIDE 0.9 % SOLUTION: Performed by: STUDENT IN AN ORGANIZED HEALTH CARE EDUCATION/TRAINING PROGRAM

## 2025-01-05 PROCEDURE — 25010000002 HYDROMORPHONE PER 4 MG: Performed by: INTERNAL MEDICINE

## 2025-01-05 PROCEDURE — 25010000002 HYDROMORPHONE PER 4 MG: Performed by: STUDENT IN AN ORGANIZED HEALTH CARE EDUCATION/TRAINING PROGRAM

## 2025-01-05 PROCEDURE — 25010000002 HEPARIN (PORCINE) 25000-0.45 UT/250ML-% SOLUTION: Performed by: STUDENT IN AN ORGANIZED HEALTH CARE EDUCATION/TRAINING PROGRAM

## 2025-01-05 PROCEDURE — 25010000002 HEPARIN (PORCINE) PER 1000 UNITS: Performed by: STUDENT IN AN ORGANIZED HEALTH CARE EDUCATION/TRAINING PROGRAM

## 2025-01-05 PROCEDURE — 85610 PROTHROMBIN TIME: CPT | Performed by: STUDENT IN AN ORGANIZED HEALTH CARE EDUCATION/TRAINING PROGRAM

## 2025-01-05 PROCEDURE — 83605 ASSAY OF LACTIC ACID: CPT | Performed by: INTERNAL MEDICINE

## 2025-01-05 PROCEDURE — 82550 ASSAY OF CK (CPK): CPT | Performed by: INTERNAL MEDICINE

## 2025-01-05 PROCEDURE — 25010000002 HYDROMORPHONE 1 MG/ML SOLUTION: Performed by: INTERNAL MEDICINE

## 2025-01-05 RX ORDER — HYDRALAZINE HYDROCHLORIDE 25 MG/1
25 TABLET, FILM COATED ORAL 3 TIMES DAILY
Status: DISCONTINUED | OUTPATIENT
Start: 2025-01-05 | End: 2025-01-05

## 2025-01-05 RX ORDER — METOPROLOL SUCCINATE 50 MG/1
50 TABLET, EXTENDED RELEASE ORAL
Status: DISCONTINUED | OUTPATIENT
Start: 2025-01-05 | End: 2025-01-08

## 2025-01-05 RX ORDER — GABAPENTIN 300 MG/1
300 CAPSULE ORAL 3 TIMES DAILY
Status: COMPLETED | OUTPATIENT
Start: 2025-01-05 | End: 2025-01-12

## 2025-01-05 RX ORDER — LOSARTAN POTASSIUM 25 MG/1
25 TABLET ORAL DAILY
Status: DISCONTINUED | OUTPATIENT
Start: 2025-01-05 | End: 2025-01-05

## 2025-01-05 RX ORDER — NALOXONE HCL 0.4 MG/ML
0.4 VIAL (ML) INJECTION
Status: DISCONTINUED | OUTPATIENT
Start: 2025-01-05 | End: 2025-01-07

## 2025-01-05 RX ORDER — SODIUM CHLORIDE 9 MG/ML
110 INJECTION, SOLUTION INTRAVENOUS CONTINUOUS
Status: DISCONTINUED | OUTPATIENT
Start: 2025-01-05 | End: 2025-01-06

## 2025-01-05 RX ORDER — HYDRALAZINE HYDROCHLORIDE 25 MG/1
25 TABLET, FILM COATED ORAL 3 TIMES DAILY
Status: DISCONTINUED | OUTPATIENT
Start: 2025-01-05 | End: 2025-01-08

## 2025-01-05 RX ORDER — HEPARIN SODIUM 1000 [USP'U]/ML
4000 INJECTION, SOLUTION INTRAVENOUS; SUBCUTANEOUS AS NEEDED
Status: DISCONTINUED | OUTPATIENT
Start: 2025-01-05 | End: 2025-01-06

## 2025-01-05 RX ORDER — NOREPINEPHRINE BITARTRATE 0.03 MG/ML
.02-.3 INJECTION, SOLUTION INTRAVENOUS
Status: DISCONTINUED | OUTPATIENT
Start: 2025-01-05 | End: 2025-01-06

## 2025-01-05 RX ORDER — HYDROMORPHONE HYDROCHLORIDE 1 MG/ML
0.5 INJECTION, SOLUTION INTRAMUSCULAR; INTRAVENOUS; SUBCUTANEOUS ONCE
Status: COMPLETED | OUTPATIENT
Start: 2025-01-05 | End: 2025-01-05

## 2025-01-05 RX ORDER — CLOPIDOGREL BISULFATE 75 MG/1
75 TABLET ORAL DAILY
Status: DISCONTINUED | OUTPATIENT
Start: 2025-01-05 | End: 2025-01-08

## 2025-01-05 RX ORDER — METOPROLOL SUCCINATE 50 MG/1
50 TABLET, EXTENDED RELEASE ORAL
Status: DISCONTINUED | OUTPATIENT
Start: 2025-01-06 | End: 2025-01-05

## 2025-01-05 RX ORDER — HEPARIN SODIUM 1000 [USP'U]/ML
2000 INJECTION, SOLUTION INTRAVENOUS; SUBCUTANEOUS AS NEEDED
Status: DISCONTINUED | OUTPATIENT
Start: 2025-01-05 | End: 2025-01-06

## 2025-01-05 RX ORDER — LOSARTAN POTASSIUM 25 MG/1
25 TABLET ORAL DAILY
Status: DISCONTINUED | OUTPATIENT
Start: 2025-01-05 | End: 2025-01-08

## 2025-01-05 RX ORDER — HEPARIN SODIUM 10000 [USP'U]/100ML
10.17 INJECTION, SOLUTION INTRAVENOUS
Status: DISCONTINUED | OUTPATIENT
Start: 2025-01-05 | End: 2025-01-06

## 2025-01-05 RX ADMIN — HEPARIN SODIUM 10.17 UNITS/KG/HR: 10000 INJECTION, SOLUTION INTRAVENOUS at 15:19

## 2025-01-05 RX ADMIN — Medication 10 ML: at 20:36

## 2025-01-05 RX ADMIN — HYDROMORPHONE HYDROCHLORIDE 1 MG: 1 INJECTION, SOLUTION INTRAMUSCULAR; INTRAVENOUS; SUBCUTANEOUS at 15:04

## 2025-01-05 RX ADMIN — IPRATROPIUM BROMIDE AND ALBUTEROL SULFATE 3 ML: .5; 3 SOLUTION RESPIRATORY (INHALATION) at 18:56

## 2025-01-05 RX ADMIN — Medication 1 APPLICATION: at 08:44

## 2025-01-05 RX ADMIN — HYDRALAZINE HYDROCHLORIDE 25 MG: 50 TABLET ORAL at 15:04

## 2025-01-05 RX ADMIN — Medication 10 ML: at 08:44

## 2025-01-05 RX ADMIN — METOPROLOL SUCCINATE 50 MG: 50 TABLET, EXTENDED RELEASE ORAL at 15:04

## 2025-01-05 RX ADMIN — IPRATROPIUM BROMIDE AND ALBUTEROL SULFATE 3 ML: .5; 3 SOLUTION RESPIRATORY (INHALATION) at 14:59

## 2025-01-05 RX ADMIN — HYDROMORPHONE HYDROCHLORIDE 0.5 MG: 1 INJECTION, SOLUTION INTRAMUSCULAR; INTRAVENOUS; SUBCUTANEOUS at 13:19

## 2025-01-05 RX ADMIN — HYDROMORPHONE HYDROCHLORIDE 1 MG: 1 INJECTION, SOLUTION INTRAMUSCULAR; INTRAVENOUS; SUBCUTANEOUS at 19:28

## 2025-01-05 RX ADMIN — IPRATROPIUM BROMIDE AND ALBUTEROL SULFATE 3 ML: .5; 3 SOLUTION RESPIRATORY (INHALATION) at 03:52

## 2025-01-05 RX ADMIN — SODIUM CHLORIDE 110 ML/HR: 9 INJECTION, SOLUTION INTRAVENOUS at 23:23

## 2025-01-05 RX ADMIN — IPRATROPIUM BROMIDE AND ALBUTEROL SULFATE 3 ML: .5; 3 SOLUTION RESPIRATORY (INHALATION) at 11:09

## 2025-01-05 RX ADMIN — SODIUM CHLORIDE 110 ML/HR: 9 INJECTION, SOLUTION INTRAVENOUS at 04:53

## 2025-01-05 RX ADMIN — HYDROCODONE BITARTRATE AND ACETAMINOPHEN 1 TABLET: 5; 325 TABLET ORAL at 00:11

## 2025-01-05 RX ADMIN — ASPIRIN 81 MG: 81 TABLET, CHEWABLE ORAL at 08:44

## 2025-01-05 RX ADMIN — CHLORHEXIDINE GLUCONATE 1 APPLICATION: 500 CLOTH TOPICAL at 04:53

## 2025-01-05 RX ADMIN — IPRATROPIUM BROMIDE AND ALBUTEROL SULFATE 3 ML: .5; 3 SOLUTION RESPIRATORY (INHALATION) at 23:49

## 2025-01-05 RX ADMIN — Medication 1 APPLICATION: at 20:36

## 2025-01-05 RX ADMIN — GABAPENTIN 300 MG: 300 CAPSULE ORAL at 13:16

## 2025-01-05 RX ADMIN — GABAPENTIN 300 MG: 300 CAPSULE ORAL at 20:36

## 2025-01-05 RX ADMIN — ACETAMINOPHEN 650 MG: 325 TABLET ORAL at 10:32

## 2025-01-05 RX ADMIN — HYDROMORPHONE HYDROCHLORIDE 1 MG: 1 INJECTION, SOLUTION INTRAMUSCULAR; INTRAVENOUS; SUBCUTANEOUS at 17:04

## 2025-01-05 RX ADMIN — ACETAMINOPHEN 650 MG: 325 TABLET ORAL at 01:41

## 2025-01-05 RX ADMIN — IPRATROPIUM BROMIDE AND ALBUTEROL SULFATE 3 ML: .5; 3 SOLUTION RESPIRATORY (INHALATION) at 06:45

## 2025-01-05 RX ADMIN — HYDROMORPHONE HYDROCHLORIDE 0.5 MG: 1 INJECTION, SOLUTION INTRAMUSCULAR; INTRAVENOUS; SUBCUTANEOUS at 11:30

## 2025-01-05 RX ADMIN — HYDROMORPHONE HYDROCHLORIDE 1 MG: 1 INJECTION, SOLUTION INTRAMUSCULAR; INTRAVENOUS; SUBCUTANEOUS at 23:14

## 2025-01-05 RX ADMIN — HEPARIN SODIUM 4000 UNITS: 1000 INJECTION INTRAVENOUS; SUBCUTANEOUS at 19:25

## 2025-01-05 RX ADMIN — LOSARTAN POTASSIUM 25 MG: 25 TABLET, FILM COATED ORAL at 15:04

## 2025-01-05 RX ADMIN — HYDROCODONE BITARTRATE AND ACETAMINOPHEN 1 TABLET: 5; 325 TABLET ORAL at 10:43

## 2025-01-05 RX ADMIN — CLOPIDOGREL BISULFATE 75 MG: 75 TABLET ORAL at 10:33

## 2025-01-05 RX ADMIN — HYDRALAZINE HYDROCHLORIDE 25 MG: 50 TABLET ORAL at 20:37

## 2025-01-05 RX ADMIN — NOREPINEPHRINE BITARTRATE 0.02 MCG/KG/MIN: 0.03 INJECTION, SOLUTION INTRAVENOUS at 01:41

## 2025-01-05 RX ADMIN — HYDRALAZINE HYDROCHLORIDE 10 MG: 20 INJECTION, SOLUTION INTRAMUSCULAR; INTRAVENOUS at 11:12

## 2025-01-05 NOTE — PROGRESS NOTES
LOS: 2 days   Patient Care Team:  Eliseo Smith MD as PCP - General  Eliseo Smith MD as PCP - Family Medicine    Chief Complaint:  Post op day #2-EVAR    Subjective     Patient Complaints: Patient extubated.  Patient having issues staying comfortable lying in bed.  Denies any abdominal pain.  States that his left lower extremity is at his baseline.  No nausea vomiting.  Positive flatus    Objective     Vital Signs  Temp:  [97.8 °F (36.6 °C)-100.2 °F (37.9 °C)] 97.9 °F (36.6 °C)  Heart Rate:  [] 85  Resp:  [13-23] 13  BP: ()/(51-91) 178/81  FiO2 (%):  [40 %] 40 %    Physical Exam  Constitutional:       Appearance: Normal appearance. He is not ill-appearing or toxic-appearing.   HENT:      Head: Normocephalic and atraumatic.   Cardiovascular:      Rate and Rhythm: Normal rate and regular rhythm.      Pulses:           Dorsalis pedis pulses are detected w/ Doppler on the left side.        Posterior tibial pulses are 2+ on the right side and detected w/ Doppler on the left side.   Pulmonary:      Effort: Pulmonary effort is normal. No respiratory distress.   Abdominal:      Palpations: Abdomen is soft.      Tenderness: There is no abdominal tenderness. There is no guarding.      Comments: Mild distention.   Musculoskeletal:         General: No swelling or tenderness.      Cervical back: Normal range of motion and neck supple.   Skin:     General: Skin is warm and dry.      Comments: Left foot warm.  Appears well-perfused brisk cap refill.  Coloration improving.   Neurological:      Mental Status: He is alert. Mental status is at baseline.         Laboratory Data:   Results from last 7 days   Lab Units 01/05/25  0602 01/04/25  1753 01/04/25  0600   WBC 10*3/mm3 13.65* 15.28* 16.79*   HEMOGLOBIN g/dL 9.8* 11.6* 12.3*   HEMATOCRIT % 30.7* 37.0* 37.9   PLATELETS 10*3/mm3 161 214 270       Results from last 7 days   Lab Units 01/05/25  0602 01/04/25  0721 01/04/25  0600 01/03/25  1530  01/03/25  0309   SODIUM mmol/L 138 135* 135*   < > 139   SODIUM, ARTERIAL   --   --   --    < >  --    POTASSIUM mmol/L 4.4 4.6 4.5   < > 3.9   CHLORIDE mmol/L 106 100 100   < > 101   CO2 mmol/L 24.0 25.0 24.0   < > 28.0   BUN mg/dL 16 13 13   < > 13   CREATININE mg/dL 0.67* 0.87 0.92   < > 0.92   CALCIUM mg/dL 8.2* 8.0* 8.2*   < > 9.1   BILIRUBIN mg/dL  --   --  0.3  --  0.3   ALK PHOS U/L  --   --  73  --  106   ALT (SGPT) U/L  --   --  10  --  14   AST (SGOT) U/L  --   --  29  --  16   GLUCOSE mg/dL 145* 161* 164*   < > 184*    < > = values in this interval not displayed.     Results from last 7 days   Lab Units 01/05/25  0602 01/05/25  0020 01/04/25  1753 01/04/25  1152 01/04/25  0600 01/03/25  2331 01/03/25  1831   PROTIME Seconds  --   --   --   --  14.8 14.9* 15.7*   INR   --   --   --   --  1.10* 1.11* 1.19*   APTT seconds 79.7* 80.5* 89.4*   < > 82.3* 40.6* 56.1*    < > = values in this interval not displayed.            Medication Review: Reviewed    Assessment & Plan       Abdominal aortic aneurysm    AAA (abdominal aortic aneurysm)    Abdominal pain    69-year-old male postop day 2 EVAR with extension to left external iliac for rupture.      Plan for disposition:  -Intensivist on board.  Appreciate their assistance.  -Hypotension overnight related to Precedex given for agitation.  Resolved this a.m. while off Precedex.  -Decreased urine output due to urinary retention.  Maintain IV fluids.  -Left lower extremity coloration improving.  Signals present this a.m.  Will DC heparin and start aspirin Plavix.  -Mild abdominal distention.  Passing flatus.  No nausea vomiting.  Okay for sips and chips.  -PT OT encouraged.    Timmy Finney MD  Vascular Surgery  [phone]  01/05/25  09:49 CST

## 2025-01-05 NOTE — PROGRESS NOTES
Called to bedside for concern for ischemic left lower extremity.  Per report earlier in the day the patient had left lower extremity pain.  He was also mottled cold at that time and no pedal signals could be found.  At the time of my exam the patient's coloration of the foot was stable to improved from this a.m.  He had brisk cap refill.  He had a signal that was improved from this a.m. at the DP and PT which was loud and questionably multiphasic.  He had a palpable left common femoral pulse.  Mild mottling to the left thigh.  Multiphasic common femoral popliteal signal.   At the time my exam the patient complained only of left groin pain at the incision and stated that his left foot and leg did not hurt.  However history is limited by the previous stroke.  Per the family the patient has nearly constant left lower extremity pain at baseline secondary to the stroke.  Family states that the coloration of the foot is significantly improved from baseline, and the left thigh mottling is stable from baseline.  At this point the left foot does not appear ischemic and he has left pedal signals he has multiphasic signals all the way down through the left pop.  Advised to start gabapentin 3 times daily.  Also advised to resume heparin drip.  Will make patient n.p.o. in case symptoms or changes occur.

## 2025-01-05 NOTE — PROGRESS NOTES
[facility] Intensivist Services  INPATIENT PROGRESS NOTE    Patient Name: Brijesh Grande  Date of Admission: 1/3/2025  Today's Date: 01/05/25  Length of Stay: 2  Primary Care Physician: Eliseo Smith MD    Subjective   Chief Complaint: Abdominal pain  Abdominal Pain       69-year-old male with a past medical history of hypertension and previous stroke in January 2016 with residual left-sided weakness and abnormal sensation admitted after presenting to ED with complaints of abdominal pain, left flank pain, and back pain.  The patient is also a smoker, and he continues to smoke 2 packs/day.     Significant findings from ED include glucose elevated 194, but otherwise normal CMP.  CBC was completely normal.  UA was positive for glucose, but was otherwise normal.  CT scan of the abdomen and pelvis showed a fusiform aneurysm of the distal abdominal aorta.  There is partial lumen circumferential mural thrombus Boces of the aneurysm.  There is evidence of hematoma/hemorrhage at the posterior wall of the aneurysm.  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  Patient was noted to be hypertensive in the emergency department.     Patient is being admitted to the CCU for nicardipine infusion for blood pressure control and for close monitoring.  I saw the patient while he was still in the emergency department.  He states the pain he was having in his abdomen and back are improved.  He reports that he has had an abnormal sensation to the left side of his body since his stroke from 9 years ago.  However, he noticed very intense pain in his abdomen and back earlier this morning, which brought him to the emergency department.  He has no other complaints for me at this time      Interval history:  1/4/2025 patient is intubated sedated he is status post placement of aortobiiliac endograft for  ruptured AAA and left external iliac artery stent.  Patient sedation was weaned down this morning and patient was very agitated not following commands and had to be resedated again.  Patient is being started on Precedex and fentanyl drip.  As per discussion with vascular surgery at bedside today patient had a similar episode after surgery yesterday he was very agitated and had to be reintubated and was found to have some vocal cord edema.    1/5/2025 patient was extubated yesterday he has done very well he got agitated overnight and had to be started on Precedex which dropped his blood pressure transiently requiring Levophed.  Patient remains on Precedex 0.2 he does complain of not able to use the bathroom and had some urine retention had to have an out overnight I do think this is causing his agitation.       All 12 point system review were unremarkab other than was mentioned history present illness le  Objective    Temp:  [97.8 °F (36.6 °C)-100.2 °F (37.9 °C)] 97.9 °F (36.6 °C)  Heart Rate:  [] 84  Resp:  [13-23] 13  BP: ()/(51-91) 95/65  FiO2 (%):  [40 %] 40 %  Physical Exam  Constitutional:       Appearance: He is not ill-appearing.   HENT:      Head: Normocephalic.      Mouth/Throat:      Mouth: Mucous membranes are moist.   Eyes:      Pupils: Pupils are equal, round, and reactive to light.   Cardiovascular:      Rate and Rhythm: Normal rate and regular rhythm.   Pulmonary:      Effort: No respiratory distress.      Breath sounds: No wheezing.   Abdominal:      General: Bowel sounds are normal. There is no distension.      Palpations: Abdomen is soft.      Tenderness: There is no abdominal tenderness.   Musculoskeletal:      Right lower leg: Edema present.      Left lower leg: Edema present.      Comments: Mild cyanosis of the left leg pulses are palpable   Skin:     General: Skin is warm.   Neurological:      General: No focal deficit present.      Mental Status: He is oriented to person, place,  and time.   Psychiatric:         Mood and Affect: Mood normal.             Results Review:  Lab Results (last 24 hours)       Procedure Component Value Units Date/Time    Phosphorus [[id number]]  (Abnormal) Collected: 01/05/25 0602    Specimen: Blood Updated: 01/05/25 0651     Phosphorus 2.2 mg/dL     Basic Metabolic Panel [[id number]]  (Abnormal) Collected: 01/05/25 0602    Specimen: Blood Updated: 01/05/25 0636     Glucose 145 mg/dL      BUN 16 mg/dL      Creatinine 0.67 mg/dL      Sodium 138 mmol/L      Potassium 4.4 mmol/L      Chloride 106 mmol/L      CO2 24.0 mmol/L      Calcium 8.2 mg/dL      BUN/Creatinine Ratio 23.9     Anion Gap 8.0 mmol/L      eGFR 99.8 mL/min/1.73     Narrative:      GFR Categories in Chronic Kidney Disease (CKD)      GFR Category          GFR (mL/min/1.73)    Interpretation  G1                     90 or greater         Normal or high (1)  G2                      60-89                Mild decrease (1)  G3a                   45-59                Mild to moderate decrease  G3b                   30-44                Moderate to severe decrease  G4                    15-29                Severe decrease  G5                    14 or less           Kidney failure          (1)In the absence of evidence of kidney disease, neither GFR category G1 or G2 fulfill the criteria for CKD.    eGFR calculation 2021 CKD-EPI creatinine equation, which does not include race as a factor    Magnesium [[id number]]  (Normal) Collected: 01/05/25 0602    Specimen: Blood Updated: 01/05/25 0636     Magnesium 2.2 mg/dL     aPTT [[id number]]  (Abnormal) Collected: 01/05/25 0602    Specimen: Blood Updated: 01/05/25 0622     PTT 79.7 seconds     CBC & Differential [[id number]]  (Abnormal) Collected: 01/05/25 0602    Specimen: Blood Updated: 01/05/25 0612    Narrative:      The following orders were created for panel order CBC & Differential.  Procedure                               Abnormality         Status                      ---------                               -----------         ------                     CBC Auto Differential[[id number]]        Abnormal            Final result                 Please view results for these tests on the individual orders.    CBC Auto Differential [[id number]]  (Abnormal) Collected: 01/05/25 0602    Specimen: Blood Updated: 01/05/25 0612     WBC 13.65 10*3/mm3      RBC 3.33 10*6/mm3      Hemoglobin 9.8 g/dL      Hematocrit 30.7 %      MCV 92.2 fL      MCH 29.4 pg      MCHC 31.9 g/dL      RDW 13.8 %      RDW-SD 46.5 fl      MPV 9.2 fL      Platelets 161 10*3/mm3      Neutrophil % 85.5 %      Lymphocyte % 6.4 %      Monocyte % 7.5 %      Eosinophil % 0.0 %      Basophil % 0.1 %      Immature Grans % 0.5 %      Neutrophils, Absolute 11.67 10*3/mm3      Lymphocytes, Absolute 0.88 10*3/mm3      Monocytes, Absolute 1.02 10*3/mm3      Eosinophils, Absolute 0.00 10*3/mm3      Basophils, Absolute 0.01 10*3/mm3      Immature Grans, Absolute 0.07 10*3/mm3      nRBC 0.0 /100 WBC     aPTT [[id number]]  (Abnormal) Collected: 01/05/25 0020    Specimen: Blood Updated: 01/05/25 0037     PTT 80.5 seconds     aPTT [[id number]]  (Abnormal) Collected: 01/04/25 1753    Specimen: Blood Updated: 01/04/25 1824     PTT 89.4 seconds     CBC & Differential [[id number]]  (Abnormal) Collected: 01/04/25 1753    Specimen: Blood Updated: 01/04/25 1807    Narrative:      The following orders were created for panel order CBC & Differential.  Procedure                               Abnormality         Status                     ---------                               -----------         ------                     CBC Auto Differential[[id number]]        Abnormal            Final result                 Please view results for these tests on the individual orders.    CBC Auto Differential [[id number]]  (Abnormal) Collected: 01/04/25 1753    Specimen: Blood Updated: 01/04/25 1807     WBC 15.28 10*3/mm3      RBC 3.97 10*6/mm3      Hemoglobin  11.6 g/dL      Hematocrit 37.0 %      MCV 93.2 fL      MCH 29.2 pg      MCHC 31.4 g/dL      RDW 14.1 %      RDW-SD 48.7 fl      MPV 9.1 fL      Platelets 214 10*3/mm3      Neutrophil % 82.1 %      Lymphocyte % 7.2 %      Monocyte % 9.8 %      Eosinophil % 0.1 %      Basophil % 0.3 %      Immature Grans % 0.5 %      Neutrophils, Absolute 12.54 10*3/mm3      Lymphocytes, Absolute 1.10 10*3/mm3      Monocytes, Absolute 1.50 10*3/mm3      Eosinophils, Absolute 0.02 10*3/mm3      Basophils, Absolute 0.04 10*3/mm3      Immature Grans, Absolute 0.08 10*3/mm3      nRBC 0.0 /100 WBC     Blood Gas, Arterial - [[id number]]  (Abnormal) Collected: 01/04/25 1532    Specimen: Arterial Blood Updated: 01/04/25 1536     Site Arterial Line     Migue's Test N/A     pH, Arterial 7.347 pH units      Comment: 84 Value below reference range        pCO2, Arterial 51.4 mm Hg      Comment: 83 Value above reference range        pO2, Arterial 75.8 mm Hg      Comment: 84 Value below reference range        HCO3, Arterial 28.2 mmol/L      Comment: 83 Value above reference range        Base Excess, Arterial 1.8 mmol/L      O2 Saturation, Arterial 95.2 %      Temperature 37.0     Barometric Pressure for Blood Gas 761 mmHg      Modality Ventilator     FIO2 40 %      Ventilator Mode SPONTANEOUS     PEEP 5.0     PSV 5.0 cmH2O      Collected by [id number]     Comment: Meter: [id number]     :  Perla Mccrary, LEXIS        pCO2, Temperature Corrected 51.4 mm Hg      pH, Temp Corrected 7.347 pH Units      pO2, Temperature Corrected 75.8 mm Hg      PO2/FIO2 190    aPTT [[id number]]  (Abnormal) Collected: 01/04/25 1152    Specimen: Blood Updated: 01/04/25 1216     PTT 68.0 seconds              XR Chest 1 View    Result Date: 1/3/2025  1. Right IJ central line placed and well-positioned without complication. 2. Endotracheal tube remains well-positioned. The lungs remain clear.  This report was signed and finalized on 1/3/2025 7:29 PM by Dr. Soler  "MD Aquilino.      XR Chest 1 View    Result Date: 1/3/2025  1.. Lungs clear. 2. Endotracheal tube well-positioned.  This report was signed and finalized on 1/3/2025 6:46 PM by Dr. Ryder Rolle MD.       Result Review:  I have personally reviewed the results from the time of this admission to 1/5/2025 09:30 CST and agree with these findings:  [x]  Laboratory list / accordion  []  Microbiology  [x]  Radiology  []  EKG/Telemetry   []  Cardiology/Vascular   []  Pathology  []  Old records  []  Other:  Most notable findings include:       Culture Data:   No results found for: \"BLOODCX\", \"URINECX\", \"WOUNDCX\", \"MRSACX\", \"RESPCX\", \"STOOLCX\"    I have reviewed the patient's current medications.     Assessment/Plan   Assessment  Active Hospital Problems    Diagnosis     **Abdominal aortic aneurysm     AAA (abdominal aortic aneurysm)     Abdominal pain    -Ruptured infrarenal abdominal aortic aneurysm status post aortobiiliac endograft left external iliac stent  -Acute hypoxemic respiratory failure requiring mechanical ventilation  -Laryngeal edema  -Severe agitation  -Suspected COPD  -Tobacco abuse  -ICU delirium      Plan:  -Wean down oxygen as tolerated  -I titrate Precedex off  -Pain management  - scheduled DuoNeb  -Insert Le catheter for urine tension  -Diet  -Will discuss with vascular surgery patient might be able to transfer to the floor    Electronically signed by Shaun Gallego MD on 1/5/2025 at 09:30 CST  "

## 2025-01-05 NOTE — PROGRESS NOTES
Patient blood pressure is rising complaining of severe left leg pain.  Patient left leg looks more mottled and more cyanosed almost up to his left knee.  Foot feels cool rest of the leg is warm.  Pulses are not dopplerable   -Stat 20 mg IV hydralazine was given  -IV Dilaudid stat  - vascular surgery were contacted.  -I will send for CK LDH and lactic acid

## 2025-01-05 NOTE — PLAN OF CARE
Goal Outcome Evaluation:      Patient stable overnight. Heparin drip with no changes at each Q6 PTT draw. No signs of bleeding. No changes in pain. He asked for one dose of pain medication around 0030. He was able to sleep some during the shift.   Improved pulses in LLE on 0400 check, color of leg and foot is improved. Patient was unable to urinate and at 0000 assessment bladder scan was performed. In/out cath performed with patient permission and 300 ml returned. O2 sats stable on nasal cannula. BP's were soft at one point and levophed was restarted briefly. It is off at this time. Patient only allowed himself to be turned either supine or to the right. This was done Q2 per his allowance. Safety maintained.

## 2025-01-06 LAB
ANION GAP SERPL CALCULATED.3IONS-SCNC: 9 MMOL/L (ref 5–15)
APTT PPP: 47.9 SECONDS (ref 24.5–36)
BASOPHILS # BLD AUTO: 0.04 10*3/MM3 (ref 0–0.2)
BASOPHILS NFR BLD AUTO: 0.3 % (ref 0–1.5)
BUN SERPL-MCNC: 17 MG/DL (ref 8–23)
BUN/CREAT SERPL: 24.6 (ref 7–25)
CALCIUM SPEC-SCNC: 8.2 MG/DL (ref 8.6–10.5)
CHLORIDE SERPL-SCNC: 109 MMOL/L (ref 98–107)
CO2 SERPL-SCNC: 25 MMOL/L (ref 22–29)
CREAT SERPL-MCNC: 0.69 MG/DL (ref 0.76–1.27)
DEPRECATED RDW RBC AUTO: 48 FL (ref 37–54)
EGFRCR SERPLBLD CKD-EPI 2021: 98.9 ML/MIN/1.73
EOSINOPHIL # BLD AUTO: 0.08 10*3/MM3 (ref 0–0.4)
EOSINOPHIL NFR BLD AUTO: 0.6 % (ref 0.3–6.2)
ERYTHROCYTE [DISTWIDTH] IN BLOOD BY AUTOMATED COUNT: 14.2 % (ref 12.3–15.4)
GLUCOSE SERPL-MCNC: 112 MG/DL (ref 65–99)
HCT VFR BLD AUTO: 28.6 % (ref 37.5–51)
HGB BLD-MCNC: 9.1 G/DL (ref 13–17.7)
IMM GRANULOCYTES # BLD AUTO: 0.1 10*3/MM3 (ref 0–0.05)
IMM GRANULOCYTES NFR BLD AUTO: 0.8 % (ref 0–0.5)
LYMPHOCYTES # BLD AUTO: 1.04 10*3/MM3 (ref 0.7–3.1)
LYMPHOCYTES NFR BLD AUTO: 8.3 % (ref 19.6–45.3)
MAGNESIUM SERPL-MCNC: 2.2 MG/DL (ref 1.6–2.4)
MCH RBC QN AUTO: 29.4 PG (ref 26.6–33)
MCHC RBC AUTO-ENTMCNC: 31.8 G/DL (ref 31.5–35.7)
MCV RBC AUTO: 92.6 FL (ref 79–97)
MONOCYTES # BLD AUTO: 0.94 10*3/MM3 (ref 0.1–0.9)
MONOCYTES NFR BLD AUTO: 7.5 % (ref 5–12)
NEUTROPHILS NFR BLD AUTO: 10.3 10*3/MM3 (ref 1.7–7)
NEUTROPHILS NFR BLD AUTO: 82.5 % (ref 42.7–76)
NRBC BLD AUTO-RTO: 0 /100 WBC (ref 0–0.2)
PHOSPHATE SERPL-MCNC: 2.1 MG/DL (ref 2.5–4.5)
PLATELET # BLD AUTO: 174 10*3/MM3 (ref 140–450)
PMV BLD AUTO: 9.2 FL (ref 6–12)
POTASSIUM SERPL-SCNC: 4.4 MMOL/L (ref 3.5–5.2)
RBC # BLD AUTO: 3.09 10*6/MM3 (ref 4.14–5.8)
SODIUM SERPL-SCNC: 143 MMOL/L (ref 136–145)
WBC NRBC COR # BLD AUTO: 12.5 10*3/MM3 (ref 3.4–10.8)

## 2025-01-06 PROCEDURE — 85025 COMPLETE CBC W/AUTO DIFF WBC: CPT | Performed by: INTERNAL MEDICINE

## 2025-01-06 PROCEDURE — 97530 THERAPEUTIC ACTIVITIES: CPT

## 2025-01-06 PROCEDURE — 97166 OT EVAL MOD COMPLEX 45 MIN: CPT

## 2025-01-06 PROCEDURE — 85730 THROMBOPLASTIN TIME PARTIAL: CPT | Performed by: STUDENT IN AN ORGANIZED HEALTH CARE EDUCATION/TRAINING PROGRAM

## 2025-01-06 PROCEDURE — 25010000002 HEPARIN (PORCINE) PER 1000 UNITS: Performed by: STUDENT IN AN ORGANIZED HEALTH CARE EDUCATION/TRAINING PROGRAM

## 2025-01-06 PROCEDURE — 94799 UNLISTED PULMONARY SVC/PX: CPT

## 2025-01-06 PROCEDURE — 94664 DEMO&/EVAL PT USE INHALER: CPT

## 2025-01-06 PROCEDURE — 97162 PT EVAL MOD COMPLEX 30 MIN: CPT

## 2025-01-06 PROCEDURE — 84100 ASSAY OF PHOSPHORUS: CPT | Performed by: STUDENT IN AN ORGANIZED HEALTH CARE EDUCATION/TRAINING PROGRAM

## 2025-01-06 PROCEDURE — 97116 GAIT TRAINING THERAPY: CPT

## 2025-01-06 PROCEDURE — 83735 ASSAY OF MAGNESIUM: CPT | Performed by: STUDENT IN AN ORGANIZED HEALTH CARE EDUCATION/TRAINING PROGRAM

## 2025-01-06 PROCEDURE — 25010000002 HYDROMORPHONE 1 MG/ML SOLUTION: Performed by: INTERNAL MEDICINE

## 2025-01-06 PROCEDURE — 80048 BASIC METABOLIC PNL TOTAL CA: CPT | Performed by: INTERNAL MEDICINE

## 2025-01-06 RX ADMIN — HYDRALAZINE HYDROCHLORIDE 25 MG: 50 TABLET ORAL at 09:52

## 2025-01-06 RX ADMIN — GABAPENTIN 300 MG: 300 CAPSULE ORAL at 09:52

## 2025-01-06 RX ADMIN — Medication 1 APPLICATION: at 09:52

## 2025-01-06 RX ADMIN — IPRATROPIUM BROMIDE AND ALBUTEROL SULFATE 3 ML: .5; 3 SOLUTION RESPIRATORY (INHALATION) at 06:25

## 2025-01-06 RX ADMIN — HYDROCODONE BITARTRATE AND ACETAMINOPHEN 1 TABLET: 5; 325 TABLET ORAL at 12:43

## 2025-01-06 RX ADMIN — HYDRALAZINE HYDROCHLORIDE 25 MG: 50 TABLET ORAL at 16:38

## 2025-01-06 RX ADMIN — IPRATROPIUM BROMIDE AND ALBUTEROL SULFATE 3 ML: .5; 3 SOLUTION RESPIRATORY (INHALATION) at 03:28

## 2025-01-06 RX ADMIN — BISACODYL 5 MG: 5 TABLET, COATED ORAL at 16:38

## 2025-01-06 RX ADMIN — METOPROLOL SUCCINATE 50 MG: 50 TABLET, EXTENDED RELEASE ORAL at 09:51

## 2025-01-06 RX ADMIN — IPRATROPIUM BROMIDE AND ALBUTEROL SULFATE 3 ML: .5; 3 SOLUTION RESPIRATORY (INHALATION) at 10:55

## 2025-01-06 RX ADMIN — CHLORHEXIDINE GLUCONATE 1 APPLICATION: 500 CLOTH TOPICAL at 04:55

## 2025-01-06 RX ADMIN — GABAPENTIN 300 MG: 300 CAPSULE ORAL at 16:38

## 2025-01-06 RX ADMIN — ATORVASTATIN CALCIUM 10 MG: 10 TABLET, FILM COATED ORAL at 09:56

## 2025-01-06 RX ADMIN — POLYETHYLENE GLYCOL 3350 17 G: 17 POWDER, FOR SOLUTION ORAL at 13:54

## 2025-01-06 RX ADMIN — HEPARIN SODIUM 4000 UNITS: 1000 INJECTION INTRAVENOUS; SUBCUTANEOUS at 07:00

## 2025-01-06 RX ADMIN — Medication 10 ML: at 20:52

## 2025-01-06 RX ADMIN — HYDROMORPHONE HYDROCHLORIDE 1 MG: 1 INJECTION, SOLUTION INTRAMUSCULAR; INTRAVENOUS; SUBCUTANEOUS at 08:20

## 2025-01-06 RX ADMIN — Medication 10 ML: at 20:51

## 2025-01-06 RX ADMIN — LOSARTAN POTASSIUM 25 MG: 25 TABLET, FILM COATED ORAL at 09:51

## 2025-01-06 RX ADMIN — Medication 10 ML: at 09:52

## 2025-01-06 RX ADMIN — HYDRALAZINE HYDROCHLORIDE 25 MG: 50 TABLET ORAL at 20:51

## 2025-01-06 RX ADMIN — GABAPENTIN 300 MG: 300 CAPSULE ORAL at 20:51

## 2025-01-06 RX ADMIN — DOCUSATE SODIUM 50 MG AND SENNOSIDES 8.6 MG 2 TABLET: 8.6; 5 TABLET, FILM COATED ORAL at 09:51

## 2025-01-06 RX ADMIN — HYDROMORPHONE HYDROCHLORIDE 1 MG: 1 INJECTION, SOLUTION INTRAMUSCULAR; INTRAVENOUS; SUBCUTANEOUS at 04:55

## 2025-01-06 RX ADMIN — HYDROCODONE BITARTRATE AND ACETAMINOPHEN 1 TABLET: 5; 325 TABLET ORAL at 20:51

## 2025-01-06 RX ADMIN — DOCUSATE SODIUM 50 MG AND SENNOSIDES 8.6 MG 2 TABLET: 8.6; 5 TABLET, FILM COATED ORAL at 20:51

## 2025-01-06 RX ADMIN — IPRATROPIUM BROMIDE AND ALBUTEROL SULFATE 3 ML: .5; 3 SOLUTION RESPIRATORY (INHALATION) at 19:00

## 2025-01-06 RX ADMIN — IPRATROPIUM BROMIDE AND ALBUTEROL SULFATE 3 ML: .5; 3 SOLUTION RESPIRATORY (INHALATION) at 14:04

## 2025-01-06 RX ADMIN — Medication 5 MG: at 20:51

## 2025-01-06 RX ADMIN — Medication 1 APPLICATION: at 20:51

## 2025-01-06 RX ADMIN — ASPIRIN 81 MG: 81 TABLET, CHEWABLE ORAL at 09:51

## 2025-01-06 RX ADMIN — CLOPIDOGREL BISULFATE 75 MG: 75 TABLET ORAL at 09:51

## 2025-01-06 NOTE — CASE MANAGEMENT/SOCIAL WORK
Continued Stay Note  [facility]     Patient Name: Brijesh Grande  MRN: [id number]  Today's Date: 1/6/2025    Admit Date: 1/3/2025    Plan: Referral to [facility] pending   Discharge Plan       Row Name 01/06/25 1409       Plan    Plan Referral to [facility] pending    Plan Comments Referral made to [facility], awaiting response.                   Discharge Codes    No documentation.                       ALAYNA Nolasco

## 2025-01-06 NOTE — PLAN OF CARE
"Goal Outcome Evaluation:  Plan of Care Reviewed With: patient        Progress: improving     VSS. Afebrile. Urine output improved to 400 ml for shift. O2 stable on 6L/NC. He has been NPO for any potential procedures today (as directed). Pain medication requested and given x 3 this shift. He has slept some throughout the shift, which he states has made him feel \"better\". Heparin drip remains on and IV fluids are running, but all other IV drips have remained off successfully this shift. Turned Q2H as patient would allow. Safety maintained.                            "

## 2025-01-06 NOTE — PROGRESS NOTES
LOS: 3 days   Patient Care Team:  Eliseo Smith MD as PCP - General  Eliseo Smith MD as PCP - Family Medicine    Chief Complaint:  Post op day #3-EVAR    Subjective     Patient Complaints: Patient more comfortable this a.m. mild left groin pain as expected.  Passing flatus no nausea or vomiting reports hungry this a.m.  Denies any abdominal pain.  Denies any left lower extremity pain.  Objective     Vital Signs  Temp:  [97.9 °F (36.6 °C)-99.2 °F (37.3 °C)] 97.9 °F (36.6 °C)  Heart Rate:  [] 84  Resp:  [12-20] 16  BP: (103-175)/() 128/83    Physical Exam  Constitutional:       Appearance: Normal appearance. He is not ill-appearing or toxic-appearing.   HENT:      Head: Normocephalic and atraumatic.   Cardiovascular:      Rate and Rhythm: Normal rate and regular rhythm.      Pulses:           Dorsalis pedis pulses are detected w/ Doppler on the left side.        Posterior tibial pulses are 2+ on the right side and detected w/ Doppler on the left side.   Pulmonary:      Effort: Pulmonary effort is normal. No respiratory distress.   Abdominal:      Palpations: Abdomen is soft.      Tenderness: There is no abdominal tenderness. There is no guarding.      Comments: Mild distention resolved.   Musculoskeletal:         General: No swelling or tenderness.      Cervical back: Normal range of motion and neck supple.      Comments: Motor intact bilateral lower extremities   Skin:     General: Skin is warm and dry.      Comments: Left foot warm.  Appears well-perfused brisk cap refill.  Coloration improving.   Neurological:      Mental Status: He is alert. Mental status is at baseline.         Laboratory Data:   Results from last 7 days   Lab Units 01/06/25  0625 01/05/25  0602 01/04/25  1753   WBC 10*3/mm3 12.50* 13.65* 15.28*   HEMOGLOBIN g/dL 9.1* 9.8* 11.6*   HEMATOCRIT % 28.6* 30.7* 37.0*   PLATELETS 10*3/mm3 174 161 214       Results from last 7 days   Lab Units 01/06/25  0625 01/05/25  0602  01/04/25  0721 01/04/25  0600 01/03/25  1530 01/03/25  0309   SODIUM mmol/L 143 138 135* 135*   < > 139   SODIUM, ARTERIAL   --   --   --   --    < >  --    POTASSIUM mmol/L 4.4 4.4 4.6 4.5   < > 3.9   CHLORIDE mmol/L 109* 106 100 100   < > 101   CO2 mmol/L 25.0 24.0 25.0 24.0   < > 28.0   BUN mg/dL 17 16 13 13   < > 13   CREATININE mg/dL 0.69* 0.67* 0.87 0.92   < > 0.92   CALCIUM mg/dL 8.2* 8.2* 8.0* 8.2*   < > 9.1   BILIRUBIN mg/dL  --   --   --  0.3  --  0.3   ALK PHOS U/L  --   --   --  73  --  106   ALT (SGPT) U/L  --   --   --  10  --  14   AST (SGOT) U/L  --   --   --  29  --  16   GLUCOSE mg/dL 112* 145* 161* 164*   < > 184*    < > = values in this interval not displayed.     Results from last 7 days   Lab Units 01/06/25  0625 01/05/25  2326 01/05/25  1817 01/05/25  1307 01/05/25  0602 01/04/25  1152 01/04/25  0600 01/03/25  2331   PROTIME Seconds  --   --   --   --  15.1*  --  14.8 14.9*   INR   --   --   --   --  1.13*  --  1.10* 1.11*   APTT seconds 47.9* 85.2* 41.6*   < > 79.7*   < > 82.3* 40.6*    < > = values in this interval not displayed.            Medication Review: Reviewed    Assessment & Plan       Abdominal aortic aneurysm    AAA (abdominal aortic aneurysm)    Abdominal pain    69-year-old male postop day 3 EVAR with extension to left external iliac for rupture.      Plan for disposition:  -Intensivist on board.  Appreciate their assistance.  -Attention resolved continue to monitor.  -Agitation and discomfort significantly improved.  Continue gabapentin.  -Okay to advance diet as tolerated.  -Patient eating well okay to DC fluids.  -DC heparin drip.  Transition to aspirin Plavix.  -PT OT encouraged.    Timmy Finney MD  Vascular Surgery  [phone]  01/06/25  09:12 CST

## 2025-01-06 NOTE — PLAN OF CARE
Problem: Adult Inpatient Plan of Care  Goal: Plan of Care Review  Outcome: Progressing  Goal: Patient-Specific Goal (Individualized)  Outcome: Progressing  Goal: Absence of Hospital-Acquired Illness or Injury  Outcome: Progressing  Intervention: Identify and Manage Fall Risk  Recent Flowsheet Documentation  Taken 1/5/2025 1800 by Neo Espana RN  Safety Promotion/Fall Prevention:   safety round/check completed   fall prevention program maintained  Taken 1/5/2025 1700 by Neo Espana RN  Safety Promotion/Fall Prevention:   safety round/check completed   fall prevention program maintained  Taken 1/5/2025 1600 by Neo Espana RN  Safety Promotion/Fall Prevention:   safety round/check completed   fall prevention program maintained  Taken 1/5/2025 1500 by Neo Espana RN  Safety Promotion/Fall Prevention:   safety round/check completed   fall prevention program maintained  Taken 1/5/2025 1400 by Neo Espana RN  Safety Promotion/Fall Prevention:   safety round/check completed   fall prevention program maintained  Taken 1/5/2025 1300 by Neo Espana RN  Safety Promotion/Fall Prevention:   safety round/check completed   fall prevention program maintained  Taken 1/5/2025 1200 by Neo Espana RN  Safety Promotion/Fall Prevention:   safety round/check completed   fall prevention program maintained  Taken 1/5/2025 1100 by Neo Espana RN  Safety Promotion/Fall Prevention:   safety round/check completed   fall prevention program maintained  Taken 1/5/2025 1000 by Neo Espana RN  Safety Promotion/Fall Prevention:   safety round/check completed   fall prevention program maintained  Taken 1/5/2025 0900 by Neo Espana RN  Safety Promotion/Fall Prevention:   safety round/check completed   fall prevention program maintained  Taken 1/5/2025 0800 by Neo Espana RN  Safety Promotion/Fall Prevention:   safety round/check completed   fall prevention program maintained  Taken  1/5/2025 0700 by Neo Espana RN  Safety Promotion/Fall Prevention:   safety round/check completed   fall prevention program maintained  Intervention: Prevent Skin Injury  Recent Flowsheet Documentation  Taken 1/5/2025 1800 by Neo Espana RN  Body Position: dangle, side of bed  Taken 1/5/2025 1700 by Neo Espana RN  Body Position: dangle, side of bed  Taken 1/5/2025 1600 by Neo Espana RN  Body Position: dangle, side of bed  Taken 1/5/2025 1500 by Neo Espana RN  Body Position: dangle, side of bed  Taken 1/5/2025 1400 by Neo Espana RN  Body Position: dangle, side of bed  Taken 1/5/2025 1300 by Neo Espana RN  Body Position: dangle, side of bed  Taken 1/5/2025 1200 by Neo Espana RN  Body Position:   side-lying   right   patient/family refused  Skin Protection: incontinence pads utilized  Taken 1/5/2025 1100 by Neo Espana RN  Body Position:   weight shifting   tilted   right  Taken 1/5/2025 1000 by Neo Espana RN  Body Position:   position changed independently   sitting up in bed   weight shifting  Taken 1/5/2025 0900 by Neo Espana RN  Body Position:   turned   supine   sitting up in bed   upper extremity elevated  Taken 1/5/2025 0800 by Neo Espana RN  Body Position:   tilted   right   position changed independently  Skin Protection: incontinence pads utilized  Taken 1/5/2025 0700 by Neo Espana RN  Body Position:   position changed independently   tilted   turned   right  Intervention: Prevent and Manage VTE (Venous Thromboembolism) Risk  Recent Flowsheet Documentation  Taken 1/5/2025 1200 by Neo Espana RN  VTE Prevention/Management: (see MAR) other (see comments)  Goal: Optimal Comfort and Wellbeing  Outcome: Progressing  Intervention: Monitor Pain and Promote Comfort  Recent Flowsheet Documentation  Taken 1/5/2025 1704 by Neo Espana RN  Pain Management Interventions:   position adjusted   pain medication given  Taken  1/5/2025 1000 by Neo Espana, RN  Pain Management Interventions: position adjusted  Intervention: Provide Person-Centered Care  Recent Flowsheet Documentation  Taken 1/5/2025 1600 by Neo Espana, RN  Trust Relationship/Rapport: care explained  Taken 1/5/2025 1200 by Neo Espana, RN  Trust Relationship/Rapport:   care explained   emotional support provided   thoughts/feelings acknowledged  Goal: Readiness for Transition of Care  Outcome: Progressing   Goal Outcome Evaluation:  Pt remains off of precedex for duration of shift, A&Ox4, c/o pain 10/10 unable to get comfortable, pulses in LLE questionable at one point, Dr. Finney contacted, came to bedside, pulses improved, color improved, orders for gabepentin and restart heparin ggt

## 2025-01-06 NOTE — PLAN OF CARE
Goal Outcome Evaluation:  Plan of Care Reviewed With: patient           Outcome Evaluation: PT eval complete. He is alert and oriented x 4. Mr. Grande reports living at home alone where he was independent in his mobility and ADL's. He utilizes a cane as needed but reports furniture walking. He has a history of CVA w left sided deficits. L LE is grossly 2-/5 in strength. His L LE is also mild/moderately hypertonic. Today he needs min assist x 2 to stand and to t/f to the [facility]. He utilizes his tone with wt bearing through his L LE. Demos L flank pain with an antalgic gait. Was later able to ambulate a short distance with min assist x 2, ~ 12 ft. He would benefit from a cane for support and balance due to L LE weakness and reports of generalized weakness. He remains a fall risk. PT will cont with mobility training, balance training, strengthening and endurance training/activity tolerance. He would currently benefit from short term rehab as he lives alone and is not currently ready to dc home alone.    Anticipated Discharge Disposition (PT): sub acute care setting

## 2025-01-06 NOTE — DISCHARGE PLACEMENT REQUEST
"To: [facility] Bed    From: Iman PORTER [phone]        Brea Grande (69 y.o. Male)       Date of Birth   1955    Social Security Number       Address   [address] [facility] [address]    Home Phone   [phone]    MRN   [id number]       Confucianism   Other    Marital Status                               Admission Date   1/3/25    Admission Type   Emergency    Admitting Provider   Nathan Cortes MD    Attending Provider   Shaun Gallego MD    Department, Room/Bed   [facility] CARDIAC CARE, C005/1       Discharge Date       Discharge Disposition       Discharge Destination                                 Attending Provider: Shaun Gallego MD    Allergies: No Known Allergies    Isolation: None   Infection: None   Code Status: CPR    Ht: 177.8 cm (70\")   Wt: 99.1 kg (218 lb 7.6 oz)    Admission Cmt: None   Principal Problem: Abdominal aortic aneurysm [I71.40]                   Active Insurance as of 1/3/2025       Primary Coverage       Payor Plan Insurance Group Employer/Plan Group    ANTHEM MEDICARE REPLACEMENT ANTHEM MED ADV PPO [id number]       Payor Plan Address Payor Plan Phone Number Payor Plan Fax Number Effective Dates    [address] [phone]  1/1/2024 - None Entered    [facility] [id number]         Subscriber Name Subscriber Birth Date Member ID       BREA GRANDE 1955 [id number]                     Emergency Contacts        (Rel.) Home Phone Work Phone Mobile Phone    Alexis Grande (Son) -- -- [phone]    Mildred Pendleton (Friend) -- -- [phone]                 History & Physical        Abrahan Haddad PA-C at 01/03/25 0943       Attestation signed by Nathan Cortes MD at 01/03/25 7650    I have reviewed this documentation and agree.  Patient heading to the OR.  I agree with the APC notes and plan as noted above.  Very interesting imaging.  Appreciate vascular surgery assistance.  Admit to ICU for further care until patient " more stable.  Plan for OR.  Nathan Cortes MD, CM                    [facility] Intensivist Services  HISTORY AND PHYSICAL    Date of Admission: 1/3/2025  Primary Care Physician: Eliseo Smith MD    Subjective   Primary Historian: Patient    Chief Complaint: Abdominal pain, left flank pain, back pain    History of Present Illness  69-year-old male with a past medical history of hypertension and previous stroke in January 2016 with residual left-sided weakness and abnormal sensation admitted after presenting to ED with complaints of abdominal pain, left flank pain, and back pain.  The patient is also a smoker, and he continues to smoke 2 packs/day.    Significant findings from ED include glucose elevated 194, but otherwise normal CMP.  CBC was completely normal.  UA was positive for glucose, but was otherwise normal.  CT scan of the abdomen and pelvis showed a fusiform aneurysm of the distal abdominal aorta.  There is partial lumen circumferential mural thrombus Boces of the aneurysm.  There is evidence of hematoma/hemorrhage at the posterior wall of the aneurysm.  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  Patient was noted to be hypertensive in the emergency department.    Patient is being admitted to the CCU for nicardipine infusion for blood pressure control and for close monitoring.  I saw the patient while he was still in the emergency department.  He states the pain he was having in his abdomen and back are improved.  He reports that he has had an abnormal sensation to the left side of his body since his stroke from 9 years ago.  However, he noticed very intense pain in his abdomen and back earlier this morning, which brought him to the emergency department.  He has no other complaints for me at this time.    Review of Systems   Otherwise complete ROS reviewed and  negative except as mentioned in the HPI.    Past Medical History:   Past Medical History:   Diagnosis Date    Hypertension     Stroke      Past Surgical History:  Past Surgical History:   Procedure Laterality Date    CAROTID ENDARTERECTOMY      x 2     Social History:  reports that he has been smoking cigarettes. He does not have any smokeless tobacco history on file. He reports that he does not currently use alcohol. He reports that he does not currently use drugs.    Family History: family history is not on file.       Allergies:  No Known Allergies    Medications:  Prior to Admission medications    Medication Sig Start Date End Date Taking? Authorizing Provider   amLODIPine (NORVASC) 10 MG tablet Take 1 tablet by mouth Daily.    Raulito Gleason MD   aspirin 325 MG EC tablet Take 1 tablet by mouth Every 6 (Six) Hours As Needed for Mild Pain.    Raulito Gleason MD   atorvastatin (LIPITOR) 10 MG tablet Take 1 tablet by mouth Daily.    Raulito Gleason MD   cloNIDine (CATAPRES) 0.2 MG tablet Take 1 tablet by mouth 2 (Two) Times a Day.    Raulito Gleason MD   hydrALAZINE (APRESOLINE) 25 MG tablet Take 1 tablet by mouth 3 (Three) Times a Day.    Raulito Gleason MD   losartan (COZAAR) 25 MG tablet Take 1 tablet by mouth Daily.    Raulito Gleason MD   metoprolol tartrate (LOPRESSOR) 25 MG tablet Take 1 tablet by mouth 2 (Two) Times a Day.    Raulito Gleason MD     I have utilized all available immediate resources to obtain, update, or review the patient's current medications (including all prescriptions, over-the-counter products, herbals, cannabis/cannabidiol products, and vitamin/mineral/dietary (nutritional) supplements).    Objective     Vital Signs: /95   Pulse 69   Temp 97.5 °F (36.4 °C) (Oral)   Resp 18   SpO2 91%   Physical Exam  Constitutional:       General: He is not in acute distress.     Appearance: He is not toxic-appearing or diaphoretic.   HENT:       Head: Normocephalic and atraumatic.      Mouth/Throat:      Mouth: Mucous membranes are moist.   Eyes:      Extraocular Movements: Extraocular movements intact.   Cardiovascular:      Rate and Rhythm: Normal rate and regular rhythm.      Comments: DP and PT pulses RLE 2+, DP and PT pulses LLE 1+.   Pulmonary:      Effort: Pulmonary effort is normal.      Breath sounds: Normal breath sounds.   Abdominal:      General: Bowel sounds are normal. There is distension.      Tenderness: There is no abdominal tenderness.   Skin:     General: Skin is warm.      Capillary Refill: Capillary refill takes less than 2 seconds.   Neurological:      Mental Status: He is alert and oriented to person, place, and time. Mental status is at baseline.      Comments: LUE and LLE weakness present. Patient states this is chronic since his stroke from January 2016.         Results Reviewed:  Lab Results (last 24 hours)       Procedure Component Value Units Date/Time    [place] Urine - Urine, Clean Catch [[id number]] Collected: 01/03/25 0351    Specimen: Urine, Clean Catch Updated: 01/03/25 0400     Extra Tube Hold for add-ons.     Comment: Auto resulted.       Urinalysis With Culture If Indicated - Urine, Clean Catch [[id number]]  (Abnormal) Collected: 01/03/25 0351    Specimen: Urine, Clean Catch Updated: 01/03/25 0359     Color, UA Yellow     Appearance, UA Clear     pH, UA 6.0     Specific Gravity, UA 1.019     Glucose,  mg/dL (1+)     Ketones, UA Negative     Bilirubin, UA Negative     Blood, UA Negative     Protein, UA Negative     Leuk Esterase, UA Negative     Nitrite, UA Negative     Urobilinogen, UA 0.2 E.U./dL    Narrative:      In absence of clinical symptoms, the presence of pyuria, bacteria, and/or nitrites on the urinalysis result does not correlate with infection.  Urine microscopic not indicated.    Comprehensive Metabolic Panel [[id number]]  (Abnormal) Collected: 01/03/25 0309    Specimen: Blood Updated: 01/03/25 1290      Glucose 184 mg/dL      BUN 13 mg/dL      Creatinine 0.92 mg/dL      Sodium 139 mmol/L      Potassium 3.9 mmol/L      Comment: Slight hemolysis detected by analyzer. Result may be falsely elevated.        Chloride 101 mmol/L      CO2 28.0 mmol/L      Calcium 9.1 mg/dL      Total Protein 7.1 g/dL      Albumin 4.1 g/dL      ALT (SGPT) 14 U/L      AST (SGOT) 16 U/L      Alkaline Phosphatase 106 U/L      Total Bilirubin 0.3 mg/dL      Globulin 3.0 gm/dL      A/G Ratio 1.4 g/dL      BUN/Creatinine Ratio 14.1     Anion Gap 10.0 mmol/L      eGFR 88.9 mL/min/1.73     Narrative:      GFR Categories in Chronic Kidney Disease (CKD)      GFR Category          GFR (mL/min/1.73)    Interpretation  G1                     90 or greater         Normal or high (1)  G2                      60-89                Mild decrease (1)  G3a                   45-59                Mild to moderate decrease  G3b                   30-44                Moderate to severe decrease  G4                    15-29                Severe decrease  G5                    14 or less           Kidney failure          (1)In the absence of evidence of kidney disease, neither GFR category G1 or G2 fulfill the criteria for CKD.    eGFR calculation 2021 CKD-EPI creatinine equation, which does not include race as a factor    CBC & Differential [[id number]]  (Abnormal) Collected: 01/03/25 0309    Specimen: Blood Updated: 01/03/25 0340    Narrative:      The following orders were created for panel order CBC & Differential.  Procedure                               Abnormality         Status                     ---------                               -----------         ------                     CBC Auto Differential[[id number]]        Abnormal            Final result                 Please view results for these tests on the individual orders.    CBC Auto Differential [[id number]]  (Abnormal) Collected: 01/03/25 0309    Specimen: Blood Updated: 01/03/25 0340     WBC 9.50  10*3/mm3      RBC 4.90 10*6/mm3      Hemoglobin 14.7 g/dL      Hematocrit 44.8 %      MCV 91.4 fL      MCH 30.0 pg      MCHC 32.8 g/dL      RDW 13.8 %      RDW-SD 46.6 fl      MPV 9.0 fL      Platelets 261 10*3/mm3      Neutrophil % 72.9 %      Lymphocyte % 18.1 %      Monocyte % 6.8 %      Eosinophil % 1.4 %      Basophil % 0.5 %      Immature Grans % 0.3 %      Neutrophils, Absolute 6.92 10*3/mm3      Lymphocytes, Absolute 1.72 10*3/mm3      Monocytes, Absolute 0.65 10*3/mm3      Eosinophils, Absolute 0.13 10*3/mm3      Basophils, Absolute 0.05 10*3/mm3      Immature Grans, Absolute 0.03 10*3/mm3      nRBC 0.0 /100 WBC     [place] Draw [[id number]] Collected: 01/03/25 0309    Specimen: Blood Updated: 01/03/25 0315    Narrative:      The following orders were created for panel order [place] Draw.  Procedure                               Abnormality         Status                     ---------                               -----------         ------                     Green Top (Gel)[[id number]]                                  Final result               Lavender Top[[id number]]                                     Final result               Red Top[[id number]]                                          Final result               Carbone Top[[id number]]                                         Final result               Light Blue Top[[id number]]                                   Final result                 Please view results for these tests on the individual orders.    Green Top (Gel) [[id number]] Collected: 01/03/25 0309    Specimen: Blood Updated: 01/03/25 0315     Extra Tube Hold for add-ons.     Comment: Auto resulted.       Lavender Top [[id number]] Collected: 01/03/25 0309    Specimen: Blood Updated: 01/03/25 0315     Extra Tube hold for add-on     Comment: Auto resulted       Red Top [[id number]] Collected: 01/03/25 0309    Specimen: Blood Updated: 01/03/25 0315     Extra Tube Hold for add-ons.     Comment: Auto  resulted.       Carbone Top [[id number]] Collected: 01/03/25 0309    Specimen: Blood Updated: 01/03/25 0315     Extra Tube Hold for add-ons.     Comment: Auto resulted.       Light Blue Top [[id number]] Collected: 01/03/25 0309    Specimen: Blood Updated: 01/03/25 0315     Extra Tube Hold for add-ons.     Comment: Auto resulted                CT Abdomen Pelvis With & Without Contrast    Result Date: 1/3/2025  1. Fusiform aneurysm of the distal abdominal aorta as detailed above. There is partial lumen circumferential mural thrombosis of the aneurysm. There is evidence of hematoma/hemorrhage at the posterior wall of the aneurysm as detailed above. Further evaluation with CT angiography of the abdomen may be obtained. 2. A saccular aneurysm of the mid abdominal aorta adjacent and below the level of the right renal arteries and posterior to the IVC. Details given above. 3. No other acute findings in the abdomen or pelvis to account for patient's symptoms. No renal or ureteral calculi or obstructive uropathy. 4. No gallstones. No finding to suggest appendicitis.       This report was signed and finalized on 1/3/2025 5:58 AM by Dr. Andra Scott MD.       Result Review:  I have personally reviewed the results from the time of this admission to 1/3/2025 09:44 CST and agree with these findings:  [x]  Laboratory list / accordion  []  Microbiology  [x]  Radiology  []  EKG/Telemetry   []  Cardiology/Vascular   []  Pathology  []  Old records  []  Other:  Most notable findings include: CT scan of the abdomen and pelvis showed a fusiform aneurysm of the distal abdominal aorta.  There is partial lumen circumferential mural thrombus Boces of the aneurysm.  There is evidence of hematoma/hemorrhage at the posterior wall of the aneurysm.  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      I have personally reviewed and interpreted the  radiology studies and ECG obtained at time of admission.     Assessment / Plan   Assessment:   Active Hospital Problems    Diagnosis     AAA (abdominal aortic aneurysm)    69-year-old male with a past medical history of previous stroke, tobacco use, and hypertension admitted after presenting to the ED with complaints of abdominal pain and back pain.  He was found to have a AAA with evidence of hematoma/hemorrhage at the posterior wall of the aneurysm.  He is admitted to the CCU for close monitoring and will be started on a Cardene infusion to keep systolic blood pressure <120.  Vascular surgery has been consulted.  Patient may potentially go to the OR this afternoon.    Treatment Plan  The patient will be admitted to Intensivist service here at [facility].     AAA  -With evidence of hematoma/hemorrhage at the posterior wall of the aneurysm  -Starting patient on Cardene infusion now with goal SBP < 120  -Renal function is currently normal; however, given area of aneurysm, we will monitor UOP and renal function closely   -Serial pulse checks to BLE   -Vital signs per CCU protocol  -Pt to be kept NPO for possible surgery  -Further recommendations per vascular surgery  -Vascular surgery following, we appreciate their recommendations    Hypertension  -Starting cardene infusion to keep SBP <120 in setting of AAA as mentioned above  -Patient takes amlodipine, clonidine, hydralazine, losartan, and metoprolol. These medications have been ordered to start tomorrow as patient is currently NPO as he may be going to OR today.   -VS per CCU protocol    Tobacco abuse  -Patient smokes 2 PPD  -Nicotine patch ordered, but patient does not want one at this time.     4. History of Stroke  -Patient had stroke in Jan 2016 with left-sided residual deficits as mentioned above    Medical Decision Making  Number and Complexity of problems: 4  Differential Diagnosis: AAA, rupture of abdominal aortic aneurysm, renal colic,  pyelonephritis, splenic injury, appendicitis    Conditions and Status        Condition is unchanged as patient just arrived from ED.     [facility] Data  External documents reviewed: N/A  Cardiac tracing (EKG, telemetry) interpretation: Sinus rhythm, rate controlled at time my exam  Radiology interpretation: Yes, see above  Labs reviewed: Yes, see above  Any tests that were considered but not ordered: N/A     Decision rules/scores evaluated (example [id number], Wells, etc): N/A     Discussed with: Attending, patient, patient's ex-wife, nurse     Care Planning  Shared decision making: Attending  Code status and discussions: Full    Disposition  Social Determinants of Health that impact treatment or disposition: N/A  Estimated length of stay is 3-5 days.     I confirmed that the patient's advanced care plan is present, code status is documented, and a surrogate decision maker is listed in the patient's medical record.     The patient's surrogate decision maker is his son, Alexis.     The patient was seen and examined by me on 1/3/2025.    I provided 35 minutes of total critical care time. Due to the high probability of clinically significant, life-threatening deterioration, the patient required my direct and personal management. The critical care time does not include time spent on separately billable procedures.    Electronically signed by Abrahan Haddad PA-C on 1/3/2025 at 09:44 CST              Electronically signed by Nathan Cortes MD at 01/03/25 1357       Current Facility-Administered Medications   Medication Dose Route Frequency Provider Last Rate Last Admin    [Held by provider] amLODIPine (NORVASC) tablet 10 mg  10 mg Oral Daily Timmy Finney MD        aspirin chewable tablet 81 mg  81 mg Oral Daily Timmy Finney MD   81 mg at 01/06/25 0951    atorvastatin (LIPITOR) tablet 10 mg  10 mg Oral Daily Shaun Gallego MD   10 mg at 01/06/25 0956    sennosides-docusate (PERICOLACE) 8.6-50 MG per tablet 2 tablet   2 tablet Oral BID Timmy Finney MD   2 tablet at 01/06/25 0951    And    polyethylene glycol (MIRALAX) packet 17 g  17 g Oral Daily PRN Timmy Finney MD   17 g at 01/06/25 1354    And    bisacodyl (DULCOLAX) EC tablet 5 mg  5 mg Oral Daily PRN Timmy Finney MD        And    bisacodyl (DULCOLAX) suppository 10 mg  10 mg Rectal Daily PRN Timmy Finney MD        Chlorhexidine Gluconate Cloth 2 % pads 1 Application  1 Application Topical Q24H Timmy Finney MD   1 Application at 01/06/25 0455    [Held by provider] cloNIDine (CATAPRES) tablet 0.2 mg  0.2 mg Oral BID Timmy Finney MD        clopidogrel (PLAVIX) tablet 75 mg  75 mg Oral Daily Timmy Finney MD   75 mg at 01/06/25 0951    gabapentin (NEURONTIN) capsule 300 mg  300 mg Oral TID Timmy Finney MD   300 mg at 01/06/25 0952    hydrALAZINE (APRESOLINE) injection 10 mg  10 mg Intravenous Q4H PRN Timmy Finney MD   10 mg at 01/05/25 1112    hydrALAZINE (APRESOLINE) tablet 25 mg  25 mg Oral TID Shaun Gallego MD   25 mg at 01/06/25 0952    HYDROcodone-acetaminophen (NORCO) 5-325 MG per tablet 1 tablet  1 tablet Oral Q6H PRN Timmy Finney MD   1 tablet at 01/06/25 1243    HYDROmorphone (DILAUDID) injection 1 mg  1 mg Intravenous Q2H PRN Shaun Gallego MD   1 mg at 01/06/25 0820    And    naloxone (NARCAN) injection 0.4 mg  0.4 mg Intravenous Q5 Min PRN Shaun Gallego MD        ipratropium-albuterol (DUO-NEB) nebulizer solution 3 mL  3 mL Nebulization Q4H - RT Shaun Gallego MD   3 mL at 01/06/25 1404    losartan (COZAAR) tablet 25 mg  25 mg Oral Daily Shaun Gallego MD   25 mg at 01/06/25 0951    metoprolol succinate XL (TOPROL-XL) 24 hr tablet 50 mg  50 mg Oral Q24H Shaun Gallego MD   50 mg at 01/06/25 0951    mupirocin (BACTROBAN) 2 % nasal ointment 1 Application  1 Application Each Nare BID Timmy Finney MD   1 Application at 01/06/25 0952    nitroglycerin (NITROSTAT) SL tablet 0.4 mg  0.4 mg Sublingual Q5 Min PRN Reg  MD Timmy        ondansetron ODT (ZOFRAN-ODT) disintegrating tablet 4 mg  4 mg Oral Q6H PRN Timmy Finney MD        sodium chloride 0.9 % flush 10 mL  10 mL Intravenous PRN Timmy Finney MD        sodium chloride 0.9 % flush 10 mL  10 mL Intravenous Q12H Timmy Finney MD   10 mL at 01/06/25 0952    sodium chloride 0.9 % flush 10 mL  10 mL Intravenous PRN Timmy Finney MD        sodium chloride 0.9 % flush 10 mL  10 mL Intravenous Q12H Timmy Finney MD   10 mL at 01/06/25 0952    sodium chloride 0.9 % flush 10 mL  10 mL Intravenous PRN Timmy Finney MD        sodium chloride 0.9 % infusion 40 mL  40 mL Intravenous PRN Timmy Finney MD        sodium chloride 0.9 % infusion 40 mL  40 mL Intravenous PRN Timmy Finney MD            Operative/Procedure Notes (all)        Timmy Finney MD at 01/03/25 1319  Version 1 of 1         ABDOMINAL AORTIC ANEURYSM REPAIR WITH ENDOGRAFT  Progress Note    Brijesh Grande  1/3/2025    Pre-op Diagnosis:   Abdominal pain, unspecified abdominal location [R10.9]       Post-Op Diagnosis Codes:     * Abdominal pain, unspecified abdominal location [R10.9]    Procedure/CPT® Codes:  Percutaneous access and closure of right common femoral artery  Placement of aortobiiliac endograft rupture  Concurrent placement of the extension stent down into left external iliac artery  Attempted left internal iliac artery coil embolization  Left common femoral artery cutdown with primary repair      Procedure(s):  ABDOMINAL AORTIC ANEURYSM REPAIR WITH ENDOGRAFT              Surgeon(s):  Eduardo Whiteside DO Sapp, Alexander, MD    Anesthesia: General    Staff:   Circulator: Zara Balbuena RN  Scrub Person: Ryan Walton  Assistant: Sury Cisneros  Vascular Radiology Technician: Radha Robb  Assistant: Sury Cisneros      Estimated Blood Loss: 100ml    Urine Voided: 1000 mL    Specimens:                None          Drains:   Urethral Catheter Silicone 16 Fr. (Active)            Complications: Rupture left external iliac artery, left lower extremity ischemia    Assistant: Sury Cisneros  was responsible for performing the following activities: Retraction, Suction, Irrigation, and Suturing and their skilled assistance was necessary for the success of this case.    Timmy Finney MD     Date: 1/3/2025  Time: 16:39 CST          Electronically signed by Timmy Finney MD at 01/03/25 1642       Timmy Finney MD at 01/03/25 1319  Version 1 of Shukri Grande  1/3/2025     PREOPERATIVE DIAGNOSIS: Abdominal pain, unspecified abdominal location [R10.9]  Ruptured abdominal aortic aneurysm     POSTOPERATIVE DIAGNOSIS: Post-Op Diagnosis Codes:     * Abdominal pain, unspecified abdominal location [R10.9]  Ruptured abdominal aortic aneurysm     PROCEDURE PERFORMED:   Percutaneous access and closure of right common femoral artery  Ultrasound-guided access of left common femoral artery  Placement of aortobiiliac endograft for rupture  Concurrent placement of extension stent to left external iliac artery  Left external iliac artery stent placement  Exposure of left common femoral artery with primary repair  Attempted left internal iliac artery coil embolization      IMPLANTS:   Main body was a 28 x 16 Endurant stent graft placed via the right femoral artery  The left was extended down with a 16 x 16 and 16 x 10 Endurant limb  The right was extended down with a 16 x 20 indurate limb  A 9 x 39 Omnilink balloon expandable stent was placed into the left external iliac artery       SURGEON: Romero Finney MD    ASSISTANT: Eduardo Whiteside DO     ANESTHESIA: General.    PREPARATION: Routine.    STAFF: Circulator: Zara Balbuena RN  Scrub Person: Ryan Walton  Assistant: Sury Cisneros  Vascular Radiology Technician: Radha Robb    Estimated Blood Loss: 100ml    SPECIMENS: None    COMPLICATIONS: Ruptured left external iliac artery and Perclose failure left common femoral  artery    INDICATIONS: Brijesh Grande is a 69 y.o. male who presented with sudden onset left lower quadrant abdominal, flank and back pain.  A CT scan of the abdomen pelvis was obtained which showed a 5.7 cm infrarenal abdominal aortic aneurysm with concern for rupture on the posterior wall.  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  The patients vital signs were noted to be stable. Risks of abdominal aortic aneurysm repair include, but are not limited to, bleeding, infection, vessel rupture, MI, stroke, and damage to kidney or bowel.  Due to the left common iliac artery aneurysm also discussed the possibility of erectile dysfunction and pelvic ischemia with likely the need for cooling of the left internal iliac artery.  The indications, risks, and possible complications of the procedure were explained to the patient, who voiced understanding and wished to proceed with surgery.     PROCEDURE IN DETAIL:   The patient was taken to the operating room and placed on the operating table in a supine position. After general anesthesia was obtained, the abdomen and bilateral groins were prepped and draped in a sterile manner.  Under ultrasound guidance and using a micropuncture technique the right common femoral artery was cannulated and a micro-sheath was placed.  A small stab incision was made with 11 blade.  The Advantage Glidewire was advanced up into the aorta under fluoroscopic guidance.  The 7 French sheath was placed for predilatation.  The first Perclose device was placed and deployed at the 2 o'clock position.  The second one was placed and deployed at the 10 o'clock position.  The 11 French sheath was placed.  Patient was systemically heparinized and additional boluses were given as needed throughout the procedure.  The same procedure was performed in the left groin.  Under ultrasound guidance and using a micropuncture technique the left common femoral artery was cannulated and a micro-sheath was  placed.  A small stab incision was made with 11 blade.  The Advantage Glidewire was advanced into the aorta under fluoroscopic guidance.  The 7 Georgian sheath was placed for predilatation.  The first Perclose device was placed and deployed at the 2 o'clock position.  The second was placed and deployed at the 10 o'clock position.  The 11 Georgian sheath was placed.  A limited pelvic arteriogram was obtained to the left common femoral sheath which showed the left internal iliac artery was patent however was severely diseased.  This was questionable on the preoperative CT scan.  Due to concern for possible pelvic ischemia due to the extensive collateralization seen this was repeated on the right side.  On the right the right internal iliac artery was more widely patent and there was good collateralization going to the left prompting us to proceed with left internal iliac artery embolization.  We attempted to cannulate the left internal iliac artery with a series of wires and catheters were ultimately unsuccessful.  We then advanced a 6.5  sheath through the right common femoral sheath to aid in cannulation of the internal iliac artery.  Despite multiple arteriograms and the use of multiple catheters and wires were unable to do so from the right common femoral sheath.  We then attempted to cannulate it from the left common femoral sheath with the  as well as multiple wires and catheters.  Ultimately we were unable to cannulate left internal iliac artery likely due to his advanced disease.  Considering the disease state and stenosis of the left internal iliac artery we then elected to continue with the EVAR with attempts to leave the wire behind for coiling of the left common iliac artery and left internal iliac artery origin.  Both flush catheters were advanced into the aorta and an aortoiliac angiogram was performed.  The appropriate measurements were taken.  The Medtronic Endurant modular bifurcated  device with 2 docking limbs measuring 28 cm was placed from the right side up to the level of the renal arteries.  A magged up view of the renal arteries was performed.  The graft was then successfully deployed.  The suprarenal stent was successfully deployed.  Next, the flush catheter was captured from the left side and the gate was successful cannulated.  The catheter was spun to ensure that we were in true lumen.  We then upsized the left common femoral sheath to a 16 Cypriot sheath to allow for the vasile wire behind the graft for cooling of the left common iliac and internal iliac artery if needed.  With the C-arm at 20° POP a retrograde angiogram was performed from the left side.  The hypogastric was marked on the screen.   During this process we discovered that the left external iliac artery was ruptured distally this was marked as well.  The dilator was placed back onto the sheath and the sheath was advanced more proximally where it was occlusive to control the bleeding.  The contralateral limbs were successfully placed and deployed to exclude the left common iliac artery aneurysm and the rupture left external iliac artery.  This was done with the 014 wire placed around the stent into the aneurysm sac.  Further arteriogram showed exclusion of the ruptured area no further bleeding.  The rest of the main body device was successfully deployed.  The delivery device was captured and a 16 Cypriot sheath was placed.  With the C-arm at 20° [place] a retrograde angiogram was performed from the right side.  Hypogastric was marked on the screen.  The ipsilateral extension limb was placed successfully.  The right balloon was then used to angioplasty the main body in the right limb.  We then attempted to advance the catheter over the 014 wire into the aneurysm sac for coil embolization left internal iliac artery.  However we were ultimately unsuccessful as this would not advance beyond the distal edge of the stent.  UF catheter  was then advanced over the wire from the right side and an angiogram was performed down through the graft and through the iliac arteries.    There was no evidence of type I, type II, type III, or type IV endoleaks.  Due to no evidence of endoleak from the left internal iliac artery we then elected to abandon the left internal iliac artery embolization.  The 014 wire was then removed.  The Reliant balloon was then used for balloon angioplasty through the left limb.  Due to the severe tortuosity and stenosis of the left external iliac artery we were concern for kinking of the limb and possible eventual occlusion.  The wire was withdrawn until the floppy end was in the tortuous and stenosed portion.  An angiogram was then shot through the left common femoral sheath showing stenosis in mild remaining tortuosity.  Due to the stenosis and previous tortuosity a 9 x 39 Omnilink balloon expandable stent was placed over this portion.  Completion angiogram was performed which showed rapid flow down through the graft and out through the iliac arteries without any evidence of stenosis or occlusion.    There was no evidence of type I, type II, type III, or type IV endoleaks.  At this point I felt no further intervention was warranted.  The sheaths were removed.  The Perclose devices were used to seal down the artery.  Direct pressure was held for an additional 10 minutes of ensure hemostasis.  5 mL of 0.5% Marcaine plain was used to infiltrate each groin for local anesthesia.  Pedal pulses were then checked with a Doppler.  Signals on the right were unchanged from preop, however the left had very weak signals and very sluggish refill.  We then evaluated the left common femoral with a ultrasound and noted what was appear to be a diminished pulse distally.  At this point we elected for open exploration of the left common femoral artery.  A transverse skin incision was then made over the left common femoral artery this was carried  down through the skin and subcutaneous tissues.  We then entered the femoral sheath sharply and dissected the proximal common femoral artery free from the surrounding structures.  We then continued to dissect down distally where the proximal SFA and profunda were dissected free from surrounding structures.  A Silastic vessel loop was then used to encircle the proximal SFA, profunda, and proximal right common femoral artery.  The proximal common femoral artery was then clamped and tension was applied to the Silastic Vesseloops for distal control.  The Perclose's which appeared to be well-placed were then cut and removed.  The defect in the left common femoral artery was then examined and there was no evidence of occlusion or dissection from the Perclose.  A 5-0 Prolene was then used to close the defect in the typical fashion.  Prior to completion of the repair of the arteriotomy there was flushed and de-aired.  The distal and proximal clamps were then removed, and there was noted to be a good pulse proximal and distal to the area of repair.  At this point we discussed a possible arteriogram, however we were unsure of the patient's baseline disease.  We also elected not to do this due to the amount of contrast we had already used on top of the CT scan he had received previously in the day.  The left foot was then reexamined and there was improved cap refill with still poor signals.  At this point we were concerned that this could be spasm due to occlusion of the left external iliac artery with the sheaths in place.  Wound was then irrigated.  The skin on the right side was then reapproximated using a 4-0 Monocryl in a subcuticular fashion.  A multilayer closure was then performed in the left groin in the typical fashion.  Sterile dressings were applied. The patient tolerated the procedure well. Sponge and needle counts were correct. The patient was then awakened and extubated in the operating room and taken to the  "recovery room in good condition.    Dr. Whiteside was present throughout the procedure and assisted with access, internal iliac artery cannulation, gate cannulation, exposure, and closure.  Timmy Finney MD  Date: 1/3/2025 Time: 18:05 CST      Electronically signed by Timmy Finney MD at 01/03/25 1847       Edgard Daigle APRN at 01/03/25 1925  Version 1 of 1       Procedure Orders    1. Insert Central Line At Bedside [[id number]] ordered by Edgard Daigle APRN               Post-procedure Diagnoses    1. Aneurysm of infrarenal abdominal aorta, unspecified whether ruptured [I71.43]                 Insert Central Line At Bedside    Date/Time: 1/3/2025 9:34 PM    Performed by: Edgard Daigle APRN  Authorized by: Edgard Daigle APRN  Consent: Written consent obtained.  Risks and benefits: risks, benefits and alternatives were discussed  Consent given by: power of  and spouse  Patient understanding: patient states understanding of the procedure being performed  Patient consent: the patient's understanding of the procedure matches consent given  Procedure consent: procedure consent matches procedure scheduled  Relevant documents: relevant documents present and verified  Test results: test results available and properly labeled  Site marked: the operative site was marked  Imaging studies: imaging studies available  Patient identity confirmed: arm band and hospital-assigned identification number  Time out: Immediately prior to procedure a \"time out\" was called to verify the correct patient, procedure, equipment, support staff and site/side marked as required.  Indications: vascular access    Anesthesia:  Local Anesthetic: lidocaine 2% without epinephrine  Anesthetic total: 3 mL    Sedation:  Patient sedated: yes  Sedatives: propofol  Vitals: Vital signs were monitored during sedation.    Preparation: skin prepped with 2% chlorhexidine  Skin prep agent dried: skin prep agent completely dried prior to " procedure  Sterile barriers: all five maximum sterile barriers used - cap, mask, sterile gown, sterile gloves, and large sterile sheet  Hand hygiene: hand hygiene performed prior to central venous catheter insertion  Location details: right internal jugular  Patient position: flat  Catheter type: triple lumen  Catheter size: 7 Fr  Pre-procedure: landmarks identified  Ultrasound guidance: yes  Sterile ultrasound techniques: sterile gel and sterile probe covers were used  Number of attempts: 1  Successful placement: yes  Post-procedure: line sutured and dressing applied  Assessment: blood return through all ports, placement verified by x-ray and no pneumothorax on x-ray  Patient tolerance: patient tolerated the procedure well with no immediate complications          Electronically signed by Edgard Daigle APRN at 01/03/25 2136          Physician Progress Notes (most recent note)        Timmy Finney MD at 01/06/25 0911             LOS: 3 days   Patient Care Team:  Eliseo Smith MD as PCP - General  Eliseo Smith MD as PCP - Family Medicine    Chief Complaint:  Post op day #3-EVAR    Subjective     Patient Complaints: Patient more comfortable this a.m. mild left groin pain as expected.  Passing flatus no nausea or vomiting reports hungry this a.m.  Denies any abdominal pain.  Denies any left lower extremity pain.  Objective     Vital Signs  Temp:  [97.9 °F (36.6 °C)-99.2 °F (37.3 °C)] 97.9 °F (36.6 °C)  Heart Rate:  [] 84  Resp:  [12-20] 16  BP: (103-175)/() 128/83    Physical Exam  Constitutional:       Appearance: Normal appearance. He is not ill-appearing or toxic-appearing.   HENT:      Head: Normocephalic and atraumatic.   Cardiovascular:      Rate and Rhythm: Normal rate and regular rhythm.      Pulses:           Dorsalis pedis pulses are detected w/ Doppler on the left side.        Posterior tibial pulses are 2+ on the right side and detected w/ Doppler on the left side.    Pulmonary:      Effort: Pulmonary effort is normal. No respiratory distress.   Abdominal:      Palpations: Abdomen is soft.      Tenderness: There is no abdominal tenderness. There is no guarding.      Comments: Mild distention resolved.   Musculoskeletal:         General: No swelling or tenderness.      Cervical back: Normal range of motion and neck supple.      Comments: Motor intact bilateral lower extremities   Skin:     General: Skin is warm and dry.      Comments: Left foot warm.  Appears well-perfused brisk cap refill.  Coloration improving.   Neurological:      Mental Status: He is alert. Mental status is at baseline.         Laboratory Data:   Results from last 7 days   Lab Units 01/06/25  0625 01/05/25  0602 01/04/25  1753   WBC 10*3/mm3 12.50* 13.65* 15.28*   HEMOGLOBIN g/dL 9.1* 9.8* 11.6*   HEMATOCRIT % 28.6* 30.7* 37.0*   PLATELETS 10*3/mm3 174 161 214       Results from last 7 days   Lab Units 01/06/25  0625 01/05/25  0602 01/04/25  0721 01/04/25  0600 01/03/25  1530 01/03/25  0309   SODIUM mmol/L 143 138 135* 135*   < > 139   SODIUM, ARTERIAL   --   --   --   --    < >  --    POTASSIUM mmol/L 4.4 4.4 4.6 4.5   < > 3.9   CHLORIDE mmol/L 109* 106 100 100   < > 101   CO2 mmol/L 25.0 24.0 25.0 24.0   < > 28.0   BUN mg/dL 17 16 13 13   < > 13   CREATININE mg/dL 0.69* 0.67* 0.87 0.92   < > 0.92   CALCIUM mg/dL 8.2* 8.2* 8.0* 8.2*   < > 9.1   BILIRUBIN mg/dL  --   --   --  0.3  --  0.3   ALK PHOS U/L  --   --   --  73  --  106   ALT (SGPT) U/L  --   --   --  10  --  14   AST (SGOT) U/L  --   --   --  29  --  16   GLUCOSE mg/dL 112* 145* 161* 164*   < > 184*    < > = values in this interval not displayed.     Results from last 7 days   Lab Units 01/06/25  0625 01/05/25  2326 01/05/25  1817 01/05/25  1307 01/05/25  0602 01/04/25  1152 01/04/25  0600 01/03/25  2331   PROTIME Seconds  --   --   --   --  15.1*  --  14.8 14.9*   INR   --   --   --   --  1.13*  --  1.10* 1.11*   APTT seconds 47.9* 85.2* 41.6*    < > 79.7*   < > 82.3* 40.6*    < > = values in this interval not displayed.            Medication Review: Reviewed    Assessment & Plan       Abdominal aortic aneurysm    AAA (abdominal aortic aneurysm)    Abdominal pain    69-year-old male postop day 3 EVAR with extension to left external iliac for rupture.      Plan for disposition:  -Intensivist on board.  Appreciate their assistance.  -Attention resolved continue to monitor.  -Agitation and discomfort significantly improved.  Continue gabapentin.  -Okay to advance diet as tolerated.  -Patient eating well okay to DC fluids.  -DC heparin drip.  Transition to aspirin Plavix.  -PT OT encouraged.    Timmy Finney MD  Vascular Surgery  [phone]  01/06/25  09:12 CST      Electronically signed by Timmy Finney MD at 01/06/25 0915          Consult Notes (most recent note)        Timmy Finney MD at 01/03/25 1243          Brijesh Grande  [id number]  [id number]  [facility] OR  Nathan Cortes MD  1/3/2025    Chief Complaint   Patient presents with    Abdominal Pain       HPI: Brijesh Grande is a 69 y.o. male who presented with left lower quadrant abdominal pain, left flank pain, back pain.  The patient had a previous left-sided stroke and has sensory deficits to the left side.  He states that he had sudden onset pain starting this a.m.  He then presented to the ER where he was found to have a greater than 5 cm aneurysm along with a left common iliac artery aneurysm.  He also had 2 saccular aneurysms in the more proximal infrarenal abdominal aorta.  Patient states that his pain has improved but is still present after pain medicine.  He does smoke.  There is also concern for potential contained hemorrhage along the posterior wall aneurysm.    Past Medical History:   Diagnosis Date    Hypertension     Stroke        Past Surgical History:   Procedure Laterality Date    CAROTID ENDARTERECTOMY      x 2       History reviewed. No pertinent family history.    Social  "History     Socioeconomic History    Marital status:    Tobacco Use    Smoking status: Every Day     Current packs/day: 1.00     Types: Cigarettes   Substance and Sexual Activity    Alcohol use: Not Currently    Drug use: Not Currently       No Known Allergies    Hospital Medications (active)         Dose Frequency Start End    amLODIPine (NORVASC) tablet 10 mg 10 mg Daily 1/4/2025 --    Admin Instructions: Hold for SBP less than 100, DBP less than 60.  Caution: Look alike/sound alike drug alert. Avoid grapefruit juice.    Route: Oral    Cosign for Ordering: Required by Nathan Cortes MD    atorvastatin (LIPITOR) tablet 10 mg 10 mg Daily 1/3/2025 --    Admin Instructions: Avoid grapefruit juice.    Route: Oral    Cosign for Ordering: Required by Nathan Cortes MD    bisacodyl (DULCOLAX) EC tablet 5 mg 5 mg Daily PRN 1/3/2025 --    Admin Instructions: Use if no bowel movement after 12 hours.  Swallow whole. Do not crush, split, or chew tablet.    Route: Oral    Cosign for Ordering: Required by Nathan Cortes MD    Linked Group 1: Placed in \"And\" Linked Group        bisacodyl (DULCOLAX) suppository 10 mg 10 mg Daily PRN 1/3/2025 --    Admin Instructions: Use if no bowel movement after 12 hours.  Hold for diarrhea    Route: Rectal    Cosign for Ordering: Required by Nathan Cortes MD    Linked Group 1: Placed in \"And\" Linked Group        ceFAZolin 2000 mg IVPB in 100 mL NS (MBP) 2,000 mg Once 1/3/2025 --    Admin Instructions: Administer Within 1 Hour of Surgical Incision.  Redose 4 Hours From Pre-Op Dose if Procedure Ongoing or Blood Loss Greater  Than 1.5 L    Caution: Look alike/sound alike drug alert    Route: Intravenous    Chlorhexidine Gluconate Cloth 2 % pads 1 Application 1 Application Once 1/3/2025 --    Admin Instructions: Use for admission bath and continue for length of critical care stay.    Route: Topical    Cosign for Ordering: Required by Nathan Cortes MD    Chlorhexidine Gluconate " Cloth 2 % pads 1 Application 1 Application Every 24 Hours 1/4/2025 --    Admin Instructions: Use for admission bath and continue for length of critical care stay.    Route: Topical    Cosign for Ordering: Required by Nathan Cortes MD    cloNIDine (CATAPRES) tablet 0.2 mg 0.2 mg 2 Times Daily 1/4/2025 --    Admin Instructions: Hold for SBP less than 100, DBP less than 60, or heart rate less than 50. If a dose is held, please contact the provider.  Caution: Look alike/sound alike drug alert.    Route: Oral    Cosign for Ordering: Required by Nathan Cortes MD    hydrALAZINE (APRESOLINE) injection 10 mg 10 mg Every 4 Hours PRN 1/3/2025 --    Admin Instructions: Hold for SBP less than 100, DBP less than 60.  Caution: Look alike/sound alike drug alert    Route: Intravenous    Cosign for Ordering: Required by Nathan Cortes MD    hydrALAZINE (APRESOLINE) tablet 25 mg 25 mg 3 Times Daily 1/4/2025 --    Admin Instructions: Hold for SBP less than 100, DBP less than 60.  Caution: Look alike/sound alike drug alert    Route: Oral    Cosign for Ordering: Required by Nathan Cortes MD    lactated ringers infusion 100 mL/hr Continuous 1/3/2025 1/4/2025    Route: Intravenous    losartan (COZAAR) tablet 25 mg 25 mg Daily 1/4/2025 --    Admin Instructions: Hold for SBP less than 100, DBP less than 60.    Route: Oral    Cosign for Ordering: Required by Nathan Cortes MD    metoprolol tartrate (LOPRESSOR) tablet 25 mg 25 mg 2 Times Daily 1/4/2025 --    Admin Instructions: Hold for SBP less than 100, DBP less than 60, or heart rate less than 50. If a dose is held, please contact the provider.    Route: Oral    Cosign for Ordering: Required by Nathan Cortes MD    mupirocin (BACTROBAN) 2 % nasal ointment 1 Application 1 Application 2 Times Daily 1/3/2025 1/8/2025    Admin Instructions: Begin on day 1 of ICU admission and administer for 5 days, even if patient transferred out of critical care.    MUPIROCIN APPLICATION:  1.  Place patient's bed at 30 degrees, if tolerated.  2. Wash your hands with warm soapy water or use hand  .  3. Open the tube of mupirocin 2%.  4. Squeeze about 0.5 g (blueberry-size) of mupirocin from the  tube onto a sterile applicator or a cotton swab.  5. Apply the swab directly into nostril. Ensure coating of the  sides of the nostril.  6. Repeat with second sterile applicator for other nostril.  7. Gently press the sides of the nostrils together and massage  gently for 60 seconds.        Route: Each Nare    Cosign for Ordering: Required by Nathan Cortes MD    niCARdipine (CARDENE) 20 mg in 200 mL NS infusion 5-15 mg/hr Titrated 1/3/2025 --    Admin Instructions: For Acute Aortic Dissection - Initiate infusion at 5 mg/hr and titrate up or down by 2.5 - 5 mg/hr every 15 minutes if HR 60-80 but unable to achieve SBP less than or equal to 120 mm Hg with pain control and parenteral beta=blockers.  Maximum Dose = 15 mg/hr. Hold infusion for SBP less than 100 mm Hg. Contact provider if unable to maintain SBP Less Than or equal to 120 mm Hg on maximum dose.  Once SBP target achieved obtain vitals a minimum of every 30 minutes. Administer as a slow continuous infusion via central line or through a large peripheral vein. Peripheral venous irritation may be minimized by changing the site of infusion every 12 hours. Nicardipine 0.2mg/mL concentration must be infused via central line ONLY.  Caution: Look alike/sound alike drug alert    Route: Intravenous    Cosign for Ordering: Required by Nathan Cortes MD    nitroglycerin (NITROSTAT) SL tablet 0.4 mg 0.4 mg Every 5 Minutes PRN 1/3/2025 --    Admin Instructions: If Pain Unrelieved After 3 Doses Notify MD  May administer up to 3 doses per episode.    Route: Sublingual    Cosign for Ordering: Required by Nathan Cortes MD    polyethylene glycol (MIRALAX) packet 17 g 17 g Daily PRN 1/3/2025 --    Admin Instructions: Use if no bowel movement after 12 hours. Mix  "in 6-8 ounces of water.  Use 4-8 ounces of water, tea, or juice for each 17 gram dose.    Route: Oral    Cosign for Ordering: Required by Nathan Cortes MD    Linked Group 1: Placed in \"And\" Linked Group        sennosides-docusate (PERICOLACE) 8.6-50 MG per tablet 2 tablet 2 tablet 2 Times Daily 1/3/2025 --    Admin Instructions: HOLD MEDICATION IF PATIENT HAS HAD BOWEL MOVEMENT. Start bowel management regimen if patient has not had a bowel movement after 12 hours.    Route: Oral    Cosign for Ordering: Required by Nathan Cortes MD    Linked Group 1: Placed in \"And\" Linked Group        sodium chloride 0.9 % flush 10 mL 10 mL As Needed 1/3/2025 --    Route: Intravenous    Cosign for Ordering: Required by Santosh Terrell MD    sodium chloride 0.9 % flush 10 mL 10 mL Every 12 Hours Scheduled 1/3/2025 --    Route: Intravenous    Cosign for Ordering: Required by Nathan Cortes MD    sodium chloride 0.9 % flush 10 mL 10 mL As Needed 1/3/2025 --    Route: Intravenous    Cosign for Ordering: Required by Nathan Cortes MD    sodium chloride 0.9 % flush 3 mL 3 mL Every 12 Hours Scheduled 1/3/2025 --    Route: Intravenous    sodium chloride 0.9 % flush 3-10 mL 3-10 mL As Needed 1/3/2025 --    Route: Intravenous    sodium chloride 0.9 % infusion 40 mL 40 mL As Needed 1/3/2025 --    Admin Instructions: Following administration of an IV intermittent medication, flush line with 40mL NS at 100mL/hr.    Route: Intravenous    Cosign for Ordering: Required by Nathan Cortes MD    sodium chloride 0.9 % infusion 40 mL 40 mL As Needed 1/3/2025 1/4/2025    Admin Instructions: Following administration of an IV intermittent medication, flush line with 40mL NS at 100mL/hr.    Route: Intravenous            Review of Systems   Constitutional:  Negative for diaphoresis and fever.   HENT: Negative.     Eyes: Negative.    Respiratory: Negative.  Negative for shortness of breath and wheezing.    Cardiovascular: Negative.  Negative for " chest pain and leg swelling.   Gastrointestinal:  Positive for abdominal pain.   Endocrine: Negative.    Genitourinary: Negative.    Musculoskeletal: Negative.    Skin: Negative.    Allergic/Immunologic: Negative.    Neurological:  Positive for weakness. Negative for dizziness.   Hematological: Negative.    Psychiatric/Behavioral: Negative.         /98   Pulse 87   Temp 97.5 °F (36.4 °C) (Oral)   Resp 18   SpO2 91%    Physical Exam  Vitals and nursing note reviewed.   Constitutional:       General: He is not in acute distress.     Appearance: Normal appearance. He is not diaphoretic.   HENT:      Head: Normocephalic. No right periorbital erythema or left periorbital erythema.      Nose: Nose normal.   Eyes:      General: No scleral icterus.     Pupils: Pupils are equal.   Cardiovascular:      Rate and Rhythm: Normal rate and regular rhythm.      Pulses:           Femoral pulses are 2+ on the right side and 2+ on the left side.       Posterior tibial pulses are 2+ on the left side.   Pulmonary:      Effort: Pulmonary effort is normal. No respiratory distress.      Breath sounds: Normal breath sounds.   Abdominal:      General: Bowel sounds are normal. There is no distension.      Palpations: Abdomen is soft.      Tenderness: There is no abdominal tenderness. There is no guarding.   Musculoskeletal:         General: No swelling or tenderness. Normal range of motion.      Cervical back: Normal range of motion and neck supple.      Right lower leg: No edema.      Left lower leg: No edema.      Comments: Residual left-sided weakness from previous stroke.   Feet:      Right foot:      Skin integrity: Skin integrity normal.      Left foot:      Skin integrity: Skin integrity normal.   Skin:     General: Skin is warm and dry.      Findings: No erythema or rash.   Neurological:      Mental Status: He is alert and oriented to person, place, and time. Mental status is at baseline.      Cranial Nerves: No cranial  nerve deficit.   Psychiatric:         Attention and Perception: Attention normal.         Mood and Affect: Mood normal.         Behavior: Behavior normal.         Thought Content: Thought content normal.         Judgment: Judgment normal.         Laboratory Data:  Results from last 7 days   Lab Units 01/03/25  0309   WBC 10*3/mm3 9.50   HEMOGLOBIN g/dL 14.7   HEMATOCRIT % 44.8   PLATELETS 10*3/mm3 261       Results from last 7 days   Lab Units 01/03/25  0309   SODIUM mmol/L 139   POTASSIUM mmol/L 3.9   CHLORIDE mmol/L 101   CO2 mmol/L 28.0   BUN mg/dL 13   CREATININE mg/dL 0.92   CALCIUM mg/dL 9.1   BILIRUBIN mg/dL 0.3   ALK PHOS U/L 106   ALT (SGPT) U/L 14   AST (SGOT) U/L 16   GLUCOSE mg/dL 184*             Diagnostic Data:  Imaging Results (Last 24 Hours)       Procedure Component Value Units Date/Time    IR Prox Dis Prost Endov Rep Initial Ves [[id number]] Resulted: 01/03/25 1231     Updated: 01/03/25 1231    CT Abdomen Pelvis With & Without Contrast [[id number]] Collected: 01/03/25 0541     Updated: 01/03/25 0601    Narrative:      EXAMINATION: CT ABDOMEN PELVIS W WO CONTRAST-      1/3/2025 2:47 AM     HISTORY: LLQ pain, L flank pain     In order to have a CT radiation dose as low as reasonably achievable  Automated Exposure Control was utilized for adjustment of the mA and/or  KV according to patient size.     Total DLP = 650.92 mGy.cm     The CT scan of the abdomen and pelvis is performed before and after  intravenous contrast enhancement.     Images are acquired in the axial plane and subsequent reconstruction in  the coronal and sagittal planes.     There is no previous similar study for comparison.     The lung bases included in the study are unremarkable.     Limited visualized cardiomediastinal structures show moderate  cardiomegaly, atheromatous changes of the thoracic aorta and coronary  arteries.     The liver and spleen are normal.     No radiopaque gallstone.     Relatively small pancreas seen. No  mass. No ductal dilatation.     Left adrenal nodule is seen measuring 2 cm x 1.4 cm. CT density is  nonfatty. Normal right adrenal gland.     Moderate lobulation of renal contour bilaterally. There is a tiny  exophytic nodule from the lower pole of the left kidney posteriorly  measuring 8 mm in diameter. This is too small to be further  characterized. No radiopaque calculi in either kidney. No hydronephrosis  on either side. Limited visualized ureters are nondilated. The urinary  bladder is incompletely distended with a mild to moderate wall  thickening. No focal intrinsic abnormality.     The prostate is enlarged with intrinsic calcification. It compresses and  elevates the floor of the urinary bladder.     There is small fat-containing inguinal hernias. There is a tiny  fat-containing umbilical hernia.     Stomach is decompressed with diffuse wall thickening. No focal  abnormality. Duodenum is normal. Small bowel is nondistended. No finding  to suggest appendicitis. Moderate gas and stool is seen in the colon. No  finding to suggest obstruction.     Atheromatous changes of the abdominal aorta and iliac arteries. There is  a saccular aneurysm from the right posterolateral aspect of the mid  abdominal aorta opposite and posterior to the inferior vena cava,  measuring 2.3 x 1.8 cm. There is fusiform aneurysmal dilatation of  distal abdominal aorta with a maximum lumen of the abdominal aorta  measuring 5.7 x 4.7 at and just above the level of bifurcation. 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 There is an ill-defined area  of increased density in the posterior aspect of the thrombus which may  represent a hematoma in the posterior wall of the aneurysm?. The  aneurysm extends into the left common iliac artery which measures 3.7 x  3.4 cm.     There is no evidence of abdominal or pelvic lymphadenopathy.     Images reviewed in bone window  show chronic degenerative changes of the  lumbar and thoracic spine. No acute bony abnormality.       Impression:      1. Fusiform aneurysm of the distal abdominal aorta as detailed above.  There is partial lumen circumferential mural thrombosis of the aneurysm.  There is evidence of hematoma/hemorrhage at the posterior wall of the  aneurysm as detailed above. Further evaluation with CT angiography of  the abdomen may be obtained.  2. A saccular aneurysm of the mid abdominal aorta adjacent and below the  level of the right renal arteries and posterior to the IVC. Details  given above.  3. No other acute findings in the abdomen or pelvis to account for  patient's symptoms. No renal or ureteral calculi or obstructive  uropathy.  4. No gallstones. No finding to suggest appendicitis.                    This report was signed and finalized on 1/3/2025 5:58 AM by Dr. Andra Scott MD.               Impression: 69-year-old male presenting with left lower quadrant abdominal pain, flank pain and back pain who was found to have a contained ruptured infrarenal abdominal aortic aneurysm.    Abdominal aortic aneurysm    AAA (abdominal aortic aneurysm)    Abdominal pain      Plan: After thoroughly evaluating Brijesh Grande, I believe the best course of action is to proceed with EVAR.  Risks/benefits were explained at great length to the patient which include but are not limited to bleeding, infection, vessel rupture, bowel damage, renal failure, MI, stroke, and death.  Also discussed with the patient the possibility of having to coil his internal iliac artery on the left with the risk of erectile dysfunction and pelvic ischemia.  The patient understands and would like to proceed with surgery.    Thank you for allowing me to participate in the care of your patient.  Please do not hesitate to call with any questions or concerns.  Timmy Finney MD   Vascular Surgery  [phone]           Electronically signed by Reg  MD Timmy at 25 1258          Physical Therapy Notes (most recent note)        Francis Hung, PT DPT at 25 1124  Version 1 of 1         Patient Name: Brijesh Grande  : 1955    MRN: [id number]                              Today's Date: 2025       Admit Date: 1/3/2025    Visit Dx:     ICD-10-CM ICD-9-CM   1. Aneurysm of infrarenal abdominal aorta, unspecified whether ruptured  I71.43 441.4   2. Abdominal pain, unspecified abdominal location  R10.9 789.00   3. Abdominal aortic aneurysm (AAA) without rupture, unspecified part  I71.40 441.4   4. Primary hypertension  I10 401.9   5. Impaired mobility [Z74.09]  Z74.09 799.89     Patient Active Problem List   Diagnosis    AAA (abdominal aortic aneurysm)    Abdominal aortic aneurysm    Abdominal pain     Past Medical History:   Diagnosis Date    Hypertension     Stroke      Past Surgical History:   Procedure Laterality Date    ABDOMINAL AORTIC ANEURYSM REPAIR WITH ENDOGRAFT N/A 1/3/2025    Procedure: ABDOMINAL AORTIC ANEURYSM REPAIR WITH ENDOGRAFT;  Surgeon: Eduardo Whiteside DO;  [facility]: [facility]BRID [facility];  Service: Vascular;  Laterality: N/A;    CAROTID ENDARTERECTOMY      x 2      General Information       Row Name 25 0824          Physical Therapy Time and Intention    Document Type evaluation  CC: Abdominal pain, Left flank pain, back pain. Dx: AAA, HTN, Post op acute resp failurel vented 1/3 - . s/p /3 AAA repair w endograft. Hx: CVA w left sided weakness and abnormal sensation.  -NITHIN     Mode of Treatment physical therapy  -NITHIN       Row Name 25 0824          General Information    Patient Profile Reviewed yes  -NITHIN     Prior Level of Function independent:;bed mobility;transfer;gait;dressing;bathing;driving  Utilizes a straight cane, PRN  -NITHIN     Existing Precautions/Restrictions fall;oxygen therapy device and L/min  -NITHIN     Barriers to Rehab medically complex;previous functional deficit;impaired sensation;physical barrier   -NITHIN       Row Name 01/06/25 0824          Living Environment    People in Home alone  -NIHTIN       Row Name 01/06/25 0824          Home Main Entrance    Number of Stairs, Main Entrance three  -NITHIN     Stair Railings, Main Entrance railings on both sides of stairs  -NITHIN       Row Name 01/06/25 0824          Stairs Within Home, Primary    Number of Stairs, Within Home, Primary one  -NITHIN       Row Name 01/06/25 0824          Cognition    Orientation Status (Cognition) oriented x 4  -NITHIN       Row Name 01/06/25 0824          Safety Issues/Impairments Affecting Functional Mobility    Safety Issues Affecting Function (Mobility) friction/shear risk  -NITHIN     Impairments Affecting Function (Mobility) balance;endurance/activity tolerance;range of motion (ROM);pain;strength;muscle tone abnormal;sensation/sensory awareness  -NITHIN               User Key  (r) = Recorded By, (t) = Taken By, (c) = Cosigned By      Initials Name Provider Type    Francis Ma, PT DPT Physical Therapist                   Mobility       Row Name 01/06/25 0824          Bed Mobility    Comment, (Bed Mobility) Sitting EOB, left as found.  -NITHIN       Row Name 01/06/25 0824          Bed-Chair Transfer    Bed-Chair [place] (Transfers) minimum assist (75% patient effort);2 person assist;verbal cues  -NITHIN       Row Name 01/06/25 0824          Sit-Stand Transfer    Sit-Stand [place] (Transfers) minimum assist (75% patient effort);2 person assist;verbal cues  -NITHIN       Row Name 01/06/25 0824          Gait/Stairs (Locomotion)    [place] Level (Gait) minimum assist (75% patient effort);2 person assist  -NITHIN     Distance in Feet (Gait) 12  -NITHIN     Deviations/Abnormal Patterns (Gait) left sided deviations;antalgic;gait speed decreased;stride length decreased  -NITHIN     Left Sided Gait Deviations hip hiking;heel strike decreased  -NITHIN     Comment, (Gait/Stairs) Hx CVA with L sided weakness and increased tone. Utilizes tone for wt bearing. Decreased swing/Knee  "flexion/extension.  -NITHIN               User Key  (r) = Recorded By, (t) = Taken By, (c) = Cosigned By      Initials Name Provider Type    Francis Ma PT DPT Physical Therapist                   Obj/Interventions       Row Name 01/06/25 0824          Range of Motion Comprehensive    Comment, General Range of Motion R LE AROM WFL, L ankle DF/PF PROM impaired 50%, L knee flex/ext PROM impaired 25%, L hip flexion PROM 75% (pain in L flank and tone)  -NITHIN       Row Name 01/06/25 0824          Strength Comprehensive (MMT)    Comment, General Manual Muscle Testing (MMT) Assessment R LE functionally 4/5, L ankle, knee and hip grossly 2-/5  -NITHIN       Row Name 01/06/25 0824          Motor Skills    Motor Skills muscle tone  -NITHIN     Muscle Tone left;lower extremity(s);mild impairment;moderate impairment;hypertonia  Hx CVA  -NITHIN       Row Name 01/06/25 0824          Balance    Balance Assessment sitting dynamic balance;standing dynamic balance  -NITHIN     Dynamic Sitting Balance supervision  -NITHIN     Position, Sitting Balance unsupported;sitting edge of bed  BSC  -NITHIN     Dynamic Standing Balance minimal assist  -NITHIN     Position/Device Used, Standing Balance supported  -NITHIN       Row Name 01/06/25 0824          Sensory Assessment (Somatosensory)    Sensory Assessment (Somatosensory) right-sided sensation intact  R LE sensation intact to light touch, Pt. reports L LE \"feels different\"  -NITHIN               User Key  (r) = Recorded By, (t) = Taken By, (c) = Cosigned By      Initials Name Provider Type    Francis Ma PT DPT Physical Therapist                   Goals/Plan       Row Name 01/06/25 0824          Bed Mobility Goal 1 (PT)    Activity/Assistive Device (Bed Mobility Goal 1, PT) sit to supine/supine to sit  -NITHIN     [place] Level/Cues Needed (Bed Mobility Goal 1, PT) supervision required  -NITHIN     Time Frame (Bed Mobility Goal 1, PT) long term goal (LTG);10 days  -NITHIN     Progress/Outcomes (Bed Mobility Goal 1, " PT) new goal  -NITHIN       Row Name 01/06/25 0824          Transfer Goal 1 (PT)    Activity/Assistive Device (Transfer Goal 1, PT) sit-to-stand/stand-to-sit;bed-to-chair/chair-to-bed  -NITHIN     [place] Level/Cues Needed (Transfer Goal 1, PT) supervision required  -NITHIN     Time Frame (Transfer Goal 1, PT) long term goal (LTG);10 days  -NITHIN     Progress/Outcome (Transfer Goal 1, PT) new goal  -NITHIN       Row Name 01/06/25 0824          Gait Training Goal 1 (PT)    Activity/Assistive Device (Gait Training Goal 1, PT) gait (walking locomotion);assistive device use;decrease fall risk;forward stepping;improve balance and speed;increase endurance/gait distance  -NITHIN     [place] Level (Gait Training Goal 1, PT) contact guard required  -NITHIN     Distance (Gait Training Goal 1, PT) 30 ft  -NITHIN     Time Frame (Gait Training Goal 1, PT) long term goal (LTG);10 days  -NITHIN     Progress/Outcome (Gait Training Goal 1, PT) new goal  -NITHIN       Row Name 01/06/25 0824          Therapy Assessment/Plan (PT)    Planned Therapy Interventions (PT) bed mobility training;transfer training;gait training;balance training;home exercise program;patient/family education;postural re-education;stair training;strengthening;motor coordination training;ROM (range of motion);stretching  -NITHIN               User Key  (r) = Recorded By, (t) = Taken By, (c) = Cosigned By      Initials Name Provider Type    Francis Ma, PT DPT Physical Therapist                   Clinical Impression       Row Name 01/06/25 0824          Pain    Pretreatment Pain Rating 7/10  -NITHIN     Posttreatment Pain Rating 7/10  -NITHIN     Pain Location abdomen;flank  -NITHIN     Pain Side/Orientation left  -NITHIN     Pain Management Interventions exercise or physical activity utilized;premedicated for activity  -NITHIN     Response to Pain Interventions no change per patient report  -NITHIN       Row Name 01/06/25 0824          Plan of Care Review    Plan of Care Reviewed With patient  -NITHIN     Outcome  Evaluation PT eval complete. He is alert and oriented x 4. Mr. Grande reports living at home alone where he was independent in his mobility and ADL's. He utilizes a cane as needed but reports furniture walking. He has a history of CVA w left sided deficits. L LE is grossly 2-/5 in strength. His L LE is also mild/moderately hypertonic. Today he needs min assist x 2 to stand and to t/f to the [facility]. He utilizes his tone with wt bearing through his L LE. Demos L flank pain with an antalgic gait. Was later able to ambulate a short distance with min assist x 2, ~ 12 ft. He would benefit from a cane for support and balance due to L LE weakness and reports of generalized weakness. He remains a fall risk. PT will cont with mobility training, balance training, strengthening and endurance training/activity tolerance. He would currently benefit from short term rehab as he lives alone and is not currently ready to dc home alone.  -NITHIN       Row Name 01/06/25 0824          Therapy Assessment/Plan (PT)    Patient/Family Therapy Goals Statement (PT) increase strength.  -NITHIN     Rehab Potential (PT) good  -NITHIN     Criteria for Skilled Interventions Met (PT) yes;meets criteria;skilled treatment is necessary  -NITHIN     Therapy Frequency (PT) 2 times/day  -NITHIN     Predicted Duration of Therapy Intervention (PT) until d.c  -NITHIN       Row Name 01/06/25 0824          Positioning and Restraints    Pre-Treatment Position in bed  -NITHIN     Post Treatment Position bed  -NITHIN     In Bed notified nsg;sitting EOB;call light within reach;encouraged to call for assist;patient within staff view  -NITHIN               User Key  (r) = Recorded By, (t) = Taken By, (c) = Cosigned By      Initials Name Provider Type    Francis Ma, PT DPT Physical Therapist                   Outcome Measures       Row Name 01/06/25 0824          How much help from another person do you currently need...    Turning from your back to your side while in flat bed without using  bedrails? 3  -NITHIN     Moving from lying on back to sitting on the side of a flat bed without bedrails? 3  -NITHIN     Moving to and from a bed to a chair (including a wheelchair)? 2  -NITHIN     Standing up from a chair using your arms (e.g., wheelchair, bedside chair)? 2  -NITHIN     Climbing 3-5 steps with a railing? 1  -NITHIN     To walk in hospital room? 2  -NITHIN     AM-PAC 6 Clicks Score (PT) 13  -NITHIN     Highest Level of Mobility Goal 4 --> Transfer to chair/commode  -NITHIN       Row Name 01/06/25 0921 01/06/25 0824       Functional Assessment    Outcome Measure Options AM-PAC 6 Clicks Daily Activity (OT)  -LS AM-PAC 6 Clicks Basic Mobility (PT)  -NITHIN              User Key  (r) = Recorded By, (t) = Taken By, (c) = Cosigned By      Initials Name Provider Type    Francis Ma, PT DPT Physical Therapist    Hetal Linares, OTR/L Occupational Therapist                                 Physical Therapy Education       Title: PT OT SLP Therapies (In Progress)       Topic: Physical Therapy (In Progress)       Point: Mobility training (Done)       Learning Progress Summary            Patient Acceptance, E, VU,DU,NR by NITHIN at 1/6/2025 0824    Comment: Benefits of activity, progression of PT POC,                      Point: Home exercise program (Not Started)       Learner Progress:  Not documented in this visit.              Point: Body mechanics (Not Started)       Learner Progress:  Not documented in this visit.              Point: Precautions (Not Started)       Learner Progress:  Not documented in this visit.                              User Key       Initials Effective Dates Name Provider Type Discipline    NITHIN 02/03/23 -  Francis Hung, PT DPT Physical Therapist PT                  PT Recommendation and Plan  Planned Therapy Interventions (PT): bed mobility training, transfer training, gait training, balance training, home exercise program, patient/family education, postural re-education, stair training, strengthening, motor  coordination training, ROM (range of motion), stretching  Outcome Evaluation: PT eval complete. He is alert and oriented x 4. Mr. Grande reports living at home alone where he was independent in his mobility and ADL's. He utilizes a cane as needed but reports furniture walking. He has a history of CVA w left sided deficits. L LE is grossly 2-/5 in strength. His L LE is also mild/moderately hypertonic. Today he needs min assist x 2 to stand and to t/f to the [facility]. He utilizes his tone with wt bearing through his L LE. Demos L flank pain with an antalgic gait. Was later able to ambulate a short distance with min assist x 2, ~ 12 ft. He would benefit from a cane for support and balance due to L LE weakness and reports of generalized weakness. He remains a fall risk. PT will cont with mobility training, balance training, strengthening and endurance training/activity tolerance. He would currently benefit from short term rehab as he lives alone and is not currently ready to dc home alone.     Time Calculation:         PT Charges       Row Name 01/06/25 1114             Time Calculation    Start Time 0824  -NITHIN      Stop Time 1000  -NITHIN      Time Calculation (min) 96 min  -NITHIN      PT Received On 01/06/25  -NITHIN      PT Goal Re-Cert Due Date 01/16/25  -NITHIN         Time Calculation- PT    Total Timed Code Minutes- PT 36 minute(s)  -NITHIN         Timed Charges    82677 - Gait Training Minutes  16  -NITHIN      36009 - PT Therapeutic Activity Minutes 20  -NITHIN         Total Minutes    Timed Charges Total Minutes 36  -NITHIN       Total Minutes 36  -NITHIN                User Key  (r) = Recorded By, (t) = Taken By, (c) = Cosigned By      Initials Name Provider Type    Francis Ma PT DPT Physical Therapist                  Therapy Charges for Today       Code Description Service Date Service Provider Modifiers Qty    27900093887 HC GAIT TRAINING EA 15 MIN 1/6/2025 Francis Hung, PT DPT GP 1    96306728798 HC PT THERAPEUTIC ACT EA 15 MIN  2025 Francis Hung, PT DPT GP 1    56389875240 HC PT EVAL MOD COMPLEXITY 4 2025 Francis Hung, PT DPT GP 1            PT G-Codes  Outcome Measure Options: AM-PAC 6 Clicks Daily Activity (OT)  AM-PAC 6 Clicks Score (PT): 13  AM-PAC 6 Clicks Score (OT): 15  PT Discharge Summary  Anticipated Discharge Disposition (PT): sub acute care setting    Francis Hung, PT DPT  2025      Electronically signed by Francis Hung, PT DPT at 25 1126          Occupational Therapy Notes (most recent note)        Hetal Cottrell OTR/L at 25 1028          Patient Name: Brijesh Grande  : 1955    MRN: [id number]                              Today's Date: 2025       Admit Date: 1/3/2025    Visit Dx:     ICD-10-CM ICD-9-CM   1. Aneurysm of infrarenal abdominal aorta, unspecified whether ruptured  I71.43 441.4   2. Abdominal pain, unspecified abdominal location  R10.9 789.00   3. Abdominal aortic aneurysm (AAA) without rupture, unspecified part  I71.40 441.4   4. Primary hypertension  I10 401.9     Patient Active Problem List   Diagnosis    AAA (abdominal aortic aneurysm)    Abdominal aortic aneurysm    Abdominal pain     Past Medical History:   Diagnosis Date    Hypertension     Stroke      Past Surgical History:   Procedure Laterality Date    ABDOMINAL AORTIC ANEURYSM REPAIR WITH ENDOGRAFT N/A 1/3/2025    Procedure: ABDOMINAL AORTIC ANEURYSM REPAIR WITH ENDOGRAFT;  Surgeon: Eduardo Whiteside DO;  Location: [facility] HYBRID OR;  Service: Vascular;  Laterality: N/A;    CAROTID ENDARTERECTOMY      x 2      General Information       Row Name 25 0921          OT Time and Intention    Subjective Information complains of;pain  -LS     Document Type evaluation  cc: Abdominal pain, left flank pain, back pain. Dx: AAA, HTN, Post op acute resp failurel vented 1/3 - . s/p 1/3 AAA repair w endograft. Hx: CVA w left sided weakness and abnormal sensation.  -LS     Mode of Treatment occupational  therapy  -LS     Patient Effort good  -       Row Name 01/06/25 0921          General Information    Patient Profile Reviewed yes  -LS     Prior Level of Function independent:;ADL's;all household mobility;community mobility;home management;cooking;cleaning;driving;shopping  Utilizes SC occasionally  -LS     Existing Precautions/Restrictions fall;other (see comments)  L hemiparesis  -     Barriers to Rehab medically complex;previous functional deficit  -       Row Name 01/06/25 0921          Occupational Profile    Environmental Supports and Barriers (Occupational Profile) Walk in shower with shower chair and grab bars. Other AD/DME: SC, quad cane  -       Row Name 01/06/25 0921          Living Environment    People in Home alone  -       Row Name 01/06/25 0921          Home Main Entrance    Number of Stairs, Main Entrance three  -LS     Stair Railings, Main Entrance railings on both sides of stairs  -       Row Name 01/06/25 0921          Stairs Within Home, Primary    Number of Stairs, Within Home, Primary one  -LS     Stair Railings, Within Home, Primary none  -LS       Row Name 01/06/25 0921          Cognition    Orientation Status (Cognition) oriented x 4  -LS       Row Name 01/06/25 0921          Safety Issues/Impairments Affecting Functional Mobility    Safety Issues Affecting Function (Mobility) safety precaution awareness;safety precautions follow-through/compliance  -     Impairments Affecting Function (Mobility) balance;endurance/activity tolerance;range of motion (ROM);pain;strength;muscle tone abnormal  -               User Key  (r) = Recorded By, (t) = Taken By, (c) = Cosigned By      Initials Name Provider Type    Hetal Linares OTR/L Occupational Therapist                     Mobility/ADL's       Row Name 01/06/25 0921          Transfers    Transfers sit-stand transfer;bed-chair transfer  -       Row Name 01/06/25 0921          Bed-Chair Transfer    Bed-Chair [place]  (Transfers) minimum assist (75% patient effort);2 person assist;verbal cues  -     Comment, (Bed-Chair Transfer) BSC  -       Row Name 01/06/25 0921          Sit-Stand Transfer    Sit-Stand [place] (Transfers) minimum assist (75% patient effort);2 person assist;verbal cues  -       Row Name 01/06/25 0921          Functional Mobility    Functional Mobility- Ind. Level minimum assist (75% patient effort);2 person assist required  -     Functional Mobility- Device other (see comments)  reached for objects throughout room to steady self  -     Patient was able to Ambulate yes  -       Row Name 01/06/25 0921          Activities of Daily Living    BADL Assessment/Intervention lower body dressing  -       Row Name 01/06/25 0921          Lower Body Dressing Assessment/Training    [place] Level (Lower Body Dressing) don;shoes/slippers;maximum assist (25% patient effort)  -     Position (Lower Body Dressing) edge of bed sitting  -               User Key  (r) = Recorded By, (t) = Taken By, (c) = Cosigned By      Initials Name Provider Type    LS Hetal Cottrell OTR/L Occupational Therapist                   Obj/Interventions       Row Name 01/06/25 0921          Sensory Assessment (Somatosensory)    Sensory Assessment Pt reports numbness throughout entire LUE; RUE sensation intact  -       Row Name 01/06/25 0921          Vision Assessment/Intervention    Visual Impairment/Limitations [facility]  -       Row Name 01/06/25 0921          Range of Motion Comprehensive    General Range of Motion upper extremity range of motion deficits identified  -     Comment, General Range of Motion Increased tone throughout LUE; L shoulder impaired 50%; L elbow and hand impaired 25%; L wrist impaired 75%; RUE AROM/PROM WFL  -       Row Name 01/06/25 0921          Strength Comprehensive (MMT)    General Manual Muscle Testing (MMT) Assessment upper extremity strength deficits identified  -     Comment, General Manual  Muscle Testing (MMT) Assessment LUE strength testing deferred; RUE strength grossly 5/5  -LS       Row Name 01/06/25 0921          Motor Skills    Motor Skills coordination  -LS     Coordination left;fine motor deficit;gross motor deficit;right;WNL  -       Row Name 01/06/25 0921          Balance    Balance Assessment sitting static balance;sitting dynamic balance;standing static balance;standing dynamic balance  -LS     Static Sitting Balance standby assist  -LS     Dynamic Sitting Balance standby assist;contact guard  -LS     Position, Sitting Balance unsupported;sitting edge of bed  -LS     Static Standing Balance contact guard;minimal assist  -LS     Dynamic Standing Balance contact guard;minimal assist  -LS     Position/Device Used, Standing Balance supported  -LS               User Key  (r) = Recorded By, (t) = Taken By, (c) = Cosigned By      Initials Name Provider Type    Hetal Linares, OTR/L Occupational Therapist                   Goals/Plan       Row Name 01/06/25 0921          Transfer Goal 1 (OT)    Activity/Assistive Device (Transfer Goal 1, OT) toilet;shower chair  -LS     [place] Level/Cues Needed (Transfer Goal 1, OT) standby assist  -LS     Time Frame (Transfer Goal 1, OT) long term goal (LTG);10 days  -LS     Progress/Outcome (Transfer Goal 1, OT) new goal  -LS       Row Name 01/06/25 0921          Dressing Goal 1 (OT)    Activity/Device (Dressing Goal 1, OT) dressing skills, all  -LS     [place]/Cues Needed (Dressing Goal 1, OT) standby assist  -LS     Time Frame (Dressing Goal 1, OT) long term goal (LTG);10 days  -LS     Progress/Outcome (Dressing Goal 1, OT) new goal  -LS       Row Name 01/06/25 0921          Toileting Goal 1 (OT)    Activity/Device (Toileting Goal 1, OT) toileting skills, all  -LS     [place] Level/Cues Needed (Toileting Goal 1, OT) standby assist  -LS     Time Frame (Toileting Goal 1, OT) long term goal (LTG);10 days  -LS     Progress/Outcome (Toileting  Goal 1, OT) new goal  -LS       Row Name 01/06/25 0921          Problem Specific Goal 1 (OT)    Problem Specific Goal 1 (OT) Pt will independently implement one pain management technique to decrease pain and improve functional adl performance.  -LS     Time Frame (Problem Specific Goal 1, OT) long term goal (LTG);by discharge  -LS     Progress/Outcome (Problem Specific Goal 1, OT) new goal  -LS       Row Name 01/06/25 0921          Therapy Assessment/Plan (OT)    Planned Therapy Interventions (OT) transfer/mobility retraining;strengthening exercise;patient/caregiver education/training;occupation/activity based interventions;functional balance retraining;activity tolerance training;BADL retraining;adaptive equipment training;ROM/therapeutic exercise;passive ROM/stretching  -LS               User Key  (r) = Recorded By, (t) = Taken By, (c) = Cosigned By      Initials Name Provider Type    LS Hetal Cottrell OTR/L Occupational Therapist                   Clinical Impression       Row Name 01/06/25 0921          Pain Assessment    Pretreatment Pain Rating 7/10  -LS     Posttreatment Pain Rating 7/10  -LS     Pain Location abdomen  -LS     Pain Side/Orientation left  -LS     Pain Management Interventions exercise or physical activity utilized  -       Row Name 01/06/25 0921          Plan of Care Review    Plan of Care Reviewed With patient  -LS     Progress no change  -LS     Outcome Evaluation OT eval completed. Pt seated EOB upon therapist arrival; A&Ox4; 6L BNC with SpO2 97% at rest; c/o 7/10 pain in L abdominal incision. Pt reports Mod I with all BADLs including fxl ambulation at baseline. Today, Pt donned B shoes while seated EOB with Max A. Pt performed all sit<>stand transfers to/from bed and BSC with Min A x2. Pt ambulated short distance at bedside while reaching for objects to steady self with Min A x2 and verbal cues for safety awareness. Pt demos LUE AROM and strength deficits which are present at baseline  due to h/o CVA; RUE strength WNL. SpO2 remained WNL while Pt remained on 6L however, Pt did experience some SOB with activity. Skilled OT intervention indicated in order to address deficits in fxl mobility, fxl activity tolerance, balance, strength, and use of adaptive techniques/equipment during performance of BADLs. Recommend sub acute rehab at discharge.  -       Row Name 01/06/25 0921          Therapy Assessment/Plan (OT)    Rehab Potential (OT) good  -     Criteria for Skilled Therapeutic Interventions Met (OT) yes;skilled treatment is necessary  -     Therapy Frequency (OT) 5 times/wk  -       Row Name 01/06/25 0921          Therapy Plan Review/Discharge Plan (OT)    Anticipated Discharge Disposition (OT) sub acute care setting  -       Row Name 01/06/25 0921          Positioning and Restraints    Pre-Treatment Position in bed  -     Post Treatment Position bed  -LS     In Bed sitting EOB;call light within reach;encouraged to call for assist;side rails up x2;patient within staff view  -LS               User Key  (r) = Recorded By, (t) = Taken By, (c) = Cosigned By      Initials Name Provider Type    Hetal Linares OTR/L Occupational Therapist                   Outcome Measures       Row Name 01/06/25 0921          How much help from another is currently needed...    Putting on and taking off regular lower body clothing? 2  -LS     Bathing (including washing, rinsing, and drying) 2  -LS     Toileting (which includes using toilet bed pan or urinal) 2  -LS     Putting on and taking off regular upper body clothing 2  -LS     Taking care of personal grooming (such as brushing teeth) 3  -LS     Eating meals 4  -LS     AM-PAC 6 Clicks Score (OT) 15  -       Row Name 01/06/25 0921          Functional Assessment    Outcome Measure Options AM-PAC 6 Clicks Daily Activity (OT)  -LS               User Key  (r) = Recorded By, (t) = Taken By, (c) = Cosigned By      Initials Name Provider Type    SOWMYA Cottrell  MARGARITA FongR/L Occupational Therapist                    Occupational Therapy Education       Title: PT OT SLP Therapies (In Progress)       Topic: Occupational Therapy (In Progress)       Point: ADL training (Done)       Description:   Instruct learner(s) on proper safety adaptation and remediation techniques during self care or transfers.   Instruct in proper use of assistive devices.                  Learning Progress Summary            Patient Acceptance, E, VU,NR by  at 1/6/2025 1027                      Point: Home exercise program (Not Started)       Description:   Instruct learner(s) on appropriate technique for monitoring, assisting and/or progressing therapeutic exercises/activities.                  Learner Progress:  Not documented in this visit.              Point: Precautions (Done)       Description:   Instruct learner(s) on prescribed precautions during self-care and functional transfers.                  Learning Progress Summary            Patient Acceptance, E, VU,NR by  at 1/6/2025 1027                      Point: Body mechanics (Done)       Description:   Instruct learner(s) on proper positioning and spine alignment during self-care, functional mobility activities and/or exercises.                  Learning Progress Summary            Patient Acceptance, E, VU,NR by  at 1/6/2025 1027                                      User Key       Initials Effective Dates Name Provider Type Discipline     06/20/22 -  Hetal Cottrell OTR/L Occupational Therapist OT                  OT Recommendation and Plan  Planned Therapy Interventions (OT): transfer/mobility retraining, strengthening exercise, patient/caregiver education/training, occupation/activity based interventions, functional balance retraining, activity tolerance training, BADL retraining, adaptive equipment training, ROM/therapeutic exercise, passive ROM/stretching  Therapy Frequency (OT): 5 times/wk  Plan of Care Review  Plan of Care  Reviewed With: patient  Progress: no change  Outcome Evaluation: OT eval completed. Pt seated EOB upon therapist arrival; A&Ox4; 6L BNC with SpO2 97% at rest; c/o 7/10 pain in L abdominal incision. Pt reports Mod I with all BADLs including fxl ambulation at baseline. Today, Pt donned B shoes while seated EOB with Max A. Pt performed all sit<>stand transfers to/from bed and BSC with Min A x2. Pt ambulated short distance at bedside while reaching for objects to steady self with Min A x2 and verbal cues for safety awareness. Pt demos LUE AROM and strength deficits which are present at baseline due to h/o CVA; RUE strength WNL. SpO2 remained WNL while Pt remained on 6L however, Pt did experience some SOB with activity. Skilled OT intervention indicated in order to address deficits in fxl mobility, fxl activity tolerance, balance, strength, and use of adaptive techniques/equipment during performance of BADLs. Recommend sub acute rehab at discharge.     Time Calculation:         Time Calculation- OT       Row Name 01/06/25 0921             Time Calculation- OT    OT Start Time 0921  -      OT Stop Time 1014  -      OT Time Calculation (min) 53 min  -      OT Non-Billable Time (min) 53 min  -      OT Received On 01/06/25  -      OT Goal Re-Cert Due Date 01/16/25  -                User Key  (r) = Recorded By, (t) = Taken By, (c) = Cosigned By      Initials Name Provider Type     Hetal Cottrell OTR/L Occupational Therapist                  Therapy Charges for Today       Code Description Service Date Service Provider Modifiers Qty    [id number]  OT EVAL MOD COMPLEXITY 4 1/6/2025 Hetal Cottrell OTR/L GO 1                 Hetal Cottrell OTR/L  1/6/2025    Electronically signed by Hetal Cottrell OTR/L at 01/06/25 102

## 2025-01-06 NOTE — THERAPY EVALUATION
Patient Name: Brijesh Grande  : 1955    MRN: [id number]                              Today's Date: 2025       Admit Date: 1/3/2025    Visit Dx:     ICD-10-CM ICD-9-CM   1. Aneurysm of infrarenal abdominal aorta, unspecified whether ruptured  I71.43 441.4   2. Abdominal pain, unspecified abdominal location  R10.9 789.00   3. Abdominal aortic aneurysm (AAA) without rupture, unspecified part  I71.40 441.4   4. Primary hypertension  I10 401.9     Patient Active Problem List   Diagnosis    AAA (abdominal aortic aneurysm)    Abdominal aortic aneurysm    Abdominal pain     Past Medical History:   Diagnosis Date    Hypertension     Stroke      Past Surgical History:   Procedure Laterality Date    ABDOMINAL AORTIC ANEURYSM REPAIR WITH ENDOGRAFT N/A 1/3/2025    Procedure: ABDOMINAL AORTIC ANEURYSM REPAIR WITH ENDOGRAFT;  Surgeon: Eduardo Whiteside DO;  Location: [facility] HYBRID OR;  Service: Vascular;  Laterality: N/A;    CAROTID ENDARTERECTOMY      x 2      General Information       Row Name 25 0921          OT Time and Intention    Subjective Information complains of;pain  -LS     Document Type evaluation  cc: Abdominal pain, left flank pain, back pain. Dx: AAA, HTN, Post op acute resp failurel vented 1/3 - . s/p 1/3 AAA repair w endograft. Hx: CVA w left sided weakness and abnormal sensation.  -LS     Mode of Treatment occupational therapy  -LS     Patient Effort good  -LS       Row Name 25 0921          General Information    Patient Profile Reviewed yes  -LS     Prior Level of Function independent:;ADL's;all household mobility;community mobility;home management;cooking;cleaning;driving;shopping  Utilizes SC occasionally  -LS     Existing Precautions/Restrictions fall;other (see comments)  L hemiparesis  -LS     Barriers to Rehab medically complex;previous functional deficit  -LS       Row Name 25 0921          Occupational Profile    Environmental Supports and Barriers (Occupational Profile)  Walk in shower with shower chair and grab bars. Other AD/DME: SC, quad cane  -       Row Name 01/06/25 0921          Living Environment    People in Home alone  -       Row Name 01/06/25 0921          Home Main Entrance    Number of Stairs, Main Entrance three  -LS     Stair Railings, Main Entrance railings on both sides of stairs  -       Row Name 01/06/25 0921          Stairs Within Home, Primary    Number of Stairs, Within Home, Primary one  -LS     Stair Railings, Within Home, Primary none  -       Row Name 01/06/25 0921          Cognition    Orientation Status (Cognition) oriented x 4  -       Row Name 01/06/25 0921          Safety Issues/Impairments Affecting Functional Mobility    Safety Issues Affecting Function (Mobility) safety precaution awareness;safety precautions follow-through/compliance  -     Impairments Affecting Function (Mobility) balance;endurance/activity tolerance;range of motion (ROM);pain;strength;muscle tone abnormal  -               User Key  (r) = Recorded By, (t) = Taken By, (c) = Cosigned By      Initials Name Provider Type     Hetal Cottrell OTR/L Occupational Therapist                     Mobility/ADL's       Row Name 01/06/25 0921          Transfers    Transfers sit-stand transfer;bed-chair transfer  -       Row Name 01/06/25 0921          Bed-Chair Transfer    Bed-Chair [place] (Transfers) minimum assist (75% patient effort);2 person assist;verbal cues  -     Comment, (Bed-Chair Transfer) BSC  -       Row Name 01/06/25 0921          Sit-Stand Transfer    Sit-Stand [place] (Transfers) minimum assist (75% patient effort);2 person assist;verbal cues  -       Row Name 01/06/25 0921          Functional Mobility    Functional Mobility- Ind. Level minimum assist (75% patient effort);2 person assist required  -     Functional Mobility- Device other (see comments)  reached for objects throughout room to steady self  -     Patient was able to Ambulate yes   -       Row Name 01/06/25 0921          Activities of Daily Living    BADL Assessment/Intervention lower body dressing  -       Row Name 01/06/25 0921          Lower Body Dressing Assessment/Training    [place] Level (Lower Body Dressing) don;shoes/slippers;maximum assist (25% patient effort)  -     Position (Lower Body Dressing) edge of bed sitting  -               User Key  (r) = Recorded By, (t) = Taken By, (c) = Cosigned By      Initials Name Provider Type    Hetal Linares OTR/L Occupational Therapist                   Obj/Interventions       Row Name 01/06/25 0921          Sensory Assessment (Somatosensory)    Sensory Assessment Pt reports numbness throughout entire LUE; RUE sensation intact  -       Row Name 01/06/25 0921          Vision Assessment/Intervention    Visual Impairment/Limitations WFL  -       Row Name 01/06/25 0921          Range of Motion Comprehensive    General Range of Motion upper extremity range of motion deficits identified  -     Comment, General Range of Motion Increased tone throughout LUE; L shoulder impaired 50%; L elbow and hand impaired 25%; L wrist impaired 75%; RUE AROM/PROM WFL  -       Row Name 01/06/25 0921          Strength Comprehensive (MMT)    General Manual Muscle Testing (MMT) Assessment upper extremity strength deficits identified  -     Comment, General Manual Muscle Testing (MMT) Assessment LUE strength testing deferred; RUE strength grossly 5/5  -       Row Name 01/06/25 0921          Motor Skills    Motor Skills coordination  -     Coordination left;fine motor deficit;gross motor deficit;right;WNL  -       Row Name 01/06/25 0921          Balance    Balance Assessment sitting static balance;sitting dynamic balance;standing static balance;standing dynamic balance  -     Static Sitting Balance standby assist  -LS     Dynamic Sitting Balance standby assist;contact guard  -     Position, Sitting Balance unsupported;sitting edge of bed   -LS     Static Standing Balance contact guard;minimal assist  -LS     Dynamic Standing Balance contact guard;minimal assist  -LS     Position/Device Used, Standing Balance supported  -LS               User Key  (r) = Recorded By, (t) = Taken By, (c) = Cosigned By      Initials Name Provider Type    Hetal Linares, OTR/L Occupational Therapist                   Goals/Plan       Row Name 01/06/25 0921          Transfer Goal 1 (OT)    Activity/Assistive Device (Transfer Goal 1, OT) toilet;shower chair  -LS     [place] Level/Cues Needed (Transfer Goal 1, OT) standby assist  -LS     Time Frame (Transfer Goal 1, OT) long term goal (LTG);10 days  -LS     Progress/Outcome (Transfer Goal 1, OT) new goal  -       Row Name 01/06/25 0921          Dressing Goal 1 (OT)    Activity/Device (Dressing Goal 1, OT) dressing skills, all  -LS     [place]/Cues Needed (Dressing Goal 1, OT) standby assist  -LS     Time Frame (Dressing Goal 1, OT) long term goal (LTG);10 days  -LS     Progress/Outcome (Dressing Goal 1, OT) new goal  -       Row Name 01/06/25 0921          Toileting Goal 1 (OT)    Activity/Device (Toileting Goal 1, OT) toileting skills, all  -LS     [place] Level/Cues Needed (Toileting Goal 1, OT) standby assist  -LS     Time Frame (Toileting Goal 1, OT) long term goal (LTG);10 days  -LS     Progress/Outcome (Toileting Goal 1, OT) new goal  -       Row Name 01/06/25 0921          Problem Specific Goal 1 (OT)    Problem Specific Goal 1 (OT) Pt will independently implement one pain management technique to decrease pain and improve functional adl performance.  -LS     Time Frame (Problem Specific Goal 1, OT) long term goal (LTG);by discharge  -LS     Progress/Outcome (Problem Specific Goal 1, OT) new goal  -       Row Name 01/06/25 0921          Therapy Assessment/Plan (OT)    Planned Therapy Interventions (OT) transfer/mobility retraining;strengthening exercise;patient/caregiver  education/training;occupation/activity based interventions;functional balance retraining;activity tolerance training;BADL retraining;adaptive equipment training;ROM/therapeutic exercise;passive ROM/stretching  -LS               User Key  (r) = Recorded By, (t) = Taken By, (c) = Cosigned By      Initials Name Provider Type    LS TrishHetal parrish, OTR/L Occupational Therapist                   Clinical Impression       Row Name 01/06/25 0921          Pain Assessment    Pretreatment Pain Rating 7/10  -LS     Posttreatment Pain Rating 7/10  -LS     Pain Location abdomen  -LS     Pain Side/Orientation left  -LS     Pain Management Interventions exercise or physical activity utilized  -LS       Row Name 01/06/25 0921          Plan of Care Review    Plan of Care Reviewed With patient  -LS     Progress no change  -LS     Outcome Evaluation OT eval completed. Pt seated EOB upon therapist arrival; A&Ox4; 6L BNC with SpO2 97% at rest; c/o 7/10 pain in L abdominal incision. Pt reports Mod I with all BADLs including fxl ambulation at baseline. Today, Pt donned B shoes while seated EOB with Max A. Pt performed all sit<>stand transfers to/from bed and BSC with Min A x2. Pt ambulated short distance at bedside while reaching for objects to steady self with Min A x2 and verbal cues for safety awareness. Pt demos LUE AROM and strength deficits which are present at baseline due to h/o CVA; RUE strength WNL. SpO2 remained WNL while Pt remained on 6L however, Pt did experience some SOB with activity. Skilled OT intervention indicated in order to address deficits in fxl mobility, fxl activity tolerance, balance, strength, and use of adaptive techniques/equipment during performance of BADLs. Recommend sub acute rehab at discharge.  -LS       Row Name 01/06/25 0921          Therapy Assessment/Plan (OT)    Rehab Potential (OT) good  -LS     Criteria for Skilled Therapeutic Interventions Met (OT) yes;skilled treatment is necessary  -      Therapy Frequency (OT) 5 times/wk  -       Row Name 01/06/25 0921          Therapy Plan Review/Discharge Plan (OT)    Anticipated Discharge Disposition (OT) sub acute care setting  -       Row Name 01/06/25 0921          Positioning and Restraints    Pre-Treatment Position in bed  -LS     Post Treatment Position bed  -LS     In Bed sitting EOB;call light within reach;encouraged to call for assist;side rails up x2;patient within staff view  -               User Key  (r) = Recorded By, (t) = Taken By, (c) = Cosigned By      Initials Name Provider Type    Hetal Linares OTR/L Occupational Therapist                   Outcome Measures       Row Name 01/06/25 0921          How much help from another is currently needed...    Putting on and taking off regular lower body clothing? 2  -LS     Bathing (including washing, rinsing, and drying) 2  -LS     Toileting (which includes using toilet bed pan or urinal) 2  -LS     Putting on and taking off regular upper body clothing 2  -LS     Taking care of personal grooming (such as brushing teeth) 3  -LS     Eating meals 4  -LS     AM-PAC 6 Clicks Score (OT) 15  -       Row Name 01/06/25 0921          Functional Assessment    Outcome Measure Options AM-PAC 6 Clicks Daily Activity (OT)  -               User Key  (r) = Recorded By, (t) = Taken By, (c) = Cosigned By      Initials Name Provider Type    Hetal Linares OTR/L Occupational Therapist                    Occupational Therapy Education       Title: PT OT SLP Therapies (In Progress)       Topic: Occupational Therapy (In Progress)       Point: ADL training (Done)       Description:   Instruct learner(s) on proper safety adaptation and remediation techniques during self care or transfers.   Instruct in proper use of assistive devices.                  Learning Progress Summary            Patient Acceptance, E, VU,NR by SOWMYA at 1/6/2025 1027                      Point: Home exercise program (Not Started)        Description:   Instruct learner(s) on appropriate technique for monitoring, assisting and/or progressing therapeutic exercises/activities.                  Learner Progress:  Not documented in this visit.              Point: Precautions (Done)       Description:   Instruct learner(s) on prescribed precautions during self-care and functional transfers.                  Learning Progress Summary            Patient Acceptance, E, VU,NR by  at 1/6/2025 1027                      Point: Body mechanics (Done)       Description:   Instruct learner(s) on proper positioning and spine alignment during self-care, functional mobility activities and/or exercises.                  Learning Progress Summary            Patient Acceptance, E, VU,NR by  at 1/6/2025 1027                                      User Key       Initials Effective Dates Name Provider Type Discipline     06/20/22 -  Hetal Cottrell, OTR/L Occupational Therapist OT                  OT Recommendation and Plan  Planned Therapy Interventions (OT): transfer/mobility retraining, strengthening exercise, patient/caregiver education/training, occupation/activity based interventions, functional balance retraining, activity tolerance training, BADL retraining, adaptive equipment training, ROM/therapeutic exercise, passive ROM/stretching  Therapy Frequency (OT): 5 times/wk  Plan of Care Review  Plan of Care Reviewed With: patient  Progress: no change  Outcome Evaluation: OT eval completed. Pt seated EOB upon therapist arrival; A&Ox4; 6L BNC with SpO2 97% at rest; c/o 7/10 pain in L abdominal incision. Pt reports Mod I with all BADLs including fxl ambulation at baseline. Today, Pt donned B shoes while seated EOB with Max A. Pt performed all sit<>stand transfers to/from bed and BSC with Min A x2. Pt ambulated short distance at bedside while reaching for objects to steady self with Min A x2 and verbal cues for safety awareness. Pt demos LUE AROM and strength deficits  which are present at baseline due to h/o CVA; RUE strength WNL. SpO2 remained WNL while Pt remained on 6L however, Pt did experience some SOB with activity. Skilled OT intervention indicated in order to address deficits in fxl mobility, fxl activity tolerance, balance, strength, and use of adaptive techniques/equipment during performance of BADLs. Recommend sub acute rehab at discharge.     Time Calculation:         Time Calculation- OT       Row Name 01/06/25 0921             Time Calculation- OT    OT Start Time 0921  -      OT Stop Time 1014  -      OT Time Calculation (min) 53 min  -      OT Non-Billable Time (min) 53 min  -      OT Received On 01/06/25  -      OT Goal Re-Cert Due Date 01/16/25  -                User Key  (r) = Recorded By, (t) = Taken By, (c) = Cosigned By      Initials Name Provider Type    LS Hetal Cottrell OTR/L Occupational Therapist                  Therapy Charges for Today       Code Description Service Date Service Provider Modifiers Qty    94019598506 HC OT EVAL MOD COMPLEXITY 4 1/6/2025 Hetal Cottrell OTR/L GO 1                 Hetal Cottrell OTR/MARE  1/6/2025

## 2025-01-06 NOTE — THERAPY EVALUATION
Patient Name: Brijesh Grande  : 1955    MRN: [id number]                              Today's Date: 2025       Admit Date: 1/3/2025    Visit Dx:     ICD-10-CM ICD-9-CM   1. Aneurysm of infrarenal abdominal aorta, unspecified whether ruptured  I71.43 441.4   2. Abdominal pain, unspecified abdominal location  R10.9 789.00   3. Abdominal aortic aneurysm (AAA) without rupture, unspecified part  I71.40 441.4   4. Primary hypertension  I10 401.9   5. Impaired mobility [Z74.09]  Z74.09 799.89     Patient Active Problem List   Diagnosis    AAA (abdominal aortic aneurysm)    Abdominal aortic aneurysm    Abdominal pain     Past Medical History:   Diagnosis Date    Hypertension     Stroke      Past Surgical History:   Procedure Laterality Date    ABDOMINAL AORTIC ANEURYSM REPAIR WITH ENDOGRAFT N/A 1/3/2025    Procedure: ABDOMINAL AORTIC ANEURYSM REPAIR WITH ENDOGRAFT;  Surgeon: Eduardo Whiteside DO;  Location: [facility] OR;  Service: Vascular;  Laterality: N/A;    CAROTID ENDARTERECTOMY      x 2      General Information       Row Name 2524          Physical Therapy Time and Intention    Document Type evaluation  CC: Abdominal pain, Left flank pain, back pain. Dx: AAA, HTN, Post op acute resp failurel vented 1/3 - . s/p 1/3 AAA repair w endograft. Hx: CVA w left sided weakness and abnormal sensation.  -NITHIN     Mode of Treatment physical therapy  -NITHIN       Row Name 25          General Information    Patient Profile Reviewed yes  -NITHIN     Prior Level of Function independent:;bed mobility;transfer;gait;dressing;bathing;driving  Utilizes a straight cane, PRN  -NITHIN     Existing Precautions/Restrictions fall;oxygen therapy device and L/min  -NITHIN     Barriers to Rehab medically complex;previous functional deficit;impaired sensation;physical barrier  -NITHIN       Row Name 25          Living Environment    People in Home alone  -NITHIN       Row Name 25          Home Main Entrance     Number of Stairs, Main Entrance three  -NITHIN     Stair Railings, Main Entrance railings on both sides of stairs  -NITHIN       Row Name 01/06/25 0824          Stairs Within Home, Primary    Number of Stairs, Within Home, Primary one  -NITHIN       Row Name 01/06/25 0824          Cognition    Orientation Status (Cognition) oriented x 4  -NITHIN       Row Name 01/06/25 0824          Safety Issues/Impairments Affecting Functional Mobility    Safety Issues Affecting Function (Mobility) friction/shear risk  -NITHIN     Impairments Affecting Function (Mobility) balance;endurance/activity tolerance;range of motion (ROM);pain;strength;muscle tone abnormal;sensation/sensory awareness  -NITHIN               User Key  (r) = Recorded By, (t) = Taken By, (c) = Cosigned By      Initials Name Provider Type    Francis Ma PT DPT Physical Therapist                   Mobility       Row Name 01/06/25 0824          Bed Mobility    Comment, (Bed Mobility) Sitting EOB, left as found.  -NITHIN       Row Name 01/06/25 0824          Bed-Chair Transfer    Bed-Chair [place] (Transfers) minimum assist (75% patient effort);2 person assist;verbal cues  -NITHIN       Row Name 01/06/25 0824          Sit-Stand Transfer    Sit-Stand [place] (Transfers) minimum assist (75% patient effort);2 person assist;verbal cues  -NITHIN       Row Name 01/06/25 0824          Gait/Stairs (Locomotion)    [place] Level (Gait) minimum assist (75% patient effort);2 person assist  -NITHIN     Distance in Feet (Gait) 12  -NITHIN     Deviations/Abnormal Patterns (Gait) left sided deviations;antalgic;gait speed decreased;stride length decreased  -NITHIN     Left Sided Gait Deviations hip hiking;heel strike decreased  -NITHIN     Comment, (Gait/Stairs) Hx CVA with L sided weakness and increased tone. Utilizes tone for wt bearing. Decreased swing/Knee flexion/extension.  -NITHIN               User Key  (r) = Recorded By, (t) = Taken By, (c) = Cosigned By      Initials Name Provider Type    NITHIN Hung  "Francis HUGHES PT DPT Physical Therapist                   Obj/Interventions       Row Name 01/06/25 0824          Range of Motion Comprehensive    Comment, General Range of Motion R LE AROM WFL, L ankle DF/PF PROM impaired 50%, L knee flex/ext PROM impaired 25%, L hip flexion PROM 75% (pain in L flank and tone)  -NITHIN       Row Name 01/06/25 0824          Strength Comprehensive (MMT)    Comment, General Manual Muscle Testing (MMT) Assessment R LE functionally 4/5, L ankle, knee and hip grossly 2-/5  -NITHIN       Row Name 01/06/25 0824          Motor Skills    Motor Skills muscle tone  -NITHIN     Muscle Tone left;lower extremity(s);mild impairment;moderate impairment;hypertonia  Hx CVA  -NITHIN       Row Name 01/06/25 0824          Balance    Balance Assessment sitting dynamic balance;standing dynamic balance  -NITHIN     Dynamic Sitting Balance supervision  -NITHIN     Position, Sitting Balance unsupported;sitting edge of bed  BSC  -NITHIN     Dynamic Standing Balance minimal assist  -NITHIN     Position/Device Used, Standing Balance supported  -NITHIN       Row Name 01/06/25 0824          Sensory Assessment (Somatosensory)    Sensory Assessment (Somatosensory) right-sided sensation intact  R LE sensation intact to light touch, Pt. reports L LE \"feels different\"  -NITHIN               User Key  (r) = Recorded By, (t) = Taken By, (c) = Cosigned By      Initials Name Provider Type    Francis Ma, PT DPT Physical Therapist                   Goals/Plan       Row Name 01/06/25 0824          Bed Mobility Goal 1 (PT)    Activity/Assistive Device (Bed Mobility Goal 1, PT) sit to supine/supine to sit  -NITHIN     [place] Level/Cues Needed (Bed Mobility Goal 1, PT) supervision required  -NITHIN     Time Frame (Bed Mobility Goal 1, PT) long term goal (LTG);10 days  -NITHIN     Progress/Outcomes (Bed Mobility Goal 1, PT) new goal  -NITHIN       Row Name 01/06/25 0824          Transfer Goal 1 (PT)    Activity/Assistive Device (Transfer Goal 1, PT) " sit-to-stand/stand-to-sit;bed-to-chair/chair-to-bed  -NITHIN     [place] Level/Cues Needed (Transfer Goal 1, PT) supervision required  -NITHIN     Time Frame (Transfer Goal 1, PT) long term goal (LTG);10 days  -NITHIN     Progress/Outcome (Transfer Goal 1, PT) new goal  -NITHIN       Row Name 01/06/25 0824          Gait Training Goal 1 (PT)    Activity/Assistive Device (Gait Training Goal 1, PT) gait (walking locomotion);assistive device use;decrease fall risk;forward stepping;improve balance and speed;increase endurance/gait distance  -NITHIN     [place] Level (Gait Training Goal 1, PT) contact guard required  -NITHIN     Distance (Gait Training Goal 1, PT) 30 ft  -NITHIN     Time Frame (Gait Training Goal 1, PT) long term goal (LTG);10 days  -NITHIN     Progress/Outcome (Gait Training Goal 1, PT) new goal  -NITHIN       Row Name 01/06/25 0824          Therapy Assessment/Plan (PT)    Planned Therapy Interventions (PT) bed mobility training;transfer training;gait training;balance training;home exercise program;patient/family education;postural re-education;stair training;strengthening;motor coordination training;ROM (range of motion);stretching  -NITHIN               User Key  (r) = Recorded By, (t) = Taken By, (c) = Cosigned By      Initials Name Provider Type    Francis Ma, PT DPT Physical Therapist                   Clinical Impression       Row Name 01/06/25 0824          Pain    Pretreatment Pain Rating 7/10  -NITHIN     Posttreatment Pain Rating 7/10  -NITHIN     Pain Location abdomen;flank  -NITHIN     Pain Side/Orientation left  -NITHIN     Pain Management Interventions exercise or physical activity utilized;premedicated for activity  -NITHIN     Response to Pain Interventions no change per patient report  -NITHIN       Row Name 01/06/25 0824          Plan of Care Review    Plan of Care Reviewed With patient  -NITHIN     Outcome Evaluation PT eval complete. He is alert and oriented x 4. Mr. Grande reports living at home alone where he was independent in  his mobility and ADL's. He utilizes a cane as needed but reports furniture walking. He has a history of CVA w left sided deficits. L LE is grossly 2-/5 in strength. His L LE is also mild/moderately hypertonic. Today he needs min assist x 2 to stand and to t/f to the [facility]. He utilizes his tone with wt bearing through his L LE. Demos L flank pain with an antalgic gait. Was later able to ambulate a short distance with min assist x 2, ~ 12 ft. He would benefit from a cane for support and balance due to L LE weakness and reports of generalized weakness. He remains a fall risk. PT will cont with mobility training, balance training, strengthening and endurance training/activity tolerance. He would currently benefit from short term rehab as he lives alone and is not currently ready to dc home alone.  -NITHIN       Row Name 01/06/25 0824          Therapy Assessment/Plan (PT)    Patient/Family Therapy Goals Statement (PT) increase strength.  -NITHIN     Rehab Potential (PT) good  -NITHIN     Criteria for Skilled Interventions Met (PT) yes;meets criteria;skilled treatment is necessary  -NITHIN     Therapy Frequency (PT) 2 times/day  -NITHIN     Predicted Duration of Therapy Intervention (PT) until d.c  -NITHIN       Row Name 01/06/25 0824          Positioning and Restraints    Pre-Treatment Position in bed  -NITHIN     Post Treatment Position bed  -NITHIN     In Bed notified nsg;sitting EOB;call light within reach;encouraged to call for assist;patient within staff view  -NITHIN               User Key  (r) = Recorded By, (t) = Taken By, (c) = Cosigned By      Initials Name Provider Type    Francis Ma, PT DPT Physical Therapist                   Outcome Measures       Row Name 01/06/25 0824          How much help from another person do you currently need...    Turning from your back to your side while in flat bed without using bedrails? 3  -NITHIN     Moving from lying on back to sitting on the side of a flat bed without bedrails? 3  -NITHIN     Moving to and  from a bed to a chair (including a wheelchair)? 2  -NITHIN     Standing up from a chair using your arms (e.g., wheelchair, bedside chair)? 2  -NITHIN     Climbing 3-5 steps with a railing? 1  -NITHIN     To walk in hospital room? 2  -NITHIN     AM-PAC 6 Clicks Score (PT) 13  -NITHIN     Highest Level of Mobility Goal 4 --> Transfer to chair/commode  -NITHIN       Row Name 01/06/25 0921 01/06/25 0824       Functional Assessment    Outcome Measure Options AM-PAC 6 Clicks Daily Activity (OT)  -LS AM-PAC 6 Clicks Basic Mobility (PT)  -NITHIN              User Key  (r) = Recorded By, (t) = Taken By, (c) = Cosigned By      Initials Name Provider Type    Francis Ma, PT DPT Physical Therapist    Hetal Linares, OTR/L Occupational Therapist                                 Physical Therapy Education       Title: PT OT SLP Therapies (In Progress)       Topic: Physical Therapy (In Progress)       Point: Mobility training (Done)       Learning Progress Summary            Patient Acceptance, E, VU,DU,NR by NITHIN at 1/6/2025 0824    Comment: Benefits of activity, progression of PT POC,                      Point: Home exercise program (Not Started)       Learner Progress:  Not documented in this visit.              Point: Body mechanics (Not Started)       Learner Progress:  Not documented in this visit.              Point: Precautions (Not Started)       Learner Progress:  Not documented in this visit.                              User Key       Initials Effective Dates Name Provider Type Discipline    NITHIN 02/03/23 -  Francis Hung, PT DPT Physical Therapist PT                  PT Recommendation and Plan  Planned Therapy Interventions (PT): bed mobility training, transfer training, gait training, balance training, home exercise program, patient/family education, postural re-education, stair training, strengthening, motor coordination training, ROM (range of motion), stretching  Outcome Evaluation: PT eval complete. He is alert and oriented x 4.  Mr. Grande reports living at home alone where he was independent in his mobility and ADL's. He utilizes a cane as needed but reports furniture walking. He has a history of CVA w left sided deficits. L LE is grossly 2-/5 in strength. His L LE is also mild/moderately hypertonic. Today he needs min assist x 2 to stand and to t/f to the [facility] [place] [facility]. He utilizes his tone with wt bearing through his L LE. Demos L flank pain with an antalgic gait. Was later able to ambulate a short distance with min assist x 2, ~ 12 ft. He would benefit from a cane for support and balance due to L LE weakness and reports of generalized weakness. He remains a fall risk. PT will cont with mobility training, balance training, strengthening and endurance training/activity tolerance. He would currently benefit from short term rehab as he lives alone and is not currently ready to dc home alone.     Time Calculation:         PT Charges       Row Name 01/06/25 1114             Time Calculation    Start Time 0824  -NITHIN      Stop Time 1000  -NITHIN      Time Calculation (min) 96 min  -NITHIN      PT Received On 01/06/25  -NITHIN      PT Goal Re-Cert Due Date 01/16/25  -NITHIN         Time Calculation- PT    Total Timed Code Minutes- PT 36 minute(s)  -NITHIN         Timed Charges    09692 - Gait Training Minutes  16  -NITHIN      47825 - PT Therapeutic Activity Minutes 20  -NITHIN         Total Minutes    Timed Charges Total Minutes 36  -NITHIN       Total Minutes 36  -NITHIN                User Key  (r) = Recorded By, (t) = Taken By, (c) = Cosigned By      Initials Name Provider Type    Francis Ma, PT DPT Physical Therapist                  Therapy Charges for Today       Code Description Service Date Service Provider Modifiers Qty    22545591139 HC GAIT TRAINING EA 15 MIN 1/6/2025 Francis Hung, PT DPT GP 1    79723424751 HC PT THERAPEUTIC ACT EA 15 MIN 1/6/2025 Francis Hung, PT DPT GP 1    14731367246 HC PT EVAL MOD COMPLEXITY 4 1/6/2025 Francis Hung, PT DPT GP 1             PT G-Codes  Outcome Measure Options: AM-PAC 6 Clicks Daily Activity (OT)  AM-PAC 6 Clicks Score (PT): 13  AM-PAC 6 Clicks Score (OT): 15  PT Discharge Summary  Anticipated Discharge Disposition (PT): sub acute care setting    Francis Hung, PT DPT  1/6/2025

## 2025-01-06 NOTE — CASE MANAGEMENT/SOCIAL WORK
Discharge Planning Assessment  [facility]     Patient Name: Brijesh Grande  MRN: [id number]  Today's Date: 1/6/2025    Admit Date: 1/3/2025        Discharge Needs Assessment       Row Name 01/06/25 1330       Living Environment    People in Home child(kristin), adult    Current Living Arrangements home    Primary Care Provided by self;child(kristin)    Family Caregiver if Needed child(kristin), adult    Quality of Family Relationships supportive    Able to Return to Prior Arrangements yes       Discharge Needs Assessment    Equipment Currently Used at Home cane, straight;wheelchair;walker, rolling    Discharge Facility/Level of Care Needs rehabilitation facility    Current Discharge Risk chronically ill    Discharge Coordination/Progress Spoke with patient's son for dc planning. Patient lives his son who assists with care at home. Therapy has recommended sub acute rehab. Patient's son agrees and would like to see if patient would qualify for [facility] swing bed. If not swing bed, he would want home health at discharge.  will follow for referral to swing bed.                   Discharge Plan    No documentation.                 Continued Care and Services - Admitted Since 1/3/2025    No active coordination exists for this encounter.          Demographic Summary    No documentation.                  Functional Status    No documentation.                  Psychosocial    No documentation.                  Abuse/Neglect    No documentation.                  Legal    No documentation.                  Substance Abuse    No documentation.                  Patient Forms    No documentation.                     Merlina A Fletcher, RN

## 2025-01-06 NOTE — PLAN OF CARE
Goal Outcome Evaluation:  Plan of Care Reviewed With: patient        Progress: no change  Outcome Evaluation: OT eval completed. Pt seated EOB upon therapist arrival; A&Ox4; 6L BNC with SpO2 97% at rest; c/o 7/10 pain in L abdominal incision. Pt reports Mod I with all BADLs including fxl ambulation at baseline. Today, Pt donned B shoes while seated EOB with Max A. Pt performed all sit<>stand transfers to/from bed and BSC with Min A x2. Pt ambulated short distance at bedside while reaching for objects to steady self with Min A x2 and verbal cues for safety awareness. Pt demos LUE AROM and strength deficits which are present at baseline due to h/o CVA; RUE strength WNL. SpO2 remained WNL while Pt remained on 6L however, Pt did experience some SOB with activity. Skilled OT intervention indicated in order to address deficits in fxl mobility, fxl activity tolerance, balance, strength, and use of adaptive techniques/equipment during performance of BADLs. Recommend sub acute rehab at discharge.    Anticipated Discharge Disposition (OT): sub acute care setting

## 2025-01-06 NOTE — PAYOR COMM NOTE
"REF:  [id number]    [facility]  [place],   [phone]  OR  FAX   [phone]      Brea Grande (69 y.o. Male)       Date of Birth   1955    Social Security Number       Address   [address] [facility] [address]    Home Phone   [phone]    MRN   [id number]       Anabaptist   Other    Marital Status                               Admission Date   1/3/25    Admission Type   Emergency    Admitting Provider   Nathan Cortes MD    Attending Provider   Shaun Gallego MD    Department, Room/Bed   [facility] CARDIAC CARE, C005/1       Discharge Date       Discharge Disposition       Discharge Destination                                 Attending Provider: Shaun Gallego MD    Allergies: No Known Allergies    Isolation: None   Infection: None   Code Status: CPR    Ht: 177.8 cm (70\")   Wt: 99.1 kg (218 lb 7.6 oz)    Admission Cmt: None   Principal Problem: Abdominal aortic aneurysm [I71.40]                   Active Insurance as of 1/3/2025       Primary Coverage       Payor Plan Insurance Group Employer/Plan Group    ANTHEM MEDICARE REPLACEMENT ANTHEM MED ADV PPO [id number]       Payor Plan Address Payor Plan Phone Number Payor Plan Fax Number Effective Dates    [address] [phone]  1/1/2024 - None Entered    [facility] [address]         Subscriber Name Subscriber Birth Date Member ID       BREA GRANDE 1955 [id number]                     Emergency Contacts        (Rel.) Home Phone Work Phone Mobile Phone    Alexis Grande (Son) -- -- [phone]    Mildred Pendleton (Friend) -- -- [phone]          Mackenzie Patel, RN   Registered Nurse     Plan of Care      Signed     Date of Service: 01/04/25 0553  Creation Time: 01/04/25 0553     Signed         Goal Outcome Evaluation:   Propofol at 50. Heparin at 12. Levophed at 0.04. Patient becomes very agitated when Propofol is decreased. Pulses Doppled in bilateral lower extremities and heard well. " "Adequate urine output. Mottling noted on lower left extremity. Maintained reverse Trendelenburg and HOB flat. NPO.                  Cherry Turcios, RN   Registered Nurse  Nursing     Plan of Care      Signed     Date of Service: 01/05/25 0634  Creation Time: 01/05/25 0634     Signed         Goal Outcome Evaluation:   Patient stable overnight. Heparin drip with no changes at each Q6 PTT draw. No signs of bleeding. No changes in pain. He asked for one dose of pain medication around 0030. He was able to sleep some during the shift.   Improved pulses in LLE on 0400 check, color of leg and foot is improved. Patient was unable to urinate and at 0000 assessment bladder scan was performed. In/out cath performed with patient permission and 300 ml returned. O2 sats stable on nasal cannula. BP's were soft at one point and levophed was restarted briefly. It is off at this time. Patient only allowed himself to be turned either supine or to the right. This was done Q2 per his allowance. Safety maintained.                   Cherry Turcios, RN   Registered Nurse  Nursing     Plan of Care      Signed     Date of Service: 01/06/25 0513  Creation Time: 01/06/25 0513     Signed         Goal Outcome Evaluation:  Plan of Care Reviewed With: patient  Progress: improving  VSS. Afebrile. Urine output improved to 400 ml for shift. O2 stable on 6L/NC. He has been NPO for any potential procedures today (as directed). Pain medication requested and given x 3 this shift. He has slept some throughout the shift, which he states has made him feel \"better\". Heparin drip remains on and IV fluids are running, but all other IV drips have remained off successfully this shift. Turned Q2H as patient would allow. Safety maintained.                   Hetal Cottrell, OTR/L   Occupational Therapist  Specialty: Occupational Therapy     Plan of Care      Signed     Date of Service: 01/06/25 1027  Creation Time: 01/06/25 1027     Signed         Goal Outcome " Evaluation:  Plan of Care Reviewed With: patient  Progress: no change  Outcome Evaluation: OT eval completed. Pt seated EOB upon therapist arrival; A&Ox4; 6L BNC with SpO2 97% at rest; c/o 7/10 pain in L abdominal incision. Pt reports Mod I with all BADLs including fxl ambulation at baseline. Today, Pt donned B shoes while seated EOB with Max A. Pt performed all sit<>stand transfers to/from bed and BSC with Min A x2. Pt ambulated short distance at bedside while reaching for objects to steady self with Min A x2 and verbal cues for safety awareness. Pt demos LUE AROM and strength deficits which are present at baseline due to h/o CVA; RUE strength WNL. SpO2 remained WNL while Pt remained on 6L however, Pt did experience some SOB with activity. Skilled OT intervention indicated in order to address deficits in fxl mobility, fxl activity tolerance, balance, strength, and use of adaptive techniques/equipment during performance of BADLs. Recommend sub acute rehab at discharge.                      History & Physical        Abrahan Haddad PA-C at 01/03/25 2124       Attestation signed by Nathan Cortes MD at 01/03/25 9275    I have reviewed this documentation and agree.  Patient heading to the OR.  I agree with the APC notes and plan as noted above.  Very interesting imaging.  Appreciate vascular surgery assistance.  Admit to ICU for further care until patient more stable.  Plan for OR.  Nathan Cortes MD, [facility] Intensivist Services  HISTORY AND PHYSICAL    Date of Admission: 1/3/2025  Primary Care Physician: Eliseo Smith MD    Subjective   Primary Historian: Patient    Chief Complaint: Abdominal pain, left flank pain, back pain    History of Present Illness  69-year-old male with a past medical history of hypertension and previous stroke in January 2016 with residual left-sided weakness and abnormal sensation admitted after presenting to ED with  complaints of abdominal pain, left flank pain, and back pain.  The patient is also a smoker, and he continues to smoke 2 packs/day.    Significant findings from ED include glucose elevated 194, but otherwise normal CMP.  CBC was completely normal.  UA was positive for glucose, but was otherwise normal.  CT scan of the abdomen and pelvis showed a fusiform aneurysm of the distal abdominal aorta.  There is partial lumen circumferential mural thrombus Boces of the aneurysm.  There is evidence of hematoma/hemorrhage at the posterior wall of the aneurysm.  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  Patient was noted to be hypertensive in the emergency department.    Patient is being admitted to the CCU for nicardipine infusion for blood pressure control and for close monitoring.  I saw the patient while he was still in the emergency department.  He states the pain he was having in his abdomen and back are improved.  He reports that he has had an abnormal sensation to the left side of his body since his stroke from 9 years ago.  However, he noticed very intense pain in his abdomen and back earlier this morning, which brought him to the emergency department.  He has no other complaints for me at this time.    Review of Systems   Otherwise complete ROS reviewed and negative except as mentioned in the HPI.    Past Medical History:   Past Medical History:   Diagnosis Date    Hypertension     Stroke      Past Surgical History:  Past Surgical History:   Procedure Laterality Date    CAROTID ENDARTERECTOMY      x 2     Social History:  reports that he has been smoking cigarettes. He does not have any smokeless tobacco history on file. He reports that he does not currently use alcohol. He reports that he does not currently use drugs.    Family History: family history is not on file.       Allergies:  No Known  Allergies    Medications:  Prior to Admission medications    Medication Sig Start Date End Date Taking? Authorizing Provider   amLODIPine (NORVASC) 10 MG tablet Take 1 tablet by mouth Daily.    Raulito Gleason MD   aspirin 325 MG EC tablet Take 1 tablet by mouth Every 6 (Six) Hours As Needed for Mild Pain.    Raulito Gleason MD   atorvastatin (LIPITOR) 10 MG tablet Take 1 tablet by mouth Daily.    Raulito Gleason MD   cloNIDine (CATAPRES) 0.2 MG tablet Take 1 tablet by mouth 2 (Two) Times a Day.    Raulito Gleason MD   hydrALAZINE (APRESOLINE) 25 MG tablet Take 1 tablet by mouth 3 (Three) Times a Day.    Raulito Gleason MD   losartan (COZAAR) 25 MG tablet Take 1 tablet by mouth Daily.    Raulito Gleason MD   metoprolol tartrate (LOPRESSOR) 25 MG tablet Take 1 tablet by mouth 2 (Two) Times a Day.    Raulito Gleason MD     I have utilized all available immediate resources to obtain, update, or review the patient's current medications (including all prescriptions, over-the-counter products, herbals, cannabis/cannabidiol products, and vitamin/mineral/dietary (nutritional) supplements).    Objective     Vital Signs: /95   Pulse 69   Temp 97.5 °F (36.4 °C) (Oral)   Resp 18   SpO2 91%   Physical Exam  Constitutional:       General: He is not in acute distress.     Appearance: He is not toxic-appearing or diaphoretic.   HENT:      Head: Normocephalic and atraumatic.      Mouth/Throat:      Mouth: Mucous membranes are moist.   Eyes:      Extraocular Movements: Extraocular movements intact.   Cardiovascular:      Rate and Rhythm: Normal rate and regular rhythm.      Comments: DP and PT pulses RLE 2+, DP and PT pulses LLE 1+.   Pulmonary:      Effort: Pulmonary effort is normal.      Breath sounds: Normal breath sounds.   Abdominal:      General: Bowel sounds are normal. There is distension.      Tenderness: There is no abdominal tenderness.   Skin:     General: Skin is warm.       Capillary Refill: Capillary refill takes less than 2 seconds.   Neurological:      Mental Status: He is alert and oriented to person, place, and time. Mental status is at baseline.      Comments: LUE and LLE weakness present. Patient states this is chronic since his stroke from January 2016.         Results Reviewed:  Lab Results (last 24 hours)       Procedure Component Value Units Date/Time    [place] Urine - Urine, Clean Catch [[id number]] Collected: 01/03/25 0351    Specimen: Urine, Clean Catch Updated: 01/03/25 0400     Extra Tube Hold for add-ons.     Comment: Auto resulted.       Urinalysis With Culture If Indicated - Urine, Clean Catch [[id number]]  (Abnormal) Collected: 01/03/25 0351    Specimen: Urine, Clean Catch Updated: 01/03/25 0359     Color, UA Yellow     Appearance, UA Clear     pH, UA 6.0     Specific Gravity, UA 1.019     Glucose,  mg/dL (1+)     Ketones, UA Negative     Bilirubin, UA Negative     Blood, UA Negative     Protein, UA Negative     Leuk Esterase, UA Negative     Nitrite, UA Negative     Urobilinogen, UA 0.2 E.U./dL    Narrative:      In absence of clinical symptoms, the presence of pyuria, bacteria, and/or nitrites on the urinalysis result does not correlate with infection.  Urine microscopic not indicated.    Comprehensive Metabolic Panel [[id number]]  (Abnormal) Collected: 01/03/25 0309    Specimen: Blood Updated: 01/03/25 0343     Glucose 184 mg/dL      BUN 13 mg/dL      Creatinine 0.92 mg/dL      Sodium 139 mmol/L      Potassium 3.9 mmol/L      Comment: Slight hemolysis detected by analyzer. Result may be falsely elevated.        Chloride 101 mmol/L      CO2 28.0 mmol/L      Calcium 9.1 mg/dL      Total Protein 7.1 g/dL      Albumin 4.1 g/dL      ALT (SGPT) 14 U/L      AST (SGOT) 16 U/L      Alkaline Phosphatase 106 U/L      Total Bilirubin 0.3 mg/dL      Globulin 3.0 gm/dL      A/G Ratio 1.4 g/dL      BUN/Creatinine Ratio 14.1     Anion Gap 10.0 mmol/L      eGFR 88.9  mL/min/1.73     Narrative:      GFR Categories in Chronic Kidney Disease (CKD)      GFR Category          GFR (mL/min/1.73)    Interpretation  G1                     90 or greater         Normal or high (1)  G2                      60-89                Mild decrease (1)  G3a                   45-59                Mild to moderate decrease  G3b                   30-44                Moderate to severe decrease  G4                    15-29                Severe decrease  G5                    14 or less           Kidney failure          (1)In the absence of evidence of kidney disease, neither GFR category G1 or G2 fulfill the criteria for CKD.    eGFR calculation 2021 CKD-EPI creatinine equation, which does not include race as a factor    CBC & Differential [[id number]]  (Abnormal) Collected: 01/03/25 0309    Specimen: Blood Updated: 01/03/25 0340    Narrative:      The following orders were created for panel order CBC & Differential.  Procedure                               Abnormality         Status                     ---------                               -----------         ------                     CBC Auto Differential[[id number]]        Abnormal            Final result                 Please view results for these tests on the individual orders.    CBC Auto Differential [[id number]]  (Abnormal) Collected: 01/03/25 0309    Specimen: Blood Updated: 01/03/25 0340     WBC 9.50 10*3/mm3      RBC 4.90 10*6/mm3      Hemoglobin 14.7 g/dL      Hematocrit 44.8 %      MCV 91.4 fL      MCH 30.0 pg      MCHC 32.8 g/dL      RDW 13.8 %      RDW-SD 46.6 fl      MPV 9.0 fL      Platelets 261 10*3/mm3      Neutrophil % 72.9 %      Lymphocyte % 18.1 %      Monocyte % 6.8 %      Eosinophil % 1.4 %      Basophil % 0.5 %      Immature Grans % 0.3 %      Neutrophils, Absolute 6.92 10*3/mm3      Lymphocytes, Absolute 1.72 10*3/mm3      Monocytes, Absolute 0.65 10*3/mm3      Eosinophils, Absolute 0.13 10*3/mm3      Basophils, Absolute  0.05 10*3/mm3      Immature Grans, Absolute 0.03 10*3/mm3      nRBC 0.0 /100 WBC     [place] Draw [[id number]] Collected: 01/03/25 0309    Specimen: Blood Updated: 01/03/25 0315    Narrative:      The following orders were created for panel order [place] Draw.  Procedure                               Abnormality         Status                     ---------                               -----------         ------                     Green Top (Gel)[[id number]]                                  Final result               Lavender Top[[id number]]                                     Final result               Red Top[[id number]]                                          Final result               Carbone Top[[id number]]                                         Final result               Light Blue Top[[id number]]                                   Final result                 Please view results for these tests on the individual orders.    Green Top (Gel) [[id number]] Collected: 01/03/25 0309    Specimen: Blood Updated: 01/03/25 0315     Extra Tube Hold for add-ons.     Comment: Auto resulted.       Lavender Top [[id number]] Collected: 01/03/25 0309    Specimen: Blood Updated: 01/03/25 0315     Extra Tube hold for add-on     Comment: Auto resulted       Red Top [[id number]] Collected: 01/03/25 0309    Specimen: Blood Updated: 01/03/25 0315     Extra Tube Hold for add-ons.     Comment: Auto resulted.       Gray Top [[id number]] Collected: 01/03/25 0309    Specimen: Blood Updated: 01/03/25 0315     Extra Tube Hold for add-ons.     Comment: Auto resulted.       Light Blue Top [[id number]] Collected: 01/03/25 0309    Specimen: Blood Updated: 01/03/25 0315     Extra Tube Hold for add-ons.     Comment: Auto resulted                CT Abdomen Pelvis With & Without Contrast    Result Date: 1/3/2025  1. Fusiform aneurysm of the distal abdominal aorta as detailed above. There is partial lumen circumferential mural thrombosis of the aneurysm. There  is evidence of hematoma/hemorrhage at the posterior wall of the aneurysm as detailed above. Further evaluation with CT angiography of the abdomen may be obtained. 2. A saccular aneurysm of the mid abdominal aorta adjacent and below the level of the right renal arteries and posterior to the IVC. Details given above. 3. No other acute findings in the abdomen or pelvis to account for patient's symptoms. No renal or ureteral calculi or obstructive uropathy. 4. No gallstones. No finding to suggest appendicitis.       This report was signed and finalized on 1/3/2025 5:58 AM by Dr. Andra Scott MD.       Result Review:  I have personally reviewed the results from the time of this admission to 1/3/2025 09:44 CST and agree with these findings:  [x]  Laboratory list / accordion  []  Microbiology  [x]  Radiology  []  EKG/Telemetry   []  Cardiology/Vascular   []  Pathology  []  Old records  []  Other:  Most notable findings include: CT scan of the abdomen and pelvis showed a fusiform aneurysm of the distal abdominal aorta.  There is partial lumen circumferential mural thrombus Boces of the aneurysm.  There is evidence of hematoma/hemorrhage at the posterior wall of the aneurysm.  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      I have personally reviewed and interpreted the radiology studies and ECG obtained at time of admission.     Assessment / Plan   Assessment:   Active Hospital Problems    Diagnosis     AAA (abdominal aortic aneurysm)    69-year-old male with a past medical history of previous stroke, tobacco use, and hypertension admitted after presenting to the ED with complaints of abdominal pain and back pain.  He was found to have a AAA with evidence of hematoma/hemorrhage at the posterior wall of the aneurysm.  He is admitted to the CCU for close monitoring and will be started on a Cardene infusion to keep  systolic blood pressure <120.  Vascular surgery has been consulted.  Patient may potentially go to the OR this afternoon.    Treatment Plan  The patient will be admitted to Intensivist service here at [facility].     AAA  -With evidence of hematoma/hemorrhage at the posterior wall of the aneurysm  -Starting patient on Cardene infusion now with goal SBP < 120  -Renal function is currently normal; however, given area of aneurysm, we will monitor UOP and renal function closely   -Serial pulse checks to BLE   -Vital signs per CCU protocol  -Pt to be kept NPO for possible surgery  -Further recommendations per vascular surgery  -Vascular surgery following, we appreciate their recommendations    Hypertension  -Starting cardene infusion to keep SBP <120 in setting of AAA as mentioned above  -Patient takes amlodipine, clonidine, hydralazine, losartan, and metoprolol. These medications have been ordered to start tomorrow as patient is currently NPO as he may be going to OR today.   -VS per CCU protocol    Tobacco abuse  -Patient smokes 2 PPD  -Nicotine patch ordered, but patient does not want one at this time.     4. History of Stroke  -Patient had stroke in Jan 2016 with left-sided residual deficits as mentioned above    Medical Decision Making  Number and Complexity of problems: 4  Differential Diagnosis: AAA, rupture of abdominal aortic aneurysm, renal colic, pyelonephritis, splenic injury, appendicitis    Conditions and Status        Condition is unchanged as patient just arrived from ED.     [facility] Data  External documents reviewed: N/A  Cardiac tracing (EKG, telemetry) interpretation: Sinus rhythm, rate controlled at time my exam  Radiology interpretation: Yes, see above  Labs reviewed: Yes, see above  Any tests that were considered but not ordered: N/A     Decision rules/scores evaluated (example [id number], [place], etc): N/A     Discussed with: Attending, patient, patient's ex-wife, nurse     Care  Planning  Shared decision making: Attending  Code status and discussions: Full    Disposition  Social Determinants of Health that impact treatment or disposition: N/A  Estimated length of stay is 3-5 days.     I confirmed that the patient's advanced care plan is present, code status is documented, and a surrogate decision maker is listed in the patient's medical record.     The patient's surrogate decision maker is his son, Alexis.     The patient was seen and examined by me on 1/3/2025.    I provided 35 minutes of total critical care time. Due to the high probability of clinically significant, life-threatening deterioration, the patient required my direct and personal management. The critical care time does not include time spent on separately billable procedures.    Electronically signed by Abrahan Haddad PA-C on 1/3/2025 at 09:44 CST              Electronically signed by Nathan Cortes MD at 01/03/25 1357          Emergency Department Notes        Ida Viveros, RN at 01/03/25 1018          Nursing report ED to floor  Brijesh Grande  69 y.o.  male    HPI:   Chief Complaint   Patient presents with    Abdominal Pain       Admitting doctor:   Nathan Cortes MD    Consulting provider(s):  Consults       No orders found from 12/5/2024 to 1/4/2025.             Admitting diagnosis:   The primary encounter diagnosis was Abdominal pain, unspecified abdominal location. Diagnoses of Abdominal aortic aneurysm (AAA) without rupture, unspecified part and Primary hypertension were also pertinent to this visit.    Code status:   Current Code Status       Date Active Code Status Order ID Comments User Context       1/3/2025 0946 CPR (Attempt to Resuscitate) [id number]  Abrahan Haddad PA-C ED        Question Answer    Code Status (Patient has no pulse and is not breathing) CPR (Attempt to Resuscitate)    Medical Interventions (Patient has pulse or is breathing) Full Support                    Allergies:   Patient has no known  allergies.    Intake and Output  No intake or output data in the 24 hours ending 01/03/25 1018    Weight:   There were no vitals filed for this visit.    Most recent vitals:   Vitals:    01/03/25 0846 01/03/25 0901 01/03/25 1001 01/03/25 1013   BP: 141/91 158/95 164/99 173/98   Pulse: 63 69 69 65   Resp:       Temp:       TempSrc:       SpO2: 92% 91% 90% 93%     Oxygen Therapy: .    Active LDAs/IV Access:   Lines, Drains & Airways       Active LDAs       Name Placement date Placement time Site Days    Peripheral IV 01/03/25 0314 Right Antecubital 01/03/25  0314  Antecubital  less than 1    Peripheral IV 01/03/25 1011 Posterior;Right Forearm 01/03/25  1011  Forearm  less than 1                    Labs (abnormal labs have a star):   Labs Reviewed   COMPREHENSIVE METABOLIC PANEL - Abnormal; Notable for the following components:       Result Value    Glucose 184 (*)     All other components within normal limits    Narrative:     GFR Categories in Chronic Kidney Disease (CKD)      GFR Category          GFR (mL/min/1.73)    Interpretation  G1                     90 or greater         Normal or high (1)  G2                      60-89                Mild decrease (1)  G3a                   45-59                Mild to moderate decrease  G3b                   30-44                Moderate to severe decrease  G4                    15-29                Severe decrease  G5                    14 or less           Kidney failure          (1)In the absence of evidence of kidney disease, neither GFR category G1 or G2 fulfill the criteria for CKD.    eGFR calculation 2021 CKD-EPI creatinine equation, which does not include race as a factor   URINALYSIS W/ CULTURE IF INDICATED - Abnormal; Notable for the following components:    Glucose,  mg/dL (1+) (*)     All other components within normal limits    Narrative:     In absence of clinical symptoms, the presence of pyuria, bacteria, and/or nitrites on the urinalysis result does  not correlate with infection.  Urine microscopic not indicated.   CBC WITH AUTO DIFFERENTIAL - Abnormal; Notable for the following components:    Lymphocyte % 18.1 (*)     All other components within normal limits   RAINBOW DRAW    Narrative:     The following orders were created for panel order [place] Draw.  Procedure                               Abnormality         Status                     ---------                               -----------         ------                     Green Top (Gel)[[id number]]                                  Final result               Lavender Top[[id number]]                                     Final result               Red Top[[id number]]                                          Final result               Carbone Top[[id number]]                                         Final result               Light Blue Top[[id number]]                                   Final result                 Please view results for these tests on the individual orders.   RAINBOW URINE   COMPREHENSIVE METABOLIC PANEL   GREEN TOP   LAVENDER TOP   RED TOP   GRAY TOP   LIGHT BLUE TOP   CBC AND DIFFERENTIAL    Narrative:     The following orders were created for panel order CBC & Differential.  Procedure                               Abnormality         Status                     ---------                               -----------         ------                     CBC Auto Differential[[id number]]        Abnormal            Final result                 Please view results for these tests on the individual orders.       Meds given in ED:   Medications   sodium chloride 0.9 % flush 10 mL (has no administration in time range)   nitroglycerin (NITROSTAT) SL tablet 0.4 mg (has no administration in time range)   sodium chloride 0.9 % flush 10 mL (has no administration in time range)   sodium chloride 0.9 % flush 10 mL (has no administration in time range)   sodium chloride 0.9 % infusion 40 mL (has no administration in time  range)   mupirocin (BACTROBAN) 2 % nasal ointment 1 Application (has no administration in time range)   Chlorhexidine Gluconate Cloth 2 % pads 1 Application (has no administration in time range)   Chlorhexidine Gluconate Cloth 2 % pads 1 Application (has no administration in time range)   sennosides-docusate (PERICOLACE) 8.6-50 MG per tablet 2 tablet (has no administration in time range)     And   polyethylene glycol (MIRALAX) packet 17 g (has no administration in time range)     And   bisacodyl (DULCOLAX) EC tablet 5 mg (has no administration in time range)     And   bisacodyl (DULCOLAX) suppository 10 mg (has no administration in time range)   niCARdipine (CARDENE) 20 mg in 200 mL NS infusion (has no administration in time range)   morphine injection 4 mg (4 mg Intravenous Given 1/3/25 9976)   sodium chloride 0.9 % bolus 1,000 mL (0 mL Intravenous Stopped 1/3/25 9559)   iopamidol (ISOVUE-300) 61 % injection 100 mL (100 mL Intravenous Given 1/3/25 1616)   Enalaprilat (VASOTEC) injection 1.25 mg (1.25 mg Intravenous Given 1/3/25 9526)     niCARdipine, 5-15 mg/hr         NIH Stroke Scale:       Isolation/Infection(s):  No active isolations   No active infections     COVID Testing  Collected .  Resulted .    Nursing report ED to floor:  Mental status: A&O x4.  Ambulatory status: up ad cathy.  Precautions: none.    ED nurse phone [phone]- [phone]..      Electronically signed by Ida Viveros RN at 01/03/25 1019       Aj Gamboa MD at 01/03/25 7618          Subjective   History of Present Illness  This is a 69 y.o M with hx of stroke, cigarette smokin,HTN, L sided residual deficits, negative for hx of kidney stone or abd surgeries, in the emergency room for concerns of L flank pain, and LLQ pain that started at 1am after he had a BM. Positive for resolved nausea. No diaphoresis, chest pain or shortness of breath. Negative for bloody stools, abd distention or urinary symptoms. Pain radiates to his L groin. Denies  swelling or concerns for hernia. Negative for previous similar pain in the past.         Review of Systems   Gastrointestinal:  Positive for abdominal pain.   All other systems reviewed and are negative.      Past Medical History:   Diagnosis Date    Hypertension     Stroke        No Known Allergies    Past Surgical History:   Procedure Laterality Date    CAROTID ENDARTERECTOMY      x 2       History reviewed. No pertinent family history.    Social History     Socioeconomic History    Marital status:    Tobacco Use    Smoking status: Every Day     Current packs/day: 1.00     Types: Cigarettes   Substance and Sexual Activity    Alcohol use: Not Currently    Drug use: Not Currently           Objective   Physical Exam  Constitutional:       Appearance: He is well-developed.   HENT:      Head: Normocephalic and atraumatic.   Cardiovascular:      Rate and Rhythm: Normal rate and regular rhythm.      Heart sounds: Normal heart sounds. No murmur heard.  Pulmonary:      Effort: No respiratory distress.      Breath sounds: Normal breath sounds. No stridor. No wheezing, rhonchi or rales.   Chest:      Chest wall: No tenderness.   Abdominal:      General: Bowel sounds are normal. There is no distension.      Comments: LLQ tenderness to palpation, L cva tenderness.    No guarding or rebound. No distention   Skin:     General: Skin is warm.      Capillary Refill: Capillary refill takes less than 2 seconds.   Neurological:      Mental Status: He is alert and oriented to person, place, and time.      Comments: L residual weakness   Psychiatric:         Mood and Affect: Mood normal.         Procedures          ED Course  ED Course as of 01/03/25 0953   Fri Jan 03, 2025   0328 [facility]kins Urine - Urine, Clean Catch [SG]   0806 Please refer to  report for the details vascular surgery was consulted on this patient with a possible aneurysm.  His blood pressure has been elevated in the ED.  Vascular surgery came and saw  the patient was the patient be admitted to medicine service but he will be going to the OR to repair this. [TS]   0810 Vascular surgery does not think that the patient requires emergent surgery but they will take to the OR later today to fix aneurysms.  The 1 medicine admit the patient.  The patient blood pressure is elevated his pain is under control.  Will give Vasotec in the ED and admitted to medicine service. [TS]   0951 Case were discussed with the medicine service they wanted the patient to be admitted to ICU Case discussed with ICU the patient will be admitted on a Cardene drip for blood pressure control. [TS]      ED Course User Index  [SG] Santosh Terrell MD  [TS] Aj Gamboa MD                                                       Medical Decision Making  69 y.o M stable, positive for LLQ, L flank pain s/p Bm. Pending evaluation with CT with and without contrast Abd pelvis. Pending UA, cbc, chemistry. Morphine ordered for pain and IV fluids. Concerns for kidney stone, vs colitis, vs diverticulitis, vs rule out perforation after BM    Blood work reassuring, cbc reassuring. Pending recommendations for concerns of aneurysm possibly bleeding by vascular surgery. Call out was made.     Spoke to vascular NP Joie, waiting for vascular attending recommendations.     Problems Addressed:  Abdominal aortic aneurysm (AAA) without rupture, unspecified part: complicated acute illness or injury  Abdominal pain, unspecified abdominal location: complicated acute illness or injury  Primary hypertension: complicated acute illness or injury    Amount and/or Complexity of Data Reviewed  Labs: ordered. Decision-making details documented in ED Course.     Details: As reviewed  Radiology: ordered.  Discussion of management or test interpretation with external provider(s): Discussed with vascular surgery Dr. Finney and with the hospitalist RAMIREZ    Risk  Prescription drug management.        Final diagnoses:   Abdominal pain,  unspecified abdominal location   Abdominal aortic aneurysm (AAA) without rupture, unspecified part   Primary hypertension       ED Disposition  ED Disposition       ED Disposition   Decision to Admit    Condition   --    Comment   Level of Care: Critical Care [6]   Diagnosis: Abdominal aortic aneurysm [336935]   Admitting Physician: BEATRICE VERDE [[id number]]   Attending Physician: BEATRICE VERDE [[id number]]   Certification: I Certify That Inpatient Hospital Services Are Medically Necessary For Greater Than 2 Midnights                 No follow-up provider specified.       Medication List      No changes were made to your prescriptions during this visit.            Aj Gamboa MD  01/03/25 0813       Aj Gamboa MD  01/03/25 0953      Electronically signed by Aj Gamboa MD at 01/03/25 0953            Operative/Procedure Notes (last 4 days)        Timmy Finney MD at 01/03/25 1319          ABDOMINAL AORTIC ANEURYSM REPAIR WITH ENDOGRAFT  Progress Note    Brijesh Grande  1/3/2025    Pre-op Diagnosis:   Abdominal pain, unspecified abdominal location [R10.9]       Post-Op Diagnosis Codes:     * Abdominal pain, unspecified abdominal location [R10.9]    Procedure/CPT® Codes:  Percutaneous access and closure of right common femoral artery  Placement of aortobiiliac endograft rupture  Concurrent placement of the extension stent down into left external iliac artery  Attempted left internal iliac artery coil embolization  Left common femoral artery cutdown with primary repair      Procedure(s):  ABDOMINAL AORTIC ANEURYSM REPAIR WITH ENDOGRAFT              Surgeon(s):  Eduardo Whiteside DO Sapp, Alexander, MD    Anesthesia: General    Staff:   Circulator: Zara Balbuena RN  Scrub Person: Ryan Walton  Assistant: Sury Cisneros  Vascular Radiology Technician: Radha Robb  Assistant: Sury Cisneros      Estimated Blood Loss: 100ml    Urine Voided: 1000 mL    Specimens:                None          Drains:    Urethral Catheter Silicone 16 Fr. (Active)           Complications: Rupture left external iliac artery, left lower extremity ischemia    Assistant: Sury Cisneros  was responsible for performing the following activities: Retraction, Suction, Irrigation, and Suturing and their skilled assistance was necessary for the success of this case.    Timmy Finney MD     Date: 1/3/2025  Time: 16:39 CST          Electronically signed by Timmy Finney MD at 01/03/25 1642       Timmy Finney MD at 01/03/25 1319          Brijesh MELENDEZ Christiane  1/3/2025     PREOPERATIVE DIAGNOSIS: Abdominal pain, unspecified abdominal location [R10.9]  Ruptured abdominal aortic aneurysm     POSTOPERATIVE DIAGNOSIS: Post-Op Diagnosis Codes:     * Abdominal pain, unspecified abdominal location [R10.9]  Ruptured abdominal aortic aneurysm     PROCEDURE PERFORMED:   Percutaneous access and closure of right common femoral artery  Ultrasound-guided access of left common femoral artery  Placement of aortobiiliac endograft for rupture  Concurrent placement of extension stent to left external iliac artery  Left external iliac artery stent placement  Exposure of left common femoral artery with primary repair  Attempted left internal iliac artery coil embolization      IMPLANTS:   Main body was a 28 x 16 Endurant stent graft placed via the right femoral artery  The left was extended down with a 16 x 16 and 16 x 10 Endurant limb  The right was extended down with a 16 x 20 indurate limb  A 9 x 39 Omnilink balloon expandable stent was placed into the left external iliac artery       SURGEON: Romero Finney MD    ASSISTANT: Eduardo Whiteside DO     ANESTHESIA: General.    PREPARATION: Routine.    STAFF: Circulator: Zara Balbuena RN  Scrub Person: Ryan Walton  Assistant: Sury Cisneros  Vascular Radiology Technician: Radha Robb    Estimated Blood Loss: 100ml    SPECIMENS: None    COMPLICATIONS: Ruptured left external iliac artery and Perclose failure  left common femoral artery    INDICATIONS: Brijesh Grande is a 69 y.o. male who presented with sudden onset left lower quadrant abdominal, flank and back pain.  A CT scan of the abdomen pelvis was obtained which showed a 5.7 cm infrarenal abdominal aortic aneurysm with concern for rupture on the posterior wall.  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  The patients vital signs were noted to be stable. Risks of abdominal aortic aneurysm repair include, but are not limited to, bleeding, infection, vessel rupture, MI, stroke, and damage to kidney or bowel.  Due to the left common iliac artery aneurysm also discussed the possibility of erectile dysfunction and pelvic ischemia with likely the need for cooling of the left internal iliac artery.  The indications, risks, and possible complications of the procedure were explained to the patient, who voiced understanding and wished to proceed with surgery.     PROCEDURE IN DETAIL:   The patient was taken to the operating room and placed on the operating table in a supine position. After general anesthesia was obtained, the abdomen and bilateral groins were prepped and draped in a sterile manner.  Under ultrasound guidance and using a micropuncture technique the right common femoral artery was cannulated and a micro-sheath was placed.  A small stab incision was made with 11 blade.  The Advantage Glidewire was advanced up into the aorta under fluoroscopic guidance.  The 7 French sheath was placed for predilatation.  The first Perclose device was placed and deployed at the 2 o'clock position.  The second one was placed and deployed at the 10 o'clock position.  The 11 French sheath was placed.  Patient was systemically heparinized and additional boluses were given as needed throughout the procedure.  The same procedure was performed in the left groin.  Under ultrasound guidance and using a micropuncture technique the left common femoral artery was cannulated and  a micro-sheath was placed.  A small stab incision was made with 11 blade.  The Advantage Glidewire was advanced into the aorta under fluoroscopic guidance.  The 7 Kyrgyz sheath was placed for predilatation.  The first Perclose device was placed and deployed at the 2 o'clock position.  The second was placed and deployed at the 10 o'clock position.  The 11 Kyrgyz sheath was placed.  A limited pelvic arteriogram was obtained to the left common femoral sheath which showed the left internal iliac artery was patent however was severely diseased.  This was questionable on the preoperative CT scan.  Due to concern for possible pelvic ischemia due to the extensive collateralization seen this was repeated on the right side.  On the right the right internal iliac artery was more widely patent and there was good collateralization going to the left prompting us to proceed with left internal iliac artery embolization.  We attempted to cannulate the left internal iliac artery with a series of wires and catheters were ultimately unsuccessful.  We then advanced a 6.5  sheath through the right common femoral sheath to aid in cannulation of the internal iliac artery.  Despite multiple arteriograms and the use of multiple catheters and wires were unable to do so from the right common femoral sheath.  We then attempted to cannulate it from the left common femoral sheath with the  as well as multiple wires and catheters.  Ultimately we were unable to cannulate left internal iliac artery likely due to his advanced disease.  Considering the disease state and stenosis of the left internal iliac artery we then elected to continue with the EVAR with attempts to leave the wire behind for coiling of the left common iliac artery and left internal iliac artery origin.  Both flush catheters were advanced into the aorta and an aortoiliac angiogram was performed.  The appropriate measurements were taken.  The [employer] NanoToolsuranCoupmon  modular bifurcated device with 2 docking limbs measuring 28 cm was placed from the right side up to the level of the renal arteries.  A magged up view of the renal arteries was performed.  The graft was then successfully deployed.  The suprarenal stent was successfully deployed.  Next, the flush catheter was captured from the left side and the gate was successful cannulated.  The catheter was spun to ensure that we were in true lumen.  We then upsized the left common femoral sheath to a 16 Nepali sheath to allow for the vasile wire behind the graft for cooling of the left common iliac and internal iliac artery if needed.  With the C-arm at 20° POP a retrograde angiogram was performed from the left side.  The hypogastric was marked on the screen.   During this process we discovered that the left external iliac artery was ruptured distally this was marked as well.  The dilator was placed back onto the sheath and the sheath was advanced more proximally where it was occlusive to control the bleeding.  The contralateral limbs were successfully placed and deployed to exclude the left common iliac artery aneurysm and the rupture left external iliac artery.  This was done with the 014 wire placed around the stent into the aneurysm sac.  Further arteriogram showed exclusion of the ruptured area no further bleeding.  The rest of the main body device was successfully deployed.  The delivery device was captured and a 16 Nepali sheath was placed.  With the C-arm at 20° Austrian a retrograde angiogram was performed from the right side.  Hypogastric was marked on the screen.  The ipsilateral extension limb was placed successfully.  The right balloon was then used to angioplasty the main body in the right limb.  We then attempted to advance the catheter over the 014 wire into the aneurysm sac for coil embolization left internal iliac artery.  However we were ultimately unsuccessful as this would not advance beyond the distal edge of the  stent.  UF catheter was then advanced over the wire from the right side and an angiogram was performed down through the graft and through the iliac arteries.    There was no evidence of type I, type II, type III, or type IV endoleaks.  Due to no evidence of endoleak from the left internal iliac artery we then elected to abandon the left internal iliac artery embolization.  The 014 wire was then removed.  The Reliant balloon was then used for balloon angioplasty through the left limb.  Due to the severe tortuosity and stenosis of the left external iliac artery we were concern for kinking of the limb and possible eventual occlusion.  The wire was withdrawn until the floppy end was in the tortuous and stenosed portion.  An angiogram was then shot through the left common femoral sheath showing stenosis in mild remaining tortuosity.  Due to the stenosis and previous tortuosity a 9 x 39 Omnilink balloon expandable stent was placed over this portion.  Completion angiogram was performed which showed rapid flow down through the graft and out through the iliac arteries without any evidence of stenosis or occlusion.    There was no evidence of type I, type II, type III, or type IV endoleaks.  At this point I felt no further intervention was warranted.  The sheaths were removed.  The Perclose devices were used to seal down the artery.  Direct pressure was held for an additional 10 minutes of ensure hemostasis.  5 mL of 0.5% Marcaine plain was used to infiltrate each groin for local anesthesia.  Pedal pulses were then checked with a Doppler.  Signals on the right were unchanged from preop, however the left had very weak signals and very sluggish refill.  We then evaluated the left common femoral with a ultrasound and noted what was appear to be a diminished pulse distally.  At this point we elected for open exploration of the left common femoral artery.  A transverse skin incision was then made over the left common femoral  artery this was carried down through the skin and subcutaneous tissues.  We then entered the femoral sheath sharply and dissected the proximal common femoral artery free from the surrounding structures.  We then continued to dissect down distally where the proximal SFA and profunda were dissected free from surrounding structures.  A Silastic vessel loop was then used to encircle the proximal SFA, profunda, and proximal right common femoral artery.  The proximal common femoral artery was then clamped and tension was applied to the Silastic Vesseloops for distal control.  The Perclose's which appeared to be well-placed were then cut and removed.  The defect in the left common femoral artery was then examined and there was no evidence of occlusion or dissection from the Perclose.  A 5-0 Prolene was then used to close the defect in the typical fashion.  Prior to completion of the repair of the arteriotomy there was flushed and de-aired.  The distal and proximal clamps were then removed, and there was noted to be a good pulse proximal and distal to the area of repair.  At this point we discussed a possible arteriogram, however we were unsure of the patient's baseline disease.  We also elected not to do this due to the amount of contrast we had already used on top of the CT scan he had received previously in the day.  The left foot was then reexamined and there was improved cap refill with still poor signals.  At this point we were concerned that this could be spasm due to occlusion of the left external iliac artery with the sheaths in place.  Wound was then irrigated.  The skin on the right side was then reapproximated using a 4-0 Monocryl in a subcuticular fashion.  A multilayer closure was then performed in the left groin in the typical fashion.  Sterile dressings were applied. The patient tolerated the procedure well. Sponge and needle counts were correct. The patient was then awakened and extubated in the operating  "room and taken to the recovery room in good condition.    Dr. Whiteside was present throughout the procedure and assisted with access, internal iliac artery cannulation, gate cannulation, exposure, and closure.  Timmy Finney MD  Date: 1/3/2025 Time: 18:05 CST      Electronically signed by Timmy Finney MD at 01/03/25 1847       Edgard Daigle APRN at 01/03/25 1925        Procedure Orders    1. Insert Central Line At Bedside [[id number]] ordered by Edgard Daigle APRN               Post-procedure Diagnoses    1. Aneurysm of infrarenal abdominal aorta, unspecified whether ruptured [I71.43]                 Insert Central Line At Bedside    Date/Time: 1/3/2025 9:34 PM    Performed by: Edgard Daigle APRN  Authorized by: Edgard Daigle APRN  Consent: Written consent obtained.  Risks and benefits: risks, benefits and alternatives were discussed  Consent given by: power of  and spouse  Patient understanding: patient states understanding of the procedure being performed  Patient consent: the patient's understanding of the procedure matches consent given  Procedure consent: procedure consent matches procedure scheduled  Relevant documents: relevant documents present and verified  Test results: test results available and properly labeled  Site marked: the operative site was marked  Imaging studies: imaging studies available  Patient identity confirmed: arm band and hospital-assigned identification number  Time out: Immediately prior to procedure a \"time out\" was called to verify the correct patient, procedure, equipment, support staff and site/side marked as required.  Indications: vascular access    Anesthesia:  Local Anesthetic: lidocaine 2% without epinephrine  Anesthetic total: 3 mL    Sedation:  Patient sedated: yes  Sedatives: propofol  Vitals: Vital signs were monitored during sedation.    Preparation: skin prepped with 2% chlorhexidine  Skin prep agent dried: skin prep agent completely dried prior " to procedure  Sterile barriers: all five maximum sterile barriers used - cap, mask, sterile gown, sterile gloves, and large sterile sheet  Hand hygiene: hand hygiene performed prior to central venous catheter insertion  Location details: right internal jugular  Patient position: flat  Catheter type: triple lumen  Catheter size: 7 Fr  Pre-procedure: landmarks identified  Ultrasound guidance: yes  Sterile ultrasound techniques: sterile gel and sterile probe covers were used  Number of attempts: 1  Successful placement: yes  Post-procedure: line sutured and dressing applied  Assessment: blood return through all ports, placement verified by x-ray and no pneumothorax on x-ray  Patient tolerance: patient tolerated the procedure well with no immediate complications          Electronically signed by Edgard Daigle APRN at 01/03/25 2136          Physician Progress Notes (last 4 days)        Timmy Finney MD at 01/06/25 0911             LOS: 3 days   Patient Care Team:  Eliseo Smith MD as PCP - General  Eliseo Smith MD as PCP - Family Medicine    Chief Complaint:  Post op day #3-EVAR    Subjective     Patient Complaints: Patient more comfortable this a.m. mild left groin pain as expected.  Passing flatus no nausea or vomiting reports hungry this a.m.  Denies any abdominal pain.  Denies any left lower extremity pain.  Objective     Vital Signs  Temp:  [97.9 °F (36.6 °C)-99.2 °F (37.3 °C)] 97.9 °F (36.6 °C)  Heart Rate:  [] 84  Resp:  [12-20] 16  BP: (103-175)/() 128/83    Physical Exam  Constitutional:       Appearance: Normal appearance. He is not ill-appearing or toxic-appearing.   HENT:      Head: Normocephalic and atraumatic.   Cardiovascular:      Rate and Rhythm: Normal rate and regular rhythm.      Pulses:           Dorsalis pedis pulses are detected w/ Doppler on the left side.        Posterior tibial pulses are 2+ on the right side and detected w/ Doppler on the left side.   Pulmonary:       Effort: Pulmonary effort is normal. No respiratory distress.   Abdominal:      Palpations: Abdomen is soft.      Tenderness: There is no abdominal tenderness. There is no guarding.      Comments: Mild distention resolved.   Musculoskeletal:         General: No swelling or tenderness.      Cervical back: Normal range of motion and neck supple.      Comments: Motor intact bilateral lower extremities   Skin:     General: Skin is warm and dry.      Comments: Left foot warm.  Appears well-perfused brisk cap refill.  Coloration improving.   Neurological:      Mental Status: He is alert. Mental status is at baseline.         Laboratory Data:   Results from last 7 days   Lab Units 01/06/25  0625 01/05/25  0602 01/04/25  1753   WBC 10*3/mm3 12.50* 13.65* 15.28*   HEMOGLOBIN g/dL 9.1* 9.8* 11.6*   HEMATOCRIT % 28.6* 30.7* 37.0*   PLATELETS 10*3/mm3 174 161 214       Results from last 7 days   Lab Units 01/06/25  0625 01/05/25  0602 01/04/25  0721 01/04/25  0600 01/03/25  1530 01/03/25  0309   SODIUM mmol/L 143 138 135* 135*   < > 139   SODIUM, ARTERIAL   --   --   --   --    < >  --    POTASSIUM mmol/L 4.4 4.4 4.6 4.5   < > 3.9   CHLORIDE mmol/L 109* 106 100 100   < > 101   CO2 mmol/L 25.0 24.0 25.0 24.0   < > 28.0   BUN mg/dL 17 16 13 13   < > 13   CREATININE mg/dL 0.69* 0.67* 0.87 0.92   < > 0.92   CALCIUM mg/dL 8.2* 8.2* 8.0* 8.2*   < > 9.1   BILIRUBIN mg/dL  --   --   --  0.3  --  0.3   ALK PHOS U/L  --   --   --  73  --  106   ALT (SGPT) U/L  --   --   --  10  --  14   AST (SGOT) U/L  --   --   --  29  --  16   GLUCOSE mg/dL 112* 145* 161* 164*   < > 184*    < > = values in this interval not displayed.     Results from last 7 days   Lab Units 01/06/25  0625 01/05/25  2326 01/05/25  1817 01/05/25  1307 01/05/25  0602 01/04/25  1152 01/04/25  0600 01/03/25  2331   PROTIME Seconds  --   --   --   --  15.1*  --  14.8 14.9*   INR   --   --   --   --  1.13*  --  1.10* 1.11*   APTT seconds 47.9* 85.2* 41.6*   < > 79.7*   <  > 82.3* 40.6*    < > = values in this interval not displayed.            Medication Review: Reviewed    Assessment & Plan       Abdominal aortic aneurysm    AAA (abdominal aortic aneurysm)    Abdominal pain    69-year-old male postop day 3 EVAR with extension to left external iliac for rupture.      Plan for disposition:  -Intensivist on board.  Appreciate their assistance.  -Attention resolved continue to monitor.  -Agitation and discomfort significantly improved.  Continue gabapentin.  -Okay to advance diet as tolerated.  -Patient eating well okay to DC fluids.  -DC heparin drip.  Transition to aspirin Plavix.  -PT OT encouraged.    Timmy Finney MD  Vascular Surgery  [phone]  01/06/25  09:12 CST      Electronically signed by Timmy Finney MD at 01/06/25 0915       Shaun Gallego MD at 01/06/25 0829              [facility] Intensivist Services  INPATIENT PROGRESS NOTE    Patient Name: Brijesh Grande  Date of Admission: 1/3/2025  Today's Date: 01/06/25  Length of Stay: 3  Primary Care Physician: Eliseo Smith MD    Subjective   Chief Complaint: Abdominal pain  Abdominal Pain       69-year-old male with a past medical history of hypertension and previous stroke in January 2016 with residual left-sided weakness and abnormal sensation admitted after presenting to ED with complaints of abdominal pain, left flank pain, and back pain.  The patient is also a smoker, and he continues to smoke 2 packs/day.     Significant findings from ED include glucose elevated 194, but otherwise normal CMP.  CBC was completely normal.  UA was positive for glucose, but was otherwise normal.  CT scan of the abdomen and pelvis showed a fusiform aneurysm of the distal abdominal aorta.  There is partial lumen circumferential mural thrombus Boces of the aneurysm.  There is evidence of hematoma/hemorrhage at the posterior wall of the aneurysm.  A saccular aneurysm of the mid abdominal aorta  adjacent and below the level of the right renal arteries and posterior to the IVC was also found.  Patient was noted to be hypertensive in the emergency department.     Patient is being admitted to the CCU for nicardipine infusion for blood pressure control and for close monitoring.  I saw the patient while he was still in the emergency department.  He states the pain he was having in his abdomen and back are improved.  He reports that he has had an abnormal sensation to the left side of his body since his stroke from 9 years ago.  However, he noticed very intense pain in his abdomen and back earlier this morning, which brought him to the emergency department.  He has no other complaints for me at this time      Interval history:  1/4/2025 patient is intubated sedated he is status post placement of aortobiiliac endograft for ruptured AAA and left external iliac artery stent.  Patient sedation was weaned down this morning and patient was very agitated not following commands and had to be resedated again.  Patient is being started on Precedex and fentanyl drip.  As per discussion with vascular surgery at bedside today patient had a similar episode after surgery yesterday he was very agitated and had to be reintubated and was found to have some vocal cord edema.    1/5/2025 patient was extubated yesterday he has done very well he got agitated overnight and had to be started on Precedex which dropped his blood pressure transiently requiring Levophed.  Patient remains on Precedex 0.2 he does complain of not able to use the bathroom and had some urine retention had to have an out overnight I do think this is causing his agitation.    1/6/2025 patient remains in the ICU he is not on any drips other than heparin drip.  Discussed with vascular surgery today we are discontinuing his heparin drip and starting antiplatelets.  Patient pain is under better control and he has been off Precedex since yesterday morning.  Pulses are  dopplerable.       All 12 point system review were unremarkab other than was mentioned history present illness le  Objective    Temp:  [97.9 °F (36.6 °C)-99.2 °F (37.3 °C)] 97.9 °F (36.6 °C)  Heart Rate:  [] 84  Resp:  [12-20] 16  BP: (103-178)/() 128/83  Physical Exam  Constitutional:       Appearance: He is not ill-appearing.   HENT:      Head: Normocephalic.      Mouth/Throat:      Mouth: Mucous membranes are moist.   Eyes:      Pupils: Pupils are equal, round, and reactive to light.   Cardiovascular:      Rate and Rhythm: Normal rate and regular rhythm.   Pulmonary:      Effort: No respiratory distress.      Breath sounds: No wheezing.   Abdominal:      General: Bowel sounds are normal. There is no distension.      Palpations: Abdomen is soft.      Tenderness: There is no abdominal tenderness.   Musculoskeletal:      Right lower leg: Edema present.      Left lower leg: Edema present.      Comments: Mild cyanosis of the left leg pulses are palpable   Skin:     General: Skin is warm.   Neurological:      Mental Status: He is oriented to person, place, and time.      Comments: Baseline left-sided weakness   Psychiatric:         Mood and Affect: Mood normal.             Results Review:  Lab Results (last 24 hours)       Procedure Component Value Units Date/Time    aPTT [[id number]]  (Abnormal) Collected: 01/06/25 0625    Specimen: Blood Updated: 01/06/25 0658     PTT 47.9 seconds     Magnesium [[id number]]  (Normal) Collected: 01/06/25 0625    Specimen: Blood Updated: 01/06/25 0655     Magnesium 2.2 mg/dL     Phosphorus [[id number]]  (Abnormal) Collected: 01/06/25 0625    Specimen: Blood Updated: 01/06/25 0655     Phosphorus 2.1 mg/dL     Basic Metabolic Panel [[id number]]  (Abnormal) Collected: 01/06/25 0625    Specimen: Blood Updated: 01/06/25 0655     Glucose 112 mg/dL      BUN 17 mg/dL      Creatinine 0.69 mg/dL      Sodium 143 mmol/L      Potassium 4.4 mmol/L      Chloride 109 mmol/L      CO2 25.0  mmol/L      Calcium 8.2 mg/dL      BUN/Creatinine Ratio 24.6     Anion Gap 9.0 mmol/L      eGFR 98.9 mL/min/1.73     Narrative:      GFR Categories in Chronic Kidney Disease (CKD)      GFR Category          GFR (mL/min/1.73)    Interpretation  G1                     90 or greater         Normal or high (1)  G2                      60-89                Mild decrease (1)  G3a                   45-59                Mild to moderate decrease  G3b                   30-44                Moderate to severe decrease  G4                    15-29                Severe decrease  G5                    14 or less           Kidney failure          (1)In the absence of evidence of kidney disease, neither GFR category G1 or G2 fulfill the criteria for CKD.    eGFR calculation 2021 CKD-EPI creatinine equation, which does not include race as a factor    CBC & Differential [[id number]]  (Abnormal) Collected: 01/06/25 0625    Specimen: Blood Updated: 01/06/25 0636    Narrative:      The following orders were created for panel order CBC & Differential.  Procedure                               Abnormality         Status                     ---------                               -----------         ------                     CBC Auto Differential[[id number]]        Abnormal            Final result                 Please view results for these tests on the individual orders.    CBC Auto Differential [[id number]]  (Abnormal) Collected: 01/06/25 0625    Specimen: Blood Updated: 01/06/25 0636     WBC 12.50 10*3/mm3      RBC 3.09 10*6/mm3      Hemoglobin 9.1 g/dL      Hematocrit 28.6 %      MCV 92.6 fL      MCH 29.4 pg      MCHC 31.8 g/dL      RDW 14.2 %      RDW-SD 48.0 fl      MPV 9.2 fL      Platelets 174 10*3/mm3      Neutrophil % 82.5 %      Lymphocyte % 8.3 %      Monocyte % 7.5 %      Eosinophil % 0.6 %      Basophil % 0.3 %      Immature Grans % 0.8 %      Neutrophils, Absolute 10.30 10*3/mm3      Lymphocytes, Absolute 1.04 10*3/mm3       "Monocytes, Absolute 0.94 10*3/mm3      Eosinophils, Absolute 0.08 10*3/mm3      Basophils, Absolute 0.04 10*3/mm3      Immature Grans, Absolute 0.10 10*3/mm3      nRBC 0.0 /100 WBC     aPTT [[id number]]  (Abnormal) Collected: 01/05/25 2326    Specimen: Blood Updated: 01/05/25 2348     PTT 85.2 seconds     aPTT [[id number]]  (Abnormal) Collected: 01/05/25 1817    Specimen: Blood Updated: 01/05/25 1853     PTT 41.6 seconds     CK [[id number]]  (Abnormal) Collected: 01/05/25 1307    Specimen: Blood Updated: 01/05/25 1352     Creatine Kinase 2,436 U/L     Lactate Dehydrogenase [[id number]]  (Abnormal) Collected: 01/05/25 1307    Specimen: Blood Updated: 01/05/25 1337      U/L     Lactic Acid, Plasma [[id number]]  (Normal) Collected: 01/05/25 1307    Specimen: Blood Updated: 01/05/25 1333     Lactate 1.8 mmol/L     aPTT [[id number]]  (Normal) Collected: 01/05/25 1307    Specimen: Blood Updated: 01/05/25 1328     PTT 31.5 seconds     Protime-INR [[id number]]  (Abnormal) Collected: 01/05/25 0602    Specimen: Blood Updated: 01/05/25 1310     Protime 15.1 Seconds      INR 1.13             No radiology results for the last day    Result Review:  I have personally reviewed the results from the time of this admission to 1/6/2025 08:29 CST and agree with these findings:  [x]  Laboratory list / accordion  []  Microbiology  [x]  Radiology  []  EKG/Telemetry   []  Cardiology/Vascular   []  Pathology  []  Old records  []  Other:  Most notable findings include:       Culture Data:   No results found for: \"BLOODCX\", \"URINECX\", \"WOUNDCX\", \"MRSACX\", \"RESPCX\", \"STOOLCX\"    I have reviewed the patient's current medications.     Assessment/Plan   Assessment  Active Hospital Problems    Diagnosis     **Abdominal aortic aneurysm     AAA (abdominal aortic aneurysm)     Abdominal pain    -Ruptured infrarenal abdominal aortic aneurysm status post aortobiiliac endograft left external iliac stent  -Acute hypoxemic respiratory failure requiring " mechanical ventilation  -Laryngeal edema  -Severe agitation resolved  -Suspected COPD  -Tobacco abuse  -ICU delirium resolved  -History of CVA with left-sided weakness      Plan:  -Wean down oxygen as tolerated  -Pain management with as needed Dilaudid  -Started gabapentin yesterday  -Patient is on Cozaar, hydralazine and Toprol for blood pressure control  - scheduled DuoNeb  -We will keep Le catheter for urine tension  -Discontinue heparin drip as per discussion with vascular surgery  -Start aspirin and Plavix as per vascular  -Diet  -Discussed with vascular surgery patient is stable to be transferred to the floor    Electronically signed by Shaun Gallego MD on 1/6/2025 at 08:29 CST    Electronically signed by Shaun Gallego MD at 01/06/25 0834       RegTimmy alejandro MD at 01/05/25 1250          Called to bedside for concern for ischemic left lower extremity.  Per report earlier in the day the patient had left lower extremity pain.  He was also mottled cold at that time and no pedal signals could be found.  At the time of my exam the patient's coloration of the foot was stable to improved from this a.m.  He had brisk cap refill.  He had a signal that was improved from this a.m. at the DP and PT which was loud and questionably multiphasic.  He had a palpable left common femoral pulse.  Mild mottling to the left thigh.  Multiphasic common femoral popliteal signal.   At the time my exam the patient complained only of left groin pain at the incision and stated that his left foot and leg did not hurt.  However history is limited by the previous stroke.  Per the family the patient has nearly constant left lower extremity pain at baseline secondary to the stroke.  Family states that the coloration of the foot is significantly improved from baseline, and the left thigh mottling is stable from baseline.  At this point the left foot does not appear ischemic and he has left pedal signals he has multiphasic signals all  the way down through the left pop.  Advised to start gabapentin 3 times daily.  Also advised to resume heparin drip.  Will make patient n.p.o. in case symptoms or changes occur.    Electronically signed by Timmy Finney MD at 01/05/25 1254       Shaun Gallego MD at 01/05/25 1141          Patient blood pressure is rising complaining of severe left leg pain.  Patient left leg looks more mottled and more cyanosed almost up to his left knee.  Foot feels cool rest of the leg is warm.  Pulses are not dopplerable   -Stat 20 mg IV hydralazine was given  -IV Dilaudid stat  - vascular surgery were contacted.  -I will send for CK LDH and lactic acid      Electronically signed by Shaun Gallego MD at 01/05/25 1142       Timmy Finney MD at 01/05/25 0968             LOS: 2 days   Patient Care Team:  Eliseo Smith MD as PCP - General  Eliseo Smith MD as PCP - Family Medicine    Chief Complaint:  Post op day #2-EVAR    Subjective     Patient Complaints: Patient extubated.  Patient having issues staying comfortable lying in bed.  Denies any abdominal pain.  States that his left lower extremity is at his baseline.  No nausea vomiting.  Positive flatus    Objective     Vital Signs  Temp:  [97.8 °F (36.6 °C)-100.2 °F (37.9 °C)] 97.9 °F (36.6 °C)  Heart Rate:  [] 85  Resp:  [13-23] 13  BP: ()/(51-91) 178/81  FiO2 (%):  [40 %] 40 %    Physical Exam  Constitutional:       Appearance: Normal appearance. He is not ill-appearing or toxic-appearing.   HENT:      Head: Normocephalic and atraumatic.   Cardiovascular:      Rate and Rhythm: Normal rate and regular rhythm.      Pulses:           Dorsalis pedis pulses are detected w/ Doppler on the left side.        Posterior tibial pulses are 2+ on the right side and detected w/ Doppler on the left side.   Pulmonary:      Effort: Pulmonary effort is normal. No respiratory distress.   Abdominal:      Palpations: Abdomen is soft.      Tenderness: There is no  abdominal tenderness. There is no guarding.      Comments: Mild distention.   Musculoskeletal:         General: No swelling or tenderness.      Cervical back: Normal range of motion and neck supple.   Skin:     General: Skin is warm and dry.      Comments: Left foot warm.  Appears well-perfused brisk cap refill.  Coloration improving.   Neurological:      Mental Status: He is alert. Mental status is at baseline.         Laboratory Data:   Results from last 7 days   Lab Units 01/05/25  0602 01/04/25 1753 01/04/25  0600   WBC 10*3/mm3 13.65* 15.28* 16.79*   HEMOGLOBIN g/dL 9.8* 11.6* 12.3*   HEMATOCRIT % 30.7* 37.0* 37.9   PLATELETS 10*3/mm3 161 214 270       Results from last 7 days   Lab Units 01/05/25  0602 01/04/25  0721 01/04/25  0600 01/03/25  1530 01/03/25  0309   SODIUM mmol/L 138 135* 135*   < > 139   SODIUM, ARTERIAL   --   --   --    < >  --    POTASSIUM mmol/L 4.4 4.6 4.5   < > 3.9   CHLORIDE mmol/L 106 100 100   < > 101   CO2 mmol/L 24.0 25.0 24.0   < > 28.0   BUN mg/dL 16 13 13   < > 13   CREATININE mg/dL 0.67* 0.87 0.92   < > 0.92   CALCIUM mg/dL 8.2* 8.0* 8.2*   < > 9.1   BILIRUBIN mg/dL  --   --  0.3  --  0.3   ALK PHOS U/L  --   --  73  --  106   ALT (SGPT) U/L  --   --  10  --  14   AST (SGOT) U/L  --   --  29  --  16   GLUCOSE mg/dL 145* 161* 164*   < > 184*    < > = values in this interval not displayed.     Results from last 7 days   Lab Units 01/05/25  0602 01/05/25  0020 01/04/25  1753 01/04/25  1152 01/04/25  0600 01/03/25  2331 01/03/25  1831   PROTIME Seconds  --   --   --   --  14.8 14.9* 15.7*   INR   --   --   --   --  1.10* 1.11* 1.19*   APTT seconds 79.7* 80.5* 89.4*   < > 82.3* 40.6* 56.1*    < > = values in this interval not displayed.            Medication Review: Reviewed    Assessment & Plan       Abdominal aortic aneurysm    AAA (abdominal aortic aneurysm)    Abdominal pain    69-year-old male postop day 2 EVAR with extension to left external iliac for rupture.      Plan for  disposition:  -Intensivist on board.  Appreciate their assistance.  -Hypotension overnight related to Precedex given for agitation.  Resolved this a.m. while off Precedex.  -Decreased urine output due to urinary retention.  Maintain IV fluids.  -Left lower extremity coloration improving.  Signals present this a.m.  Will DC heparin and start aspirin Plavix.  -Mild abdominal distention.  Passing flatus.  No nausea vomiting.  Okay for sips and chips.  -PT OT encouraged.    Timmy Finney MD  Vascular Surgery  [phone]  01/05/25  09:49 CST      Electronically signed by Timmy Finney MD at 01/05/25 0905       Shaun Gallego MD at 01/05/25 0930              [facility]  INPATIENT PROGRESS NOTE    Patient Name: Brijesh Grande  Date of Admission: 1/3/2025  Today's Date: 01/05/25  Length of Stay: 2  Primary Care Physician: Eliseo Smith MD    Subjective   Chief Complaint: Abdominal pain  Abdominal Pain       69-year-old male with a past medical history of hypertension and previous stroke in January 2016 with residual left-sided weakness and abnormal sensation admitted after presenting to ED with complaints of abdominal pain, left flank pain, and back pain.  The patient is also a smoker, and he continues to smoke 2 packs/day.     Significant findings from ED include glucose elevated 194, but otherwise normal CMP.  CBC was completely normal.  UA was positive for glucose, but was otherwise normal.  CT scan of the abdomen and pelvis showed a fusiform aneurysm of the distal abdominal aorta.  There is partial lumen circumferential mural thrombus Boces of the aneurysm.  There is evidence of hematoma/hemorrhage at the posterior wall of the aneurysm.  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  Patient was noted to be hypertensive in the emergency department.      Patient is being admitted to the CCU for nicardipine infusion for blood pressure control and for close monitoring.  I saw the patient while he was still in the emergency department.  He states the pain he was having in his abdomen and back are improved.  He reports that he has had an abnormal sensation to the left side of his body since his stroke from 9 years ago.  However, he noticed very intense pain in his abdomen and back earlier this morning, which brought him to the emergency department.  He has no other complaints for me at this time      Interval history:  1/4/2025 patient is intubated sedated he is status post placement of aortobiiliac endograft for ruptured AAA and left external iliac artery stent.  Patient sedation was weaned down this morning and patient was very agitated not following commands and had to be resedated again.  Patient is being started on Precedex and fentanyl drip.  As per discussion with vascular surgery at bedside today patient had a similar episode after surgery yesterday he was very agitated and had to be reintubated and was found to have some vocal cord edema.    1/5/2025 patient was extubated yesterday he has done very well he got agitated overnight and had to be started on Precedex which dropped his blood pressure transiently requiring Levophed.  Patient remains on Precedex 0.2 he does complain of not able to use the bathroom and had some urine retention had to have an out overnight I do think this is causing his agitation.       All 12 point system review were unremarkab other than was mentioned history present illness le  Objective    Temp:  [97.8 °F (36.6 °C)-100.2 °F (37.9 °C)] 97.9 °F (36.6 °C)  Heart Rate:  [] 84  Resp:  [13-23] 13  BP: ()/(51-91) 95/65  FiO2 (%):  [40 %] 40 %  Physical Exam  Constitutional:       Appearance: He is not ill-appearing.   HENT:      Head: Normocephalic.      Mouth/Throat:      Mouth: Mucous membranes are moist.   Eyes:       Pupils: Pupils are equal, round, and reactive to light.   Cardiovascular:      Rate and Rhythm: Normal rate and regular rhythm.   Pulmonary:      Effort: No respiratory distress.      Breath sounds: No wheezing.   Abdominal:      General: Bowel sounds are normal. There is no distension.      Palpations: Abdomen is soft.      Tenderness: There is no abdominal tenderness.   Musculoskeletal:      Right lower leg: Edema present.      Left lower leg: Edema present.      Comments: Mild cyanosis of the left leg pulses are palpable   Skin:     General: Skin is warm.   Neurological:      General: No focal deficit present.      Mental Status: He is oriented to person, place, and time.   Psychiatric:         Mood and Affect: Mood normal.             Results Review:  Lab Results (last 24 hours)       Procedure Component Value Units Date/Time    Phosphorus [[id number]]  (Abnormal) Collected: 01/05/25 0602    Specimen: Blood Updated: 01/05/25 0651     Phosphorus 2.2 mg/dL     Basic Metabolic Panel [[id number]]  (Abnormal) Collected: 01/05/25 0602    Specimen: Blood Updated: 01/05/25 0636     Glucose 145 mg/dL      BUN 16 mg/dL      Creatinine 0.67 mg/dL      Sodium 138 mmol/L      Potassium 4.4 mmol/L      Chloride 106 mmol/L      CO2 24.0 mmol/L      Calcium 8.2 mg/dL      BUN/Creatinine Ratio 23.9     Anion Gap 8.0 mmol/L      eGFR 99.8 mL/min/1.73     Narrative:      GFR Categories in Chronic Kidney Disease (CKD)      GFR Category          GFR (mL/min/1.73)    Interpretation  G1                     90 or greater         Normal or high (1)  G2                      60-89                Mild decrease (1)  G3a                   45-59                Mild to moderate decrease  G3b                   30-44                Moderate to severe decrease  G4                    15-29                Severe decrease  G5                    14 or less           Kidney failure          (1)In the absence of evidence of kidney disease, neither GFR  category G1 or G2 fulfill the criteria for CKD.    eGFR calculation 2021 CKD-EPI creatinine equation, which does not include race as a factor    Magnesium [[id number]]  (Normal) Collected: 01/05/25 0602    Specimen: Blood Updated: 01/05/25 0636     Magnesium 2.2 mg/dL     aPTT [[id number]]  (Abnormal) Collected: 01/05/25 0602    Specimen: Blood Updated: 01/05/25 0622     PTT 79.7 seconds     CBC & Differential [[id number]]  (Abnormal) Collected: 01/05/25 0602    Specimen: Blood Updated: 01/05/25 0612    Narrative:      The following orders were created for panel order CBC & Differential.  Procedure                               Abnormality         Status                     ---------                               -----------         ------                     CBC Auto Differential[[id number]]        Abnormal            Final result                 Please view results for these tests on the individual orders.    CBC Auto Differential [[id number]]  (Abnormal) Collected: 01/05/25 0602    Specimen: Blood Updated: 01/05/25 0612     WBC 13.65 10*3/mm3      RBC 3.33 10*6/mm3      Hemoglobin 9.8 g/dL      Hematocrit 30.7 %      MCV 92.2 fL      MCH 29.4 pg      MCHC 31.9 g/dL      RDW 13.8 %      RDW-SD 46.5 fl      MPV 9.2 fL      Platelets 161 10*3/mm3      Neutrophil % 85.5 %      Lymphocyte % 6.4 %      Monocyte % 7.5 %      Eosinophil % 0.0 %      Basophil % 0.1 %      Immature Grans % 0.5 %      Neutrophils, Absolute 11.67 10*3/mm3      Lymphocytes, Absolute 0.88 10*3/mm3      Monocytes, Absolute 1.02 10*3/mm3      Eosinophils, Absolute 0.00 10*3/mm3      Basophils, Absolute 0.01 10*3/mm3      Immature Grans, Absolute 0.07 10*3/mm3      nRBC 0.0 /100 WBC     aPTT [[id number]]  (Abnormal) Collected: 01/05/25 0020    Specimen: Blood Updated: 01/05/25 0037     PTT 80.5 seconds     aPTT [[id number]]  (Abnormal) Collected: 01/04/25 1753    Specimen: Blood Updated: 01/04/25 1824     PTT 89.4 seconds     CBC & Differential  [[id number]]  (Abnormal) Collected: 01/04/25 1753    Specimen: Blood Updated: 01/04/25 1807    Narrative:      The following orders were created for panel order CBC & Differential.  Procedure                               Abnormality         Status                     ---------                               -----------         ------                     CBC Auto Differential[[id number]]        Abnormal            Final result                 Please view results for these tests on the individual orders.    CBC Auto Differential [[id number]]  (Abnormal) Collected: 01/04/25 1753    Specimen: Blood Updated: 01/04/25 1807     WBC 15.28 10*3/mm3      RBC 3.97 10*6/mm3      Hemoglobin 11.6 g/dL      Hematocrit 37.0 %      MCV 93.2 fL      MCH 29.2 pg      MCHC 31.4 g/dL      RDW 14.1 %      RDW-SD 48.7 fl      MPV 9.1 fL      Platelets 214 10*3/mm3      Neutrophil % 82.1 %      Lymphocyte % 7.2 %      Monocyte % 9.8 %      Eosinophil % 0.1 %      Basophil % 0.3 %      Immature Grans % 0.5 %      Neutrophils, Absolute 12.54 10*3/mm3      Lymphocytes, Absolute 1.10 10*3/mm3      Monocytes, Absolute 1.50 10*3/mm3      Eosinophils, Absolute 0.02 10*3/mm3      Basophils, Absolute 0.04 10*3/mm3      Immature Grans, Absolute 0.08 10*3/mm3      nRBC 0.0 /100 WBC     Blood Gas, Arterial - [[id number]]  (Abnormal) Collected: 01/04/25 1532    Specimen: Arterial Blood Updated: 01/04/25 1536     Site Arterial Line     Migue's Test N/A     pH, Arterial 7.347 pH units      Comment: 84 Value below reference range        pCO2, Arterial 51.4 mm Hg      Comment: 83 Value above reference range        pO2, Arterial 75.8 mm Hg      Comment: 84 Value below reference range        HCO3, Arterial 28.2 mmol/L      Comment: 83 Value above reference range        Base Excess, Arterial 1.8 mmol/L      O2 Saturation, Arterial 95.2 %      Temperature 37.0     Barometric Pressure for Blood Gas 761 mmHg      Modality Ventilator     FIO2 40 %      Ventilator  "Mode SPONTANEOUS     PEEP 5.0     PSV 5.0 cmH2O      Collected by [id number]     Comment: Meter: [id number]     :  Perla Mccrary, RRT        pCO2, Temperature Corrected 51.4 mm Hg      pH, Temp Corrected 7.347 pH Units      pO2, Temperature Corrected 75.8 mm Hg      PO2/FIO2 190    aPTT [[id number]]  (Abnormal) Collected: 01/04/25 1152    Specimen: Blood Updated: 01/04/25 1216     PTT 68.0 seconds              XR Chest 1 View    Result Date: 1/3/2025  1. Right IJ central line placed and well-positioned without complication. 2. Endotracheal tube remains well-positioned. The lungs remain clear.  This report was signed and finalized on 1/3/2025 7:29 PM by Dr. Ryder Rolle MD.      XR Chest 1 View    Result Date: 1/3/2025  1.. Lungs clear. 2. Endotracheal tube well-positioned.  This report was signed and finalized on 1/3/2025 6:46 PM by Dr. Ryder Rolle MD.       Result Review:  I have personally reviewed the results from the time of this admission to 1/5/2025 09:30 CST and agree with these findings:  [x]  Laboratory list / accordion  []  Microbiology  [x]  Radiology  []  EKG/Telemetry   []  Cardiology/Vascular   []  Pathology  []  Old records  []  Other:  Most notable findings include:       Culture Data:   No results found for: \"BLOODCX\", \"URINECX\", \"WOUNDCX\", \"MRSACX\", \"RESPCX\", \"STOOLCX\"    I have reviewed the patient's current medications.     Assessment/Plan   Assessment  Active Hospital Problems    Diagnosis     **Abdominal aortic aneurysm     AAA (abdominal aortic aneurysm)     Abdominal pain    -Ruptured infrarenal abdominal aortic aneurysm status post aortobiiliac endograft left external iliac stent  -Acute hypoxemic respiratory failure requiring mechanical ventilation  -Laryngeal edema  -Severe agitation  -Suspected COPD  -Tobacco abuse  -ICU delirium      Plan:  -Wean down oxygen as tolerated  -I titrate Precedex off  -Pain management  - scheduled DuoNeb  -Insert Le catheter for " urine tension  -Diet  -Will discuss with vascular surgery patient might be able to transfer to the floor    Electronically signed by Shaun Gallego MD on 1/5/2025 at 09:30 CST    Electronically signed by Shaun Gallego MD at 01/05/25 0933       Halley Leone APRN at 01/04/25 1020             LOS: 1 day   Patient Care Team:  Eliseo Smith MD as PCP - General  Eliseo Smith MD as PCP - Family Medicine    Chief Complaint:  Post op day #1-EVAR    Subjective     Patient Complaints: Patient seen/examined lying in bed.  Patient on ventilator.  Bilateral feet warm.  RLE pulses palpable.  LLE dopplered.      Objective     Vital Signs  Temp:  [97.3 °F (36.3 °C)-99.2 °F (37.3 °C)] 99.2 °F (37.3 °C)  Heart Rate:  [] 100  Resp:  [12-30] 18  BP: ()/() 136/78  FiO2 (%):  [50 %-60 %] 50 %    Physical Exam  Vitals and nursing note reviewed.   Constitutional:       General: He is not in acute distress.     Appearance: Normal appearance. He is overweight. He is not diaphoretic.      Interventions: He is intubated.   HENT:      Head: Normocephalic. No right periorbital erythema or left periorbital erythema.      Nose: Nose normal.   Eyes:      General: No scleral icterus.     Pupils: Pupils are equal.   Cardiovascular:      Rate and Rhythm: Normal rate and regular rhythm.      Pulses: Normal pulses.           Dorsalis pedis pulses are 2+ on the right side and detected w/ Doppler on the left side.        Posterior tibial pulses are 2+ on the right side and detected w/ Doppler on the left side.      Heart sounds: Normal heart sounds. No murmur heard.     Comments: Bilateral groin incisions c/d/I-soft and free of hematoma  Pulmonary:      Effort: No respiratory distress. He is intubated.      Breath sounds: Normal breath sounds.   Abdominal:      General: Bowel sounds are normal. There is no distension.      Palpations: Abdomen is soft.      Tenderness: There is no abdominal tenderness. There is no  guarding.      Comments: Abdomen soft, nondistended   Musculoskeletal:         General: No swelling or tenderness. Normal range of motion.      Cervical back: Normal range of motion and neck supple.      Right lower leg: No edema.      Left lower leg: No edema.   Feet:      Right foot:      Skin integrity: Skin integrity normal.      Left foot:      Skin integrity: Skin integrity normal.   Skin:     General: Skin is warm and dry.      Findings: No erythema or rash.   Neurological:      General: No focal deficit present.      Mental Status: He is alert and oriented to person, place, and time. Mental status is at baseline.      Cranial Nerves: No cranial nerve deficit.      Gait: Gait normal.   Psychiatric:         Attention and Perception: Attention normal.         Mood and Affect: Mood normal.         Behavior: Behavior normal.         Thought Content: Thought content normal.         Judgment: Judgment normal.         Laboratory Data:   Results from last 7 days   Lab Units 01/04/25  0600 01/03/25  2331 01/03/25  1831   WBC 10*3/mm3 16.79* 19.09* 14.89*   HEMOGLOBIN g/dL 12.3* 12.4* 11.2*   HEMATOCRIT % 37.9 38.5 35.5*   PLATELETS 10*3/mm3 270 268 224       Results from last 7 days   Lab Units 01/04/25  0721 01/04/25  0600 01/03/25  1831 01/03/25  1530 01/03/25  0309   SODIUM mmol/L 135* 135* 139  --  139   SODIUM, ARTERIAL   --   --   --    < >  --    POTASSIUM mmol/L 4.6 4.5 4.0  --  3.9   CHLORIDE mmol/L 100 100 105  --  101   CO2 mmol/L 25.0 24.0 24.0  --  28.0   BUN mg/dL 13 13 9  --  13   CREATININE mg/dL 0.87 0.92 0.94  --  0.92   CALCIUM mg/dL 8.0* 8.2* 7.6*  --  9.1   BILIRUBIN mg/dL  --  0.3  --   --  0.3   ALK PHOS U/L  --  73  --   --  106   ALT (SGPT) U/L  --  10  --   --  14   AST (SGOT) U/L  --  29  --   --  16   GLUCOSE mg/dL 161* 164* 172*  --  184*    < > = values in this interval not displayed.     Results from last 7 days   Lab Units 01/04/25  0600 01/03/25  2331 01/03/25  1831   PROTIME Seconds  14.8 14.9* 15.7*   INR  1.10* 1.11* 1.19*   APTT seconds 82.3* 40.6* 56.1*            Medication Review: Reviewed    Assessment & Plan       Abdominal aortic aneurysm    AAA (abdominal aortic aneurysm)    Abdominal pain          Plan for disposition:  Start NS at 110 ml/hr verify with Dr. Gallego  Continue Heparin drip  Monitor labs-CBC/BMP    MENDEZ Mckeon  Vascular Surgery  [phone]  01/04/25  10:22 CST      Electronically signed by Halley Leone APRN at 01/05/25 0956       Shaun Gallego MD at 01/04/25 0942              [facility]  INPATIENT PROGRESS NOTE    Patient Name: Brijesh Grande  Date of Admission: 1/3/2025  Today's Date: 01/04/25  Length of Stay: 1  Primary Care Physician: Eliseo Smith MD    Subjective   Chief Complaint: Abdominal pain  Abdominal Pain       69-year-old male with a past medical history of hypertension and previous stroke in January 2016 with residual left-sided weakness and abnormal sensation admitted after presenting to ED with complaints of abdominal pain, left flank pain, and back pain.  The patient is also a smoker, and he continues to smoke 2 packs/day.     Significant findings from ED include glucose elevated 194, but otherwise normal CMP.  CBC was completely normal.  UA was positive for glucose, but was otherwise normal.  CT scan of the abdomen and pelvis showed a fusiform aneurysm of the distal abdominal aorta.  There is partial lumen circumferential mural thrombus Boces of the aneurysm.  There is evidence of hematoma/hemorrhage at the posterior wall of the aneurysm.  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  Patient was noted to be hypertensive in the emergency department.     Patient is being admitted to the CCU for nicardipine infusion for blood pressure control and for close monitoring.  I saw the patient  while he was still in the emergency department.  He states the pain he was having in his abdomen and back are improved.  He reports that he has had an abnormal sensation to the left side of his body since his stroke from 9 years ago.  However, he noticed very intense pain in his abdomen and back earlier this morning, which brought him to the emergency department.  He has no other complaints for me at this time      Interval history:  1/4/2025 patient is intubated sedated he is status post placement of aortobiiliac endograft for ruptured AAA and left external iliac artery stent.  Patient sedation was weaned down this morning and patient was very agitated not following commands and had to be resedated again.  Patient is being started on Precedex and fentanyl drip.  As per discussion with vascular surgery at bedside today patient had a similar episode after surgery yesterday he was very agitated and had to be reintubated and was found to have some vocal cord edema.    Review of Systems   Gastrointestinal:  Positive for abdominal pain.      Could not be obtained due to sedation.     Objective    Temp:  [97.3 °F (36.3 °C)-99.2 °F (37.3 °C)] 99.2 °F (37.3 °C)  Heart Rate:  [] 101  Resp:  [12-30] 18  BP: ()/() 129/72  FiO2 (%):  [50 %-60 %] 50 %  Physical Exam  Constitutional:       Appearance: He is ill-appearing.   HENT:      Head: Normocephalic.      Comments: Orally intubated     Mouth/Throat:      Mouth: Mucous membranes are moist.   Eyes:      Pupils: Pupils are equal, round, and reactive to light.   Cardiovascular:      Rate and Rhythm: Normal rate and regular rhythm.   Pulmonary:      Effort: Respiratory distress present.      Breath sounds: Wheezing present.   Abdominal:      General: Bowel sounds are normal. There is no distension.      Palpations: Abdomen is soft.      Tenderness: There is no abdominal tenderness.   Musculoskeletal:      Right lower leg: Edema present.      Left lower leg:  Edema present.   Skin:     General: Skin is warm.   Neurological:      Comments: Moving all extremities opening his eyes but very agitated not following commands             Results Review:  Lab Results (last 24 hours)       Procedure Component Value Units Date/Time    Basic Metabolic Panel [[id number]]  (Abnormal) Collected: 01/04/25 0721    Specimen: Blood Updated: 01/04/25 0749     Glucose 161 mg/dL      BUN 13 mg/dL      Creatinine 0.87 mg/dL      Sodium 135 mmol/L      Potassium 4.6 mmol/L      Comment: Slight hemolysis detected by analyzer. Result may be falsely elevated.        Chloride 100 mmol/L      CO2 25.0 mmol/L      Calcium 8.0 mg/dL      BUN/Creatinine Ratio 14.9     Anion Gap 10.0 mmol/L      eGFR 92.2 mL/min/1.73     Narrative:      GFR Categories in Chronic Kidney Disease (CKD)      GFR Category          GFR (mL/min/1.73)    Interpretation  G1                     90 or greater         Normal or high (1)  G2                      60-89                Mild decrease (1)  G3a                   45-59                Mild to moderate decrease  G3b                   30-44                Moderate to severe decrease  G4                    15-29                Severe decrease  G5                    14 or less           Kidney failure          (1)In the absence of evidence of kidney disease, neither GFR category G1 or G2 fulfill the criteria for CKD.    eGFR calculation 2021 CKD-EPI creatinine equation, which does not include race as a factor    Magnesium [[id number]]  (Normal) Collected: 01/04/25 0600    Specimen: Blood Updated: 01/04/25 0637     Magnesium 2.0 mg/dL     Comprehensive Metabolic Panel [[id number]]  (Abnormal) Collected: 01/04/25 0600    Specimen: Blood Updated: 01/04/25 0637     Glucose 164 mg/dL      BUN 13 mg/dL      Creatinine 0.92 mg/dL      Sodium 135 mmol/L      Potassium 4.5 mmol/L      Comment: Slight hemolysis detected by analyzer. Result may be falsely elevated.        Chloride 100 mmol/L       CO2 24.0 mmol/L      Calcium 8.2 mg/dL      Total Protein 5.8 g/dL      Albumin 3.3 g/dL      ALT (SGPT) 10 U/L      AST (SGOT) 29 U/L      Comment: Slight hemolysis detected by analyzer. Result may be falsely elevated.        Alkaline Phosphatase 73 U/L      Total Bilirubin 0.3 mg/dL      Globulin 2.5 gm/dL      A/G Ratio 1.3 g/dL      BUN/Creatinine Ratio 14.1     Anion Gap 11.0 mmol/L      eGFR 88.9 mL/min/1.73     Narrative:      GFR Categories in Chronic Kidney Disease (CKD)      GFR Category          GFR (mL/min/1.73)    Interpretation  G1                     90 or greater         Normal or high (1)  G2                      60-89                Mild decrease (1)  G3a                   45-59                Mild to moderate decrease  G3b                   30-44                Moderate to severe decrease  G4                    15-29                Severe decrease  G5                    14 or less           Kidney failure          (1)In the absence of evidence of kidney disease, neither GFR category G1 or G2 fulfill the criteria for CKD.    eGFR calculation 2021 CKD-EPI creatinine equation, which does not include race as a factor    Phosphorus [[id number]]  (Normal) Collected: 01/04/25 0600    Specimen: Blood Updated: 01/04/25 0636     Phosphorus 4.1 mg/dL     aPTT [[id number]]  (Abnormal) Collected: 01/04/25 0600    Specimen: Blood Updated: 01/04/25 0625     PTT 82.3 seconds     Protime-INR [[id number]]  (Abnormal) Collected: 01/04/25 0600    Specimen: Blood Updated: 01/04/25 0625     Protime 14.8 Seconds      INR 1.10    CBC & Differential [[id number]]  (Abnormal) Collected: 01/04/25 0600    Specimen: Blood Updated: 01/04/25 0613    Narrative:      The following orders were created for panel order CBC & Differential.  Procedure                               Abnormality         Status                     ---------                               -----------         ------                     CBC Auto  Differential[[id number]]        Abnormal            Final result                 Please view results for these tests on the individual orders.    CBC Auto Differential [[id number]]  (Abnormal) Collected: 01/04/25 0600    Specimen: Blood Updated: 01/04/25 0613     WBC 16.79 10*3/mm3      RBC 4.14 10*6/mm3      Hemoglobin 12.3 g/dL      Hematocrit 37.9 %      MCV 91.5 fL      MCH 29.7 pg      MCHC 32.5 g/dL      RDW 14.0 %      RDW-SD 47.4 fl      MPV 9.5 fL      Platelets 270 10*3/mm3      Neutrophil % 84.9 %      Lymphocyte % 5.8 %      Monocyte % 8.6 %      Eosinophil % 0.0 %      Basophil % 0.2 %      Immature Grans % 0.5 %      Neutrophils, Absolute 14.26 10*3/mm3      Lymphocytes, Absolute 0.98 10*3/mm3      Monocytes, Absolute 1.44 10*3/mm3      Eosinophils, Absolute 0.00 10*3/mm3      Basophils, Absolute 0.03 10*3/mm3      Immature Grans, Absolute 0.08 10*3/mm3      nRBC 0.0 /100 WBC     aPTT [[id number]]  (Abnormal) Collected: 01/03/25 2331    Specimen: Blood Updated: 01/03/25 2355     PTT 40.6 seconds     Protime-INR [[id number]]  (Abnormal) Collected: 01/03/25 2331    Specimen: Blood Updated: 01/03/25 2355     Protime 14.9 Seconds      INR 1.11    CBC & Differential [[id number]]  (Abnormal) Collected: 01/03/25 2331    Specimen: Blood Updated: 01/03/25 2344    Narrative:      The following orders were created for panel order CBC & Differential.  Procedure                               Abnormality         Status                     ---------                               -----------         ------                     CBC Auto Differential[[id number]]        Abnormal            Final result                 Please view results for these tests on the individual orders.    CBC Auto Differential [[id number]]  (Abnormal) Collected: 01/03/25 2331    Specimen: Blood Updated: 01/03/25 2344     WBC 19.09 10*3/mm3      RBC 4.22 10*6/mm3      Hemoglobin 12.4 g/dL      Hematocrit 38.5 %      MCV 91.2 fL      MCH 29.4 pg       MCHC 32.2 g/dL      RDW 14.1 %      RDW-SD 47.1 fl      MPV 9.0 fL      Platelets 268 10*3/mm3      Neutrophil % 88.9 %      Lymphocyte % 3.7 %      Monocyte % 6.5 %      Eosinophil % 0.0 %      Basophil % 0.1 %      Immature Grans % 0.8 %      Neutrophils, Absolute 16.96 10*3/mm3      Lymphocytes, Absolute 0.70 10*3/mm3      Monocytes, Absolute 1.25 10*3/mm3      Eosinophils, Absolute 0.00 10*3/mm3      Basophils, Absolute 0.02 10*3/mm3      Immature Grans, Absolute 0.16 10*3/mm3      nRBC 0.0 /100 WBC     Blood Gas, Arterial - [[id number]]  (Abnormal) Collected: 01/03/25 2019    Specimen: Arterial Blood Updated: 01/03/25 2021     Site Arterial Line     Migue's Test N/A     pH, Arterial 7.335 pH units      Comment: 84 Value below reference range        pCO2, Arterial 49.0 mm Hg      Comment: 83 Value above reference range        pO2, Arterial 74.1 mm Hg      Comment: 84 Value below reference range        HCO3, Arterial 26.1 mmol/L      Comment: 83 Value above reference range        Base Excess, Arterial -0.3 mmol/L      Comment: 84 Value below reference range        O2 Saturation, Arterial 94.8 %      Temperature 37.0     Barometric Pressure for Blood Gas 762 mmHg      Modality Ventilator     FIO2 50 %      Ventilator Mode AC     Set Tidal Volume 550.000     Set Mech Resp Rate 18.0     PEEP 5.0     Collected by MIKE     Comment: Meter: [id number]     :  MIKE        pCO2, Temperature Corrected 49.0 mm Hg      pH, Temp Corrected 7.335 pH Units      pO2, Temperature Corrected 74.1 mm Hg      PO2/FIO2 148    Basic Metabolic Panel [[id number]]  (Abnormal) Collected: 01/03/25 1831    Specimen: Blood Updated: 01/03/25 1907     Glucose 172 mg/dL      BUN 9 mg/dL      Creatinine 0.94 mg/dL      Sodium 139 mmol/L      Potassium 4.0 mmol/L      Chloride 105 mmol/L      CO2 24.0 mmol/L      Calcium 7.6 mg/dL      BUN/Creatinine Ratio 9.6     Anion Gap 10.0 mmol/L      eGFR 86.7 mL/min/1.73      Narrative:      GFR Categories in Chronic Kidney Disease (CKD)      GFR Category          GFR (mL/min/1.73)    Interpretation  G1                     90 or greater         Normal or high (1)  G2                      60-89                Mild decrease (1)  G3a                   45-59                Mild to moderate decrease  G3b                   30-44                Moderate to severe decrease  G4                    15-29                Severe decrease  G5                    14 or less           Kidney failure          (1)In the absence of evidence of kidney disease, neither GFR category G1 or G2 fulfill the criteria for CKD.    eGFR calculation 2021 CKD-EPI creatinine equation, which does not include race as a factor    aPTT [[id number]]  (Abnormal) Collected: 01/03/25 1831    Specimen: Blood Updated: 01/03/25 1859     PTT 56.1 seconds     Protime-INR [[id number]]  (Abnormal) Collected: 01/03/25 1831    Specimen: Blood Updated: 01/03/25 1859     Protime 15.7 Seconds      INR 1.19    CBC & Differential [[id number]]  (Abnormal) Collected: 01/03/25 1831    Specimen: Blood Updated: 01/03/25 1856    Narrative:      The following orders were created for panel order CBC & Differential.  Procedure                               Abnormality         Status                     ---------                               -----------         ------                     CBC Auto Differential[[id number]]        Abnormal            Final result                 Please view results for these tests on the individual orders.    CBC Auto Differential [[id number]]  (Abnormal) Collected: 01/03/25 1831    Specimen: Blood Updated: 01/03/25 1856     WBC 14.89 10*3/mm3      RBC 3.86 10*6/mm3      Hemoglobin 11.2 g/dL      Hematocrit 35.5 %      MCV 92.0 fL      MCH 29.0 pg      MCHC 31.5 g/dL      RDW 13.8 %      RDW-SD 47.0 fl      MPV 9.0 fL      Platelets 224 10*3/mm3      Neutrophil % 89.2 %      Lymphocyte % 3.9 %      Monocyte % 6.0 %       Eosinophil % 0.0 %      Basophil % 0.2 %      Immature Grans % 0.7 %      Neutrophils, Absolute 13.28 10*3/mm3      Lymphocytes, Absolute 0.58 10*3/mm3      Monocytes, Absolute 0.90 10*3/mm3      Eosinophils, Absolute 0.00 10*3/mm3      Basophils, Absolute 0.03 10*3/mm3      Immature Grans, Absolute 0.10 10*3/mm3      nRBC 0.0 /100 WBC     Blood Gas, Arterial With Co-Ox [[id number]]  (Abnormal) Collected: 01/03/25 1530    Specimen: Arterial Blood Updated: 01/03/25 1526     Site Arterial Line     Migue's Test N/A     pH, Arterial 7.350 pH units      pCO2, Arterial 48.6 mm Hg      Comment: 83 Value above reference range        pO2, Arterial 136.0 mm Hg      Comment: 83 Value above reference range        HCO3, Arterial 26.8 mmol/L      Comment: 83 Value above reference range        Base Excess, Arterial 0.6 mmol/L      O2 Saturation, Arterial 99.1 %      Comment: 83 Value above reference range        Hemoglobin, Blood Gas 13.1 g/dL      Comment: 84 Value below reference range        Hematocrit, Blood Gas 40.2 %      Oxyhemoglobin 96.9 %      Methemoglobin 1.00 %      Carboxyhemoglobin 1.2 %      Temperature 37.4     Sodium, Arterial 139 mmol/L      Potassium, Arterial 3.4 mmol/L      Comment: 84 Value below reference range        Ionized Calcium 4.57 mg/dL      Comment: 84 Value below reference range        Barometric Pressure for Blood Gas 763 mmHg      Modality Ventilator     FIO2 100 %      Ventilator Mode NA     Collected by SURGERY     Comment: Meter: [id number]     :  Jessica Pickard, LEXIS        pH, Temp Corrected 7.345 pH Units      pCO2, Temperature Corrected 49.4 mm Hg      pO2, Temperature Corrected 138 mm Hg              XR Chest 1 View    Result Date: 1/3/2025  1. Right IJ central line placed and well-positioned without complication. 2. Endotracheal tube remains well-positioned. The lungs remain clear.  This report was signed and finalized on 1/3/2025 7:29 PM by Dr. Ryder Rolle MD.   "    XR Chest 1 View    Result Date: 1/3/2025  1.. Lungs clear. 2. Endotracheal tube well-positioned.  This report was signed and finalized on 1/3/2025 6:46 PM by Dr. Ryder Rolle MD.      CT Abdomen Pelvis With & Without Contrast    Result Date: 1/3/2025  1. Fusiform aneurysm of the distal abdominal aorta as detailed above. There is partial lumen circumferential mural thrombosis of the aneurysm. There is evidence of hematoma/hemorrhage at the posterior wall of the aneurysm as detailed above. Further evaluation with CT angiography of the abdomen may be obtained. 2. A saccular aneurysm of the mid abdominal aorta adjacent and below the level of the right renal arteries and posterior to the IVC. Details given above. 3. No other acute findings in the abdomen or pelvis to account for patient's symptoms. No renal or ureteral calculi or obstructive uropathy. 4. No gallstones. No finding to suggest appendicitis.       This report was signed and finalized on 1/3/2025 5:58 AM by Dr. Andra Scott MD.       Result Review:  I have personally reviewed the results from the time of this admission to 1/4/2025 09:43 CST and agree with these findings:  [x]  Laboratory list / accordion  []  Microbiology  [x]  Radiology  []  EKG/Telemetry   []  Cardiology/Vascular   []  Pathology  []  Old records  []  Other:  Most notable findings include:       Culture Data:   No results found for: \"BLOODCX\", \"URINECX\", \"WOUNDCX\", \"MRSACX\", \"RESPCX\", \"STOOLCX\"    I have reviewed the patient's current medications.     Assessment/Plan   Assessment  Active Hospital Problems    Diagnosis     **Abdominal aortic aneurysm     AAA (abdominal aortic aneurysm)     Abdominal pain    -Ruptured infrarenal abdominal aortic aneurysm status post L4 to biiliac endograft left external iliac stent  -Acute hypoxemic respiratory failure requiring mechanical ventilation  -Laryngeal edema  -Severe agitation  -Suspected COPD  -Tobacco abuse  -ICU " delirium      Plan:  -SAT and SBT was tried today but patient was very agitated thrashing in bed not following commands  -Propofol was restarted  -I will start patient on Precedex and fentanyl to control pain  -We will retry awakening trial later after starting Precedex and fentanyl  -Adjust mechanical ventilation use lumped active strategy keep pulse ox above 90%  -Start scheduled DuoNeb  -Consider checking for cuff leak prior to extubation  -We will hold oral antihypertensives for now due to borderline blood pressure with sedation  - We will hold off on starting Decadron-for now not to add to his agitation and delirium but if patient does not have cuff leak we will consider that.  -Consider starting Cardene drip and titrate if blood pressure rises  -Discussed with vascular surgery  -Patient ex-wife and son were updated at bedside      I have spent 45 minutes of critical care time this time was spent at bedside over the unit this time was exclusive of any billable procedures patient is needing intensive care due to complex medical problems requiring complex medical decisions  Electronically signed by Shaun Gallego MD on 1/4/2025 at 09:43 CST    Electronically signed by Shaun Gallego MD at 01/04/25 0952          Consult Notes (last 4 days)        Timmy Finney MD at 01/03/25 1243          Brijesh Grande  [id number]  [id number]  [facility] OR  Nathan Cortes MD  1/3/2025    Chief Complaint   Patient presents with    Abdominal Pain       HPI: Brijesh Grande is a 69 y.o. male who presented with left lower quadrant abdominal pain, left flank pain, back pain.  The patient had a previous left-sided stroke and has sensory deficits to the left side.  He states that he had sudden onset pain starting this a.m.  He then presented to the ER where he was found to have a greater than 5 cm aneurysm along with a left common iliac artery aneurysm.  He also had 2 saccular aneurysms in the more proximal infrarenal abdominal  "aorta.  Patient states that his pain has improved but is still present after pain medicine.  He does smoke.  There is also concern for potential contained hemorrhage along the posterior wall aneurysm.    Past Medical History:   Diagnosis Date    Hypertension     Stroke        Past Surgical History:   Procedure Laterality Date    CAROTID ENDARTERECTOMY      x 2       History reviewed. No pertinent family history.    Social History     Socioeconomic History    Marital status:    Tobacco Use    Smoking status: Every Day     Current packs/day: 1.00     Types: Cigarettes   Substance and Sexual Activity    Alcohol use: Not Currently    Drug use: Not Currently       No Known Allergies    Hospital Medications (active)         Dose Frequency Start End    amLODIPine (NORVASC) tablet 10 mg 10 mg Daily 1/4/2025 --    Admin Instructions: Hold for SBP less than 100, DBP less than 60.  Caution: Look alike/sound alike drug alert. Avoid grapefruit juice.    Route: Oral    Cosign for Ordering: Required by Nathan Cortes MD    atorvastatin (LIPITOR) tablet 10 mg 10 mg Daily 1/3/2025 --    Admin Instructions: Avoid grapefruit juice.    Route: Oral    Cosign for Ordering: Required by Nathan Cortes MD    bisacodyl (DULCOLAX) EC tablet 5 mg 5 mg Daily PRN 1/3/2025 --    Admin Instructions: Use if no bowel movement after 12 hours.  Swallow whole. Do not crush, split, or chew tablet.    Route: Oral    Cosign for Ordering: Required by Nathan Cortes MD    Linked Group 1: Placed in \"And\" Linked Group        bisacodyl (DULCOLAX) suppository 10 mg 10 mg Daily PRN 1/3/2025 --    Admin Instructions: Use if no bowel movement after 12 hours.  Hold for diarrhea    Route: Rectal    Cosign for Ordering: Required by Nathan Cortes MD    Linked Group 1: Placed in \"And\" Linked Group        ceFAZolin 2000 mg IVPB in 100 mL NS (MBP) 2,000 mg Once 1/3/2025 --    Admin Instructions: Administer Within 1 Hour of Surgical Incision.  Redose 4 " Hours From Pre-Op Dose if Procedure Ongoing or Blood Loss Greater  Than 1.5 L    Caution: Look alike/sound alike drug alert    Route: Intravenous    Chlorhexidine Gluconate Cloth 2 % pads 1 Application 1 Application Once 1/3/2025 --    Admin Instructions: Use for admission bath and continue for length of critical care stay.    Route: Topical    Cosign for Ordering: Required by Nathna Cortes MD    Chlorhexidine Gluconate Cloth 2 % pads 1 Application 1 Application Every 24 Hours 1/4/2025 --    Admin Instructions: Use for admission bath and continue for length of critical care stay.    Route: Topical    Cosign for Ordering: Required by Nathan Cortes MD    cloNIDine (CATAPRES) tablet 0.2 mg 0.2 mg 2 Times Daily 1/4/2025 --    Admin Instructions: Hold for SBP less than 100, DBP less than 60, or heart rate less than 50. If a dose is held, please contact the provider.  Caution: Look alike/sound alike drug alert.    Route: Oral    Cosign for Ordering: Required by Nathan Cortes MD    hydrALAZINE (APRESOLINE) injection 10 mg 10 mg Every 4 Hours PRN 1/3/2025 --    Admin Instructions: Hold for SBP less than 100, DBP less than 60.  Caution: Look alike/sound alike drug alert    Route: Intravenous    Cosign for Ordering: Required by Nathan Cortes MD    hydrALAZINE (APRESOLINE) tablet 25 mg 25 mg 3 Times Daily 1/4/2025 --    Admin Instructions: Hold for SBP less than 100, DBP less than 60.  Caution: Look alike/sound alike drug alert    Route: Oral    Cosign for Ordering: Required by Nathan Cortes MD    lactated ringers infusion 100 mL/hr Continuous 1/3/2025 1/4/2025    Route: Intravenous    losartan (COZAAR) tablet 25 mg 25 mg Daily 1/4/2025 --    Admin Instructions: Hold for SBP less than 100, DBP less than 60.    Route: Oral    Cosign for Ordering: Required by Nathan Cortes MD    metoprolol tartrate (LOPRESSOR) tablet 25 mg 25 mg 2 Times Daily 1/4/2025 --    Admin Instructions: Hold for SBP less than 100, DBP  less than 60, or heart rate less than 50. If a dose is held, please contact the provider.    Route: Oral    Cosign for Ordering: Required by Nathan Cortes MD    mupirocin (BACTROBAN) 2 % nasal ointment 1 Application 1 Application 2 Times Daily 1/3/2025 1/8/2025    Admin Instructions: Begin on day 1 of ICU admission and administer for 5 days, even if patient transferred out of critical care.    MUPIROCIN APPLICATION:  1. Place patient's bed at 30 degrees, if tolerated.  2. Wash your hands with warm soapy water or use hand  .  3. Open the tube of mupirocin 2%.  4. Squeeze about 0.5 g (blueberry-size) of mupirocin from the  tube onto a sterile applicator or a cotton swab.  5. Apply the swab directly into nostril. Ensure coating of the  sides of the nostril.  6. Repeat with second sterile applicator for other nostril.  7. Gently press the sides of the nostrils together and massage  gently for 60 seconds.        Route: Each Nare    Cosign for Ordering: Required by Nathan Cortes MD    niCARdipine (CARDENE) 20 mg in 200 mL NS infusion 5-15 mg/hr Titrated 1/3/2025 --    Admin Instructions: For Acute Aortic Dissection - Initiate infusion at 5 mg/hr and titrate up or down by 2.5 - 5 mg/hr every 15 minutes if HR 60-80 but unable to achieve SBP less than or equal to 120 mm Hg with pain control and parenteral beta=blockers.  Maximum Dose = 15 mg/hr. Hold infusion for SBP less than 100 mm Hg. Contact provider if unable to maintain SBP Less Than or equal to 120 mm Hg on maximum dose.  Once SBP target achieved obtain vitals a minimum of every 30 minutes. Administer as a slow continuous infusion via central line or through a large peripheral vein. Peripheral venous irritation may be minimized by changing the site of infusion every 12 hours. Nicardipine 0.2mg/mL concentration must be infused via central line ONLY.  Caution: Look alike/sound alike drug alert    Route: Intravenous    Cosign for Ordering: Required by  "Nathan Cortes MD    nitroglycerin (NITROSTAT) SL tablet 0.4 mg 0.4 mg Every 5 Minutes PRN 1/3/2025 --    Admin Instructions: If Pain Unrelieved After 3 Doses Notify MD  May administer up to 3 doses per episode.    Route: Sublingual    Cosign for Ordering: Required by Nathan Cortes MD    polyethylene glycol (MIRALAX) packet 17 g 17 g Daily PRN 1/3/2025 --    Admin Instructions: Use if no bowel movement after 12 hours. Mix in 6-8 ounces of water.  Use 4-8 ounces of water, tea, or juice for each 17 gram dose.    Route: Oral    Cosign for Ordering: Required by Nathan Cortes MD    Linked Group 1: Placed in \"And\" Linked Group        sennosides-docusate (PERICOLACE) 8.6-50 MG per tablet 2 tablet 2 tablet 2 Times Daily 1/3/2025 --    Admin Instructions: HOLD MEDICATION IF PATIENT HAS HAD BOWEL MOVEMENT. Start bowel management regimen if patient has not had a bowel movement after 12 hours.    Route: Oral    Cosign for Ordering: Required by Nathan Cortes MD    Linked Group 1: Placed in \"And\" Linked Group        sodium chloride 0.9 % flush 10 mL 10 mL As Needed 1/3/2025 --    Route: Intravenous    Cosign for Ordering: Required by Santosh Terrell MD    sodium chloride 0.9 % flush 10 mL 10 mL Every 12 Hours Scheduled 1/3/2025 --    Route: Intravenous    Cosign for Ordering: Required by Nathan Cortes MD    sodium chloride 0.9 % flush 10 mL 10 mL As Needed 1/3/2025 --    Route: Intravenous    Cosign for Ordering: Required by Nathan Cortes MD    sodium chloride 0.9 % flush 3 mL 3 mL Every 12 Hours Scheduled 1/3/2025 --    Route: Intravenous    sodium chloride 0.9 % flush 3-10 mL 3-10 mL As Needed 1/3/2025 --    Route: Intravenous    sodium chloride 0.9 % infusion 40 mL 40 mL As Needed 1/3/2025 --    Admin Instructions: Following administration of an IV intermittent medication, flush line with 40mL NS at 100mL/hr.    Route: Intravenous    Cosign for Ordering: Required by Nathan Cortes MD    sodium chloride 0.9 " % infusion 40 mL 40 mL As Needed 1/3/2025 1/4/2025    Admin Instructions: Following administration of an IV intermittent medication, flush line with 40mL NS at 100mL/hr.    Route: Intravenous            Review of Systems   Constitutional:  Negative for diaphoresis and fever.   HENT: Negative.     Eyes: Negative.    Respiratory: Negative.  Negative for shortness of breath and wheezing.    Cardiovascular: Negative.  Negative for chest pain and leg swelling.   Gastrointestinal:  Positive for abdominal pain.   Endocrine: Negative.    Genitourinary: Negative.    Musculoskeletal: Negative.    Skin: Negative.    Allergic/Immunologic: Negative.    Neurological:  Positive for weakness. Negative for dizziness.   Hematological: Negative.    Psychiatric/Behavioral: Negative.         /98   Pulse 87   Temp 97.5 °F (36.4 °C) (Oral)   Resp 18   SpO2 91%    Physical Exam  Vitals and nursing note reviewed.   Constitutional:       General: He is not in acute distress.     Appearance: Normal appearance. He is not diaphoretic.   HENT:      Head: Normocephalic. No right periorbital erythema or left periorbital erythema.      Nose: Nose normal.   Eyes:      General: No scleral icterus.     Pupils: Pupils are equal.   Cardiovascular:      Rate and Rhythm: Normal rate and regular rhythm.      Pulses:           Femoral pulses are 2+ on the right side and 2+ on the left side.       Posterior tibial pulses are 2+ on the left side.   Pulmonary:      Effort: Pulmonary effort is normal. No respiratory distress.      Breath sounds: Normal breath sounds.   Abdominal:      General: Bowel sounds are normal. There is no distension.      Palpations: Abdomen is soft.      Tenderness: There is no abdominal tenderness. There is no guarding.   Musculoskeletal:         General: No swelling or tenderness. Normal range of motion.      Cervical back: Normal range of motion and neck supple.      Right lower leg: No edema.      Left lower leg: No  edema.      Comments: Residual left-sided weakness from previous stroke.   Feet:      Right foot:      Skin integrity: Skin integrity normal.      Left foot:      Skin integrity: Skin integrity normal.   Skin:     General: Skin is warm and dry.      Findings: No erythema or rash.   Neurological:      Mental Status: He is alert and oriented to person, place, and time. Mental status is at baseline.      Cranial Nerves: No cranial nerve deficit.   Psychiatric:         Attention and Perception: Attention normal.         Mood and Affect: Mood normal.         Behavior: Behavior normal.         Thought Content: Thought content normal.         Judgment: Judgment normal.         Laboratory Data:  Results from last 7 days   Lab Units 01/03/25  0309   WBC 10*3/mm3 9.50   HEMOGLOBIN g/dL 14.7   HEMATOCRIT % 44.8   PLATELETS 10*3/mm3 261       Results from last 7 days   Lab Units 01/03/25  0309   SODIUM mmol/L 139   POTASSIUM mmol/L 3.9   CHLORIDE mmol/L 101   CO2 mmol/L 28.0   BUN mg/dL 13   CREATININE mg/dL 0.92   CALCIUM mg/dL 9.1   BILIRUBIN mg/dL 0.3   ALK PHOS U/L 106   ALT (SGPT) U/L 14   AST (SGOT) U/L 16   GLUCOSE mg/dL 184*             Diagnostic Data:  Imaging Results (Last 24 Hours)       Procedure Component Value Units Date/Time    IR Prox Dis Prost Endov Rep Initial Ves [[id number]] Resulted: 01/03/25 1231     Updated: 01/03/25 1231    CT Abdomen Pelvis With & Without Contrast [[id number]] Collected: 01/03/25 0541     Updated: 01/03/25 0601    Narrative:      EXAMINATION: CT ABDOMEN PELVIS W WO CONTRAST-      1/3/2025 2:47 AM     HISTORY: LLQ pain, L flank pain     In order to have a CT radiation dose as low as reasonably achievable  Automated Exposure Control was utilized for adjustment of the mA and/or  KV according to patient size.     Total DLP = 650.92 mGy.cm     The CT scan of the abdomen and pelvis is performed before and after  intravenous contrast enhancement.     Images are acquired in the axial plane and  subsequent reconstruction in  the coronal and sagittal planes.     There is no previous similar study for comparison.     The lung bases included in the study are unremarkable.     Limited visualized cardiomediastinal structures show moderate  cardiomegaly, atheromatous changes of the thoracic aorta and coronary  arteries.     The liver and spleen are normal.     No radiopaque gallstone.     Relatively small pancreas seen. No mass. No ductal dilatation.     Left adrenal nodule is seen measuring 2 cm x 1.4 cm. CT density is  nonfatty. Normal right adrenal gland.     Moderate lobulation of renal contour bilaterally. There is a tiny  exophytic nodule from the lower pole of the left kidney posteriorly  measuring 8 mm in diameter. This is too small to be further  characterized. No radiopaque calculi in either kidney. No hydronephrosis  on either side. Limited visualized ureters are nondilated. The urinary  bladder is incompletely distended with a mild to moderate wall  thickening. No focal intrinsic abnormality.     The prostate is enlarged with intrinsic calcification. It compresses and  elevates the floor of the urinary bladder.     There is small fat-containing inguinal hernias. There is a tiny  fat-containing umbilical hernia.     Stomach is decompressed with diffuse wall thickening. No focal  abnormality. Duodenum is normal. Small bowel is nondistended. No finding  to suggest appendicitis. Moderate gas and stool is seen in the colon. No  finding to suggest obstruction.     Atheromatous changes of the abdominal aorta and iliac arteries. There is  a saccular aneurysm from the right posterolateral aspect of the mid  abdominal aorta opposite and posterior to the inferior vena cava,  measuring 2.3 x 1.8 cm. There is fusiform aneurysmal dilatation of  distal abdominal aorta with a maximum lumen of the abdominal aorta  measuring 5.7 x 4.7 at and just above the level of bifurcation. There is  a circumferential partial lumen  mural thrombosis of the aneurysm with  the effective lumen measuring 3.3 x 2 cm. There is an ill-defined area  of increased density in the posterior aspect of the thrombus which may  represent a hematoma in the posterior wall of the aneurysm?. The  aneurysm extends into the left common iliac artery which measures 3.7 x  3.4 cm.     There is no evidence of abdominal or pelvic lymphadenopathy.     Images reviewed in bone window show chronic degenerative changes of the  lumbar and thoracic spine. No acute bony abnormality.       Impression:      1. Fusiform aneurysm of the distal abdominal aorta as detailed above.  There is partial lumen circumferential mural thrombosis of the aneurysm.  There is evidence of hematoma/hemorrhage at the posterior wall of the  aneurysm as detailed above. Further evaluation with CT angiography of  the abdomen may be obtained.  2. A saccular aneurysm of the mid abdominal aorta adjacent and below the  level of the right renal arteries and posterior to the IVC. Details  given above.  3. No other acute findings in the abdomen or pelvis to account for  patient's symptoms. No renal or ureteral calculi or obstructive  uropathy.  4. No gallstones. No finding to suggest appendicitis.                    This report was signed and finalized on 1/3/2025 5:58 AM by Dr. Andra Scott MD.               Impression: 69-year-old male presenting with left lower quadrant abdominal pain, flank pain and back pain who was found to have a contained ruptured infrarenal abdominal aortic aneurysm.    Abdominal aortic aneurysm    AAA (abdominal aortic aneurysm)    Abdominal pain      Plan: After thoroughly evaluating Brijesh Grande, I believe the best course of action is to proceed with EVAR.  Risks/benefits were explained at great length to the patient which include but are not limited to bleeding, infection, vessel rupture, bowel damage, renal failure, MI, stroke, and death.  Also discussed with the patient the  possibility of having to coil his internal iliac artery on the left with the risk of erectile dysfunction and pelvic ischemia.  The patient understands and would like to proceed with surgery.    Thank you for allowing me to participate in the care of your patient.  Please do not hesitate to call with any questions or concerns.  Timmy Finney MD   Vascular Surgery  [phone]           Electronically signed by Timmy Finney MD at 01/03/25 3094

## 2025-01-06 NOTE — PROGRESS NOTES
[facility] Intensivist Services  INPATIENT PROGRESS NOTE    Patient Name: Brijesh Grande  Date of Admission: 1/3/2025  Today's Date: 01/06/25  Length of Stay: 3  Primary Care Physician: Eliseo Smith MD    Subjective   Chief Complaint: Abdominal pain  Abdominal Pain       69-year-old male with a past medical history of hypertension and previous stroke in January 2016 with residual left-sided weakness and abnormal sensation admitted after presenting to ED with complaints of abdominal pain, left flank pain, and back pain.  The patient is also a smoker, and he continues to smoke 2 packs/day.     Significant findings from ED include glucose elevated 194, but otherwise normal CMP.  CBC was completely normal.  UA was positive for glucose, but was otherwise normal.  CT scan of the abdomen and pelvis showed a fusiform aneurysm of the distal abdominal aorta.  There is partial lumen circumferential mural thrombus Boces of the aneurysm.  There is evidence of hematoma/hemorrhage at the posterior wall of the aneurysm.  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  Patient was noted to be hypertensive in the emergency department.     Patient is being admitted to the CCU for nicardipine infusion for blood pressure control and for close monitoring.  I saw the patient while he was still in the emergency department.  He states the pain he was having in his abdomen and back are improved.  He reports that he has had an abnormal sensation to the left side of his body since his stroke from 9 years ago.  However, he noticed very intense pain in his abdomen and back earlier this morning, which brought him to the emergency department.  He has no other complaints for me at this time      Interval history:  1/4/2025 patient is intubated sedated he is status post placement of aortobiiliac endograft for  ruptured AAA and left external iliac artery stent.  Patient sedation was weaned down this morning and patient was very agitated not following commands and had to be resedated again.  Patient is being started on Precedex and fentanyl drip.  As per discussion with vascular surgery at bedside today patient had a similar episode after surgery yesterday he was very agitated and had to be reintubated and was found to have some vocal cord edema.    1/5/2025 patient was extubated yesterday he has done very well he got agitated overnight and had to be started on Precedex which dropped his blood pressure transiently requiring Levophed.  Patient remains on Precedex 0.2 he does complain of not able to use the bathroom and had some urine retention had to have an out overnight I do think this is causing his agitation.    1/6/2025 patient remains in the ICU he is not on any drips other than heparin drip.  Discussed with vascular surgery today we are discontinuing his heparin drip and starting antiplatelets.  Patient pain is under better control and he has been off Precedex since yesterday morning.  Pulses are dopplerable.       All 12 point system review were unremarkab other than was mentioned history present illness le  Objective    Temp:  [97.9 °F (36.6 °C)-99.2 °F (37.3 °C)] 97.9 °F (36.6 °C)  Heart Rate:  [] 84  Resp:  [12-20] 16  BP: (103-178)/() 128/83  Physical Exam  Constitutional:       Appearance: He is not ill-appearing.   HENT:      Head: Normocephalic.      Mouth/Throat:      Mouth: Mucous membranes are moist.   Eyes:      Pupils: Pupils are equal, round, and reactive to light.   Cardiovascular:      Rate and Rhythm: Normal rate and regular rhythm.   Pulmonary:      Effort: No respiratory distress.      Breath sounds: No wheezing.   Abdominal:      General: Bowel sounds are normal. There is no distension.      Palpations: Abdomen is soft.      Tenderness: There is no abdominal tenderness.    Musculoskeletal:      Right lower leg: Edema present.      Left lower leg: Edema present.      Comments: Mild cyanosis of the left leg pulses are palpable   Skin:     General: Skin is warm.   Neurological:      Mental Status: He is oriented to person, place, and time.      Comments: Baseline left-sided weakness   Psychiatric:         Mood and Affect: Mood normal.             Results Review:  Lab Results (last 24 hours)       Procedure Component Value Units Date/Time    aPTT [[id number]]  (Abnormal) Collected: 01/06/25 0625    Specimen: Blood Updated: 01/06/25 0658     PTT 47.9 seconds     Magnesium [[id number]]  (Normal) Collected: 01/06/25 0625    Specimen: Blood Updated: 01/06/25 0655     Magnesium 2.2 mg/dL     Phosphorus [[id number]]  (Abnormal) Collected: 01/06/25 0625    Specimen: Blood Updated: 01/06/25 0655     Phosphorus 2.1 mg/dL     Basic Metabolic Panel [[id number]]  (Abnormal) Collected: 01/06/25 0625    Specimen: Blood Updated: 01/06/25 0655     Glucose 112 mg/dL      BUN 17 mg/dL      Creatinine 0.69 mg/dL      Sodium 143 mmol/L      Potassium 4.4 mmol/L      Chloride 109 mmol/L      CO2 25.0 mmol/L      Calcium 8.2 mg/dL      BUN/Creatinine Ratio 24.6     Anion Gap 9.0 mmol/L      eGFR 98.9 mL/min/1.73     Narrative:      GFR Categories in Chronic Kidney Disease (CKD)      GFR Category          GFR (mL/min/1.73)    Interpretation  G1                     90 or greater         Normal or high (1)  G2                      60-89                Mild decrease (1)  G3a                   45-59                Mild to moderate decrease  G3b                   30-44                Moderate to severe decrease  G4                    15-29                Severe decrease  G5                    14 or less           Kidney failure          (1)In the absence of evidence of kidney disease, neither GFR category G1 or G2 fulfill the criteria for CKD.    eGFR calculation 2021 CKD-EPI creatinine equation, which does not  include race as a factor    CBC & Differential [[id number]]  (Abnormal) Collected: 01/06/25 0625    Specimen: Blood Updated: 01/06/25 0636    Narrative:      The following orders were created for panel order CBC & Differential.  Procedure                               Abnormality         Status                     ---------                               -----------         ------                     CBC Auto Differential[[id number]]        Abnormal            Final result                 Please view results for these tests on the individual orders.    CBC Auto Differential [[id number]]  (Abnormal) Collected: 01/06/25 0625    Specimen: Blood Updated: 01/06/25 0636     WBC 12.50 10*3/mm3      RBC 3.09 10*6/mm3      Hemoglobin 9.1 g/dL      Hematocrit 28.6 %      MCV 92.6 fL      MCH 29.4 pg      MCHC 31.8 g/dL      RDW 14.2 %      RDW-SD 48.0 fl      MPV 9.2 fL      Platelets 174 10*3/mm3      Neutrophil % 82.5 %      Lymphocyte % 8.3 %      Monocyte % 7.5 %      Eosinophil % 0.6 %      Basophil % 0.3 %      Immature Grans % 0.8 %      Neutrophils, Absolute 10.30 10*3/mm3      Lymphocytes, Absolute 1.04 10*3/mm3      Monocytes, Absolute 0.94 10*3/mm3      Eosinophils, Absolute 0.08 10*3/mm3      Basophils, Absolute 0.04 10*3/mm3      Immature Grans, Absolute 0.10 10*3/mm3      nRBC 0.0 /100 WBC     aPTT [[id number]]  (Abnormal) Collected: 01/05/25 2326    Specimen: Blood Updated: 01/05/25 2348     PTT 85.2 seconds     aPTT [[id number]]  (Abnormal) Collected: 01/05/25 1817    Specimen: Blood Updated: 01/05/25 1853     PTT 41.6 seconds     CK [[id number]]  (Abnormal) Collected: 01/05/25 1307    Specimen: Blood Updated: 01/05/25 1352     Creatine Kinase 2,436 U/L     Lactate Dehydrogenase [[id number]]  (Abnormal) Collected: 01/05/25 1307    Specimen: Blood Updated: 01/05/25 1337      U/L     Lactic Acid, Plasma [[id number]]  (Normal) Collected: 01/05/25 1307    Specimen: Blood Updated: 01/05/25 1333     Lactate 1.8 mmol/L  "    aPTT [[id number]]  (Normal) Collected: 01/05/25 1307    Specimen: Blood Updated: 01/05/25 1328     PTT 31.5 seconds     Protime-INR [[id number]]  (Abnormal) Collected: 01/05/25 0602    Specimen: Blood Updated: 01/05/25 1310     Protime 15.1 Seconds      INR 1.13             No radiology results for the last day    Result Review:  I have personally reviewed the results from the time of this admission to 1/6/2025 08:29 CST and agree with these findings:  [x]  Laboratory list / accordion  []  Microbiology  [x]  Radiology  []  EKG/Telemetry   []  Cardiology/Vascular   []  Pathology  []  Old records  []  Other:  Most notable findings include:       Culture Data:   No results found for: \"BLOODCX\", \"URINECX\", \"WOUNDCX\", \"MRSACX\", \"RESPCX\", \"STOOLCX\"    I have reviewed the patient's current medications.     Assessment/Plan   Assessment  Active Hospital Problems    Diagnosis     **Abdominal aortic aneurysm     AAA (abdominal aortic aneurysm)     Abdominal pain    -Ruptured infrarenal abdominal aortic aneurysm status post aortobiiliac endograft left external iliac stent  -Acute hypoxemic respiratory failure requiring mechanical ventilation  -Laryngeal edema  -Severe agitation resolved  -Suspected COPD  -Tobacco abuse  -ICU delirium resolved  -History of CVA with left-sided weakness      Plan:  -Wean down oxygen as tolerated  -Pain management with as needed Dilaudid  -Started gabapentin yesterday  -Patient is on Cozaar, hydralazine and Toprol for blood pressure control  - scheduled DuoNeb  -We will keep Le catheter for urine tension  -Discontinue heparin drip as per discussion with vascular surgery  -Start aspirin and Plavix as per vascular  -Diet  -Discussed with vascular surgery patient is stable to be transferred to the floor    Electronically signed by Shaun Gallego MD on 1/6/2025 at 08:29 CST  "

## 2025-01-07 LAB
ANION GAP SERPL CALCULATED.3IONS-SCNC: 7 MMOL/L (ref 5–15)
BASOPHILS # BLD AUTO: 0.02 10*3/MM3 (ref 0–0.2)
BASOPHILS NFR BLD AUTO: 0.2 % (ref 0–1.5)
BUN SERPL-MCNC: 15 MG/DL (ref 8–23)
BUN/CREAT SERPL: 29.4 (ref 7–25)
CALCIUM SPEC-SCNC: 8.1 MG/DL (ref 8.6–10.5)
CHLORIDE SERPL-SCNC: 103 MMOL/L (ref 98–107)
CO2 SERPL-SCNC: 27 MMOL/L (ref 22–29)
CREAT SERPL-MCNC: 0.51 MG/DL (ref 0.76–1.27)
DEPRECATED RDW RBC AUTO: 46.5 FL (ref 37–54)
EGFRCR SERPLBLD CKD-EPI 2021: 108.4 ML/MIN/1.73
EOSINOPHIL # BLD AUTO: 0.23 10*3/MM3 (ref 0–0.4)
EOSINOPHIL NFR BLD AUTO: 2.6 % (ref 0.3–6.2)
ERYTHROCYTE [DISTWIDTH] IN BLOOD BY AUTOMATED COUNT: 13.9 % (ref 12.3–15.4)
GLUCOSE SERPL-MCNC: 103 MG/DL (ref 65–99)
HCT VFR BLD AUTO: 28.1 % (ref 37.5–51)
HGB BLD-MCNC: 9.1 G/DL (ref 13–17.7)
IMM GRANULOCYTES # BLD AUTO: 0.07 10*3/MM3 (ref 0–0.05)
IMM GRANULOCYTES NFR BLD AUTO: 0.8 % (ref 0–0.5)
LYMPHOCYTES # BLD AUTO: 1.14 10*3/MM3 (ref 0.7–3.1)
LYMPHOCYTES NFR BLD AUTO: 13.1 % (ref 19.6–45.3)
MAGNESIUM SERPL-MCNC: 2.1 MG/DL (ref 1.6–2.4)
MCH RBC QN AUTO: 29.5 PG (ref 26.6–33)
MCHC RBC AUTO-ENTMCNC: 32.4 G/DL (ref 31.5–35.7)
MCV RBC AUTO: 91.2 FL (ref 79–97)
MONOCYTES # BLD AUTO: 0.72 10*3/MM3 (ref 0.1–0.9)
MONOCYTES NFR BLD AUTO: 8.3 % (ref 5–12)
NEUTROPHILS NFR BLD AUTO: 6.51 10*3/MM3 (ref 1.7–7)
NEUTROPHILS NFR BLD AUTO: 75 % (ref 42.7–76)
NRBC BLD AUTO-RTO: 0 /100 WBC (ref 0–0.2)
PLATELET # BLD AUTO: 195 10*3/MM3 (ref 140–450)
PMV BLD AUTO: 9.5 FL (ref 6–12)
POTASSIUM SERPL-SCNC: 3.7 MMOL/L (ref 3.5–5.2)
RBC # BLD AUTO: 3.08 10*6/MM3 (ref 4.14–5.8)
SODIUM SERPL-SCNC: 137 MMOL/L (ref 136–145)
WBC NRBC COR # BLD AUTO: 8.69 10*3/MM3 (ref 3.4–10.8)

## 2025-01-07 PROCEDURE — 94799 UNLISTED PULMONARY SVC/PX: CPT

## 2025-01-07 PROCEDURE — 85025 COMPLETE CBC W/AUTO DIFF WBC: CPT | Performed by: INTERNAL MEDICINE

## 2025-01-07 PROCEDURE — 97530 THERAPEUTIC ACTIVITIES: CPT

## 2025-01-07 PROCEDURE — 83735 ASSAY OF MAGNESIUM: CPT | Performed by: NURSE PRACTITIONER

## 2025-01-07 PROCEDURE — 93005 ELECTROCARDIOGRAM TRACING: CPT | Performed by: NURSE PRACTITIONER

## 2025-01-07 PROCEDURE — 93010 ELECTROCARDIOGRAM REPORT: CPT | Performed by: INTERNAL MEDICINE

## 2025-01-07 PROCEDURE — 63710000001 ONDANSETRON ODT 4 MG TABLET DISPERSIBLE: Performed by: STUDENT IN AN ORGANIZED HEALTH CARE EDUCATION/TRAINING PROGRAM

## 2025-01-07 PROCEDURE — 94760 N-INVAS EAR/PLS OXIMETRY 1: CPT

## 2025-01-07 PROCEDURE — 80048 BASIC METABOLIC PNL TOTAL CA: CPT | Performed by: INTERNAL MEDICINE

## 2025-01-07 PROCEDURE — 25010000002 HEPARIN (PORCINE) PER 1000 UNITS: Performed by: INTERNAL MEDICINE

## 2025-01-07 RX ORDER — BUDESONIDE 0.5 MG/2ML
0.5 INHALANT ORAL
Status: DISCONTINUED | OUTPATIENT
Start: 2025-01-07 | End: 2025-01-15 | Stop reason: HOSPADM

## 2025-01-07 RX ORDER — IPRATROPIUM BROMIDE AND ALBUTEROL SULFATE 2.5; .5 MG/3ML; MG/3ML
3 SOLUTION RESPIRATORY (INHALATION) EVERY 4 HOURS PRN
Status: DISCONTINUED | OUTPATIENT
Start: 2025-01-07 | End: 2025-01-15 | Stop reason: HOSPADM

## 2025-01-07 RX ORDER — HEPARIN SODIUM 5000 [USP'U]/ML
5000 INJECTION, SOLUTION INTRAVENOUS; SUBCUTANEOUS EVERY 8 HOURS SCHEDULED
Status: DISCONTINUED | OUTPATIENT
Start: 2025-01-07 | End: 2025-01-09

## 2025-01-07 RX ORDER — TAMSULOSIN HYDROCHLORIDE 0.4 MG/1
0.4 CAPSULE ORAL DAILY
Status: DISCONTINUED | OUTPATIENT
Start: 2025-01-07 | End: 2025-01-15 | Stop reason: HOSPADM

## 2025-01-07 RX ORDER — METOPROLOL TARTRATE 1 MG/ML
5 INJECTION, SOLUTION INTRAVENOUS ONCE
Status: COMPLETED | OUTPATIENT
Start: 2025-01-07 | End: 2025-01-07

## 2025-01-07 RX ADMIN — GABAPENTIN 300 MG: 300 CAPSULE ORAL at 08:38

## 2025-01-07 RX ADMIN — DOCUSATE SODIUM 50 MG AND SENNOSIDES 8.6 MG 2 TABLET: 8.6; 5 TABLET, FILM COATED ORAL at 08:38

## 2025-01-07 RX ADMIN — ASPIRIN 81 MG: 81 TABLET, CHEWABLE ORAL at 08:38

## 2025-01-07 RX ADMIN — TAMSULOSIN HYDROCHLORIDE 0.4 MG: 0.4 CAPSULE ORAL at 11:42

## 2025-01-07 RX ADMIN — HYDROCODONE BITARTRATE AND ACETAMINOPHEN 1 TABLET: 5; 325 TABLET ORAL at 08:51

## 2025-01-07 RX ADMIN — HYDRALAZINE HYDROCHLORIDE 25 MG: 50 TABLET ORAL at 20:52

## 2025-01-07 RX ADMIN — METOPROLOL TARTRATE 5 MG: 1 INJECTION, SOLUTION INTRAVENOUS at 03:38

## 2025-01-07 RX ADMIN — HEPARIN SODIUM 5000 UNITS: 5000 INJECTION, SOLUTION INTRAVENOUS; SUBCUTANEOUS at 20:52

## 2025-01-07 RX ADMIN — Medication 10 ML: at 08:39

## 2025-01-07 RX ADMIN — METOPROLOL SUCCINATE 50 MG: 50 TABLET, EXTENDED RELEASE ORAL at 08:38

## 2025-01-07 RX ADMIN — HYDROCODONE BITARTRATE AND ACETAMINOPHEN 1 TABLET: 5; 325 TABLET ORAL at 03:05

## 2025-01-07 RX ADMIN — GABAPENTIN 300 MG: 300 CAPSULE ORAL at 20:51

## 2025-01-07 RX ADMIN — Medication 10 ML: at 20:57

## 2025-01-07 RX ADMIN — CHLORHEXIDINE GLUCONATE 1 APPLICATION: 500 CLOTH TOPICAL at 03:38

## 2025-01-07 RX ADMIN — ATORVASTATIN CALCIUM 10 MG: 10 TABLET, FILM COATED ORAL at 08:38

## 2025-01-07 RX ADMIN — CLOPIDOGREL BISULFATE 75 MG: 75 TABLET ORAL at 08:38

## 2025-01-07 RX ADMIN — GABAPENTIN 300 MG: 300 CAPSULE ORAL at 15:11

## 2025-01-07 RX ADMIN — ONDANSETRON 4 MG: 4 TABLET, ORALLY DISINTEGRATING ORAL at 08:51

## 2025-01-07 RX ADMIN — LOSARTAN POTASSIUM 25 MG: 25 TABLET, FILM COATED ORAL at 08:38

## 2025-01-07 RX ADMIN — Medication 5 MG: at 20:51

## 2025-01-07 RX ADMIN — IPRATROPIUM BROMIDE AND ALBUTEROL SULFATE 3 ML: .5; 3 SOLUTION RESPIRATORY (INHALATION) at 03:40

## 2025-01-07 RX ADMIN — HYDRALAZINE HYDROCHLORIDE 25 MG: 50 TABLET ORAL at 08:38

## 2025-01-07 RX ADMIN — HEPARIN SODIUM 5000 UNITS: 5000 INJECTION, SOLUTION INTRAVENOUS; SUBCUTANEOUS at 15:11

## 2025-01-07 RX ADMIN — BUDESONIDE INHALATION 0.5 MG: 0.5 SUSPENSION RESPIRATORY (INHALATION) at 18:32

## 2025-01-07 RX ADMIN — HYDROCODONE BITARTRATE AND ACETAMINOPHEN 1 TABLET: 5; 325 TABLET ORAL at 20:51

## 2025-01-07 RX ADMIN — METOPROLOL TARTRATE 5 MG: 5 INJECTION INTRAVENOUS at 12:15

## 2025-01-07 RX ADMIN — HYDRALAZINE HYDROCHLORIDE 25 MG: 50 TABLET ORAL at 15:11

## 2025-01-07 RX ADMIN — DOCUSATE SODIUM 50 MG AND SENNOSIDES 8.6 MG 2 TABLET: 8.6; 5 TABLET, FILM COATED ORAL at 20:51

## 2025-01-07 RX ADMIN — BUDESONIDE INHALATION 0.5 MG: 0.5 SUSPENSION RESPIRATORY (INHALATION) at 10:05

## 2025-01-07 RX ADMIN — Medication 1 APPLICATION: at 08:39

## 2025-01-07 NOTE — PLAN OF CARE
Goal Outcome Evaluation:  Plan of Care Reviewed With: patient        Progress: no change  Outcome Evaluation: PT tx completed. Pt. c/o left LE pain with any touch or movement. Pt. did some AROM exercises of R LE. He was able to stand with MIN assist x 2 from chair. Worked on static standing balance-MIN assist x 1. Slight posterior lean. Pt. declined to walk without a second person to assist at this time. Will continue to work on mobility and balance.

## 2025-01-07 NOTE — THERAPY TREATMENT NOTE
Acute Care - Physical Therapy Treatment Note  [facility]     Patient Name: Brijesh Grande  : 1955  MRN: [id number]  Today's Date: 2025      Visit Dx:     ICD-10-CM ICD-9-CM   1. Aneurysm of infrarenal abdominal aorta, unspecified whether ruptured  I71.43 441.4   2. Abdominal pain, unspecified abdominal location  R10.9 789.00   3. Abdominal aortic aneurysm (AAA) without rupture, unspecified part  I71.40 441.4   4. Primary hypertension  I10 401.9   5. Impaired mobility [Z74.09]  Z74.09 799.89     Patient Active Problem List   Diagnosis    AAA (abdominal aortic aneurysm)    Abdominal aortic aneurysm    Abdominal pain     Past Medical History:   Diagnosis Date    Hypertension     Stroke      Past Surgical History:   Procedure Laterality Date    ABDOMINAL AORTIC ANEURYSM REPAIR WITH ENDOGRAFT N/A 1/3/2025    Procedure: ABDOMINAL AORTIC ANEURYSM REPAIR WITH ENDOGRAFT;  Surgeon: Eduardo Whiteside DO;  Location: [facility];  Service: Vascular;  Laterality: N/A;    CAROTID ENDARTERECTOMY      x 2     PT Assessment (Last 12 Hours)       PT Evaluation and Treatment       Row Name 25 1026          Physical Therapy Time and Intention    Subjective Information complains of;pain  -     Document Type therapy note (daily note)  -     Mode of Treatment physical therapy  -       Row Name 25 1026          General Information    Existing Precautions/Restrictions fall;other (see comments)  Left 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  -       Row Name 25 1026          Pain    Pretreatment Pain Rating 0/10 - no pain  -     Posttreatment Pain Rating 10/10  -     Pain Location extremity  -     Pain Side/Orientation left;lower  -     Pain Management Interventions exercise or physical activity utilized;premedicated for activity  -     Response to Pain Interventions activity participation with increased pain  -       Row Name 25 1026          Bed Mobility    Comment, (Bed Mobility) up in chair   -       Row Name 01/07/25 1026          Transfers    Comment, (Transfers) stood x 2  -       Row Name 01/07/25 1026          Sit-Stand Transfer    Sit-Stand [place] (Transfers) verbal cues;minimum assist (75% patient effort)  -       Row Name 01/07/25 1026          Stand-Sit Transfer    Stand-Sit [place] (Transfers) verbal cues;minimum assist (75% patient effort)  -       Row Name 01/07/25 1026          Gait/Stairs (Locomotion)    Comment, (Gait/Stairs) pt declined to attempt to walk without 2 people. 2nd person unavailable at this time.  -       Row Name 01/07/25 1026          Balance    Comment, Balance MIn assist for static standing balance-slight posterior lean. stood for 1.5 minutes each time-stood x 2  -[facility] Name 01/07/25 1026          Hip (Therapeutic Exercise)    Hip (Therapeutic Exercise) AROM (active range of motion)  -     Hip AROM (Therapeutic Exercise) right;flexion;sitting;10 repetitions  -[facility] Name 01/07/25 1026          Knee (Therapeutic Exercise)    Knee (Therapeutic Exercise) AROM (active range of motion)  -     Knee AROM (Therapeutic Exercise) right;LAQ (long arc quad);sitting;15 repititions  -       Row Name 01/07/25 1026          Ankle (Therapeutic Exercise)    Ankle (Therapeutic Exercise) AROM (active range of motion)  -     Ankle AROM (Therapeutic Exercise) right;dorsiflexion;sitting;15 repititions  -[facility] Name             Wound 01/03/25 1622 Right anterior groin    Wound - Properties Group Placement Date: 01/03/25  -AC Placement Time: 1622  -AC Side: Right  -AC Orientation: anterior  -AC Location: groin  -AC Primary Wound Type: Incision  -AC Additional Comments: PERCLOSE X2 2X2 TEGADERM  -AC    Retired Wound - Properties Group Placement Date: 01/03/25  -AC Placement Time: 1622  -AC Side: Right  -AC Orientation: anterior  -AC Location: groin  -AC Primary Wound Type: Incision  -AC Additional Comments: PERCLOSE X2 2X2 TEGADERM  -AC    Retired  Wound - Properties Group Placement Date: 01/03/25  -AC Placement Time: 1622  -AC Side: Right  -AC Orientation: anterior  -AC Location: groin  -AC Primary Wound Type: Incision  -AC Additional Comments: PERCLOSE X2 2X2 TEGADERM  -AC    Retired Wound - Properties Group Date first assessed: 01/03/25  -AC Time first assessed: 1622  -AC Side: Right  -AC Location: groin  -AC Primary Wound Type: Incision  -AC Additional Comments: PERCLOSE X2 2X2 TEGADERM  -AC      Row Name             Wound 01/03/25 1622 Left anterior groin    Wound - Properties Group Placement Date: 01/03/25  -AC Placement Time: 1622  -AC Side: Left  -AC Orientation: anterior  -AC Location: groin  -AC Primary Wound Type: Incision  -AC Additional Comments: STERISTRIPS 4X4 TEGADERM  -AC    Retired Wound - Properties Group Placement Date: 01/03/25  -AC Placement Time: 1622  -AC Side: Left  -AC Orientation: anterior  -AC Location: groin  -AC Primary Wound Type: Incision  -AC Additional Comments: STERISTRIPS 4X4 TEGADERM  -AC    Retired Wound - Properties Group Placement Date: 01/03/25  -AC Placement Time: 1622  -AC Side: Left  -AC Orientation: anterior  -AC Location: groin  -AC Primary Wound Type: Incision  -AC Additional Comments: STERISTRIPS 4X4 TEGADERM  -AC    Retired Wound - Properties Group Date first assessed: 01/03/25  -AC Time first assessed: 1622  -AC Side: Left  -AC Location: groin  -AC Primary Wound Type: Incision  -AC Additional Comments: STERISTRIPS 4X4 TEGADERM  -AC      Row Name 01/07/25 1026          Plan of Care Review    Plan of Care Reviewed With patient  -MF     Progress no change  -MF     Outcome Evaluation PT tx completed. Pt. c/o left LE pain with any touch or movement. Pt. did some AROM exercises of R LE. He was able to stand with MIN assist x 2 from chair. Worked on static standing balance-MIN assist x 1. Slight posterior lean. Pt. declined to walk without a second person to assist at this time. Will continue to work on mobility and  balance.  -       Row Name 01/07/25 1026          Positioning and Restraints    Pre-Treatment Position sitting in chair/recliner  -MF     Post Treatment Position chair  -MF     In Chair reclined;call light within reach;encouraged to call for assist  -MF               User Key  (r) = Recorded By, (t) = Taken By, (c) = Cosigned By      Initials Name Provider Type    AC Zara Balbuena, RN Registered Nurse    Gabby Rossi, PTA Physical Therapist Assistant                    Physical Therapy Education       Title: PT OT SLP Therapies (In Progress)       Topic: Physical Therapy (In Progress)       Point: Mobility training (Done)       Learning Progress Summary            Patient Acceptance, E, VU,DU,NR by NITHIN at 1/6/2025 0824    Comment: Benefits of activity, progression of PT POC,                      Point: Home exercise program (Not Started)       Learner Progress:  Not documented in this visit.              Point: Body mechanics (Not Started)       Learner Progress:  Not documented in this visit.              Point: Precautions (Not Started)       Learner Progress:  Not documented in this visit.                              User Key       Initials Effective Dates Name Provider Type Discipline    NITHIN 02/03/23 -  Francis Hung, PT DPT Physical Therapist PT                  PT Recommendation and Plan     Plan of Care Reviewed With: patient  Progress: no change  Outcome Evaluation: PT tx completed. Pt. c/o left LE pain with any touch or movement. Pt. did some AROM exercises of R LE. He was able to stand with MIN assist x 2 from chair. Worked on static standing balance-MIN assist x 1. Slight posterior lean. Pt. declined to walk without a second person to assist at this time. Will continue to work on mobility and balance.   Outcome Measures       Row Name 01/07/25 1026             How much help from another person do you currently need...    Turning from your back to your side while in flat bed without  using bedrails? 3  -MF      Moving from lying on back to sitting on the side of a flat bed without bedrails? 3  -MF      Moving to and from a bed to a chair (including a wheelchair)? 2  -MF      Standing up from a chair using your arms (e.g., wheelchair, bedside chair)? 3  -MF      Climbing 3-5 steps with a railing? 1  -MF      To walk in hospital room? 2  -MF      AM-PAC 6 Clicks Score (PT) 14  -MF         Functional Assessment    Outcome Measure Options AM-PAC 6 Clicks Basic Mobility (PT)  -MF                User Key  (r) = Recorded By, (t) = Taken By, (c) = Cosigned By      Initials Name Provider Type    Gabby Rossi PTA Physical Therapist Assistant                     Time Calculation:    PT Charges       Row Name 01/07/25 1211             Time Calculation    Start Time 1026  -MF      Stop Time 1056  -MF      Time Calculation (min) 30 min  -MF      PT Received On 01/07/25  -MF         Time Calculation- PT    Total Timed Code Minutes- PT 30 minute(s)  -MF         Timed Charges    62564 - PT Therapeutic Activity Minutes 30  -MF         Total Minutes    Timed Charges Total Minutes 30  -MF       Total Minutes 30  -MF                User Key  (r) = Recorded By, (t) = Taken By, (c) = Cosigned By      Initials Name Provider Type    Gabby Rossi PTA Physical Therapist Assistant                  Therapy Charges for Today       Code Description Service Date Service Provider Modifiers Qty    40961976803  PT THERAPEUTIC ACT EA 15 MIN 1/7/2025 Gabby Villa PTA GP 2            PT G-Codes  Outcome Measure Options: AM-PAC 6 Clicks Basic Mobility (PT)  AM-PAC 6 Clicks Score (PT): 14  AM-PAC 6 Clicks Score (OT): 15    Gabby Villa PTA  1/7/2025

## 2025-01-07 NOTE — NURSING NOTE
Unable to locate PT pulse even with doppler. 2 other RNs and APRN provider verified PT pulses absent. Vascular notified

## 2025-01-07 NOTE — PROGRESS NOTES
[facility]  INPATIENT PROGRESS NOTE    Patient Name: Brijesh Grande  Date of Admission: 1/3/2025  Today's Date: 01/07/25  Length of Stay: 4  Primary Care Physician: Eliseo Smith MD    Subjective   Chief Complaint: Abdominal pain  Abdominal Pain       69-year-old male with a past medical history of hypertension and previous stroke in January 2016 with residual left-sided weakness and abnormal sensation admitted after presenting to ED with complaints of abdominal pain, left flank pain, and back pain.  The patient is also a smoker, and he continues to smoke 2 packs/day.     Significant findings from ED include glucose elevated 194, but otherwise normal CMP.  CBC was completely normal.  UA was positive for glucose, but was otherwise normal.  CT scan of the abdomen and pelvis showed a fusiform aneurysm of the distal abdominal aorta.  There is partial lumen circumferential mural thrombus Boces of the aneurysm.  There is evidence of hematoma/hemorrhage at the posterior wall of the aneurysm.  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  Patient was noted to be hypertensive in the emergency department.     Patient is being admitted to the CCU for nicardipine infusion for blood pressure control and for close monitoring.  I saw the patient while he was still in the emergency department.  He states the pain he was having in his abdomen and back are improved.  He reports that he has had an abnormal sensation to the left side of his body since his stroke from 9 years ago.  However, he noticed very intense pain in his abdomen and back earlier this morning, which brought him to the emergency department.  He has no other complaints for me at this time      Interval history:  1/4/2025 patient is intubated sedated he is status post placement of aortobiiliac endograft for  ruptured AAA and left external iliac artery stent.  Patient sedation was weaned down this morning and patient was very agitated not following commands and had to be resedated again.  Patient is being started on Precedex and fentanyl drip.  As per discussion with vascular surgery at bedside today patient had a similar episode after surgery yesterday he was very agitated and had to be reintubated and was found to have some vocal cord edema.    1/5/2025 patient was extubated yesterday he has done very well he got agitated overnight and had to be started on Precedex which dropped his blood pressure transiently requiring Levophed.  Patient remains on Precedex 0.2 he does complain of not able to use the bathroom and had some urine retention had to have an out overnight I do think this is causing his agitation.    1/6/2025 patient remains in the ICU he is not on any drips other than heparin drip.  Discussed with vascular surgery today we are discontinuing his heparin drip and starting antiplatelets.  Patient pain is under better control and he has been off Precedex since yesterday morning.  Pulses are dopplerable.    1/7/2025 remains in intensive care and waiting for floor bed.  Patient had a run of A-fib with RVR overnight and received 1 dose of metoprolol and now is back to sinus rhythm.  Patient denies any chest pain no shortness of breath he still complains of his left leg pain.  Pulses are dopplerable.       All 12 point system review were unremarkab other than was mentioned history present illness le  Objective    Temp:  [97.9 °F (36.6 °C)-98.9 °F (37.2 °C)] 98.9 °F (37.2 °C)  Heart Rate:  [] 97  Resp:  [16-20] 18  BP: ()/() 142/78  Physical Exam  Constitutional:       Appearance: He is not ill-appearing.   HENT:      Head: Normocephalic.      Mouth/Throat:      Mouth: Mucous membranes are moist.   Eyes:      Pupils: Pupils are equal, round, and reactive to light.   Cardiovascular:      Rate and  Rhythm: Normal rate and regular rhythm.   Pulmonary:      Effort: No respiratory distress.      Breath sounds: No wheezing.   Abdominal:      General: Bowel sounds are normal. There is no distension.      Palpations: Abdomen is soft.      Tenderness: There is no abdominal tenderness.   Musculoskeletal:      Right lower leg: Edema present.      Left lower leg: Edema present.      Comments: Mild cyanosis of the left leg pulses are palpable   Skin:     General: Skin is warm.   Neurological:      Mental Status: He is oriented to person, place, and time.      Comments: Baseline left-sided weakness   Psychiatric:         Mood and Affect: Mood normal.             Results Review:  Lab Results (last 24 hours)       Procedure Component Value Units Date/Time    Basic Metabolic Panel [[id number]]  (Abnormal) Collected: 01/07/25 0547    Specimen: Blood Updated: 01/07/25 0641     Glucose 103 mg/dL      BUN 15 mg/dL      Creatinine 0.51 mg/dL      Sodium 137 mmol/L      Potassium 3.7 mmol/L      Chloride 103 mmol/L      CO2 27.0 mmol/L      Calcium 8.1 mg/dL      BUN/Creatinine Ratio 29.4     Anion Gap 7.0 mmol/L      eGFR 108.4 mL/min/1.73     Narrative:      GFR Categories in Chronic Kidney Disease (CKD)      GFR Category          GFR (mL/min/1.73)    Interpretation  G1                     90 or greater         Normal or high (1)  G2                      60-89                Mild decrease (1)  G3a                   45-59                Mild to moderate decrease  G3b                   30-44                Moderate to severe decrease  G4                    15-29                Severe decrease  G5                    14 or less           Kidney failure          (1)In the absence of evidence of kidney disease, neither GFR category G1 or G2 fulfill the criteria for CKD.    eGFR calculation 2021 CKD-EPI creatinine equation, which does not include race as a factor    Magnesium [[id number]]  (Normal) Collected: 01/07/25 0547    Specimen:  "Blood Updated: 01/07/25 0641     Magnesium 2.1 mg/dL     CBC & Differential [[id number]]  (Abnormal) Collected: 01/07/25 0547    Specimen: Blood Updated: 01/07/25 0620    Narrative:      The following orders were created for panel order CBC & Differential.  Procedure                               Abnormality         Status                     ---------                               -----------         ------                     CBC Auto Differential[[id number]]        Abnormal            Final result                 Please view results for these tests on the individual orders.    CBC Auto Differential [[id number]]  (Abnormal) Collected: 01/07/25 0547    Specimen: Blood Updated: 01/07/25 0620     WBC 8.69 10*3/mm3      RBC 3.08 10*6/mm3      Hemoglobin 9.1 g/dL      Hematocrit 28.1 %      MCV 91.2 fL      MCH 29.5 pg      MCHC 32.4 g/dL      RDW 13.9 %      RDW-SD 46.5 fl      MPV 9.5 fL      Platelets 195 10*3/mm3      Neutrophil % 75.0 %      Lymphocyte % 13.1 %      Monocyte % 8.3 %      Eosinophil % 2.6 %      Basophil % 0.2 %      Immature Grans % 0.8 %      Neutrophils, Absolute 6.51 10*3/mm3      Lymphocytes, Absolute 1.14 10*3/mm3      Monocytes, Absolute 0.72 10*3/mm3      Eosinophils, Absolute 0.23 10*3/mm3      Basophils, Absolute 0.02 10*3/mm3      Immature Grans, Absolute 0.07 10*3/mm3      nRBC 0.0 /100 WBC              No radiology results for the last day    Result Review:  I have personally reviewed the results from the time of this admission to 1/7/2025 08:48 CST and agree with these findings:  [x]  Laboratory list / accordion  []  Microbiology  [x]  Radiology  []  EKG/Telemetry   []  Cardiology/Vascular   []  Pathology  []  Old records  []  Other:  Most notable findings include:       Culture Data:   No results found for: \"BLOODCX\", \"URINECX\", \"WOUNDCX\", \"MRSACX\", \"RESPCX\", \"STOOLCX\"    I have reviewed the patient's current medications.     Assessment/Plan   Assessment  Active Hospital Problems    " Diagnosis     **Abdominal aortic aneurysm     AAA (abdominal aortic aneurysm)     Abdominal pain    -Ruptured infrarenal abdominal aortic aneurysm status post aortobiiliac endograft left external iliac stent  -Acute hypoxemic respiratory failure requiring mechanical ventilation  -Laryngeal edema  -Severe agitation resolved  -Suspected COPD  -Tobacco abuse  -ICU delirium resolved  -History of CVA with left-sided weakness      Plan:  -Wean down oxygen as tolerated  -Pain management with as needed Dilaudid  -Continue gabapentin   -Patient is on Cozaar, hydralazine and Toprol for blood pressure control  -I will change scheduled DuoNebs to as needed  -Start Pulmicort  -We will keep Le catheter for urine retension  - aspirin and Plavix as per vascular  -Diet  -Waiting for floor bed, ICU standpoint discussed with Dr. De La Fuente patient will be taking care of by .     Electronically signed by Shaun Gallego MD on 1/7/2025 at 08:48 CST

## 2025-01-07 NOTE — PLAN OF CARE
Pt a&o x4, afebrile, SR most of the shift but converted to afib RVR appx 0330, EKG and 5 of metoprolol, converted back to SR appx 0430. Normotensive. Sats maintained on 2 L BNC. 1 small BM, UOP adequate in FC. Pt has brittaney groin sites. R groin site soft and nontender, minimal drainage. L groin site has some firmness, ecchymosis, and tenderness. Pt reports frequent pain r/t R groin site. LLE discolored, cool, DP pulse weak with doppler, PT pulse absent, vascular notified.     Problem: Adult Inpatient Plan of Care  Goal: Plan of Care Review  Outcome: Progressing  Goal: Patient-Specific Goal (Individualized)  Outcome: Progressing  Goal: Absence of Hospital-Acquired Illness or Injury  Outcome: Progressing  Intervention: Identify and Manage Fall Risk  Recent Flowsheet Documentation  Taken 1/7/2025 0400 by Corinna Yanez RN  Safety Promotion/Fall Prevention: safety round/check completed  Taken 1/7/2025 0300 by Corinna Yanez RN  Safety Promotion/Fall Prevention: safety round/check completed  Taken 1/7/2025 0200 by Corinna Yanez RN  Safety Promotion/Fall Prevention: safety round/check completed  Taken 1/7/2025 0100 by Corinna Yanez RN  Safety Promotion/Fall Prevention: safety round/check completed  Taken 1/7/2025 0000 by Corinna Yanez RN  Safety Promotion/Fall Prevention: safety round/check completed  Taken 1/6/2025 2300 by Corinna Yanez RN  Safety Promotion/Fall Prevention: safety round/check completed  Taken 1/6/2025 2200 by Corinna Yanez RN  Safety Promotion/Fall Prevention: safety round/check completed  Taken 1/6/2025 2100 by Corinna Yanez RN  Safety Promotion/Fall Prevention: safety round/check completed  Taken 1/6/2025 2000 by Corinna Yanez RN  Safety Promotion/Fall Prevention: safety round/check completed  Taken 1/6/2025 1900 by Corinna Yanez RN  Safety Promotion/Fall Prevention: safety round/check completed  Intervention: Prevent Skin Injury  Recent Flowsheet Documentation  Taken 1/7/2025  0300 by Corinna Yanez RN  Body Position:   turned   supine  Taken 1/7/2025 0100 by Corinna Yanez RN  Body Position:   turned   right  Taken 1/6/2025 2300 by Corinna Yanez RN  Body Position:   turned   left  Taken 1/6/2025 2100 by Corinna Yanez RN  Body Position:   turned   right  Taken 1/6/2025 1900 by Corinna Yanez RN  Body Position:   turned   supine  Intervention: Prevent and Manage VTE (Venous Thromboembolism) Risk  Recent Flowsheet Documentation  Taken 1/6/2025 2300 by Corinna Yanez RN  VTE Prevention/Management: (See MAR) other (see comments)  Taken 1/6/2025 2000 by Corinna Yanez RN  VTE Prevention/Management: (See MAR) other (see comments)  Intervention: Prevent Infection  Recent Flowsheet Documentation  Taken 1/7/2025 0400 by Corinna Yanez RN  Infection Prevention:   single patient room provided   rest/sleep promoted  Taken 1/7/2025 0300 by Corinna Yanez RN  Infection Prevention:   single patient room provided   rest/sleep promoted  Taken 1/7/2025 0200 by Corinna Yanez RN  Infection Prevention:   single patient room provided   rest/sleep promoted  Taken 1/7/2025 0100 by Corinna Yanez RN  Infection Prevention:   single patient room provided   rest/sleep promoted  Taken 1/7/2025 0000 by Corinna Yanez RN  Infection Prevention:   single patient room provided   rest/sleep promoted  Taken 1/6/2025 2300 by Corinna Yanez RN  Infection Prevention:   single patient room provided   rest/sleep promoted  Taken 1/6/2025 2200 by Corinna Yanez RN  Infection Prevention:   single patient room provided   rest/sleep promoted  Taken 1/6/2025 2100 by Corinna Yanez RN  Infection Prevention:   single patient room provided   rest/sleep promoted  Taken 1/6/2025 2000 by Corinna Yanez RN  Infection Prevention:   single patient room provided   rest/sleep promoted  Taken 1/6/2025 1900 by Corinna Yanez RN  Infection Prevention:   single patient room provided   rest/sleep promoted  Goal:  Optimal Comfort and Wellbeing  Outcome: Progressing  Intervention: Monitor Pain and Promote Comfort  Recent Flowsheet Documentation  Taken 1/6/2025 2000 by Corinna Yanez RN  Pain Management Interventions:   position adjusted   pain management plan reviewed with patient/caregiver  Taken 1/6/2025 1900 by Corinna Yanez RN  Pain Management Interventions: pain management plan reviewed with patient/caregiver  Intervention: Provide Person-Centered Care  Recent Flowsheet Documentation  Taken 1/6/2025 2000 by Corinna Yanez RN  Trust Relationship/Rapport:   care explained   thoughts/feelings acknowledged  Goal: Readiness for Transition of Care  Outcome: Progressing   Goal Outcome Evaluation:

## 2025-01-07 NOTE — PROGRESS NOTES
LOS: 4 days   Patient Care Team:  Eliseo Smith MD as PCP - General  Eliseo Smith MD as PCP - Family Medicine    Chief Complaint:  Post op day #4-EVAR    Subjective     Patient Complaints: Doing well.  Reports mild left groin pain as expected.  Denies any left lower extremity pain this a.m.  Patient tolerating p.o. intake this morning.  Did report mild nausea.  Which is since resolved.  Passing flatus.  No BM yet.  Denies any abdominal pain    Objective     Vital Signs  Temp:  [97.9 °F (36.6 °C)-98.9 °F (37.2 °C)] 98.9 °F (37.2 °C)  Heart Rate:  [] 84  Resp:  [16-24] 18  BP: ()/() 127/46    Physical Exam  Constitutional:       Appearance: Normal appearance. He is not ill-appearing or toxic-appearing.   HENT:      Head: Normocephalic and atraumatic.   Cardiovascular:      Rate and Rhythm: Normal rate and regular rhythm.      Pulses:           Dorsalis pedis pulses are 2+ on the right side and detected w/ Doppler on the left side.        Posterior tibial pulses are 2+ on the right side and detected w/ Doppler on the left side.   Pulmonary:      Effort: Pulmonary effort is normal. No respiratory distress.   Abdominal:      Palpations: Abdomen is soft.      Tenderness: There is no abdominal tenderness. There is no guarding.      Comments: Mild distention resolved.   Musculoskeletal:         General: No swelling or tenderness.      Cervical back: Normal range of motion and neck supple.      Comments: Motor intact bilateral lower extremities   Skin:     General: Skin is warm and dry.      Comments: Left foot warm.  Brisk cap refill coloration stable.   Neurological:      Mental Status: He is alert. Mental status is at baseline.         Laboratory Data:   Results from last 7 days   Lab Units 01/07/25  0547 01/06/25  0625 01/05/25  0602   WBC 10*3/mm3 8.69 12.50* 13.65*   HEMOGLOBIN g/dL 9.1* 9.1* 9.8*   HEMATOCRIT % 28.1* 28.6* 30.7*   PLATELETS 10*3/mm3 195 174 161       Results from  last 7 days   Lab Units 01/07/25  0547 01/06/25  0625 01/05/25  0602 01/04/25  0721 01/04/25  0600 01/03/25  1530 01/03/25  0309   SODIUM mmol/L 137 143 138   < > 135*   < > 139   SODIUM, ARTERIAL   --   --   --   --   --    < >  --    POTASSIUM mmol/L 3.7 4.4 4.4   < > 4.5   < > 3.9   CHLORIDE mmol/L 103 109* 106   < > 100   < > 101   CO2 mmol/L 27.0 25.0 24.0   < > 24.0   < > 28.0   BUN mg/dL 15 17 16   < > 13   < > 13   CREATININE mg/dL 0.51* 0.69* 0.67*   < > 0.92   < > 0.92   CALCIUM mg/dL 8.1* 8.2* 8.2*   < > 8.2*   < > 9.1   BILIRUBIN mg/dL  --   --   --   --  0.3  --  0.3   ALK PHOS U/L  --   --   --   --  73  --  106   ALT (SGPT) U/L  --   --   --   --  10  --  14   AST (SGOT) U/L  --   --   --   --  29  --  16   GLUCOSE mg/dL 103* 112* 145*   < > 164*   < > 184*    < > = values in this interval not displayed.     Results from last 7 days   Lab Units 01/06/25  0625 01/05/25  2326 01/05/25  1817 01/05/25  1307 01/05/25  0602 01/04/25  1152 01/04/25  0600 01/03/25  2331   PROTIME Seconds  --   --   --   --  15.1*  --  14.8 14.9*   INR   --   --   --   --  1.13*  --  1.10* 1.11*   APTT seconds 47.9* 85.2* 41.6*   < > 79.7*   < > 82.3* 40.6*    < > = values in this interval not displayed.            Medication Review: Reviewed    Assessment & Plan       Abdominal aortic aneurysm    AAA (abdominal aortic aneurysm)    Abdominal pain    69-year-old male postop day 3 EVAR with extension to left external iliac for rupture.      Plan for disposition:  -Intensivist on board.  Appreciate their assistance.  -Hemoglobin stable  -Short run of A-fib overnight resolved with metoprolol.  -Attention resolved continue to monitor.  -Continue gabapentin  -Continue diet.  Advised patient to withhold from eating if nausea returns.  -Continue aspirin Plavix.  -PT OT encouraged.  -Okay for floor.    Timmy Finney MD  Vascular Surgery  [phone]  01/07/25  10:36 CST

## 2025-01-08 LAB
ANION GAP SERPL CALCULATED.3IONS-SCNC: 8 MMOL/L (ref 5–15)
BASOPHILS # BLD AUTO: 0.03 10*3/MM3 (ref 0–0.2)
BASOPHILS NFR BLD AUTO: 0.4 % (ref 0–1.5)
BUN SERPL-MCNC: 15 MG/DL (ref 8–23)
BUN/CREAT SERPL: 26.3 (ref 7–25)
CALCIUM SPEC-SCNC: 8.2 MG/DL (ref 8.6–10.5)
CHLORIDE SERPL-SCNC: 103 MMOL/L (ref 98–107)
CO2 SERPL-SCNC: 29 MMOL/L (ref 22–29)
CREAT SERPL-MCNC: 0.57 MG/DL (ref 0.76–1.27)
DEPRECATED RDW RBC AUTO: 47.7 FL (ref 37–54)
EGFRCR SERPLBLD CKD-EPI 2021: 104.8 ML/MIN/1.73
EOSINOPHIL # BLD AUTO: 0.29 10*3/MM3 (ref 0–0.4)
EOSINOPHIL NFR BLD AUTO: 3.7 % (ref 0.3–6.2)
ERYTHROCYTE [DISTWIDTH] IN BLOOD BY AUTOMATED COUNT: 13.9 % (ref 12.3–15.4)
GLUCOSE SERPL-MCNC: 108 MG/DL (ref 65–99)
HCT VFR BLD AUTO: 29.8 % (ref 37.5–51)
HGB BLD-MCNC: 9.3 G/DL (ref 13–17.7)
IMM GRANULOCYTES # BLD AUTO: 0.07 10*3/MM3 (ref 0–0.05)
IMM GRANULOCYTES NFR BLD AUTO: 0.9 % (ref 0–0.5)
LYMPHOCYTES # BLD AUTO: 1.24 10*3/MM3 (ref 0.7–3.1)
LYMPHOCYTES NFR BLD AUTO: 15.7 % (ref 19.6–45.3)
MCH RBC QN AUTO: 29 PG (ref 26.6–33)
MCHC RBC AUTO-ENTMCNC: 31.2 G/DL (ref 31.5–35.7)
MCV RBC AUTO: 92.8 FL (ref 79–97)
MONOCYTES # BLD AUTO: 0.71 10*3/MM3 (ref 0.1–0.9)
MONOCYTES NFR BLD AUTO: 9 % (ref 5–12)
NEUTROPHILS NFR BLD AUTO: 5.54 10*3/MM3 (ref 1.7–7)
NEUTROPHILS NFR BLD AUTO: 70.3 % (ref 42.7–76)
NRBC BLD AUTO-RTO: 0 /100 WBC (ref 0–0.2)
PLATELET # BLD AUTO: 222 10*3/MM3 (ref 140–450)
PMV BLD AUTO: 8.9 FL (ref 6–12)
POTASSIUM SERPL-SCNC: 4 MMOL/L (ref 3.5–5.2)
RBC # BLD AUTO: 3.21 10*6/MM3 (ref 4.14–5.8)
SODIUM SERPL-SCNC: 140 MMOL/L (ref 136–145)
WBC NRBC COR # BLD AUTO: 7.88 10*3/MM3 (ref 3.4–10.8)

## 2025-01-08 PROCEDURE — 94664 DEMO&/EVAL PT USE INHALER: CPT

## 2025-01-08 PROCEDURE — 25010000002 ENOXAPARIN PER 10 MG: Performed by: NURSE PRACTITIONER

## 2025-01-08 PROCEDURE — 94761 N-INVAS EAR/PLS OXIMETRY MLT: CPT

## 2025-01-08 PROCEDURE — 94799 UNLISTED PULMONARY SVC/PX: CPT

## 2025-01-08 PROCEDURE — 25010000002 MORPHINE PER 10 MG: Performed by: HOSPITALIST

## 2025-01-08 PROCEDURE — 25010000002 HEPARIN (PORCINE) PER 1000 UNITS: Performed by: INTERNAL MEDICINE

## 2025-01-08 PROCEDURE — 85025 COMPLETE CBC W/AUTO DIFF WBC: CPT | Performed by: INTERNAL MEDICINE

## 2025-01-08 PROCEDURE — 80048 BASIC METABOLIC PNL TOTAL CA: CPT | Performed by: INTERNAL MEDICINE

## 2025-01-08 PROCEDURE — 99222 1ST HOSP IP/OBS MODERATE 55: CPT | Performed by: INTERNAL MEDICINE

## 2025-01-08 PROCEDURE — 97530 THERAPEUTIC ACTIVITIES: CPT

## 2025-01-08 RX ORDER — POLYETHYLENE GLYCOL 3350 17 G/17G
17 POWDER, FOR SOLUTION ORAL 2 TIMES DAILY
Status: DISCONTINUED | OUTPATIENT
Start: 2025-01-08 | End: 2025-01-13

## 2025-01-08 RX ORDER — METOPROLOL SUCCINATE 50 MG/1
100 TABLET, EXTENDED RELEASE ORAL
Status: DISCONTINUED | OUTPATIENT
Start: 2025-01-09 | End: 2025-01-08

## 2025-01-08 RX ORDER — CARVEDILOL 6.25 MG/1
12.5 TABLET ORAL 2 TIMES DAILY WITH MEALS
Status: DISCONTINUED | OUTPATIENT
Start: 2025-01-08 | End: 2025-01-09

## 2025-01-08 RX ORDER — ENOXAPARIN SODIUM 100 MG/ML
1 INJECTION SUBCUTANEOUS EVERY 12 HOURS SCHEDULED
Status: DISCONTINUED | OUTPATIENT
Start: 2025-01-08 | End: 2025-01-15 | Stop reason: HOSPADM

## 2025-01-08 RX ORDER — HYDROCODONE BITARTRATE AND ACETAMINOPHEN 5; 325 MG/1; MG/1
2 TABLET ORAL EVERY 6 HOURS PRN
Status: DISPENSED | OUTPATIENT
Start: 2025-01-08 | End: 2025-01-08

## 2025-01-08 RX ORDER — METOPROLOL TARTRATE 1 MG/ML
5 INJECTION, SOLUTION INTRAVENOUS ONCE
Status: COMPLETED | OUTPATIENT
Start: 2025-01-08 | End: 2025-01-08

## 2025-01-08 RX ORDER — HYDROCODONE BITARTRATE AND ACETAMINOPHEN 5; 325 MG/1; MG/1
1 TABLET ORAL EVERY 6 HOURS PRN
Status: DISPENSED | OUTPATIENT
Start: 2025-01-08 | End: 2025-01-09

## 2025-01-08 RX ORDER — LOSARTAN POTASSIUM 50 MG/1
50 TABLET ORAL DAILY
Status: DISCONTINUED | OUTPATIENT
Start: 2025-01-09 | End: 2025-01-11

## 2025-01-08 RX ORDER — HYDRALAZINE HYDROCHLORIDE 25 MG/1
25 TABLET, FILM COATED ORAL 3 TIMES DAILY PRN
Status: DISCONTINUED | OUTPATIENT
Start: 2025-01-08 | End: 2025-01-15 | Stop reason: HOSPADM

## 2025-01-08 RX ORDER — BISACODYL 5 MG/1
10 TABLET, DELAYED RELEASE ORAL DAILY
Status: DISCONTINUED | OUTPATIENT
Start: 2025-01-09 | End: 2025-01-14

## 2025-01-08 RX ORDER — MORPHINE SULFATE 2 MG/ML
2 INJECTION, SOLUTION INTRAMUSCULAR; INTRAVENOUS EVERY 4 HOURS PRN
Status: DISPENSED | OUTPATIENT
Start: 2025-01-08 | End: 2025-01-13

## 2025-01-08 RX ORDER — LACTULOSE 10 G/15ML
20 SOLUTION ORAL 3 TIMES DAILY
Status: DISCONTINUED | OUTPATIENT
Start: 2025-01-08 | End: 2025-01-13

## 2025-01-08 RX ORDER — CARVEDILOL 6.25 MG/1
6.25 TABLET ORAL 2 TIMES DAILY WITH MEALS
Status: DISCONTINUED | OUTPATIENT
Start: 2025-01-08 | End: 2025-01-08

## 2025-01-08 RX ORDER — PANTOPRAZOLE SODIUM 40 MG/1
40 TABLET, DELAYED RELEASE ORAL
Status: DISCONTINUED | OUTPATIENT
Start: 2025-01-08 | End: 2025-01-15 | Stop reason: HOSPADM

## 2025-01-08 RX ORDER — BISACODYL 10 MG
10 SUPPOSITORY, RECTAL RECTAL ONCE
Status: COMPLETED | OUTPATIENT
Start: 2025-01-08 | End: 2025-01-08

## 2025-01-08 RX ADMIN — BUDESONIDE INHALATION 0.5 MG: 0.5 SUSPENSION RESPIRATORY (INHALATION) at 06:17

## 2025-01-08 RX ADMIN — LACTULOSE 20 G: 20 SOLUTION ORAL at 15:00

## 2025-01-08 RX ADMIN — LACTULOSE 20 G: 20 SOLUTION ORAL at 21:07

## 2025-01-08 RX ADMIN — BUDESONIDE INHALATION 0.5 MG: 0.5 SUSPENSION RESPIRATORY (INHALATION) at 19:00

## 2025-01-08 RX ADMIN — Medication 10 ML: at 08:48

## 2025-01-08 RX ADMIN — TAMSULOSIN HYDROCHLORIDE 0.4 MG: 0.4 CAPSULE ORAL at 08:45

## 2025-01-08 RX ADMIN — CLOPIDOGREL BISULFATE 75 MG: 75 TABLET ORAL at 08:45

## 2025-01-08 RX ADMIN — POLYETHYLENE GLYCOL 3350 17 G: 17 POWDER, FOR SOLUTION ORAL at 08:46

## 2025-01-08 RX ADMIN — Medication 10 ML: at 21:07

## 2025-01-08 RX ADMIN — METOPROLOL SUCCINATE 50 MG: 50 TABLET, EXTENDED RELEASE ORAL at 08:43

## 2025-01-08 RX ADMIN — BISACODYL 10 MG: 10 SUPPOSITORY RECTAL at 22:53

## 2025-01-08 RX ADMIN — HEPARIN SODIUM 5000 UNITS: 5000 INJECTION, SOLUTION INTRAVENOUS; SUBCUTANEOUS at 05:06

## 2025-01-08 RX ADMIN — HYDROCODONE BITARTRATE AND ACETAMINOPHEN 2 TABLET: 5; 325 TABLET ORAL at 14:59

## 2025-01-08 RX ADMIN — ENOXAPARIN SODIUM 100 MG: 100 INJECTION SUBCUTANEOUS at 14:56

## 2025-01-08 RX ADMIN — GABAPENTIN 300 MG: 300 CAPSULE ORAL at 21:05

## 2025-01-08 RX ADMIN — LOSARTAN POTASSIUM 25 MG: 25 TABLET, FILM COATED ORAL at 08:44

## 2025-01-08 RX ADMIN — HYDROCODONE BITARTRATE AND ACETAMINOPHEN 1 TABLET: 5; 325 TABLET ORAL at 23:33

## 2025-01-08 RX ADMIN — HYDROCODONE BITARTRATE AND ACETAMINOPHEN 1 TABLET: 5; 325 TABLET ORAL at 08:45

## 2025-01-08 RX ADMIN — ASPIRIN 81 MG: 81 TABLET, CHEWABLE ORAL at 08:43

## 2025-01-08 RX ADMIN — CARVEDILOL 12.5 MG: 6.25 TABLET, FILM COATED ORAL at 18:18

## 2025-01-08 RX ADMIN — ENOXAPARIN SODIUM 100 MG: 100 INJECTION SUBCUTANEOUS at 21:06

## 2025-01-08 RX ADMIN — POLYETHYLENE GLYCOL 3350 17 G: 17 POWDER, FOR SOLUTION ORAL at 21:07

## 2025-01-08 RX ADMIN — HYDROCODONE BITARTRATE AND ACETAMINOPHEN 1 TABLET: 5; 325 TABLET ORAL at 03:01

## 2025-01-08 RX ADMIN — GABAPENTIN 300 MG: 300 CAPSULE ORAL at 08:45

## 2025-01-08 RX ADMIN — BISACODYL 5 MG: 5 TABLET, COATED ORAL at 08:46

## 2025-01-08 RX ADMIN — METOPROLOL TARTRATE 5 MG: 1 INJECTION, SOLUTION INTRAVENOUS at 02:11

## 2025-01-08 RX ADMIN — HYDRALAZINE HYDROCHLORIDE 25 MG: 50 TABLET ORAL at 08:44

## 2025-01-08 RX ADMIN — ATORVASTATIN CALCIUM 10 MG: 10 TABLET, FILM COATED ORAL at 08:45

## 2025-01-08 RX ADMIN — Medication 10 ML: at 08:47

## 2025-01-08 RX ADMIN — MORPHINE SULFATE 2 MG: 2 INJECTION, SOLUTION INTRAMUSCULAR; INTRAVENOUS at 21:00

## 2025-01-08 RX ADMIN — GABAPENTIN 300 MG: 300 CAPSULE ORAL at 15:00

## 2025-01-08 RX ADMIN — DOCUSATE SODIUM 50 MG AND SENNOSIDES 8.6 MG 2 TABLET: 8.6; 5 TABLET, FILM COATED ORAL at 08:43

## 2025-01-08 NOTE — PROGRESS NOTES
LOS: 5 days   Patient Care Team:  Eliseo Smith MD as PCP - General  Eliseo Smith MD as PCP - Family Medicine    Chief Complaint:  Post op day #5-EVAR    Subjective     Patient Complaints: Doing okay.  Reports left lower extremity burning pain this a.m.  Denies abdominal pain denies nausea vomiting.  Positive flatus awaiting return of bowel function    Objective     Vital Signs  Temp:  [97.8 °F (36.6 °C)-98.6 °F (37 °C)] 98.1 °F (36.7 °C)  Heart Rate:  [] 136  Resp:  [15-24] 16  BP: (108-177)/(46-96) 177/96    Physical Exam  Constitutional:       Appearance: Normal appearance. He is not ill-appearing or toxic-appearing.   HENT:      Head: Normocephalic and atraumatic.   Cardiovascular:      Rate and Rhythm: Normal rate and regular rhythm.      Pulses:           Dorsalis pedis pulses are 2+ on the right side and detected w/ Doppler on the left side.        Posterior tibial pulses are 2+ on the right side and detected w/ Doppler on the left side.   Pulmonary:      Effort: Pulmonary effort is normal. No respiratory distress.   Abdominal:      Palpations: Abdomen is soft.      Tenderness: There is no abdominal tenderness. There is no guarding.   Musculoskeletal:         General: No swelling or tenderness.      Cervical back: Normal range of motion and neck supple.      Comments: Motor intact bilateral lower extremities   Skin:     General: Skin is warm and dry.      Comments: Left foot warm.  Brisk cap refill coloration stable.   Neurological:      Mental Status: He is alert. Mental status is at baseline.         Laboratory Data:   Results from last 7 days   Lab Units 01/08/25  0126 01/07/25  0547 01/06/25  0625   WBC 10*3/mm3 7.88 8.69 12.50*   HEMOGLOBIN g/dL 9.3* 9.1* 9.1*   HEMATOCRIT % 29.8* 28.1* 28.6*   PLATELETS 10*3/mm3 222 195 174       Results from last 7 days   Lab Units 01/08/25  0126 01/07/25  0547 01/06/25  0625 01/04/25  0721 01/04/25  0600 01/03/25  1530 01/03/25  0309   SODIUM  mmol/L 140 137 143   < > 135*   < > 139   SODIUM, ARTERIAL   --   --   --   --   --    < >  --    POTASSIUM mmol/L 4.0 3.7 4.4   < > 4.5   < > 3.9   CHLORIDE mmol/L 103 103 109*   < > 100   < > 101   CO2 mmol/L 29.0 27.0 25.0   < > 24.0   < > 28.0   BUN mg/dL 15 15 17   < > 13   < > 13   CREATININE mg/dL 0.57* 0.51* 0.69*   < > 0.92   < > 0.92   CALCIUM mg/dL 8.2* 8.1* 8.2*   < > 8.2*   < > 9.1   BILIRUBIN mg/dL  --   --   --   --  0.3  --  0.3   ALK PHOS U/L  --   --   --   --  73  --  106   ALT (SGPT) U/L  --   --   --   --  10  --  14   AST (SGOT) U/L  --   --   --   --  29  --  16   GLUCOSE mg/dL 108* 103* 112*   < > 164*   < > 184*    < > = values in this interval not displayed.     Results from last 7 days   Lab Units 01/06/25  0625 01/05/25  2326 01/05/25  1817 01/05/25  1307 01/05/25  0602 01/04/25  1152 01/04/25  0600 01/03/25  2331   PROTIME Seconds  --   --   --   --  15.1*  --  14.8 14.9*   INR   --   --   --   --  1.13*  --  1.10* 1.11*   APTT seconds 47.9* 85.2* 41.6*   < > 79.7*   < > 82.3* 40.6*    < > = values in this interval not displayed.            Medication Review: Reviewed    Assessment & Plan       Abdominal aortic aneurysm    AAA (abdominal aortic aneurysm)    Abdominal pain    69-year-old male postop day 5 EVAR with extension to left external iliac for rupture.      Plan for disposition:  -Hospitalist on board appreciate their assistance.  -Episode of A-fib overnight per hospitalist note given metoprolol.  This resolved.  Elevated heart rate x 1 this a.m.  Will defer to hospitalist.  -Hemoglobin stable  -Left lower extremity pain with stable coloration signals.  From his description this appears to be reperfusion pain continue gabapentin.  -Continue diet.    -Continue aspirin Plavix.  If heparin drip or anticoagulation needed for A-fib.  Okay to DC Plavix.  -PT OT encouraged.  -Awaiting return of bowel function.    Timmy Finney MD  Vascular Surgery  [phone]  01/08/25  08:59 CST

## 2025-01-08 NOTE — PLAN OF CARE
Goal Outcome Evaluation:                 Patient alert and oriented x4. Severe pain in left leg being treated with prn medications. Pivoted to chair and bed side commode today. Severely constipated, treated with prn medications.

## 2025-01-08 NOTE — SIGNIFICANT NOTE
I was called on this patient for having an episode of A-fib with RVR with heart rate in the 130s.  As noted in his chart, this happened yesterday in the ICU as well and he was given a dose of metoprolol.  I placed a dose of Lopressor 5 mg IV once and will continue to follow.

## 2025-01-08 NOTE — CASE MANAGEMENT/SOCIAL WORK
Continued Stay Note   [place]     Patient Name: Brijesh Grande  MRN: [id number]  Today's Date: 1/8/2025    Admit Date: 1/3/2025    Plan: Rehab   Discharge Plan       Row Name 01/08/25 1526       Plan    Plan Rehab    Plan Comments [facility] Bed unable to take pt. Informed his son Alexis [phone] and he is asking for a referral to [facility]. Asked for a back up in case they are unable to take and he is going to talk with pt. Referral being sent.                   Discharge Codes    No documentation.                       TALI Frederick

## 2025-01-08 NOTE — THERAPY TREATMENT NOTE
Acute Care - Physical Therapy Treatment Note  [facility]     Patient Name: Brijesh Grande  : 1955  MRN: [id number]  Today's Date: 2025      Visit Dx:     ICD-10-CM ICD-9-CM   1. Aneurysm of infrarenal abdominal aorta, unspecified whether ruptured  I71.43 441.4   2. Abdominal pain, unspecified abdominal location  R10.9 789.00   3. Abdominal aortic aneurysm (AAA) without rupture, unspecified part  I71.40 441.4   4. Primary hypertension  I10 401.9   5. Impaired mobility [Z74.09]  Z74.09 799.89     Patient Active Problem List   Diagnosis    AAA (abdominal aortic aneurysm)    Abdominal aortic aneurysm    Abdominal pain     Past Medical History:   Diagnosis Date    Hypertension     Stroke      Past Surgical History:   Procedure Laterality Date    ABDOMINAL AORTIC ANEURYSM REPAIR WITH ENDOGRAFT N/A 1/3/2025    Procedure: ABDOMINAL AORTIC ANEURYSM REPAIR WITH ENDOGRAFT;  Surgeon: Eduardo Whiteside DO;  Location: [facility];  Service: Vascular;  Laterality: N/A;    CAROTID ENDARTERECTOMY      x 2     PT Assessment (Last 12 Hours)       PT Evaluation and Treatment       Row Name 25 1131          Physical Therapy Time and Intention    Subjective Information complains of;pain  -BARON     Document Type therapy note (daily note)  -BARON     Mode of Treatment physical therapy  -       Row Name 25 1131          General Information    Existing Precautions/Restrictions fall  left 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  -       Row Name 25 1131          Pain    Pretreatment Pain Rating 0/10 - no pain  -BARON     Posttreatment Pain Rating 10/10  -BARON     Pain Location extremity  -BARON     Pain Side/Orientation left;lower  -BARON     Pain Management Interventions exercise or physical activity utilized  -BARON     Response to Pain Interventions activity participation with increased pain  -BARON     Pre/Posttreatment Pain Comment severe pain to touch and weight bearing  -       Row Name 25 1131          Bed Mobility    Bed  Mobility supine-sit  -BARON     Supine-Sit [place] (Bed Mobility) verbal cues;minimum assist (75% patient effort)  -BARON       Row Name 01/08/25 1131          Transfers    Comment, (Transfers) stood x 2, transfered to the right from bed to chair.  Pt could not tolerate more, due to increase pain in LLE  -BARON       Row Name 01/08/25 1131          Bed-Chair Transfer    Bed-Chair [place] (Transfers) verbal cues;minimum assist (75% patient effort)  -BARON       Row Name 01/08/25 1131          Sit-Stand Transfer    Sit-Stand [place] (Transfers) verbal cues;minimum assist (75% patient effort)  -BARON     Assistive Device (Sit-Stand Transfers) --  HHA  -BARON       Row Name 01/08/25 1131          Stand-Sit Transfer    Stand-Sit [place] (Transfers) verbal cues;minimum assist (75% patient effort)  -BARON       Row Name             Wound 01/03/25 1622 Right anterior groin    Wound - Properties Group Placement Date: 01/03/25  -AC Placement Time: 1622  -AC Side: Right  -AC Orientation: anterior  -AC Location: groin  -AC Primary Wound Type: Incision  -AC Additional Comments: PERCLOSE X2 2X2 TEGADERM  -AC    Retired Wound - Properties Group Placement Date: 01/03/25  -AC Placement Time: 1622  -AC Side: Right  -AC Orientation: anterior  -AC Location: groin  -AC Primary Wound Type: Incision  -AC Additional Comments: PERCLOSE X2 2X2 TEGADERM  -AC    Retired Wound - Properties Group Placement Date: 01/03/25  -AC Placement Time: 1622  -AC Side: Right  -AC Orientation: anterior  -AC Location: groin  -AC Primary Wound Type: Incision  -AC Additional Comments: PERCLOSE X2 2X2 TEGADERM  -AC    Retired Wound - Properties Group Date first assessed: 01/03/25  -AC Time first assessed: 1622  -AC Side: Right  -AC Location: groin  -AC Primary Wound Type: Incision  -AC Additional Comments: PERCLOSE X2 2X2 TEGADERM  -AC      Row Name             Wound 01/03/25 1622 Left anterior groin    Wound - Properties Group Placement Date: 01/03/25  -AC  Placement Time: 1622 -AC Side: Left  -AC Orientation: anterior  -AC Location: groin  -AC Primary Wound Type: Incision  -AC Additional Comments: STERISTRIPS 4X4 TEGADERM  -AC    Retired Wound - Properties Group Placement Date: 01/03/25  -AC Placement Time: 1622 -AC Side: Left  -AC Orientation: anterior  -AC Location: groin  -AC Primary Wound Type: Incision  -AC Additional Comments: STERISTRIPS 4X4 TEGADERM  -AC    Retired Wound - Properties Group Placement Date: 01/03/25  -AC Placement Time: 1622 -AC Side: Left  -AC Orientation: anterior  -AC Location: groin  -AC Primary Wound Type: Incision  -AC Additional Comments: STERISTRIPS 4X4 TEGADERM  -AC    Retired Wound - Properties Group Date first assessed: 01/03/25  -AC Time first assessed: 1622 -AC Side: Left  -AC Location: groin  -AC Primary Wound Type: Incision  -AC Additional Comments: STERISTRIPS 4X4 TEGADERM  -AC      Row Name 01/08/25 1131          Positioning and Restraints    Pre-Treatment Position in bed  -BARON     Post Treatment Position chair  -BARON     In Chair reclined;call light within reach;encouraged to call for assist  -BARON               User Key  (r) = Recorded By, (t) = Taken By, (c) = Cosigned By      Initials Name Provider Type    Maurice Nichols PTA Physical Therapist Assistant    Zara Reid RN Registered Nurse                    Physical Therapy Education       Title: PT OT SLP Therapies (In Progress)       Topic: Physical Therapy (In Progress)       Point: Mobility training (Done)       Learning Progress Summary            Patient AcceptanceRALPH VU by BARON at 1/8/2025 1131    Comment: have family bring pt's cane to progress with amb    AcceptanceRALPH VU, DU,NR by NITHIN at 1/6/2025 0824    Comment: Benefits of activity, progression of PT POC,                      Point: Home exercise program (Not Started)       Learner Progress:  Not documented in this visit.              Point: Body mechanics (Not Started)       Learner Progress:  Not  documented in this visit.              Point: Precautions (Not Started)       Learner Progress:  Not documented in this visit.                              User Key       Initials Effective Dates Name Provider Type Discipline    NITHIN 02/03/23 -  Francis Hung PT DPT Physical Therapist PT    BARON 02/03/23 -  Maurice Rose PTA Physical Therapist Assistant PT                  PT Recommendation and Plan     Progress: improving  Outcome Evaluation: Pt was in bed, agreed to therapy.  Pt c/o severe pain in L side, LUE, but more in LLE with any touch or weight bearing.  Pt required min assist to transfer supine to sitting.  Transfered sit to stand with min assist and HHA.  Able to transfer bed to chair with min assist, transfered to right side.  Discussed with pt having his family bring his cane, where he would feel more comfortable with progress gait.  Will continue to work with pt to increase strength and progress amb as pt is able.   Outcome Measures       Row Name 01/08/25 1131 01/07/25 1026          How much help from another person do you currently need...    Turning from your back to your side while in flat bed without using bedrails? 3  -BARON 3  -MF     Moving from lying on back to sitting on the side of a flat bed without bedrails? 3  -BARON 3  -MF     Moving to and from a bed to a chair (including a wheelchair)? 3  -BARON 2  -MF     Standing up from a chair using your arms (e.g., wheelchair, bedside chair)? 3  -BARON 3  -MF     Climbing 3-5 steps with a railing? 1  -BARON 1  -MF     To walk in hospital room? 2  -BARON 2  -     AM-PAC 6 Clicks Score (PT) 15  -BARON 14  -        Functional Assessment    Outcome Measure Options AM-PAC 6 Clicks Basic Mobility (PT)  -BARON AM-PAC 6 Clicks Basic Mobility (PT)  -               User Key  (r) = Recorded By, (t) = Taken By, (c) = Cosigned By      Initials Name Provider Type    Maurice Nichols PTA Physical Therapist Assistant    Gabby Rossi PTA Physical Therapist  Assistant                     Time Calculation:    PT Charges       Row Name 01/08/25 1131             Time Calculation    Start Time 1131  -BARON      Stop Time 1157  -BARON      Time Calculation (min) 26 min  -BARON      PT Received On 01/08/25  -BARON         Time Calculation- PT    Total Timed Code Minutes- PT 26 minute(s)  -BARON         Timed Charges    10804 - PT Therapeutic Activity Minutes 26  -BARON         Total Minutes    Timed Charges Total Minutes 26  -BARON       Total Minutes 26  -BARON                User Key  (r) = Recorded By, (t) = Taken By, (c) = Cosigned By      Initials Name Provider Type    Maurice Nichols PTA Physical Therapist Assistant                  Therapy Charges for Today       Code Description Service Date Service Provider Modifiers Qty    [id number] HC PT THERAPEUTIC ACT EA 15 MIN 1/8/2025 Maurice Rose PTA GP 2            PT G-Codes  Outcome Measure Options: AM-PAC 6 Clicks Basic Mobility (PT)  AM-PAC 6 Clicks Score (PT): 15  AM-PAC 6 Clicks Score (OT): 15    Maurice Rose PTA  1/8/2025

## 2025-01-08 NOTE — CONSULTS
"[facility]ART [facility] CONSULT NOTE    Referring Provider:     Nichol Suero MD       Reason for Consultation: \"A-fib\"    Chief Complaint   Patient presents with    Abdominal Pain       Subjective .     History of present illness:  Brijesh Grande is a 69 y.o. male with a known PMH significant for hypertension, hyperlipidemia, carotid artery disease, prior stroke with residual left-sided weakness, current smoker, and obesity who presented to the emergency department 1/3/2025 with complaints of abdominal pain, left flank pain, and back pain.  Significant findings in the emergency department workup where imaging noted abdominal aortic aneurysm with evidence of hematoma/hemorrhage.  Ocular surgery was consulted and evaluated the patient.  He was taken to the operating room for abdominal aortic aneurysm repair with endograft by Dr. Finney.  Following aneurysm repair the patient was found to have diminished pulses in the left.  The left common femoral artery was evaluated via ultrasound.  Currently, vascular surgery elected for open exploration of the left common femoral artery.  Notes indicate that the patient had placement of left external iliac artery stent.    He has been monitored in the intensive care unit.  He was having issues with agitation requiring sedation.  Sedation resulted in hypotension and he ultimately required use of pressors.  Those were weaned off and the patient's hemodynamics stabilized.  He was transferred from the unit to the floor yesterday.  Documentation indicates that the patient had a run of atrial fibrillation overnight yesterday, received metoprolol and converted back to sinus rhythm.    Last evening the hospitalist service was called due to another episode of atrial fibrillation with rapid ventricular response.  Heart rates were elevated into the 130s.  He was again administered a dose of IV metoprolol.  Given the intermittent episodes of atrial fibrillation cardiology has been " consulted.    He is currently on dual antiplatelet therapy with aspirin and clopidogrel.  He had previously been on a heparin drip.  Vascular surgery has noted if the patient needs anticoagulation for risk reduction in the setting of atrial fibrillation that Plavix could be discontinued.    The patient is alert and oriented lying in bed.  He is currently without complaints but does have intermittent left leg pain despite recent operative intervention.  He denies any further abdominal pain or flank pain.  He has no chest pain.  He denies palpitations or symptoms that correlate with episodes of atrial fibrillation.  He is a current every day smoker.  He has some shortness of breath at baseline but reports no worsening symptoms.  He feels that his abdomen is distended.    History  Past Medical History:   Diagnosis Date    Hypertension     Stroke    ,   Past Surgical History:   Procedure Laterality Date    ABDOMINAL AORTIC ANEURYSM REPAIR WITH ENDOGRAFT N/A 1/3/2025    Procedure: ABDOMINAL AORTIC ANEURYSM REPAIR WITH ENDOGRAFT;  Surgeon: Eduardo Whiteside DO;  Location: [facility] HYBRID OR;  Service: Vascular;  Laterality: N/A;    CAROTID ENDARTERECTOMY      x 2   ,   History reviewed. No pertinent family history.,   Social History     Tobacco Use    Smoking status: Every Day     Current packs/day: 1.00     Types: Cigarettes   Vaping Use    Vaping status: Never Used   Substance Use Topics    Alcohol use: Not Currently    Drug use: Not Currently   ,     Medications  Current Facility-Administered Medications   Medication Dose Route Frequency Provider Last Rate Last Admin    [Held by provider] amLODIPine (NORVASC) tablet 10 mg  10 mg Oral Daily Timmy Finney MD        aspirin chewable tablet 81 mg  81 mg Oral Daily Timmy Finney MD   81 mg at 01/08/25 0843    atorvastatin (LIPITOR) tablet 10 mg  10 mg Oral Daily Shaun Gallego MD   10 mg at 01/08/25 0845    sennosides-docusate (PERICOLACE) 8.6-50 MG per tablet 2  tablet  2 tablet Oral BID Timmy Finney MD   2 tablet at 01/08/25 0843    And    polyethylene glycol (MIRALAX) packet 17 g  17 g Oral Daily PRN Timmy Finney MD   17 g at 01/08/25 0846    And    bisacodyl (DULCOLAX) EC tablet 5 mg  5 mg Oral Daily PRN Timmy Finney MD   5 mg at 01/08/25 0846    And    bisacodyl (DULCOLAX) suppository 10 mg  10 mg Rectal Daily PRN Timmy Finney MD        budesonide (PULMICORT) nebulizer solution 0.5 mg  0.5 mg Nebulization BID - RT Shaun Gallego MD   0.5 mg at 01/08/25 0617    [Held by provider] cloNIDine (CATAPRES) tablet 0.2 mg  0.2 mg Oral BID Timmy Finney MD        clopidogrel (PLAVIX) tablet 75 mg  75 mg Oral Daily Timmy Finney MD   75 mg at 01/08/25 0845    gabapentin (NEURONTIN) capsule 300 mg  300 mg Oral TID Timmy Finney MD   300 mg at 01/08/25 0845    heparin (porcine) 5000 UNIT/ML injection 5,000 Units  5,000 Units Subcutaneous Q8H Shaun Gallego MD   5,000 Units at 01/08/25 0506    hydrALAZINE (APRESOLINE) injection 10 mg  10 mg Intravenous Q4H PRN Timmy Finney MD   10 mg at 01/05/25 1112    hydrALAZINE (APRESOLINE) tablet 25 mg  25 mg Oral TID Shaun Gallego MD   25 mg at 01/08/25 0844    HYDROcodone-acetaminophen (NORCO) 5-325 MG per tablet 1 tablet  1 tablet Oral Q6H PRN Timmy Finney MD   1 tablet at 01/08/25 0845    ipratropium-albuterol (DUO-NEB) nebulizer solution 3 mL  3 mL Nebulization Q4H PRN Shaun Gallego MD        losartan (COZAAR) tablet 25 mg  25 mg Oral Daily Shaun Gallego MD   25 mg at 01/08/25 0844    melatonin tablet 5 mg  5 mg Oral Nightly PRN Ryder Reed APRN   5 mg at 01/07/25 2051    [START ON 1/9/2025] metoprolol succinate XL (TOPROL-XL) 24 hr tablet 100 mg  100 mg Oral Q24H Nichol Suero MD        nitroglycerin (NITROSTAT) SL tablet 0.4 mg  0.4 mg Sublingual Q5 Min PRN Timmy Finney MD        ondansetron ODT (ZOFRAN-ODT) disintegrating tablet 4 mg  4 mg Oral Q6H PRN Timmy Finney MD   4 mg at  01/07/25 0851    pantoprazole (PROTONIX) EC tablet 40 mg  40 mg Oral Q AM Nichol Suero MD        sodium chloride 0.9 % flush 10 mL  10 mL Intravenous PRN Timmy Finney MD        sodium chloride 0.9 % flush 10 mL  10 mL Intravenous Q12H Timmy Finney MD   10 mL at 01/08/25 0847    sodium chloride 0.9 % flush 10 mL  10 mL Intravenous PRN Timmy Finney MD        sodium chloride 0.9 % flush 10 mL  10 mL Intravenous Q12H Timmy Finney MD   10 mL at 01/08/25 0848    sodium chloride 0.9 % flush 10 mL  10 mL Intravenous PRN Timmy Finney MD        sodium chloride 0.9 % infusion 40 mL  40 mL Intravenous PRN Timmy Finney MD        sodium chloride 0.9 % infusion 40 mL  40 mL Intravenous PRN Timmy Finney MD        tamsulosin (FLOMAX) 24 hr capsule 0.4 mg  0.4 mg Oral Daily Timmy Finney MD   0.4 mg at 01/08/25 0845       Allergies:  Patient has no known allergies.    Review of Systems  Review of Systems   Constitutional: Negative for chills, diaphoresis and fever.   Cardiovascular:  Negative for chest pain, dyspnea on exertion, irregular heartbeat, leg swelling, near-syncope, orthopnea, palpitations, paroxysmal nocturnal dyspnea and syncope.   Respiratory:  Positive for cough, shortness of breath and wheezing. Negative for sputum production.    Hematologic/Lymphatic: Negative for bleeding problem. Bruises/bleeds easily.   Gastrointestinal:  Positive for bloating. Negative for abdominal pain and nausea.   Genitourinary:  Negative for flank pain.   Neurological:  Positive for focal weakness, paresthesias, sensory change and weakness. Negative for headaches.   Psychiatric/Behavioral:  Negative for altered mental status.        Objective     Physical Exam:  Patient Vitals for the past 24 hrs:   BP Temp Temp src Pulse Resp SpO2 Weight   01/08/25 0740 177/96 98.1 °F (36.7 °C) Oral (!) 136 16 91 % --   01/08/25 0622 -- -- -- 80 18 99 % --   01/08/25 0617 -- -- -- 84 18 93 % --   01/08/25 0459 147/72 98.2  °F (36.8 °C) Oral 84 18 92 % 101 kg (222 lb 14.4 oz)   01/08/25 0241 -- -- -- 81 -- -- --   01/08/25 0210 142/75 -- -- (!) 131 20 91 % --   01/08/25 0032 148/68 -- Oral 86 18 93 % --   01/07/25 2102 -- -- -- -- -- 93 % --   01/07/25 2100 -- -- -- -- -- (!) 89 % --   01/07/25 2054 -- -- -- -- -- 92 % --   01/07/25 2003 129/75 98.2 °F (36.8 °C) Oral 87 18 93 % --   01/07/25 1837 -- -- -- 87 15 95 % --   01/07/25 1832 -- -- -- 88 16 (!) 89 % --   01/07/25 1619 143/62 97.8 °F (36.6 °C) -- 89 18 90 % --   01/07/25 1343 108/79 98.6 °F (37 °C) -- 54 18 98 % --   01/07/25 1300 115/71 -- -- 77 -- 91 % --   01/07/25 1200 116/67 -- -- (!) 130 -- 90 % --   01/07/25 1100 126/60 -- -- 95 -- 92 % --     Vitals reviewed.   Constitutional:       General: Awake.      Appearance: Well-developed, well-groomed and not in distress. Obese. Chronically ill-appearing.   HENT:      Head: Normocephalic and atraumatic.   Pulmonary:      Effort: Pulmonary effort is normal.      Breath sounds: Scattered Wheezing present.   Cardiovascular:      Normal rate. Regular rhythm.   Edema:     Peripheral edema present.     Ankle: trace edema of the left ankle.     Feet: trace edema of the left foot.  Abdominal:      General: There is distension.      Tenderness: There is no abdominal tenderness.   Musculoskeletal:      Cervical back: Normal range of motion and neck supple. Skin:     General: Skin is warm and dry.   Neurological:      Mental Status: Alert, oriented to person, place, and time and oriented to person, place and time.   Psychiatric:         Attention and Perception: Attention normal.         Mood and Affect: Mood normal.         Speech: Speech normal.         Behavior: Behavior normal. Behavior is cooperative.         Thought Content: Thought content normal.         Cognition and Memory: Cognition and memory normal.         Judgment: Judgment normal.         Results Review:   I reviewed the patient's new clinical results.    Lab Results (last  72 hours)       Procedure Component Value Units Date/Time    Basic Metabolic Panel [[id number]]  (Abnormal) Collected: 01/08/25 0126    Specimen: Blood Updated: 01/08/25 0210     Glucose 108 mg/dL      BUN 15 mg/dL      Creatinine 0.57 mg/dL      Sodium 140 mmol/L      Potassium 4.0 mmol/L      Comment: Slight hemolysis detected by analyzer. Result may be falsely elevated.        Chloride 103 mmol/L      CO2 29.0 mmol/L      Calcium 8.2 mg/dL      BUN/Creatinine Ratio 26.3     Anion Gap 8.0 mmol/L      eGFR 104.8 mL/min/1.73     Narrative:      GFR Categories in Chronic Kidney Disease (CKD)      GFR Category          GFR (mL/min/1.73)    Interpretation  G1                     90 or greater         Normal or high (1)  G2                      60-89                Mild decrease (1)  G3a                   45-59                Mild to moderate decrease  G3b                   30-44                Moderate to severe decrease  G4                    15-29                Severe decrease  G5                    14 or less           Kidney failure          (1)In the absence of evidence of kidney disease, neither GFR category G1 or G2 fulfill the criteria for CKD.    eGFR calculation 2021 CKD-EPI creatinine equation, which does not include race as a factor    CBC & Differential [[id number]]  (Abnormal) Collected: 01/08/25 0126    Specimen: Blood Updated: 01/08/25 0152    Narrative:      The following orders were created for panel order CBC & Differential.  Procedure                               Abnormality         Status                     ---------                               -----------         ------                     CBC Auto Differential[[id number]]        Abnormal            Final result                 Please view results for these tests on the individual orders.    CBC Auto Differential [[id number]]  (Abnormal) Collected: 01/08/25 0126    Specimen: Blood Updated: 01/08/25 0152     WBC 7.88 10*3/mm3      RBC 3.21  10*6/mm3      Hemoglobin 9.3 g/dL      Hematocrit 29.8 %      MCV 92.8 fL      MCH 29.0 pg      MCHC 31.2 g/dL      RDW 13.9 %      RDW-SD 47.7 fl      MPV 8.9 fL      Platelets 222 10*3/mm3      Neutrophil % 70.3 %      Lymphocyte % 15.7 %      Monocyte % 9.0 %      Eosinophil % 3.7 %      Basophil % 0.4 %      Immature Grans % 0.9 %      Neutrophils, Absolute 5.54 10*3/mm3      Lymphocytes, Absolute 1.24 10*3/mm3      Monocytes, Absolute 0.71 10*3/mm3      Eosinophils, Absolute 0.29 10*3/mm3      Basophils, Absolute 0.03 10*3/mm3      Immature Grans, Absolute 0.07 10*3/mm3      nRBC 0.0 /100 WBC     Basic Metabolic Panel [[id number]]  (Abnormal) Collected: 01/07/25 0547    Specimen: Blood Updated: 01/07/25 0641     Glucose 103 mg/dL      BUN 15 mg/dL      Creatinine 0.51 mg/dL      Sodium 137 mmol/L      Potassium 3.7 mmol/L      Chloride 103 mmol/L      CO2 27.0 mmol/L      Calcium 8.1 mg/dL      BUN/Creatinine Ratio 29.4     Anion Gap 7.0 mmol/L      eGFR 108.4 mL/min/1.73     Narrative:      GFR Categories in Chronic Kidney Disease (CKD)      GFR Category          GFR (mL/min/1.73)    Interpretation  G1                     90 or greater         Normal or high (1)  G2                      60-89                Mild decrease (1)  G3a                   45-59                Mild to moderate decrease  G3b                   30-44                Moderate to severe decrease  G4                    15-29                Severe decrease  G5                    14 or less           Kidney failure          (1)In the absence of evidence of kidney disease, neither GFR category G1 or G2 fulfill the criteria for CKD.    eGFR calculation 2021 CKD-EPI creatinine equation, which does not include race as a factor    Magnesium [[id number]]  (Normal) Collected: 01/07/25 0547    Specimen: Blood Updated: 01/07/25 0641     Magnesium 2.1 mg/dL     CBC & Differential [[id number]]  (Abnormal) Collected: 01/07/25 0547    Specimen: Blood Updated:  01/07/25 0620    Narrative:      The following orders were created for panel order CBC & Differential.  Procedure                               Abnormality         Status                     ---------                               -----------         ------                     CBC Auto Differential[[id number]]        Abnormal            Final result                 Please view results for these tests on the individual orders.    CBC Auto Differential [[id number]]  (Abnormal) Collected: 01/07/25 0547    Specimen: Blood Updated: 01/07/25 0620     WBC 8.69 10*3/mm3      RBC 3.08 10*6/mm3      Hemoglobin 9.1 g/dL      Hematocrit 28.1 %      MCV 91.2 fL      MCH 29.5 pg      MCHC 32.4 g/dL      RDW 13.9 %      RDW-SD 46.5 fl      MPV 9.5 fL      Platelets 195 10*3/mm3      Neutrophil % 75.0 %      Lymphocyte % 13.1 %      Monocyte % 8.3 %      Eosinophil % 2.6 %      Basophil % 0.2 %      Immature Grans % 0.8 %      Neutrophils, Absolute 6.51 10*3/mm3      Lymphocytes, Absolute 1.14 10*3/mm3      Monocytes, Absolute 0.72 10*3/mm3      Eosinophils, Absolute 0.23 10*3/mm3      Basophils, Absolute 0.02 10*3/mm3      Immature Grans, Absolute 0.07 10*3/mm3      nRBC 0.0 /100 WBC     aPTT [[id number]]  (Abnormal) Collected: 01/06/25 0625    Specimen: Blood Updated: 01/06/25 0658     PTT 47.9 seconds     Magnesium [[id number]]  (Normal) Collected: 01/06/25 0625    Specimen: Blood Updated: 01/06/25 0655     Magnesium 2.2 mg/dL     Phosphorus [[id number]]  (Abnormal) Collected: 01/06/25 0625    Specimen: Blood Updated: 01/06/25 0655     Phosphorus 2.1 mg/dL     Basic Metabolic Panel [[id number]]  (Abnormal) Collected: 01/06/25 0625    Specimen: Blood Updated: 01/06/25 0655     Glucose 112 mg/dL      BUN 17 mg/dL      Creatinine 0.69 mg/dL      Sodium 143 mmol/L      Potassium 4.4 mmol/L      Chloride 109 mmol/L      CO2 25.0 mmol/L      Calcium 8.2 mg/dL      BUN/Creatinine Ratio 24.6     Anion Gap 9.0 mmol/L      eGFR 98.9 mL/min/1.73      Narrative:      GFR Categories in Chronic Kidney Disease (CKD)      GFR Category          GFR (mL/min/1.73)    Interpretation  G1                     90 or greater         Normal or high (1)  G2                      60-89                Mild decrease (1)  G3a                   45-59                Mild to moderate decrease  G3b                   30-44                Moderate to severe decrease  G4                    15-29                Severe decrease  G5                    14 or less           Kidney failure          (1)In the absence of evidence of kidney disease, neither GFR category G1 or G2 fulfill the criteria for CKD.    eGFR calculation 2021 CKD-EPI creatinine equation, which does not include race as a factor    CBC & Differential [[id number]]  (Abnormal) Collected: 01/06/25 0625    Specimen: Blood Updated: 01/06/25 0636    Narrative:      The following orders were created for panel order CBC & Differential.  Procedure                               Abnormality         Status                     ---------                               -----------         ------                     CBC Auto Differential[[id number]]        Abnormal            Final result                 Please view results for these tests on the individual orders.    CBC Auto Differential [[id number]]  (Abnormal) Collected: 01/06/25 0625    Specimen: Blood Updated: 01/06/25 0636     WBC 12.50 10*3/mm3      RBC 3.09 10*6/mm3      Hemoglobin 9.1 g/dL      Hematocrit 28.6 %      MCV 92.6 fL      MCH 29.4 pg      MCHC 31.8 g/dL      RDW 14.2 %      RDW-SD 48.0 fl      MPV 9.2 fL      Platelets 174 10*3/mm3      Neutrophil % 82.5 %      Lymphocyte % 8.3 %      Monocyte % 7.5 %      Eosinophil % 0.6 %      Basophil % 0.3 %      Immature Grans % 0.8 %      Neutrophils, Absolute 10.30 10*3/mm3      Lymphocytes, Absolute 1.04 10*3/mm3      Monocytes, Absolute 0.94 10*3/mm3      Eosinophils, Absolute 0.08 10*3/mm3      Basophils, Absolute 0.04  10*3/mm3      Immature Grans, Absolute 0.10 10*3/mm3      nRBC 0.0 /100 WBC     aPTT [[id number]]  (Abnormal) Collected: 01/05/25 2326    Specimen: Blood Updated: 01/05/25 2348     PTT 85.2 seconds     aPTT [[id number]]  (Abnormal) Collected: 01/05/25 1817    Specimen: Blood Updated: 01/05/25 1853     PTT 41.6 seconds     CK [[id number]]  (Abnormal) Collected: 01/05/25 1307    Specimen: Blood Updated: 01/05/25 1352     Creatine Kinase 2,436 U/L     Lactate Dehydrogenase [[id number]]  (Abnormal) Collected: 01/05/25 1307    Specimen: Blood Updated: 01/05/25 1337      U/L     Lactic Acid, Plasma [[id number]]  (Normal) Collected: 01/05/25 1307    Specimen: Blood Updated: 01/05/25 1333     Lactate 1.8 mmol/L     aPTT [[id number]]  (Normal) Collected: 01/05/25 1307    Specimen: Blood Updated: 01/05/25 1328     PTT 31.5 seconds     Protime-INR [[id number]]  (Abnormal) Collected: 01/05/25 0602    Specimen: Blood Updated: 01/05/25 1310     Protime 15.1 Seconds      INR 1.13            Assessment     1.  Atrial fibrillation with rapid ventricular response  2.  Contained ruptured infrarenal abdominal aortic aneurysm status postrepair  3.  Hypertension  4.  Hyperlipidemia  5.  Prior stroke with left-sided residual weakness  6.  Carotid artery disease  7.  Current everyday smoker  8.  Obesity BMI 31.98    Plan     Cardiology was asked to evaluate the patient due to episodes of atrial fibrillation over the last couple of days.  The patient has been asymptomatic with episodes and is here being treated for acute complaints of abdominal pain and findings of a contained ruptured abdominal aortic aneurysm.  He denies a known history of atrial fibrillation.  He is familiar with the diagnosis as he reports multiple family members have been treated for this.  He previously had a stroke related to carotid artery disease.  He has residual left-sided weakness but was able to walk prior to his admission.    Currently he denies any chest  pain, palpitations, abdominal discomfort.  He reports sensitivity to his left lower extremity since admission.  He could barely stand on it yesterday without significant pain.  He is concerned he will not be able to walk once he is discharged due to the discomfort.    Regards to atrial fibrillation, as previously mentioned, he has been asymptomatic.  He has converted back into sinus rhythm after episodes.  He was given IV Lopressor.    He has also been hypertensive this admission.  He is on multiple medications and some medication adjustments have been made this hospitalization.  He is on dual antiplatelet therapy with aspirin and Plavix due to his vascular intervention.    He is currently in sinus rhythm.  We will adjust medications for improvement of blood pressure control.  He will need anticoagulation for stroke risk reduction given his history.  Vascular surgery recommended if anticoagulation was necessary to discontinue Plavix.  We will discontinue his intermittent heparin injections for VTE prophylaxis and placed an order for Lovenox for full dose anticoagulation at this time.  In addition we will discontinue clopidogrel as previously recommended.    Discontinue hydralazine  Discontinue Toprol-XL  Discontinue Norvasc    Start carvedilol 12.5 mg twice daily  Increase losartan to 50 mg daily  Use hydralazine IV/p.o. as needed for blood pressure control  If he continues to have elevated blood pressure readings or needs further rate control could increase current medications or consider addition of amlodipine or diltiazem.    Because he has been asymptomatic 2 episodes of atrial fibrillation would recommend a 14-day Holter monitor is placed at discharge.  If the patient continues to have intermittent episodes of atrial fibrillation following discharge and outpatient referral to electrophysiology can be considered.    Thank you for the consultation, cardiology will gladly continue to follow.     Electronically  signed by MENDEZ Abdullahi, 01/08/25, 10:51 AM CST.      Please note this cardiology consultation note is the result of a face to face consultation with the patient, in addition to reviewing medical records at length by myself, MENDEZ Abdullahi.       Time: 45 minutes

## 2025-01-08 NOTE — PLAN OF CARE
Goal Outcome Evaluation:           Progress: no change     VSS, on 2L NC.  C/O left side pain, groin and leg.  Pillow under left leg for comfort.  Pulses weak.  Urinal provided.  Heparin for VTE prevention.  Sinus rhythm, 86 on tele.  Safety maintained.

## 2025-01-08 NOTE — PLAN OF CARE
Goal Outcome Evaluation:  Plan of Care Reviewed With: patient            A/O x 4. C/O pain. (See MAR) Dr. Barajas contacted for pt converting to afib with HR 130s. Orders placed. Pt converted back to NSR at 0232 per tele. External cath. IID. 3L NC. Safety maintained.

## 2025-01-08 NOTE — PROGRESS NOTES
[facility] Intensivist Services  INPATIENT PROGRESS NOTE    Patient Name: Brijesh Grande  Date of Admission: 1/3/2025  Today's Date: 01/08/25  Length of Stay: 5  Primary Care Physician: Eliseo Smith MD    Subjective   Chief Complaint: Abdominal pain  Abdominal Pain       69-year-old male with a past medical history of hypertension and previous stroke in January 2016 with residual left-sided weakness and abnormal sensation admitted after presenting to ED with complaints of abdominal pain, left flank pain, and back pain.  The patient is also a smoker, and he continues to smoke 2 packs/day.     Significant findings from ED include glucose elevated 194, but otherwise normal CMP.  CBC was completely normal.  UA was positive for glucose, but was otherwise normal.  CT scan of the abdomen and pelvis showed a fusiform aneurysm of the distal abdominal aorta.  There is partial lumen circumferential mural thrombus Boces of the aneurysm.  There is evidence of hematoma/hemorrhage at the posterior wall of the aneurysm.  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  Patient was noted to be hypertensive in the emergency department.     Patient is being admitted to the CCU for nicardipine infusion for blood pressure control and for close monitoring.  I saw the patient while he was still in the emergency department.  He states the pain he was having in his abdomen and back are improved.  He reports that he has had an abnormal sensation to the left side of his body since his stroke from 9 years ago.  However, he noticed very intense pain in his abdomen and back earlier this morning, which brought him to the emergency department.  He has no other complaints for me at this time      Interval history:  1/4/2025 patient is intubated sedated he is status post placement of aortobiiliac endograft for  ruptured AAA and left external iliac artery stent.  Patient sedation was weaned down this morning and patient was very agitated not following commands and had to be resedated again.  Patient is being started on Precedex and fentanyl drip.  As per discussion with vascular surgery at bedside today patient had a similar episode after surgery yesterday he was very agitated and had to be reintubated and was found to have some vocal cord edema.    1/5/2025 patient was extubated yesterday he has done very well he got agitated overnight and had to be started on Precedex which dropped his blood pressure transiently requiring Levophed.  Patient remains on Precedex 0.2 he does complain of not able to use the bathroom and had some urine retention had to have an out overnight I do think this is causing his agitation.    1/6/2025 patient remains in the ICU he is not on any drips other than heparin drip.  Discussed with vascular surgery today we are discontinuing his heparin drip and starting antiplatelets.  Patient pain is under better control and he has been off Precedex since yesterday morning.  Pulses are dopplerable.    1/7/2025 remains in intensive care and waiting for floor bed.  Patient had a run of A-fib with RVR overnight and received 1 dose of metoprolol and now is back to sinus rhythm.  Patient denies any chest pain no shortness of breath he still complains of his left leg pain.  Pulses are dopplerable.  1/8  As before was transferred to the floor been having episodes of A-fib with RVR was given Lopressor on occasional basis the patient blood pressure is not well-controlled I increase his Lopressor p.o. he went for echo and we will consult with cardiology to adjust his medications, patient vascular surgery postop day #5 status post EVAR with extension to left external iliac for rupture.  Remains on aspirin and Plavix  Protonix for GI prophylaxis, no BM x4 days     All 12 point system review were unremarkab other than  was mentioned history present illness le  Objective    Temp:  [97.8 °F (36.6 °C)-98.6 °F (37 °C)] 98.1 °F (36.7 °C)  Heart Rate:  [] 136  Resp:  [15-24] 16  BP: (108-177)/(46-96) 177/96  Physical Exam  Constitutional:       Appearance: He is not ill-appearing.   HENT:      Head: Normocephalic.      Mouth/Throat:      Mouth: Mucous membranes are moist.   Eyes:      Pupils: Pupils are equal, round, and reactive to light.   Cardiovascular:      Rate and Rhythm: Normal rate and regular rhythm.   Pulmonary:      Effort: No respiratory distress.      Breath sounds: No wheezing.   Abdominal:      General: Bowel sounds are normal. There is no distension.      Palpations: Abdomen is soft.      Tenderness: There is no abdominal tenderness.   Musculoskeletal:      Right lower leg: Edema present.      Left lower leg: Edema present.      Comments: Mild cyanosis of the left leg pulses are palpable   Skin:     General: Skin is warm.   Neurological:      Mental Status: He is oriented to person, place, and time.      Comments: Baseline left-sided weakness   Psychiatric:         Mood and Affect: Mood normal.             Results Review:  Lab Results (last 24 hours)       Procedure Component Value Units Date/Time    Basic Metabolic Panel [[id number]]  (Abnormal) Collected: 01/08/25 0126    Specimen: Blood Updated: 01/08/25 0210     Glucose 108 mg/dL      BUN 15 mg/dL      Creatinine 0.57 mg/dL      Sodium 140 mmol/L      Potassium 4.0 mmol/L      Comment: Slight hemolysis detected by analyzer. Result may be falsely elevated.        Chloride 103 mmol/L      CO2 29.0 mmol/L      Calcium 8.2 mg/dL      BUN/Creatinine Ratio 26.3     Anion Gap 8.0 mmol/L      eGFR 104.8 mL/min/1.73     Narrative:      GFR Categories in Chronic Kidney Disease (CKD)      GFR Category          GFR (mL/min/1.73)    Interpretation  G1                     90 or greater         Normal or high (1)  G2                      60-89                Mild decrease  (1)  G3a                   45-59                Mild to moderate decrease  G3b                   30-44                Moderate to severe decrease  G4                    15-29                Severe decrease  G5                    14 or less           Kidney failure          (1)In the absence of evidence of kidney disease, neither GFR category G1 or G2 fulfill the criteria for CKD.    eGFR calculation 2021 CKD-EPI creatinine equation, which does not include race as a factor    CBC & Differential [[id number]]  (Abnormal) Collected: 01/08/25 0126    Specimen: Blood Updated: 01/08/25 0152    Narrative:      The following orders were created for panel order CBC & Differential.  Procedure                               Abnormality         Status                     ---------                               -----------         ------                     CBC Auto Differential[[id number]]        Abnormal            Final result                 Please view results for these tests on the individual orders.    CBC Auto Differential [[id number]]  (Abnormal) Collected: 01/08/25 0126    Specimen: Blood Updated: 01/08/25 0152     WBC 7.88 10*3/mm3      RBC 3.21 10*6/mm3      Hemoglobin 9.3 g/dL      Hematocrit 29.8 %      MCV 92.8 fL      MCH 29.0 pg      MCHC 31.2 g/dL      RDW 13.9 %      RDW-SD 47.7 fl      MPV 8.9 fL      Platelets 222 10*3/mm3      Neutrophil % 70.3 %      Lymphocyte % 15.7 %      Monocyte % 9.0 %      Eosinophil % 3.7 %      Basophil % 0.4 %      Immature Grans % 0.9 %      Neutrophils, Absolute 5.54 10*3/mm3      Lymphocytes, Absolute 1.24 10*3/mm3      Monocytes, Absolute 0.71 10*3/mm3      Eosinophils, Absolute 0.29 10*3/mm3      Basophils, Absolute 0.03 10*3/mm3      Immature Grans, Absolute 0.07 10*3/mm3      nRBC 0.0 /100 WBC              No radiology results for the last day    Result Review:  I have personally reviewed the results from the time of this admission to 1/8/2025 09:49 CST and agree with these  "findings:  [x]  Laboratory list / accordion  []  Microbiology  [x]  Radiology  []  EKG/Telemetry   []  Cardiology/Vascular   []  Pathology  []  Old records  []  Other:  Most notable findings include:       Culture Data:   No results found for: \"BLOODCX\", \"URINECX\", \"WOUNDCX\", \"MRSACX\", \"RESPCX\", \"STOOLCX\"    I have reviewed the patient's current medications.     Assessment/Plan   Assessment  Active Hospital Problems    Diagnosis     **Abdominal aortic aneurysm     AAA (abdominal aortic aneurysm)     Abdominal pain    -Ruptured infrarenal abdominal aortic aneurysm status post aortobiiliac endograft left external iliac stent  -Acute hypoxemic respiratory failure requiring mechanical ventilation  -Laryngeal edema  -Severe agitation resolved  -Suspected COPD  -Tobacco abuse  -ICU delirium resolved  -History of CVA with left-sided weakness      Plan:  -Wean down oxygen as tolerated  -Pain management with as needed Dilaudid  -Continue gabapentin   -Patient is on Cozaar, hydralazine and Toprol for blood pressure control  -I will change scheduled DuoNebs to as needed  -Start Pulmicort  -We will keep Le catheter for urine retension  - aspirin and Plavix as per vascular  -Diet  -Waiting for floor bed, ICU standpoint discussed with Dr. De La Fuente patient will be taking care of by .     Electronically signed by Nichol Suero MD on 1/8/2025 at 09:49 CST  "

## 2025-01-08 NOTE — PLAN OF CARE
Goal Outcome Evaluation:  Plan of Care Reviewed With: patient        Progress: improving  Outcome Evaluation: Pt was in bed, agreed to therapy.  Pt c/o severe pain in L side, LUE, but more in LLE with any touch or weight bearing.  Pt required min assist to transfer supine to sitting.  Transfered sit to stand with min assist and HHA.  Able to transfer bed to chair with min assist, transfered to right side.  Discussed with pt having his family bring his cane, where he would feel more comfortable with progress gait.  Will continue to work with pt to increase strength and progress amb as pt is able.

## 2025-01-08 NOTE — PROGRESS NOTES
"Pharmacy Dosing Service  Anticoagulant  Enoxaparin    Assessment/Action/Plan:  Consult placed for Pharmacy to Dose Lovenox for Afib.  Labs reviewed.  Renal function reviewed.  Initiated Lovenox 1 mg/kg q 12hrs.  Pharmacy will continue to follow and adjust as needed     Subjective:  Brijesh Grande is a 69 y.o. male on Enoxaparin 1 mg/kg SQ every 12 hours for indication of atrial fibrillation.  Objective:  [Ht: 177.8 cm (70\"); Wt: 101 kg (222 lb 14.4 oz); BMI: Body mass index is 31.98 kg/m².]  Estimated Creatinine Clearance: 141.6 mL/min (A) (by C-G formula based on SCr of 0.57 mg/dL (L)). No results found for: \"DDIMER\"   Lab Results   Component Value Date    INR 1.13 (H) 01/05/2025    INR 1.10 (H) 01/04/2025    INR 1.11 (H) 01/03/2025    PROTIME 15.1 (H) 01/05/2025    PROTIME 14.8 01/04/2025    PROTIME 14.9 (H) 01/03/2025      Lab Results   Component Value Date    HGB 9.3 (L) 01/08/2025    HGB 9.1 (L) 01/07/2025    HGB 9.1 (L) 01/06/2025      Lab Results   Component Value Date     01/08/2025     01/07/2025     01/06/2025       L Liam Dotson RPH  01/08/25 11:43 CST     "

## 2025-01-09 LAB
ANION GAP SERPL CALCULATED.3IONS-SCNC: 8 MMOL/L (ref 5–15)
BASOPHILS # BLD AUTO: 0.05 10*3/MM3 (ref 0–0.2)
BASOPHILS NFR BLD AUTO: 0.6 % (ref 0–1.5)
BUN SERPL-MCNC: 12 MG/DL (ref 8–23)
BUN/CREAT SERPL: 25 (ref 7–25)
CALCIUM SPEC-SCNC: 8.3 MG/DL (ref 8.6–10.5)
CHLORIDE SERPL-SCNC: 102 MMOL/L (ref 98–107)
CO2 SERPL-SCNC: 29 MMOL/L (ref 22–29)
CREAT SERPL-MCNC: 0.48 MG/DL (ref 0.76–1.27)
DEPRECATED RDW RBC AUTO: 46.7 FL (ref 37–54)
EGFRCR SERPLBLD CKD-EPI 2021: 110.4 ML/MIN/1.73
EOSINOPHIL # BLD AUTO: 0.26 10*3/MM3 (ref 0–0.4)
EOSINOPHIL NFR BLD AUTO: 2.9 % (ref 0.3–6.2)
ERYTHROCYTE [DISTWIDTH] IN BLOOD BY AUTOMATED COUNT: 13.7 % (ref 12.3–15.4)
GLUCOSE SERPL-MCNC: 119 MG/DL (ref 65–99)
HCT VFR BLD AUTO: 29.8 % (ref 37.5–51)
HGB BLD-MCNC: 9.3 G/DL (ref 13–17.7)
IMM GRANULOCYTES # BLD AUTO: 0.11 10*3/MM3 (ref 0–0.05)
IMM GRANULOCYTES NFR BLD AUTO: 1.2 % (ref 0–0.5)
LYMPHOCYTES # BLD AUTO: 0.97 10*3/MM3 (ref 0.7–3.1)
LYMPHOCYTES NFR BLD AUTO: 10.8 % (ref 19.6–45.3)
MCH RBC QN AUTO: 29 PG (ref 26.6–33)
MCHC RBC AUTO-ENTMCNC: 31.2 G/DL (ref 31.5–35.7)
MCV RBC AUTO: 92.8 FL (ref 79–97)
MONOCYTES # BLD AUTO: 0.83 10*3/MM3 (ref 0.1–0.9)
MONOCYTES NFR BLD AUTO: 9.3 % (ref 5–12)
NEUTROPHILS NFR BLD AUTO: 6.73 10*3/MM3 (ref 1.7–7)
NEUTROPHILS NFR BLD AUTO: 75.2 % (ref 42.7–76)
NRBC BLD AUTO-RTO: 0 /100 WBC (ref 0–0.2)
PLATELET # BLD AUTO: 231 10*3/MM3 (ref 140–450)
PMV BLD AUTO: 8.8 FL (ref 6–12)
POTASSIUM SERPL-SCNC: 4.1 MMOL/L (ref 3.5–5.2)
RBC # BLD AUTO: 3.21 10*6/MM3 (ref 4.14–5.8)
SODIUM SERPL-SCNC: 139 MMOL/L (ref 136–145)
WBC NRBC COR # BLD AUTO: 8.95 10*3/MM3 (ref 3.4–10.8)

## 2025-01-09 PROCEDURE — 97530 THERAPEUTIC ACTIVITIES: CPT

## 2025-01-09 PROCEDURE — 85025 COMPLETE CBC W/AUTO DIFF WBC: CPT | Performed by: INTERNAL MEDICINE

## 2025-01-09 PROCEDURE — 94799 UNLISTED PULMONARY SVC/PX: CPT

## 2025-01-09 PROCEDURE — 25010000002 MORPHINE PER 10 MG: Performed by: HOSPITALIST

## 2025-01-09 PROCEDURE — 97110 THERAPEUTIC EXERCISES: CPT

## 2025-01-09 PROCEDURE — 94761 N-INVAS EAR/PLS OXIMETRY MLT: CPT

## 2025-01-09 PROCEDURE — 99232 SBSQ HOSP IP/OBS MODERATE 35: CPT | Performed by: INTERNAL MEDICINE

## 2025-01-09 PROCEDURE — 25010000002 ENOXAPARIN PER 10 MG: Performed by: NURSE PRACTITIONER

## 2025-01-09 PROCEDURE — 80048 BASIC METABOLIC PNL TOTAL CA: CPT | Performed by: INTERNAL MEDICINE

## 2025-01-09 PROCEDURE — 94664 DEMO&/EVAL PT USE INHALER: CPT

## 2025-01-09 RX ORDER — CARVEDILOL 25 MG/1
25 TABLET ORAL 2 TIMES DAILY WITH MEALS
Status: DISCONTINUED | OUTPATIENT
Start: 2025-01-09 | End: 2025-01-15 | Stop reason: HOSPADM

## 2025-01-09 RX ORDER — ACETAMINOPHEN 325 MG/1
650 TABLET ORAL EVERY 6 HOURS PRN
Status: DISCONTINUED | OUTPATIENT
Start: 2025-01-09 | End: 2025-01-15 | Stop reason: HOSPADM

## 2025-01-09 RX ORDER — HYDROCODONE BITARTRATE AND ACETAMINOPHEN 5; 325 MG/1; MG/1
1 TABLET ORAL EVERY 6 HOURS PRN
Status: CANCELLED | OUTPATIENT
Start: 2025-01-09 | End: 2025-01-14

## 2025-01-09 RX ORDER — HYDROCODONE BITARTRATE AND ACETAMINOPHEN 5; 325 MG/1; MG/1
1 TABLET ORAL EVERY 6 HOURS PRN
Status: DISPENSED | OUTPATIENT
Start: 2025-01-09 | End: 2025-01-10

## 2025-01-09 RX ADMIN — ENOXAPARIN SODIUM 100 MG: 100 INJECTION SUBCUTANEOUS at 21:05

## 2025-01-09 RX ADMIN — GABAPENTIN 300 MG: 300 CAPSULE ORAL at 09:57

## 2025-01-09 RX ADMIN — Medication 10 ML: at 21:05

## 2025-01-09 RX ADMIN — CARVEDILOL 25 MG: 3.12 TABLET, FILM COATED ORAL at 18:11

## 2025-01-09 RX ADMIN — ATORVASTATIN CALCIUM 10 MG: 10 TABLET, FILM COATED ORAL at 09:57

## 2025-01-09 RX ADMIN — MORPHINE SULFATE 2 MG: 2 INJECTION, SOLUTION INTRAMUSCULAR; INTRAVENOUS at 21:05

## 2025-01-09 RX ADMIN — TAMSULOSIN HYDROCHLORIDE 0.4 MG: 0.4 CAPSULE ORAL at 09:57

## 2025-01-09 RX ADMIN — PANTOPRAZOLE SODIUM 40 MG: 40 TABLET, DELAYED RELEASE ORAL at 07:05

## 2025-01-09 RX ADMIN — GABAPENTIN 300 MG: 300 CAPSULE ORAL at 21:05

## 2025-01-09 RX ADMIN — BUDESONIDE INHALATION 0.5 MG: 0.5 SUSPENSION RESPIRATORY (INHALATION) at 18:39

## 2025-01-09 RX ADMIN — GABAPENTIN 300 MG: 300 CAPSULE ORAL at 18:11

## 2025-01-09 RX ADMIN — LOSARTAN POTASSIUM 50 MG: 50 TABLET, FILM COATED ORAL at 09:57

## 2025-01-09 RX ADMIN — ENOXAPARIN SODIUM 100 MG: 100 INJECTION SUBCUTANEOUS at 09:57

## 2025-01-09 RX ADMIN — BUDESONIDE INHALATION 0.5 MG: 0.5 SUSPENSION RESPIRATORY (INHALATION) at 07:26

## 2025-01-09 RX ADMIN — CARVEDILOL 12.5 MG: 6.25 TABLET, FILM COATED ORAL at 09:57

## 2025-01-09 RX ADMIN — ASPIRIN 81 MG: 81 TABLET, CHEWABLE ORAL at 09:57

## 2025-01-09 NOTE — THERAPY TREATMENT NOTE
Acute Care - Physical Therapy Treatment Note  [facility]     Patient Name: Brijesh Grande  : 1955  MRN: [id number]  Today's Date: 2025      Visit Dx:     ICD-10-CM ICD-9-CM   1. Aneurysm of infrarenal abdominal aorta, unspecified whether ruptured  I71.43 441.4   2. Abdominal pain, unspecified abdominal location  R10.9 789.00   3. Abdominal aortic aneurysm (AAA) without rupture, unspecified part  I71.40 441.4   4. Primary hypertension  I10 401.9   5. Impaired mobility [Z74.09]  Z74.09 799.89     Patient Active Problem List   Diagnosis    AAA (abdominal aortic aneurysm)    Abdominal aortic aneurysm    Abdominal pain     Past Medical History:   Diagnosis Date    Hyperlipidemia     Hypertension     PAD (peripheral artery disease)     Stroke      Past Surgical History:   Procedure Laterality Date    ABDOMINAL AORTIC ANEURYSM REPAIR WITH ENDOGRAFT N/A 1/3/2025    Procedure: ABDOMINAL AORTIC ANEURYSM REPAIR WITH ENDOGRAFT;  Surgeon: Eduardo Whiteside DO;  Location: [facility] OR;  Service: Vascular;  Laterality: N/A;    CAROTID ENDARTERECTOMY      x 2     PT Assessment (Last 12 Hours)       PT Evaluation and Treatment       Row Name 25 1130          Physical Therapy Time and Intention    Subjective Information complains of;weakness;pain  -BARON     Document Type therapy note (daily note)  -BARON     Mode of Treatment physical therapy  -BARON       Row Name 25 1130          General Information    Existing Precautions/Restrictions fall  left 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  -BARON       Row Name 25 1130          Pain    Pretreatment Pain Rating 2/10  -BARON     Posttreatment Pain Rating 8/10  -BARON     Pain Location extremity  -BARON     Pain Side/Orientation left  -BARON     Pain Management Interventions exercise or physical activity utilized  -BARON     Response to Pain Interventions activity participation with tolerable pain  -BARON       Row Name 25 1130          Bed Mobility    Bed Mobility sit-supine  -BARON      Supine-Sit [place] (Bed Mobility) verbal cues;minimum assist (75% patient effort)  -BARON     Sit-Supine [place] (Bed Mobility) verbal cues;minimum assist (75% patient effort)  -BARON       Row Name 01/09/25 1130          Sit-Stand Transfer    Sit-Stand [place] (Transfers) verbal cues;minimum assist (75% patient effort)  -BARON     Assistive Device (Sit-Stand Transfers) walker, mckenzie  -BARON       Row Name 01/09/25 1130          Stand-Sit Transfer    Stand-Sit [place] (Transfers) verbal cues;minimum assist (75% patient effort)  -BARON     Assistive Device (Stand-Sit Transfers) walker, mckenzie  -BARON       Row Name 01/09/25 1130          Gait/Stairs (Locomotion)    [place] Level (Gait) verbal cues;minimum assist (75% patient effort)  -BARON     Assistive Device (Gait) walker, mckenzie  -BARON     Distance in Feet (Gait) --  few steps forward then backward, limited distance due to increase pain through LLE  -BARON       Row Name 01/09/25 1130          Motor Skills    Comments, Therapeutic Exercise sitting AROM RLE, no exercises to LLE due to pain  -BARON     Additional Documentation Comments, Therapeutic Exercise (Row)  -BARON       Row Name             Wound 01/03/25 1622 Right anterior groin    Wound - Properties Group Placement Date: 01/03/25  -AC Placement Time: 1622  -AC Side: Right  -AC Orientation: anterior  -AC Location: groin  -AC Primary Wound Type: Incision  -AC Additional Comments: PERCLOSE X2 2X2 TEGADERM  -AC    Retired Wound - Properties Group Placement Date: 01/03/25  -AC Placement Time: 1622  -AC Side: Right  -AC Orientation: anterior  -AC Location: groin  -AC Primary Wound Type: Incision  -AC Additional Comments: PERCLOSE X2 2X2 TEGADERM  -AC    Retired Wound - Properties Group Placement Date: 01/03/25  -AC Placement Time: 1622  -AC Side: Right  -AC Orientation: anterior  -AC Location: groin  -AC Primary Wound Type: Incision  -AC Additional Comments: PERCLOSE X2 2X2 TEGADERM  -AC    Retired Wound - Properties Group  Date first assessed: 01/03/25  -AC Time first assessed: 1622  -AC Side: Right  -AC Location: groin  -AC Primary Wound Type: Incision  -AC Additional Comments: PERCLOSE X2 2X2 TEGADERM  -AC      Row Name             Wound 01/03/25 1622 Left anterior groin    Wound - Properties Group Placement Date: 01/03/25  -AC Placement Time: 1622  -AC Side: Left  -AC Orientation: anterior  -AC Location: groin  -AC Primary Wound Type: Incision  -AC Additional Comments: STERISTRIPS 4X4 TEGADERM  -AC    Retired Wound - Properties Group Placement Date: 01/03/25  -AC Placement Time: 1622  -AC Side: Left  -AC Orientation: anterior  -AC Location: groin  -AC Primary Wound Type: Incision  -AC Additional Comments: STERISTRIPS 4X4 TEGADERM  -AC    Retired Wound - Properties Group Placement Date: 01/03/25  -AC Placement Time: 1622  -AC Side: Left  -AC Orientation: anterior  -AC Location: groin  -AC Primary Wound Type: Incision  -AC Additional Comments: STERISTRIPS 4X4 TEGADERM  -AC    Retired Wound - Properties Group Date first assessed: 01/03/25  -AC Time first assessed: 1622  -AC Side: Left  -AC Location: groin  -AC Primary Wound Type: Incision  -AC Additional Comments: STERISTRIPS 4X4 TEGADERM  -AC      Row Name 01/09/25 1130          Positioning and Restraints    Pre-Treatment Position in bed  -BARON     Post Treatment Position bed  -BARON     In Bed fowlers;call light within reach;encouraged to call for assist;exit alarm on;side rails up x2  -BARON               User Key  (r) = Recorded By, (t) = Taken By, (c) = Cosigned By      Initials Name Provider Type    Maurice Nichols PTA Physical Therapist Assistant    Zara Reid RN Registered Nurse                    Physical Therapy Education       Title: PT OT SLP Therapies (In Progress)       Topic: Physical Therapy (In Progress)       Point: Mobility training (Done)       Learning Progress Summary            Patient Acceptance, E, CRISTEL,NR by BARON at 1/9/2025 1130    Comment: progression  with amb with managing pain    Acceptance, CRISTEL ROSAS by BARON at 1/8/2025 1131    Comment: have family bring pt's cane to progress with amb    Acceptance, CRISTEL ROSAS,LOKESH,NR by NITHIN at 1/6/2025 0824    Comment: Benefits of activity, progression of PT POC,                      Point: Home exercise program (Not Started)       Learner Progress:  Not documented in this visit.              Point: Body mechanics (Not Started)       Learner Progress:  Not documented in this visit.              Point: Precautions (Not Started)       Learner Progress:  Not documented in this visit.                              User Key       Initials Effective Dates Name Provider Type Discipline    NITHIN 02/03/23 -  Francis Hung, PT DPT Physical Therapist PT    BARON 02/03/23 -  Maurice Rose, PTA Physical Therapist Assistant PT                  PT Recommendation and Plan     Progress: improving  Outcome Evaluation: Pt was in bed, agreed to therapy.  Continue to c/o increase pain in LLE which limits mobility.  Required min assist to transfer supine to sitting.  Transfered sit to stand with CGA/min assist using the hemiwalker.  Pt was able to take a few steps forward then back with mckenzie walker and min assist, distance limited by increase pain.  Pt would benefit from inpatient rehab to continue to increase strength and progress amb.   Outcome Measures       Row Name 01/09/25 1130 01/08/25 1131 01/07/25 1026       How much help from another person do you currently need...    Turning from your back to your side while in flat bed without using bedrails? 3  -BARON 3  -BARON 3  -MF    Moving from lying on back to sitting on the side of a flat bed without bedrails? 3  -BARON 3  -BARON 3  -MF    Moving to and from a bed to a chair (including a wheelchair)? 3  -BARON 3  -BARON 2  -MF    Standing up from a chair using your arms (e.g., wheelchair, bedside chair)? 3  -BARON 3  -BARON 3  -MF    Climbing 3-5 steps with a railing? 1  -BARON 1  -BARON 1  -MF    To walk in hospital room? 2  -BARON 2  -BARON  2  -MF    AM-PAC 6 Clicks Score (PT) 15  -BARON 15  -BARON 14  -MF       Functional Assessment    Outcome Measure Options AM-PAC 6 Clicks Basic Mobility (PT)  -BARON AM-PAC 6 Clicks Basic Mobility (PT)  -BARON AM-PAC 6 Clicks Basic Mobility (PT)  -MF              User Key  (r) = Recorded By, (t) = Taken By, (c) = Cosigned By      Initials Name Provider Type    Maurice Nichols PTA Physical Therapist Assistant     Gabby Villa PTA Physical Therapist Assistant                     Time Calculation:    PT Charges       Row Name 01/09/25 1130             Time Calculation    Start Time 1130  -BARON      Stop Time 1209  -BARON      Time Calculation (min) 39 min  -BARON      PT Received On 01/09/25  -BARON         Time Calculation- PT    Total Timed Code Minutes- PT 39 minute(s)  -BARON         Timed Charges    75013 - PT Therapeutic Exercise Minutes 15  -BARON      57978 - PT Therapeutic Activity Minutes 24  -BARON         Total Minutes    Timed Charges Total Minutes 39  -BARON       Total Minutes 39  -BARON                User Key  (r) = Recorded By, (t) = Taken By, (c) = Cosigned By      Initials Name Provider Type    Maurice Nichols PTA Physical Therapist Assistant                  Therapy Charges for Today       Code Description Service Date Service Provider Modifiers Qty    12473088402 HC PT THERAPEUTIC ACT EA 15 MIN 1/8/2025 Maurice Rose, HEMAL GP 2    85327350189 HC PT THERAPEUTIC ACT EA 15 MIN 1/9/2025 Maurice Rose, HEMAL GP 2    31511307226 HC PT THER PROC EA 15 MIN 1/9/2025 Maurice Rose, PTA GP 1            PT G-Codes  Outcome Measure Options: AM-PAC 6 Clicks Basic Mobility (PT)  AM-PAC 6 Clicks Score (PT): 15  AM-PAC 6 Clicks Score (OT): 15    Maurice Rose PTA  1/9/2025

## 2025-01-09 NOTE — DISCHARGE PLACEMENT REQUEST
Signed        Expand All Collapse All[]Expand All by Default  Acute Care - Physical Therapy Treatment Note  [facility]     Patient Name: Brijesh Grande               : 1955                        MRN: [id number]  Today's Date: 2025                                    Visit Dx:   Visit Diagnosis       ICD-10-CM ICD-9-CM   1. Aneurysm of infrarenal abdominal aorta, unspecified whether ruptured  I71.43 441.4   2. Abdominal pain, unspecified abdominal location  R10.9 789.00   3. Abdominal aortic aneurysm (AAA) without rupture, unspecified part  I71.40 441.4   4. Primary hypertension  I10 401.9   5. Impaired mobility [Z74.09]  Z74.09 799.89         Problem List       Patient Active Problem List   Diagnosis    AAA (abdominal aortic aneurysm)    Abdominal aortic aneurysm    Abdominal pain         Medical History        Past Medical History:   Diagnosis Date    Hyperlipidemia      Hypertension      PAD (peripheral artery disease)      Stroke           Surgical History         Past Surgical History:   Procedure Laterality Date    ABDOMINAL AORTIC ANEURYSM REPAIR WITH ENDOGRAFT N/A 1/3/2025     Procedure: ABDOMINAL AORTIC ANEURYSM REPAIR WITH ENDOGRAFT;  Surgeon: Eduardo Whiteside DO;  Location: [facility] HYBRID OR;  Service: Vascular;  Laterality: N/A;    CAROTID ENDARTERECTOMY         x 2         PT Assessment (Last 12 Hours)            PT Evaluation and Treatment         Row Name 25 1130                 Physical Therapy Time and Intention     Subjective Information complains of;weakness;pain  -BARON       Document Type therapy note (daily note)  -BARON       Mode of Treatment physical therapy  -BARON          Row Name 25 1130                 General Information     Existing Precautions/Restrictions fall  left 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  -BARON          Row Name 25 1130                 Pain     Pretreatment Pain Rating 2/10  -BARON       Posttreatment Pain Rating 8/10  -BARON       Pain Location extremity  -BARON        Pain Side/Orientation left  -BARON       Pain Management Interventions exercise or physical activity utilized  -BARON       Response to Pain Interventions activity participation with tolerable pain  -BARON          Row Name 01/09/25 1130                 Bed Mobility     Bed Mobility sit-supine  -BARON       Supine-Sit [place] (Bed Mobility) verbal cues;minimum assist (75% patient effort)  -BARON       Sit-Supine [place] (Bed Mobility) verbal cues;minimum assist (75% patient effort)  -BARON          Row Name 01/09/25 1130                 Sit-Stand Transfer     Sit-Stand [place] (Transfers) verbal cues;minimum assist (75% patient effort)  -BARON       Assistive Device (Sit-Stand Transfers) walker, mckenzie  -BARON          Row Name 01/09/25 1130                 Stand-Sit Transfer     Stand-Sit [place] (Transfers) verbal cues;minimum assist (75% patient effort)  -BARON       Assistive Device (Stand-Sit Transfers) walker, mckenzie  -BARON          Row Name 01/09/25 1130                 Gait/Stairs (Locomotion)     [place] Level (Gait) verbal cues;minimum assist (75% patient effort)  -BARON       Assistive Device (Gait) walker, mckenzie  -BARON       Distance in Feet (Gait) --  few steps forward then backward, limited distance due to increase pain through LLE  -BARON          Row Name 01/09/25 1130                 Motor Skills     Comments, Therapeutic Exercise sitting AROM RLE, no exercises to LLE due to pain  -BARON       Additional Documentation Comments, Therapeutic Exercise (Row)  -BARON          Row Name                      Wound 01/03/25 1622 Right anterior groin     Wound - Properties Group Placement Date: 01/03/25  -AC Placement Time: 1622 -AC Side: Right  -AC Orientation: anterior  -AC Location: groin  -AC Primary Wound Type: Incision  -AC Additional Comments: PERCLOSE X2 2X2 TEGADERM  -AC     Retired Wound - Properties Group Placement Date: 01/03/25  -AC Placement Time: 1622 -AC Side: Right  -AC Orientation: anterior  -AC Location:  groin  -AC Primary Wound Type: Incision  -AC Additional Comments: PERCLOSE X2 2X2 TEGADERM  -AC     Retired Wound - Properties Group Placement Date: 01/03/25  -AC Placement Time: 1622  -AC Side: Right  -AC Orientation: anterior  -AC Location: groin  -AC Primary Wound Type: Incision  -AC Additional Comments: PERCLOSE X2 2X2 TEGADERM  -AC     Retired Wound - Properties Group Date first assessed: 01/03/25  -AC Time first assessed: 1622  -AC Side: Right  -AC Location: groin  -AC Primary Wound Type: Incision  -AC Additional Comments: PERCLOSE X2 2X2 TEGADERM  -AC        Row Name                      Wound 01/03/25 1622 Left anterior groin     Wound - Properties Group Placement Date: 01/03/25  -AC Placement Time: 1622  -AC Side: Left  -AC Orientation: anterior  -AC Location: groin  -AC Primary Wound Type: Incision  -AC Additional Comments: STERISTRIPS 4X4 TEGADERM  -AC     Retired Wound - Properties Group Placement Date: 01/03/25  -AC Placement Time: 1622  -AC Side: Left  -AC Orientation: anterior  -AC Location: groin  -AC Primary Wound Type: Incision  -AC Additional Comments: STERISTRIPS 4X4 TEGADERM  -AC     Retired Wound - Properties Group Placement Date: 01/03/25  -AC Placement Time: 1622  -AC Side: Left  -AC Orientation: anterior  -AC Location: groin  -AC Primary Wound Type: Incision  -AC Additional Comments: STERISTRIPS 4X4 TEGADERM  -AC     Retired Wound - Properties Group Date first assessed: 01/03/25  -AC Time first assessed: 1622  -AC Side: Left  -AC Location: groin  -AC Primary Wound Type: Incision  -AC Additional Comments: STERISTRIPS 4X4 TEGADERM  -AC        Row Name 01/09/25 1130                 Positioning and Restraints     Pre-Treatment Position in bed  -BARON       Post Treatment Position bed  -BARON       In Bed fowlers;call light within reach;encouraged to call for assist;exit alarm on;side rails up x2  -BARON                    User Key  (r) = Recorded By, (t) = Taken By, (c) = Cosigned By        Initials Name  Provider Type     Maurice Nichols PTA Physical Therapist Assistant     Zara Reid, RN Registered Nurse                             Physical Therapy Education            Title: PT OT SLP Therapies (In Progress)         Topic: Physical Therapy (In Progress)         Point: Mobility training (Done)         Learning Progress Summary             Patient Acceptance, E, VU,NR by BARON at 1/9/2025 1130     Comment: progression with amb with managing pain     Acceptance, E, VU by BARON at 1/8/2025 1131     Comment: have family bring pt's cane to progress with amb     Acceptance, E, VU,DU,NR by NITHIN at 1/6/2025 0824     Comment: Benefits of activity, progression of PT POC,                            Point: Home exercise program (Not Started)         Learner Progress:  Not documented in this visit.                  Point: Body mechanics (Not Started)         Learner Progress:  Not documented in this visit.                  Point: Precautions (Not Started)         Learner Progress:  Not documented in this visit.                                      User Key         Initials Effective Dates Name Provider Type Discipline     NITHIN 02/03/23 -  Francis Hung PT DPT Physical Therapist PT     BARON 02/03/23 -  Maurice Rose PTA Physical Therapist Assistant PT                          PT Recommendation and Plan  Progress: improving  Outcome Evaluation: Pt was in bed, agreed to therapy.  Continue to c/o increase pain in LLE which limits mobility.  Required min assist to transfer supine to sitting.  Transfered sit to stand with CGA/min assist using the hemiwalker.  Pt was able to take a few steps forward then back with mckenzie walker and min assist, distance limited by increase pain.  Pt would benefit from inpatient rehab to continue to increase strength and progress amb.    Outcome Measures         Row Name 01/09/25 1130 01/08/25 1131 01/07/25 1026             How much help from another person do you currently need...      Turning from your back to your side while in flat bed without using bedrails? 3  -BARON 3  -BARON 3  -MF     Moving from lying on back to sitting on the side of a flat bed without bedrails? 3  -BARON 3  -BARON 3  -MF     Moving to and from a bed to a chair (including a wheelchair)? 3  -BARON 3  -BARON 2  -MF     Standing up from a chair using your arms (e.g., wheelchair, bedside chair)? 3  -BARON 3  -BARON 3  -MF     Climbing 3-5 steps with a railing? 1  -BARON 1  -BARON 1  -MF     To walk in hospital room? 2  -BARON 2  -BARON 2  -MF     AM-PAC 6 Clicks Score (PT) 15  -BARON 15  -BARON 14  -MF             Functional Assessment     Outcome Measure Options AM-PAC 6 Clicks Basic Mobility (PT)  -BARON AM-PAC 6 Clicks Basic Mobility (PT)  -BARON AM-PAC 6 Clicks Basic Mobility (PT)  -MF                     User Key  (r) = Recorded By, (t) = Taken By, (c) = Cosigned By        Initials Name Provider Type     Maurice Nichols PTA Physical Therapist Assistant      Gabby Villa PTA Physical Therapist Assistant                              Time Calculation:     PT Charges         Row Name 01/09/25 1130                       Time Calculation     Start Time 1130  -BARON         Stop Time 1209  -BARON         Time Calculation (min) 39 min  -BARON         PT Received On 01/09/25  -BARON                 Time Calculation- PT     Total Timed Code Minutes- PT 39 minute(s)  -BARON                 Timed Charges     20310 - PT Therapeutic Exercise Minutes 15  -BARON         78723 - PT Therapeutic Activity Minutes 24  -BARON                 Total Minutes     Timed Charges Total Minutes 39  -BARON          Total Minutes 39  -BARON                      User Key  (r) = Recorded By, (t) = Taken By, (c) = Cosigned By        Initials Name Provider Type     Maurice Nichols PTA Physical Therapist Assistant                       Therapy Charges for Today         Code Description Service Date Service Provider Modifiers Qty     85664687247  PT THERAPEUTIC ACT EA 15 MIN 1/8/2025 Maurice Rose PTA  GP 2     44794592085 HC PT THERAPEUTIC ACT EA 15 MIN 2025 Maurice Rose, PTA GP 2     71004564602 HC PT THER PROC EA 15 MIN 2025 Maurice Rose, PTA GP 1                PT G-Codes  Outcome Measure Options: AM-PAC 6 Clicks Basic Mobility (PT)  AM-PAC 6 Clicks Score (PT): 15  AM-PAC 6 Clicks Score (OT): 15     Maurice Rose PTA                    2025                                         Signed        Expand All Collapse All[]Expand All by Default  Patient Name: Brijesh Grande               : 1955                          MRN: [id number]                              Today's Date: 2025                                     Admit Date: 1/3/2025               Visit Dx:   Visit Diagnosis       ICD-10-CM ICD-9-CM   1. Aneurysm of infrarenal abdominal aorta, unspecified whether ruptured  I71.43 441.4   2. Abdominal pain, unspecified abdominal location  R10.9 789.00   3. Abdominal aortic aneurysm (AAA) without rupture, unspecified part  I71.40 441.4   4. Primary hypertension  I10 401.9   5. Impaired mobility [Z74.09]  Z74.09 799.89         Problem List       Patient Active Problem List   Diagnosis    AAA (abdominal aortic aneurysm)    Abdominal aortic aneurysm    Abdominal pain         Medical History        Past Medical History:   Diagnosis Date    Hypertension      Stroke           Surgical History         Past Surgical History:   Procedure Laterality Date    ABDOMINAL AORTIC ANEURYSM REPAIR WITH ENDOGRAFT N/A 1/3/2025     Procedure: ABDOMINAL AORTIC ANEURYSM REPAIR WITH ENDOGRAFT;  Surgeon: Eduardo Whiteside DO;  Location: [facility] HYBRID OR;  Service: Vascular;  Laterality: N/A;    CAROTID ENDARTERECTOMY         x 2           General Information         Row Name 25 0824                 Physical Therapy Time and Intention     Document Type evaluation  CC: Abdominal pain, Left flank pain, back pain. Dx: AAA, HTN, Post op acute resp failurel vented 1/3 - . s/p 1/3 AAA repair w  endograft. Hx: CVA w left sided weakness and abnormal sensation.  -NITHIN       Mode of Treatment physical therapy  -NITHIN          Row Name 01/06/25 0824                 General Information     Patient Profile Reviewed yes  -NITHIN       Prior Level of Function independent:;bed mobility;transfer;gait;dressing;bathing;driving  Utilizes a straight cane, PRN  -NITHIN       Existing Precautions/Restrictions fall;oxygen therapy device and L/min  -NITHIN       Barriers to Rehab medically complex;previous functional deficit;impaired sensation;physical barrier  -NITHIN          Row Name 01/06/25 0824                 Living Environment     People in Home alone  -NITHIN          Row Name 01/06/25 0824                 Home Main Entrance     Number of Stairs, Main Entrance three  -NITHIN       Stair Railings, Main Entrance railings on both sides of stairs  -NITHIN          Row Name 01/06/25 0824                 Stairs Within Home, Primary     Number of Stairs, Within Home, Primary one  -NITHIN          Row Name 01/06/25 0824                 Cognition     Orientation Status (Cognition) oriented x 4  -NITHIN          Row Name 01/06/25 0824                 Safety Issues/Impairments Affecting Functional Mobility     Safety Issues Affecting Function (Mobility) friction/shear risk  -NITHIN       Impairments Affecting Function (Mobility) balance;endurance/activity tolerance;range of motion (ROM);pain;strength;muscle tone abnormal;sensation/sensory awareness  -NITHIN                       User Key  (r) = Recorded By, (t) = Taken By, (c) = Cosigned By        Initials Name Provider Type     Francis Ma, PT DPT Physical Therapist                            Mobility         Row Name 01/06/25 0824                 Bed Mobility     Comment, (Bed Mobility) Sitting EOB, left as found.  -NITHIN          Row Name 01/06/25 0824                 Bed-Chair Transfer     Bed-Chair [place] (Transfers) minimum assist (75% patient effort);2 person assist;verbal cues  -NITHIN          Row Name  01/06/25 0824                 Sit-Stand Transfer     Sit-Stand [place] (Transfers) minimum assist (75% patient effort);2 person assist;verbal cues  -NITHIN          Row Name 01/06/25 0824                 Gait/Stairs (Locomotion)     [place] Level (Gait) minimum assist (75% patient effort);2 person assist  -NITHIN       Distance in Feet (Gait) 12  -NITHIN       Deviations/Abnormal Patterns (Gait) left sided deviations;antalgic;gait speed decreased;stride length decreased  -NITHIN       Left Sided Gait Deviations hip hiking;heel strike decreased  -NITHIN       Comment, (Gait/Stairs) Hx CVA with L sided weakness and increased tone. Utilizes tone for wt bearing. Decreased swing/Knee flexion/extension.  -NITHIN                       User Key  (r) = Recorded By, (t) = Taken By, (c) = Cosigned By        Initials Name Provider Type     Francis Ma, PT DPT Physical Therapist                            Obj/Interventions         Row Name 01/06/25 0824                 Range of Motion Comprehensive     Comment, General Range of Motion R LE AROM WFL, L ankle DF/PF PROM impaired 50%, L knee flex/ext PROM impaired 25%, L hip flexion PROM 75% (pain in L flank and tone)  -NITHIN          Row Name 01/06/25 0824                 Strength Comprehensive (MMT)     Comment, General Manual Muscle Testing (MMT) Assessment R LE functionally 4/5, L ankle, knee and hip grossly 2-/5  -NITHIN          Row Name 01/06/25 0824                 Motor Skills     Motor Skills muscle tone  -NITHIN       Muscle Tone left;lower extremity(s);mild impairment;moderate impairment;hypertonia  Hx CVA  -NITHIN          Row Name 01/06/25 0824                 Balance     Balance Assessment sitting dynamic balance;standing dynamic balance  -NITHIN       Dynamic Sitting Balance supervision  -NITHIN       Position, Sitting Balance unsupported;sitting edge of bed  BSC  -NITHIN       Dynamic Standing Balance minimal assist  -NITHIN       Position/Device Used, Standing Balance supported  -NITHIN          Row  "Name 01/06/25 0824                 Sensory Assessment (Somatosensory)     Sensory Assessment (Somatosensory) right-sided sensation intact  R LE sensation intact to light touch, Pt. reports L LE \"feels different\"  -NITHIN                       User Key  (r) = Recorded By, (t) = Taken By, (c) = Cosigned By        Initials Name Provider Type     Francis Ma, PT DPT Physical Therapist                            Goals/Plan         Row Name 01/06/25 0824                 Bed Mobility Goal 1 (PT)     Activity/Assistive Device (Bed Mobility Goal 1, PT) sit to supine/supine to sit  -NITHIN       [place] Level/Cues Needed (Bed Mobility Goal 1, PT) supervision required  -NITHIN       Time Frame (Bed Mobility Goal 1, PT) long term goal (LTG);10 days  -NITHIN       Progress/Outcomes (Bed Mobility Goal 1, PT) new goal  -NITHIN          Row Name 01/06/25 0824                 Transfer Goal 1 (PT)     Activity/Assistive Device (Transfer Goal 1, PT) sit-to-stand/stand-to-sit;bed-to-chair/chair-to-bed  -NITHIN       [place] Level/Cues Needed (Transfer Goal 1, PT) supervision required  -NITHIN       Time Frame (Transfer Goal 1, PT) long term goal (LTG);10 days  -NITHIN       Progress/Outcome (Transfer Goal 1, PT) new goal  -NITHIN          Row Name 01/06/25 0824                 Gait Training Goal 1 (PT)     Activity/Assistive Device (Gait Training Goal 1, PT) gait (walking locomotion);assistive device use;decrease fall risk;forward stepping;improve balance and speed;increase endurance/gait distance  -NITHIN       [place] Level (Gait Training Goal 1, PT) contact guard required  -NITHIN       Distance (Gait Training Goal 1, PT) 30 ft  -NITHIN       Time Frame (Gait Training Goal 1, PT) long term goal (LTG);10 days  -NITHIN       Progress/Outcome (Gait Training Goal 1, PT) new goal  -NITHIN          Row Name 01/06/25 0824                 Therapy Assessment/Plan (PT)     Planned Therapy Interventions (PT) bed mobility training;transfer training;gait training;balance " training;home exercise program;patient/family education;postural re-education;stair training;strengthening;motor coordination training;ROM (range of motion);stretching  -NITHIN                    User Key  (r) = Recorded By, (t) = Taken By, (c) = Cosigned By        Initials Name Provider Type     Francis Ma, PT DPT Physical Therapist                         Clinical Impression         Row Name 01/06/25 0824                 Pain     Pretreatment Pain Rating 7/10  -NITHIN       Posttreatment Pain Rating 7/10  -NITHIN       Pain Location abdomen;flank  -NITHIN       Pain Side/Orientation left  -NITHIN       Pain Management Interventions exercise or physical activity utilized;premedicated for activity  -NITHIN       Response to Pain Interventions no change per patient report  -NITHIN          Row Name 01/06/25 0824                 Plan of Care Review     Plan of Care Reviewed With patient  -NITHIN       Outcome Evaluation PT juan complete. He is alert and oriented x 4. Mr. Grande reports living at home alone where he was independent in his mobility and ADL's. He utilizes a cane as needed but reports furniture walking. He has a history of CVA w left sided deficits. L LE is grossly 2-/5 in strength. His L LE is also mild/moderately hypertonic. Today he needs min assist x 2 to stand and to t/f to the [facility]. He utilizes his tone with wt bearing through his L LE. Demos L flank pain with an antalgic gait. Was later able to ambulate a short distance with min assist x 2, ~ 12 ft. He would benefit from a cane for support and balance due to L LE weakness and reports of generalized weakness. He remains a fall risk. PT will cont with mobility training, balance training, strengthening and endurance training/activity tolerance. He would currently benefit from short term rehab as he lives alone and is not currently ready to dc home alone.  -NITHIN          Row Name 01/06/25 0824                 Therapy Assessment/Plan (PT)     Patient/Family Therapy Goals  Statement (PT) increase strength.  -NITHIN       Rehab Potential (PT) good  -NITHIN       Criteria for Skilled Interventions Met (PT) yes;meets criteria;skilled treatment is necessary  -NITHIN       Therapy Frequency (PT) 2 times/day  -NITHIN       Predicted Duration of Therapy Intervention (PT) until d.c  -NITHIN          Row Name 01/06/25 0824                 Positioning and Restraints     Pre-Treatment Position in bed  -NITHIN       Post Treatment Position bed  -NITHIN       In Bed notified nsg;sitting EOB;call light within reach;encouraged to call for assist;patient within staff view  -NITHIN                       User Key  (r) = Recorded By, (t) = Taken By, (c) = Cosigned By        Initials Name Provider Type     Francis Ma, PT DPT Physical Therapist                            Outcome Measures         Row Name 01/06/25 0824                 How much help from another person do you currently need...     Turning from your back to your side while in flat bed without using bedrails? 3  -NITHIN       Moving from lying on back to sitting on the side of a flat bed without bedrails? 3  -NITHIN       Moving to and from a bed to a chair (including a wheelchair)? 2  -NITHIN       Standing up from a chair using your arms (e.g., wheelchair, bedside chair)? 2  -NITHIN       Climbing 3-5 steps with a railing? 1  -NITHIN       To walk in hospital room? 2  -NITHIN       AM-PAC 6 Clicks Score (PT) 13  -NITHIN       Highest Level of Mobility Goal 4 --> Transfer to chair/commode  -NITHIN          Row Name 01/06/25 0921 01/06/25 0824            Functional Assessment     Outcome Measure Options AM-PAC 6 Clicks Daily Activity (OT)  -LS AM-PAC 6 Clicks Basic Mobility (PT)  -NITHIN                     User Key  (r) = Recorded By, (t) = Taken By, (c) = Cosigned By        Initials Name Provider Type     Francis Ma, PT DPT Physical Therapist     Hetal Linares OTR/L Occupational Therapist                          Physical Therapy Education            Title: PT OT SLP Therapies (In  Progress)         Topic: Physical Therapy (In Progress)         Point: Mobility training (Done)         Learning Progress Summary             Patient Acceptance, E, VU,DU,NR by NITHIN at 1/6/2025 0891     Comment: Benefits of activity, progression of PT POC,                            Point: Home exercise program (Not Started)         Learner Progress:  Not documented in this visit.                  Point: Body mechanics (Not Started)         Learner Progress:  Not documented in this visit.                  Point: Precautions (Not Started)         Learner Progress:  Not documented in this visit.                                      User Key         Initials Effective Dates Name Provider Type Discipline     NITHIN 02/03/23 -  Francis Hung, PT DPT Physical Therapist PT                          PT Recommendation and Plan  Planned Therapy Interventions (PT): bed mobility training, transfer training, gait training, balance training, home exercise program, patient/family education, postural re-education, stair training, strengthening, motor coordination training, ROM (range of motion), stretching  Outcome Evaluation: PT eval complete. He is alert and oriented x 4. Mr. Grande reports living at home alone where he was independent in his mobility and ADL's. He utilizes a cane as needed but reports furniture walking. He has a history of CVA w left sided deficits. L LE is grossly 2-/5 in strength. His L LE is also mild/moderately hypertonic. Today he needs min assist x 2 to stand and to [place] [facility] He utilizes his tone with wt bearing through his L LE. Demos L flank pain with an antalgic gait. Was later able to ambulate a short distance with min assist x 2, ~ 12 ft. He would benefit from a cane for support and balance due to L LE weakness and reports of generalized weakness. He remains a fall risk. PT will cont with mobility training, balance training, strengthening and endurance training/activity tolerance. He would  currently benefit from short term rehab as he lives alone and is not currently ready to dc home alone.      Time Calculation:        PT Charges         Row Name 25 1114                       Time Calculation     Start Time 0824  -NITHIN         Stop Time 1000  -NITHIN         Time Calculation (min) 96 min  -NITHIN         PT Received On 25  -NITHIN         PT Goal Re-Cert Due Date 25  -NITHIN                 Time Calculation- PT     Total Timed Code Minutes- PT 36 minute(s)  -NITHIN                 Timed Charges     36771 - Gait Training Minutes  16  -NITHIN         81429 - PT Therapeutic Activity Minutes 20  -NITHIN                 Total Minutes     Timed Charges Total Minutes 36  -NITHIN          Total Minutes 36  -NITHIN                      User Key  (r) = Recorded By, (t) = Taken By, (c) = Cosigned By        Initials Name Provider Type     Francis Ma, PT DPT Physical Therapist                       Therapy Charges for Today         Code Description Service Date Service Provider Modifiers Qty     83876745143 HC GAIT TRAINING EA 15 MIN 2025 Francis Hung, PT DPT GP 1     28703767198 HC PT THERAPEUTIC ACT EA 15 MIN 2025 Francis Hung, PT DPT GP 1     57835759893 HC PT EVAL MOD COMPLEXITY 4 2025 Francis Hung, PT DPT GP 1                PT G-Codes  Outcome Measure Options: AM-PAC 6 Clicks Daily Activity (OT)  AM-PAC 6 Clicks Score (PT): 13  AM-PAC 6 Clicks Score (OT): 15  PT Discharge Summary  Anticipated Discharge Disposition (PT): sub acute care setting     Francis Hung, CHRISTINE DPT               2025                                         Signed        Expand All Collapse All[]Expand All by Default  Patient Name: Brijesh Grande               : 1955                          MRN: [id number]                              Today's Date: 2025                                     Admit Date: 1/3/2025               Visit Dx:   Visit Diagnosis       ICD-10-CM ICD-9-CM   1. Aneurysm of infrarenal  abdominal aorta, unspecified whether ruptured  I71.43 441.4   2. Abdominal pain, unspecified abdominal location  R10.9 789.00   3. Abdominal aortic aneurysm (AAA) without rupture, unspecified part  I71.40 441.4   4. Primary hypertension  I10 401.9         Problem List       Patient Active Problem List   Diagnosis    AAA (abdominal aortic aneurysm)    Abdominal aortic aneurysm    Abdominal pain         Medical History        Past Medical History:   Diagnosis Date    Hypertension      Stroke           Surgical History         Past Surgical History:   Procedure Laterality Date    ABDOMINAL AORTIC ANEURYSM REPAIR WITH ENDOGRAFT N/A 1/3/2025     Procedure: ABDOMINAL AORTIC ANEURYSM REPAIR WITH ENDOGRAFT;  Surgeon: Eduardo Whiteside DO;  Location:  [facility];  Service: Vascular;  Laterality: N/A;    CAROTID ENDARTERECTOMY         x 2           General Information         Row Name 01/06/25 0921                 OT Time and Intention     Subjective Information complains of;pain  -LS       Document Type evaluation  cc: Abdominal pain, left flank pain, back pain. Dx: AAA, HTN, Post op acute resp failurel vented 1/3 - 1/4. s/p 1/3 AAA repair w endograft. Hx: CVA w left sided weakness and abnormal sensation.  -LS       Mode of Treatment occupational therapy  -LS       Patient Effort good  -LS          Row Name 01/06/25 0921                 General Information     Patient Profile Reviewed yes  -LS       Prior Level of Function independent:;ADL's;all household mobility;community mobility;home management;cooking;cleaning;driving;shopping  Utilizes [place] occasionally  -LS       Existing Precautions/Restrictions fall;other (see comments)  L hemiparesis  -LS       Barriers to Rehab medically complex;previous functional deficit  -LS          Row Name 01/06/25 0921                 Occupational Profile     Environmental Supports and Barriers (Occupational Profile) Walk in shower with shower chair and grab bars. Other AD/DME: [place],  quad cane  -          Row Name 01/06/25 0921                 Living Environment     People in Home alone  -          Row Name 01/06/25 0921                 Home Main Entrance     Number of Stairs, Main Entrance three  -LS       Stair Railings, Main Entrance railings on both sides of stairs  -          Row Name 01/06/25 0921                 Stairs Within Home, Primary     Number of Stairs, Within Home, Primary one  -LS       Stair Railings, Within Home, Primary none  -          Row Name 01/06/25 0921                 Cognition     Orientation Status (Cognition) oriented x 4  -          Row Name 01/06/25 0921                 Safety Issues/Impairments Affecting Functional Mobility     Safety Issues Affecting Function (Mobility) safety precaution awareness;safety precautions follow-through/compliance  -       Impairments Affecting Function (Mobility) balance;endurance/activity tolerance;range of motion (ROM);pain;strength;muscle tone abnormal  -                       User Key  (r) = Recorded By, (t) = Taken By, (c) = Cosigned By        Initials Name Provider Type      Hetal Cottrell OTR/L Occupational Therapist                                     Mobility/ADL's         Row Name 01/06/25 0921                 Transfers     Transfers sit-stand transfer;bed-chair transfer  -          Row Name 01/06/25 0921                 Bed-Chair Transfer     Bed-Chair [place] (Transfers) minimum assist (75% patient effort);2 person assist;verbal cues  -       Comment, (Bed-Chair Transfer) BSC  -          Row Name 01/06/25 0921                 Sit-Stand Transfer     Sit-Stand [place] (Transfers) minimum assist (75% patient effort);2 person assist;verbal cues  -          Row Name 01/06/25 0921                 Functional Mobility     Functional Mobility- Ind. Level minimum assist (75% patient effort);2 person assist required  -       Functional Mobility- Device other (see comments)  reached for objects  throughout room to steady self  -       Patient was able to Ambulate yes  -          Row Name 01/06/25 0921                 Activities of Daily Living     BADL Assessment/Intervention lower body dressing  -          Row Name 01/06/25 0921                 Lower Body Dressing Assessment/Training     [place] Level (Lower Body Dressing) don;shoes/slippers;maximum assist (25% patient effort)  -       Position (Lower Body Dressing) edge of bed sitting  -                       User Key  (r) = Recorded By, (t) = Taken By, (c) = Cosigned By        Initials Name Provider Type      Hetal Cottrell OTR/L Occupational Therapist                            Obj/Interventions         Row Name 01/06/25 0921                 Sensory Assessment (Somatosensory)     Sensory Assessment Pt reports numbness throughout entire LUE; RUE sensation intact  -          Row Name 01/06/25 0921                 Vision Assessment/Intervention     Visual Impairment/Limitations WFL  -          Row Name 01/06/25 0921                 Range of Motion Comprehensive     General Range of Motion upper extremity range of motion deficits identified  -       Comment, General Range of Motion Increased tone throughout LUE; L shoulder impaired 50%; L elbow and hand impaired 25%; L wrist impaired 75%; RUE AROM/PROM WFL  -          Row Name 01/06/25 0921                 Strength Comprehensive (MMT)     General Manual Muscle Testing (MMT) Assessment upper extremity strength deficits identified  -       Comment, General Manual Muscle Testing (MMT) Assessment LUE strength testing deferred; RUE strength grossly 5/5  -          Row Name 01/06/25 0921                 Motor Skills     Motor Skills coordination  -       Coordination left;fine motor deficit;gross motor deficit;right;WNL  -          Row Name 01/06/25 0921                 Balance     Balance Assessment sitting static balance;sitting dynamic balance;standing static balance;standing  dynamic balance  -LS       Static Sitting Balance standby assist  -LS       Dynamic Sitting Balance standby assist;contact guard  -LS       Position, Sitting Balance unsupported;sitting edge of bed  -LS       Static Standing Balance contact guard;minimal assist  -LS       Dynamic Standing Balance contact guard;minimal assist  -LS       Position/Device Used, Standing Balance supported  -LS                       User Key  (r) = Recorded By, (t) = Taken By, (c) = Cosigned By        Initials Name Provider Type      Hetal Cottrell, OTR/L Occupational Therapist                            Goals/Plan         Row Name 01/06/25 0921                 Transfer Goal 1 (OT)     Activity/Assistive Device (Transfer Goal 1, OT) toilet;shower chair  -LS       [place] Level/Cues Needed (Transfer Goal 1, OT) standby assist  -LS       Time Frame (Transfer Goal 1, OT) long term goal (LTG);10 days  -LS       Progress/Outcome (Transfer Goal 1, OT) new goal  -LS          Row Name 01/06/25 0921                 Dressing Goal 1 (OT)     Activity/Device (Dressing Goal 1, OT) dressing skills, all  -LS       [place]/Cues Needed (Dressing Goal 1, OT) standby assist  -LS       Time Frame (Dressing Goal 1, OT) long term goal (LTG);10 days  -LS       Progress/Outcome (Dressing Goal 1, OT) new goal  -LS          Row Name 01/06/25 0921                 Toileting Goal 1 (OT)     Activity/Device (Toileting Goal 1, OT) toileting skills, all  -LS       [place] Level/Cues Needed (Toileting Goal 1, OT) standby assist  -LS       Time Frame (Toileting Goal 1, OT) long term goal (LTG);10 days  -LS       Progress/Outcome (Toileting Goal 1, OT) new goal  -LS          Row Name 01/06/25 0921                 Problem Specific Goal 1 (OT)     Problem Specific Goal 1 (OT) Pt will independently implement one pain management technique to decrease pain and improve functional adl performance.  -LS       Time Frame (Problem Specific Goal 1, OT) long term goal  (LTG);by discharge  -LS       Progress/Outcome (Problem Specific Goal 1, OT) new goal  -LS          Row Name 01/06/25 0921                 Therapy Assessment/Plan (OT)     Planned Therapy Interventions (OT) transfer/mobility retraining;strengthening exercise;patient/caregiver education/training;occupation/activity based interventions;functional balance retraining;activity tolerance training;BADL retraining;adaptive equipment training;ROM/therapeutic exercise;passive ROM/stretching  -LS                    User Key  (r) = Recorded By, (t) = Taken By, (c) = Cosigned By        Initials Name Provider Type     LS Hetal Cottrell, OTR/L Occupational Therapist                         Clinical Impression         Row Name 01/06/25 0921                 Pain Assessment     Pretreatment Pain Rating 7/10  -LS       Posttreatment Pain Rating 7/10  -LS       Pain Location abdomen  -LS       Pain Side/Orientation left  -LS       Pain Management Interventions exercise or physical activity utilized  -LS          Row Name 01/06/25 0921                 Plan of Care Review     Plan of Care Reviewed With patient  -LS       Progress no change  -LS       Outcome Evaluation OT eval completed. Pt seated EOB upon therapist arrival; A&Ox4; 6L BNC with SpO2 97% at rest; c/o 7/10 pain in L abdominal incision. Pt reports Mod I with all BADLs including fxl ambulation at baseline. Today, Pt donned B shoes while seated EOB with Max A. Pt performed all sit<>stand transfers to/from bed and BSC with Min A x2. Pt ambulated short distance at bedside while reaching for objects to steady self with Min A x2 and verbal cues for safety awareness. Pt demos LUE AROM and strength deficits which are present at baseline due to h/o CVA; RUE strength WNL. SpO2 remained WNL while Pt remained on 6L however, Pt did experience some SOB with activity. Skilled OT intervention indicated in order to address deficits in fxl mobility, fxl activity tolerance, balance, strength,  and use of adaptive techniques/equipment during performance of BADLs. Recommend sub acute rehab at discharge.  -          Row Name 01/06/25 0921                 Therapy Assessment/Plan (OT)     Rehab Potential (OT) good  -       Criteria for Skilled Therapeutic Interventions Met (OT) yes;skilled treatment is necessary  -       Therapy Frequency (OT) 5 times/wk  -          Row Name 01/06/25 0921                 Therapy Plan Review/Discharge Plan (OT)     Anticipated Discharge Disposition (OT) sub acute care setting  -          Row Name 01/06/25 0921                 Positioning and Restraints     Pre-Treatment Position in bed  -LS       Post Treatment Position bed  -LS       In Bed sitting EOB;call light within reach;encouraged to call for assist;side rails up x2;patient within staff view  -                       User Key  (r) = Recorded By, (t) = Taken By, (c) = Cosigned By        Initials Name Provider Type     Hetal Linares OTR/L Occupational Therapist                            Outcome Measures         Row Name 01/06/25 0921                 How much help from another is currently needed...     Putting on and taking off regular lower body clothing? 2  -LS       Bathing (including washing, rinsing, and drying) 2  -LS       Toileting (which includes using toilet bed pan or urinal) 2  -LS       Putting on and taking off regular upper body clothing 2  -LS       Taking care of personal grooming (such as brushing teeth) 3  -LS       Eating meals 4  -LS       AM-PAC 6 Clicks Score (OT) 15  -          Row Name 01/06/25 0921                 Functional Assessment     Outcome Measure Options AM-PAC 6 Clicks Daily Activity (OT)  -                       User Key  (r) = Recorded By, (t) = Taken By, (c) = Cosigned By        Initials Name Provider Type     Hetal Linares OTR/L Occupational Therapist                             Occupational Therapy Education            Title: PT OT SLP Therapies (In Progress)          Topic: Occupational Therapy (In Progress)         Point: ADL training (Done)         Description:   Instruct learner(s) on proper safety adaptation and remediation techniques during self care or transfers.   Instruct in proper use of assistive devices.                       Learning Progress Summary             Patient Acceptance, E, VU,NR by  at 1/6/2025 1027                            Point: Home exercise program (Not Started)         Description:   Instruct learner(s) on appropriate technique for monitoring, assisting and/or progressing therapeutic exercises/activities.                       Learner Progress:  Not documented in this visit.                  Point: Precautions (Done)         Description:   Instruct learner(s) on prescribed precautions during self-care and functional transfers.                       Learning Progress Summary             Patient Acceptance, E, VU,NR by  at 1/6/2025 1027                            Point: Body mechanics (Done)         Description:   Instruct learner(s) on proper positioning and spine alignment during self-care, functional mobility activities and/or exercises.                       Learning Progress Summary             Patient Acceptance, E, VU,NR by  at 1/6/2025 1027                                                User Key         Initials Effective Dates Name Provider Type Discipline     SOWMYA 06/20/22 -  Hetal Cottrell OTR/L Occupational Therapist OT                          OT Recommendation and Plan  Planned Therapy Interventions (OT): transfer/mobility retraining, strengthening exercise, patient/caregiver education/training, occupation/activity based interventions, functional balance retraining, activity tolerance training, BADL retraining, adaptive equipment training, ROM/therapeutic exercise, passive ROM/stretching  Therapy Frequency (OT): 5 times/wk  Plan of Care Review  Plan of Care Reviewed With: patient  Progress: no change  Outcome Evaluation: OT  eval completed. Pt seated EOB upon therapist arrival; A&Ox4; 6L BNC with SpO2 97% at rest; c/o 7/10 pain in L abdominal incision. Pt reports Mod I with all BADLs including fxl ambulation at baseline. Today, Pt donned B shoes while seated EOB with Max A. Pt performed all sit<>stand transfers to/from bed and BSC with Min A x2. Pt ambulated short distance at bedside while reaching for objects to steady self with Min A x2 and verbal cues for safety awareness. Pt demos LUE AROM and strength deficits which are present at baseline due to h/o CVA; RUE strength WNL. SpO2 remained WNL while Pt remained on 6L however, Pt did experience some SOB with activity. Skilled OT intervention indicated in order to address deficits in fxl mobility, fxl activity tolerance, balance, strength, and use of adaptive techniques/equipment during performance of BADLs. Recommend sub acute rehab at discharge.      Time Calculation:        Time Calculation- OT         Row Name 01/06/25 0921                       Time Calculation- OT     OT Start Time 0921  -         OT Stop Time 1014  -         OT Time Calculation (min) 53 min  -         OT Non-Billable Time (min) 53 min  -         OT Received On 01/06/25  -         OT Goal Re-Cert Due Date 01/16/25  -                      User Key  (r) = Recorded By, (t) = Taken By, (c) = Cosigned By        Initials Name Provider Type     LS Hetal Cottrell, OTR/L Occupational Therapist                       Therapy Charges for Today         Code Description Service Date Service Provider Modifiers Qty     [id number] HC OT EVAL MOD COMPLEXITY 4 1/6/2025 Hetal Cottrell, OTR/L GO 1                   Hetal Cottrell OTR/L             1/6/2025

## 2025-01-09 NOTE — PLAN OF CARE
Goal Outcome Evaluation:  Plan of Care Reviewed With: patient  Progress: improving       Pt medicated with prn pain meds x2 thus far this shift. IV's saline locked. BM x2. External catheter in place with good urine output. Up with asst x2 to BSC. Drsgs intact to bilat groins. O2 @ 3L. VSS. Safety maintained.

## 2025-01-09 NOTE — CASE MANAGEMENT/SOCIAL WORK
Continued Stay Note   [place]     Patient Name: Brijesh Grande  MRN: [id number]  Today's Date: 1/9/2025    Admit Date: 1/3/2025    Plan: Rehab   Discharge Plan       Row Name 01/09/25 1525       Plan    Plan Rehab    Plan Comments [facility] unable to take pt. Spoke with pt's son, Alexis [phone], about other options. He requests a referral to [facility]. Referral sent.                   Discharge Codes    No documentation.                       TALI Frederick

## 2025-01-09 NOTE — PLAN OF CARE
Goal Outcome Evaluation:  Plan of Care Reviewed With: patient        Progress: improving  Outcome Evaluation: Pt was in bed, agreed to therapy.  Continue to c/o increase pain in LLE which limits mobility.  Required min assist to transfer supine to sitting.  Transfered sit to stand with CGA/min assist using the hemiwalker.  Pt was able to take a few steps forward then back with mckenzie walker and min assist, distance limited by increase pain.  Pt would benefit from inpatient rehab to continue to increase strength and progress amb.

## 2025-01-09 NOTE — DISCHARGE PLACEMENT REQUEST
"Brea Grande (69 y.o. Male)       Date of Birth   1955    Social Security Number       Address   [address] [place] [address]    Home Phone   [phone]    MRN   [id number]       [place]ai   Other    Marital Status                               Admission Date   1/3/25    Admission Type   Emergency    Admitting Provider   Nichol Suero MD    Attending Provider   Nichol Suero MD    Department, Room/Bed   [facility] 3C, 374/1       Discharge Date       Discharge Disposition       Discharge Destination                                 Attending Provider: Nichol Suero MD    Allergies: No Known Allergies    Isolation: None   Infection: None   Code Status: CPR    Ht: 177.8 cm (70\")   Wt: 102 kg (224 lb 3.2 oz)    Admission Cmt: None   Principal Problem: Abdominal aortic aneurysm [I71.40]                   Active Insurance as of 1/3/2025       Primary Coverage       Payor Plan Insurance Group Employer/Plan Group    ANTHEM MEDICARE REPLACEMENT ANTHEM MED ADV PPO [id number]       Payor Plan Address Payor Plan Phone Number Payor Plan Fax Number Effective Dates    [address] [phone]  1/1/2024 - None Entered    [facility] [id number]         Subscriber Name Subscriber Birth Date Member ID       BREA GRANDE 1955 [id number]                     Emergency Contacts        (Rel.) Home Phone Work Phone Mobile Phone    Alexis Grande (Son) -- -- [phone]    Mildred Pendleton (Friend) -- -- [phone]             Signed        Expand All Collapse All[]Expand All by Default      LOS: 6 days   Patient Care Team:  Eliseo Smith MD as PCP - General  Eliseo Smith MD as PCP - Family Medicine     Chief Complaint:  Post op day #6-EVAR        Subjective  Patient Complaints: Doing fine.  BM x 2 yesterday.  Denies abdominal pain.  Left leg pain stable.     Objective  Vital Signs  Temp:  [97.3 °F (36.3 °C)-98.2 °F (36.8 °C)] 98 °F (36.7 °C)  Heart Rate:  [76-91] " 76  Resp:  [15-24] 20  BP: (145-171)/(65-83) 171/80     Physical Exam  Constitutional:       Appearance: Normal appearance. He is not ill-appearing or toxic-appearing.   HENT:      Head: Normocephalic and atraumatic.   Cardiovascular:      Rate and Rhythm: Normal rate and regular rhythm.      Pulses:           Dorsalis pedis pulses are 2+ on the right side and 1+ on the left side.        Posterior tibial pulses are 2+ on the right side and detected w/ Doppler on the left side.   Pulmonary:      Effort: Pulmonary effort is normal. No respiratory distress.   Abdominal:      Palpations: Abdomen is soft.      Tenderness: There is no abdominal tenderness. There is no guarding.   Musculoskeletal:         General: No swelling or tenderness.      Cervical back: Normal range of motion and neck supple.      Comments: Motor intact bilateral lower extremities.  Soft bilaterally.   Skin:     General: Skin is warm and dry.      Comments: Left foot warm.  Brisk cap refill coloration stable.   Neurological:      Mental Status: He is alert. Mental status is at baseline.            Laboratory Data:                Results from last 7 days   Lab Units 01/09/25  0225 01/08/25  0126 01/07/25  0547   WBC 10*3/mm3 8.95 7.88 8.69   HEMOGLOBIN g/dL 9.3* 9.3* 9.1*   HEMATOCRIT % 29.8* 29.8* 28.1*   PLATELETS 10*3/mm3 231 222 195                    Results from last 7 days   Lab Units 01/09/25  0225 01/08/25  0126 01/07/25  0547 01/04/25  0721 01/04/25  0600 01/03/25  1530 01/03/25  0309   SODIUM mmol/L 139 140 137   < > 135*   < > 139   SODIUM, ARTERIAL    --   --   --   --   --    < >  --    POTASSIUM mmol/L 4.1 4.0 3.7   < > 4.5   < > 3.9   CHLORIDE mmol/L 102 103 103   < > 100   < > 101   CO2 mmol/L 29.0 29.0 27.0   < > 24.0   < > 28.0   BUN mg/dL 12 15 15   < > 13   < > 13   CREATININE mg/dL 0.48* 0.57* 0.51*   < > 0.92   < > 0.92   CALCIUM mg/dL 8.3* 8.2* 8.1*   < > 8.2*   < > 9.1   BILIRUBIN mg/dL  --   --   --   --  0.3  --  0.3   ALK  PHOS U/L  --   --   --   --  73  --  106   ALT (SGPT) U/L  --   --   --   --  10  --  14   AST (SGOT) U/L  --   --   --   --  29  --  16   GLUCOSE mg/dL 119* 108* 103*   < > 164*   < > 184*    < > = values in this interval not displayed.                  Results from last 7 days   Lab Units 01/06/25  0625 01/05/25  2326 01/05/25  1817 01/05/25  1307 01/05/25  0602 01/04/25  1152 01/04/25  0600 01/03/25  2331   PROTIME Seconds  --   --   --   --  15.1*  --  14.8 14.9*   INR    --   --   --   --  1.13*  --  1.10* 1.11*   APTT seconds 47.9* 85.2* 41.6*   < > 79.7*   < > 82.3* 40.6*    < > = values in this interval not displayed.               Medication Review: Reviewed        Assessment & Plan    Abdominal aortic aneurysm    AAA (abdominal aortic aneurysm)    Abdominal pain     69-year-old male postop day 6 EVAR with extension to left external iliac for rupture.        Plan for disposition:  -Hospitalist on board appreciate their assistance.  -Tachycardia resolved Lovenox started by hospitalist.  Continue aspirin DC Plavix with anticoagulation.  -Hemoglobin stable  -Faintly palpable left lower extremity pedal pulses with multiphasic signals.  Continue gabapentin for possible reperfusion pain versus chronic pain from stroke.  -PT OT encouraged.  -BM x 2 yesterday.  Patient reports is nonbloody.  Denies abdominal pain.  -No further vascular intervention needed at this time.  -Patient to follow-up in 2 weeks for wound check.     Timmy Finney MD  Vascular Surgery  [phone]  01/09/25  09:44 CST                      Signed             [facility]  INPATIENT PROGRESS NOTE     Patient Name: Brijesh Grande  Date of Admission: 1/3/2025  Today's Date: 01/09/25  Length of Stay: 6  Primary Care Physician: Eliseo Smith MD     Subjective   Chief Complaint: Abdominal pain  Abdominal Pain        69-year-old male with a past medical history of hypertension and previous  stroke in January 2016 with residual left-sided weakness and abnormal sensation admitted after presenting to ED with complaints of abdominal pain, left flank pain, and back pain.  The patient is also a smoker, and he continues to smoke 2 packs/day.     Significant findings from ED include glucose elevated 194, but otherwise normal CMP.  CBC was completely normal.  UA was positive for glucose, but was otherwise normal.  CT scan of the abdomen and pelvis showed a fusiform aneurysm of the distal abdominal aorta.  There is partial lumen circumferential mural thrombus Boces of the aneurysm.  There is evidence of hematoma/hemorrhage at the posterior wall of the aneurysm.  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  Patient was noted to be hypertensive in the emergency department.     Patient is being admitted to the CCU for nicardipine infusion for blood pressure control and for close monitoring.  I saw the patient while he was still in the emergency department.  He states the pain he was having in his abdomen and back are improved.  He reports that he has had an abnormal sensation to the left side of his body since his stroke from 9 years ago.  However, he noticed very intense pain in his abdomen and back earlier this morning, which brought him to the emergency department.  He has no other complaints for me at this time        Interval history:  1/4/2025 patient is intubated sedated he is status post placement of aortobiiliac endograft for ruptured AAA and left external iliac artery stent.  Patient sedation was weaned down this morning and patient was very agitated not following commands and had to be resedated again.  Patient is being started on Precedex and fentanyl drip.  As per discussion with vascular surgery at bedside today patient had a similar episode after surgery yesterday he was very agitated and had to be  reintubated and was found to have some vocal cord edema.     1/5/2025 patient was extubated yesterday he has done very well he got agitated overnight and had to be started on Precedex which dropped his blood pressure transiently requiring Levophed.  Patient remains on Precedex 0.2 he does complain of not able to use the bathroom and had some urine retention had to have an out overnight I do think this is causing his agitation.     1/6/2025 patient remains in the ICU he is not on any drips other than heparin drip.  Discussed with vascular surgery today we are discontinuing his heparin drip and starting antiplatelets.  Patient pain is under better control and he has been off Precedex since yesterday morning.  Pulses are dopplerable.     1/7/2025 remains in intensive care and waiting for floor bed.  Patient had a run of A-fib with RVR overnight and received 1 dose of metoprolol and now is back to sinus rhythm.  Patient denies any chest pain no shortness of breath he still complains of his left leg pain.  Pulses are dopplerable.  1/8  As before was transferred to the floor been having episodes of A-fib with RVR was given Lopressor on occasional basis the patient blood pressure is not well-controlled I increase his Lopressor p.o. he went for echo and we will consult with cardiology to adjust his medications, patient vascular surgery postop day #5 status post EVAR with extension to left external iliac for rupture.  Remains on aspirin and Plavix  Protonix for GI prophylaxis, no BM x4 days   1/9  As before appreciate vascular postop day #6 blood pressure remains not very well-controlled increased his Coreg, tachycardia resolved visit remains on aspirin and Plavix had good bowel movements care has signed off follow-up with them in 2 weeks for wound check appreciate cardiology per cardiology the patient needs full anticoagulation therefore we did discontinue Plavix continue Lovenox full dose and aspirin cardiology did  discontinue Toprol-XL and Norvasc restarted Coreg increase losartan and hydralazine as as neededBecause he has been asymptomatic 2 episodes of atrial fibrillation would recommend a 14-day Holter monitor is placed at discharge. If the patient continues to have intermittent episodes of atrial fibrillation following discharge and outpatient referral to electrophysiology can be considered.   All 12 point system review were unremarkab other than was mentioned history present illness le  Objective    Temp:  [97.3 °F (36.3 °C)-98.2 °F (36.8 °C)] 98 °F (36.7 °C)  Heart Rate:  [76-91] 76  Resp:  [15-24] 20  BP: (145-171)/(65-83) 171/80  Physical Exam  Constitutional:       Appearance: He is not ill-appearing.   HENT:      Head: Normocephalic.      Mouth/Throat:      Mouth: Mucous membranes are moist.   Eyes:      Pupils: Pupils are equal, round, and reactive to light.   Cardiovascular:      Rate and Rhythm: Normal rate and regular rhythm.   Pulmonary:      Effort: No respiratory distress.      Breath sounds: No wheezing.   Abdominal:      General: Bowel sounds are normal. There is no distension.      Palpations: Abdomen is soft.      Tenderness: There is no abdominal tenderness.   Musculoskeletal:      Right lower leg: Edema present.      Left lower leg: Edema present.      Comments: Mild cyanosis of the left leg pulses are palpable   Skin:     General: Skin is warm.   Neurological:      Mental Status: He is oriented to person, place, and time.      Comments: Baseline left-sided weakness   Psychiatric:         Mood and Affect: Mood normal.                  Results Review:  Lab Results (last 24 hours)         Procedure Component Value Units Date/Time     Basic Metabolic Panel [[id number]]  (Abnormal) Collected: 01/09/25 0225     Specimen: Blood Updated: 01/09/25 0254       Glucose 119 mg/dL         BUN 12 mg/dL         Creatinine 0.48 mg/dL         Sodium 139 mmol/L         Potassium 4.1 mmol/L         Comment: Slight  hemolysis detected by analyzer. Result may be falsely elevated.          Chloride 102 mmol/L         CO2 29.0 mmol/L         Calcium 8.3 mg/dL         BUN/Creatinine Ratio 25.0       Anion Gap 8.0 mmol/L         eGFR 110.4 mL/min/1.73       Narrative:       GFR Categories in Chronic Kidney Disease (CKD)                         GFR Category          GFR (mL/min/1.73)    Interpretation  G1                       90 or greater              Normal or high (1)  G2                               60-89                Mild decrease (1)  G3a                   45-59                Mild to moderate decrease  G3b                   30-44                Moderate to severe decrease  G4                    15-29                Severe decrease  G5                    14 or less           Kidney failure                                                 (1)In the absence of evidence of kidney disease, neither GFR category G1 or G2 fulfill the criteria for CKD.     eGFR calculation 2021 CKD-EPI creatinine equation, which does not include race as a factor     CBC & Differential [[id number]]  (Abnormal) Collected: 01/09/25 0225     Specimen: Blood Updated: 01/09/25 0235     Narrative:       The following orders were created for panel order CBC & Differential.  Procedure                               Abnormality         Status                     ---------                               -----------         ------                     CBC Auto Differential[[id number]]        Abnormal            Final result                  Please view results for these tests on the individual orders.     CBC Auto Differential [[id number]]  (Abnormal) Collected: 01/09/25 0225     Specimen: Blood Updated: 01/09/25 0235       WBC 8.95 10*3/mm3         RBC 3.21 10*6/mm3         Hemoglobin 9.3 g/dL         Hematocrit 29.8 %         MCV 92.8 fL         MCH 29.0 pg         MCHC 31.2 g/dL         RDW 13.7 %         RDW-SD 46.7 fl         MPV 8.8 fL         Platelets 231  "10*3/mm3         Neutrophil % 75.2 %         Lymphocyte % 10.8 %         Monocyte % 9.3 %         Eosinophil % 2.9 %         Basophil % 0.6 %         Immature Grans % 1.2 %         Neutrophils, Absolute 6.73 10*3/mm3         Lymphocytes, Absolute 0.97 10*3/mm3         Monocytes, Absolute 0.83 10*3/mm3         Eosinophils, Absolute 0.26 10*3/mm3         Basophils, Absolute 0.05 10*3/mm3         Immature Grans, Absolute 0.11 10*3/mm3         nRBC 0.0 /100 WBC                  No radiology results for the last day     Result Review:  I have personally reviewed the results from the time of this admission to 1/9/2025 10:16 CST and agree with these findings:  [x]  Laboratory list / accordion  []  Microbiology  [x]  Radiology  []  EKG/Telemetry   []  Cardiology/Vascular   []  Pathology  []  Old records  []  Other:  Most notable findings include:         Culture Data:   No results found for: \"BLOODCX\", \"URINECX\", \"WOUNDCX\", \"MRSACX\", \"RESPCX\", \"STOOLCX\"     I have reviewed the patient's current medications.      Assessment/Plan   Assessment       Active Hospital Problems     Diagnosis      **Abdominal aortic aneurysm      AAA (abdominal aortic aneurysm)      Abdominal pain     -Ruptured infrarenal abdominal aortic aneurysm status post aortobiiliac endograft left external iliac stent  -Acute hypoxemic respiratory failure requiring mechanical ventilation  -Laryngeal edema  -Severe agitation resolved  -Suspected COPD  -Tobacco abuse  -ICU delirium resolved  -History of CVA with left-sided weakness        Plan:  -Wean down oxygen as tolerated  -Pain management with as needed Dilaudid  -Continue gabapentin   -Patient is on Cozaar, hydralazine and Toprol for blood pressure control  -I will change scheduled DuoNebs to as needed  -Start Pulmicort  -We will keep Le catheter for urine retension  - aspirin and Plavix as per vascular  -Diet  -Waiting for floor bed, ICU standpoint discussed with Dr. De La Fuente patient will be taking care " "of by .      Electronically signed by Nichol Suero MD on 1/9/2025 at 10:16 CST                  Charli Aldana MD   Physician  Cardiology  Progress Notes      Signed  Date of Service:  01/09/25 1239  Creation Time:  01/09/25 1239     Signed        Chief Complaint/Reason for Visit: Follow-up tachycardia     S: The patient notes no acute events overnight.  He is still noticing some discomfort in his left lower extremity.  He notes no palpitations overnight but was noted to have an elevated heart rate for a few brief periods overnight.  He denies having chest discomfort.  He did have 2 bowel movements earlier today and notes less abdominal distention.     Medications: Reviewed     Review of Systems: All pertinent negatives and positives as noted above.  Otherwise, all systems reviewed and found to be negative.     Telemetry: Tachycardic rhythm earlier this morning, irregular, suggestive of atrial fibrillation, this lasted for relatively short length of time.  Currently, the patient is in normal sinus rhythm.  Also, the patient had a similar episode last night around 6 PM.     O:  /74 (BP Location: Right arm, Patient Position: Lying)   Pulse 85   Temp 98.1 °F (36.7 °C) (Oral)   Resp 20   Ht 177.8 cm (70\")   Wt 102 kg (224 lb 3.2 oz)   SpO2 92%   BMI 32.17 kg/m²   Temp:  [97.6 °F (36.4 °C)-98.2 °F (36.8 °C)] 98.1 °F (36.7 °C)  Heart Rate:  [76-91] 85  Resp:  [15-24] 20  BP: (151-171)/(74-83) 168/74     General: Ill in appearance but in no acute distress, lying in bed  CV: Regular rate and rhythm at this time with no audible murmurs appreciated  Pulmonary: Decreased breath sounds bilaterally  GI: Obese, mildly distended, nontender to palpation, active bowel sound  Extremities: Trace edema in the left lower extremity.  Warm and well-perfused.  Some decreased motor function noted, although the patient notes that he is essentially at baseline.     Diagnostic Data:           Lab Results "   Component Value Date     WBC 8.95 01/09/2025     HGB 9.3 (L) 01/09/2025     HCT 29.8 (L) 01/09/2025     MCV 92.8 01/09/2025      01/09/2025            Lab Results   Component Value Date     GLUCOSE 119 (H) 01/09/2025     CALCIUM 8.3 (L) 01/09/2025      01/09/2025     K 4.1 01/09/2025     CO2 29.0 01/09/2025      01/09/2025     BUN 12 01/09/2025     CREATININE 0.48 (L) 01/09/2025     EGFR 110.4 01/09/2025     BCR 25.0 01/09/2025     ANIONGAP 8.0 01/09/2025      ASSESSMENT/PLAN:     1.  Paroxysmal atrial fibrillation  2.  Contained rupture of infrarenal abdominal aortic aneurysm, now status post endovascular repair  3.  Anemia without active bleeding  4.  Primary hypertension  5.  Mixed hyperlipidemia  6.  Peripheral arterial disease including carotid artery disease  7.  Tobacco abuse  8.  Prior stroke     -There were two short episodes of elevated heart rate overnight, consistent with atrial fibrillation.  He is otherwise asymptomatic and hemodynamically stable.  -Continue therapeutic Lovenox at this time but plan to transition to oral anticoagulation, either Eliquis or Xarelto at discharge.  This can be at the discretion of the primary service, I have no preference in this matter as to which agent is used.  -Blood pressure still remains elevated.  Cardiology will comment on the patient's atrial fibrillation, however the primary service to manage the patient's blood pressure and adjust medications accordingly if needed.  -At discharge, the patient should be ordered a 14-day cardiac monitor - Zio patch.  -Should the patient have symptomatic episodes of atrial fibrillation while here or prolonged episodes, recommend electrophysiology consultation.  -At this time, we will be available as needed.  Please feel free to call if there are questions.                     Timmy Finney MD   Physician  Vascular Surgery  Op Note      Signed  Date of Service:  01/03/25 1319  Creation Time:  01/03/25  1801  Case Time:  Procedures:  Surgeons:    01/03/25 1319 ABDOMINAL AORTIC ANEURYSM REPAIR WITH ENDOGRAFT    Eduardo Whiteside DO Sapp, Alexander, MD               Signed        Brijesh Grande  1/3/2025     PREOPERATIVE DIAGNOSIS: Abdominal pain, unspecified abdominal location [R10.9]  Ruptured abdominal aortic aneurysm     POSTOPERATIVE DIAGNOSIS: Post-Op Diagnosis Codes:     * Abdominal pain, unspecified abdominal location [R10.9]  Ruptured abdominal aortic aneurysm     PROCEDURE PERFORMED:   Percutaneous access and closure of right common femoral artery  Ultrasound-guided access of left common femoral artery  Placement of aortobiiliac endograft for rupture  Concurrent placement of extension stent to left external iliac artery  Left external iliac artery stent placement  Exposure of left common femoral artery with primary repair  Attempted left internal iliac artery coil embolization        IMPLANTS:   Main body was a 28 x 16 Endurant stent graft placed via the right femoral artery  The left was extended down with a 16 x 16 and 16 x 10 Endurant limb  The right was extended down with a 16 x 20 indurate limb  A 9 x 39 Omnilink balloon expandable stent was placed into the left external iliac artery        SURGEON: Romero Finney MD     ASSISTANT: Eduardo Whiteside DO     ANESTHESIA: General.     PREPARATION: Routine.     STAFF: Circulator: Zara Balbuena RN  Scrub Person: Ryan Walton  Assistant: Sury Cisneros  Vascular Radiology Technician: Radha Robb     Estimated Blood Loss: 100ml     SPECIMENS: None     COMPLICATIONS: Ruptured left external iliac artery and Perclose failure left common femoral artery     INDICATIONS: Brijesh Grande is a 69 y.o. male who presented with sudden onset left lower quadrant abdominal, flank and back pain.  A CT scan of the abdomen pelvis was obtained which showed a 5.7 cm infrarenal abdominal aortic aneurysm with concern for rupture on the posterior wall.  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  The patients vital signs were noted to be stable. Risks of abdominal aortic aneurysm repair include, but are not limited to, bleeding, infection, vessel rupture, MI, stroke, and damage to kidney or bowel.  Due to the left common iliac artery aneurysm also discussed the possibility of erectile dysfunction and pelvic ischemia with likely the need for cooling of the left internal iliac artery.  The indications, risks, and possible complications of the procedure were explained to the patient, who voiced understanding and wished to proceed with surgery.     PROCEDURE IN DETAIL:   The patient was taken to the operating room and placed on the operating table in a supine position. After general anesthesia was obtained, the abdomen and bilateral groins were prepped and draped in a sterile manner.  Under ultrasound guidance and using a micropuncture technique the right common femoral artery was cannulated and a micro-sheath was placed.  A small stab incision was made with 11 blade.  The Advantage Glidewire was advanced up into the aorta under fluoroscopic guidance.  The 7 French sheath was placed for predilatation.  The first Perclose device was placed and deployed at the 2 o'clock position.  The second one was placed and deployed at the 10 o'clock position.  The 11 French sheath was placed.  Patient was systemically heparinized and additional boluses were given as needed throughout the procedure.  The same procedure was performed in the left groin.  Under ultrasound guidance and using a micropuncture technique the left common femoral artery was cannulated and a micro-sheath was placed.  A small stab incision was made with 11 blade.  The Advantage Glidewire was advanced into the aorta under fluoroscopic guidance.  The 7 French sheath was placed for predilatation.  The first Perclose device was placed and deployed at the 2 o'clock position.  The second was placed and deployed at the 10  o'clock position.  The 11 Egyptian sheath was placed.  A limited pelvic arteriogram was obtained to the left common femoral sheath which showed the left internal iliac artery was patent however was severely diseased.  This was questionable on the preoperative CT scan.  Due to concern for possible pelvic ischemia due to the extensive collateralization seen this was repeated on the right side.  On the right the right internal iliac artery was more widely patent and there was good collateralization going to the left prompting us to proceed with left internal iliac artery embolization.  We attempted to cannulate the left internal iliac artery with a series of wires and catheters were ultimately unsuccessful.  We then advanced a 6.5  sheath through the right common femoral sheath to aid in cannulation of the internal iliac artery.  Despite multiple arteriograms and the use of multiple catheters and wires were unable to do so from the right common femoral sheath.  We then attempted to cannulate it from the left common femoral sheath with the  as well as multiple wires and catheters.  Ultimately we were unable to cannulate left internal iliac artery likely due to his advanced disease.  Considering the disease state and stenosis of the left internal iliac artery we then elected to continue with the EVAR with attempts to leave the wire behind for coiling of the left common iliac artery and left internal iliac artery origin.  Both flush catheters were advanced into the aorta and an aortoiliac angiogram was performed.  The appropriate measurements were taken.  The Medtronic Endurant modular bifurcated device with 2 docking limbs measuring 28 cm was placed from the right side up to the level of the renal arteries.  A magged up view of the renal arteries was performed.  The graft was then successfully deployed.  The suprarenal stent was successfully deployed.  Next, the flush catheter was captured from the left  side and the gate was successful cannulated.  The catheter was spun to ensure that we were in true lumen.  We then upsized the left common femoral sheath to a 16 Trinidadian sheath to allow for the vasile wire behind the graft for cooling of the left common iliac and internal iliac artery if needed.  With the C-arm at 20° POP a retrograde angiogram was performed from the left side.  The hypogastric was marked on the screen.   During this process we discovered that the left external iliac artery was ruptured distally this was marked as well.  The dilator was placed back onto the sheath and the sheath was advanced more proximally where it was occlusive to control the bleeding.  The contralateral limbs were successfully placed and deployed to exclude the left common iliac artery aneurysm and the rupture left external iliac artery.  This was done with the 014 wire placed around the stent into the aneurysm sac.  Further arteriogram showed exclusion of the ruptured area no further bleeding.  The rest of the main body device was successfully deployed.  The delivery device was captured and a 16 Trinidadian sheath was placed.  With the C-arm at 20° Chinese a retrograde angiogram was performed from the right side.  Hypogastric was marked on the screen.  The ipsilateral extension limb was placed successfully.  The right balloon was then used to angioplasty the main body in the right limb.  We then attempted to advance the catheter over the 014 wire into the aneurysm sac for coil embolization left internal iliac artery.  However we were ultimately unsuccessful as this would not advance beyond the distal edge of the stent.  UF catheter was then advanced over the wire from the right side and an angiogram was performed down through the graft and through the iliac arteries.    There was no evidence of type I, type II, type III, or type IV endoleaks.  Due to no evidence of endoleak from the left internal iliac artery we then elected to abandon the  left internal iliac artery embolization.  The 014 wire was then removed.  The Reliant balloon was then used for balloon angioplasty through the left limb.  Due to the severe tortuosity and stenosis of the left external iliac artery we were concern for kinking of the limb and possible eventual occlusion.  The wire was withdrawn until the floppy end was in the tortuous and stenosed portion.  An angiogram was then shot through the left common femoral sheath showing stenosis in mild remaining tortuosity.  Due to the stenosis and previous tortuosity a 9 x 39 Omnilink balloon expandable stent was placed over this portion.  Completion angiogram was performed which showed rapid flow down through the graft and out through the iliac arteries without any evidence of stenosis or occlusion.    There was no evidence of type I, type II, type III, or type IV endoleaks.  At this point I felt no further intervention was warranted.  The sheaths were removed.  The Perclose devices were used to seal down the artery.  Direct pressure was held for an additional 10 minutes of ensure hemostasis.  5 mL of 0.5% Marcaine plain was used to infiltrate each groin for local anesthesia.  Pedal pulses were then checked with a Doppler.  Signals on the right were unchanged from preop, however the left had very weak signals and very sluggish refill.  We then evaluated the left common femoral with a ultrasound and noted what was appear to be a diminished pulse distally.  At this point we elected for open exploration of the left common femoral artery.  A transverse skin incision was then made over the left common femoral artery this was carried down through the skin and subcutaneous tissues.  We then entered the femoral sheath sharply and dissected the proximal common femoral artery free from the surrounding structures.  We then continued to dissect down distally where the proximal SFA and profunda were dissected free from surrounding structures.  A  Silastic vessel loop was then used to encircle the proximal SFA, profunda, and proximal right common femoral artery.  The proximal common femoral artery was then clamped and tension was applied to the Silastic Vesseloops for distal control.  The Perclose's which appeared to be well-placed were then cut and removed.  The defect in the left common femoral artery was then examined and there was no evidence of occlusion or dissection from the Perclose.  A 5-0 Prolene was then used to close the defect in the typical fashion.  Prior to completion of the repair of the arteriotomy there was flushed and de-aired.  The distal and proximal clamps were then removed, and there was noted to be a good pulse proximal and distal to the area of repair.  At this point we discussed a possible arteriogram, however we were unsure of the patient's baseline disease.  We also elected not to do this due to the amount of contrast we had already used on top of the CT scan he had received previously in the day.  The left foot was then reexamined and there was improved cap refill with still poor signals.  At this point we were concerned that this could be spasm due to occlusion of the left external iliac artery with the sheaths in place.  Wound was then irrigated.  The skin on the right side was then reapproximated using a 4-0 Monocryl in a subcuticular fashion.  A multilayer closure was then performed in the left groin in the typical fashion.  Sterile dressings were applied. The patient tolerated the procedure well. Sponge and needle counts were correct. The patient was then awakened and extubated in the operating room and taken to the recovery room in good condition.    Dr. Whiteside was present throughout the procedure and assisted with access, internal iliac artery cannulation, gate cannulation, exposure, and closure.  Timmy Finney MD  Date: 1/3/2025            Time: 18:05 CST                    Attestation signed by Nathan Cortes MD at  01/03/25 6599     I have reviewed this documentation and agree.  Patient heading to the OR.  I agree with the APC notes and plan as noted above.  Very interesting imaging.  Appreciate vascular surgery assistance.  Admit to ICU for further care until patient more stable.  Plan for OR.  Nathan Cortes MD, CM            Expand All Collapse All[]Expand All by Default       [facility] Intensivist Services  HISTORY AND PHYSICAL     Date of Admission: 1/3/2025  Primary Care Physician: Eliseo Smith MD     Subjective   Primary Historian: Patient     Chief Complaint: Abdominal pain, left flank pain, back pain     History of Present Illness  69-year-old male with a past medical history of hypertension and previous stroke in January 2016 with residual left-sided weakness and abnormal sensation admitted after presenting to ED with complaints of abdominal pain, left flank pain, and back pain.  The patient is also a smoker, and he continues to smoke 2 packs/day.     Significant findings from ED include glucose elevated 194, but otherwise normal CMP.  CBC was completely normal.  UA was positive for glucose, but was otherwise normal.  CT scan of the abdomen and pelvis showed a fusiform aneurysm of the distal abdominal aorta.  There is partial lumen circumferential mural thrombus Boces of the aneurysm.  There is evidence of hematoma/hemorrhage at the posterior wall of the aneurysm.  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  Patient was noted to be hypertensive in the emergency department.     Patient is being admitted to the CCU for nicardipine infusion for blood pressure control and for close monitoring.  I saw the patient while he was still in the emergency department.  He states the pain he was having in his abdomen and back are improved.  He reports that he has had an abnormal sensation to  the left side of his body since his stroke from 9 years ago.  However, he noticed very intense pain in his abdomen and back earlier this morning, which brought him to the emergency department.  He has no other complaints for me at this time.     Review of Systems   Otherwise complete ROS reviewed and negative except as mentioned in the HPI.     Past Medical History:   Medical History        Past Medical History:   Diagnosis Date    Hypertension      Stroke           Past Surgical History:  Surgical History         Past Surgical History:   Procedure Laterality Date    CAROTID ENDARTERECTOMY         x 2         Social History:  reports that he has been smoking cigarettes. He does not have any smokeless tobacco history on file. He reports that he does not currently use alcohol. He reports that he does not currently use drugs.     Family History: family history is not on file.        Allergies:  Allergies   No Known Allergies        Medications:          Prior to Admission medications    Medication Sig Start Date End Date Taking? Authorizing Provider   amLODIPine (NORVASC) 10 MG tablet Take 1 tablet by mouth Daily.       Raulito Gleason MD   aspirin 325 MG EC tablet Take 1 tablet by mouth Every 6 (Six) Hours As Needed for Mild Pain.       Raulito Gleason MD   atorvastatin (LIPITOR) 10 MG tablet Take 1 tablet by mouth Daily.       Raulito Gleason MD   cloNIDine (CATAPRES) 0.2 MG tablet Take 1 tablet by mouth 2 (Two) Times a Day.       Raulito Gleason MD   hydrALAZINE (APRESOLINE) 25 MG tablet Take 1 tablet by mouth 3 (Three) Times a Day.       Raultio Gleason MD   losartan (COZAAR) 25 MG tablet Take 1 tablet by mouth Daily.       Raulito Gleason MD   metoprolol tartrate (LOPRESSOR) 25 MG tablet Take 1 tablet by mouth 2 (Two) Times a Day.       Raulito Gleason MD      I have utilized all available immediate resources to obtain, update, or review the patient's current medications  (including all prescriptions, over-the-counter products, herbals, cannabis/cannabidiol products, and vitamin/mineral/dietary (nutritional) supplements).     Objective      Vital Signs: /95   Pulse 69   Temp 97.5 °F (36.4 °C) (Oral)   Resp 18   SpO2 91%   Physical Exam  Constitutional:       General: He is not in acute distress.     Appearance: He is not toxic-appearing or diaphoretic.   HENT:      Head: Normocephalic and atraumatic.      Mouth/Throat:      Mouth: Mucous membranes are moist.   Eyes:      Extraocular Movements: Extraocular movements intact.   Cardiovascular:      Rate and Rhythm: Normal rate and regular rhythm.      Comments: DP and PT pulses RLE 2+, DP and PT pulses LLE 1+.   Pulmonary:      Effort: Pulmonary effort is normal.      Breath sounds: Normal breath sounds.   Abdominal:      General: Bowel sounds are normal. There is distension.      Tenderness: There is no abdominal tenderness.   Skin:     General: Skin is warm.      Capillary Refill: Capillary refill takes less than 2 seconds.   Neurological:      Mental Status: He is alert and oriented to person, place, and time. Mental status is at baseline.      Comments: LUE and LLE weakness present. Patient states this is chronic since his stroke from January 2016.          Results Reviewed:  Lab Results (last 24 hours)         Procedure Component Value Units Date/Time     [place] Urine - Urine, Clean Catch [[id number]] Collected: 01/03/25 0351     Specimen: Urine, Clean Catch Updated: 01/03/25 0400       Extra Tube Hold for add-ons.       Comment: Auto resulted.        Urinalysis With Culture If Indicated - Urine, Clean Catch [[id number]]  (Abnormal) Collected: 01/03/25 0351     Specimen: Urine, Clean Catch Updated: 01/03/25 0359       Color, UA Yellow       Appearance, UA Clear       pH, UA 6.0       Specific Gravity, UA 1.019       Glucose,  mg/dL (1+)       Ketones, UA Negative       Bilirubin, UA Negative       Blood, UA Negative        Protein, UA Negative       Leuk Esterase, UA Negative       Nitrite, UA Negative       Urobilinogen, UA 0.2 E.U./dL     Narrative:       In absence of clinical symptoms, the presence of pyuria, bacteria, and/or nitrites on the urinalysis result does not correlate with infection.  Urine microscopic not indicated.     Comprehensive Metabolic Panel [[id number]]  (Abnormal) Collected: 01/03/25 0309     Specimen: Blood Updated: 01/03/25 0343       Glucose 184 mg/dL         BUN 13 mg/dL         Creatinine 0.92 mg/dL         Sodium 139 mmol/L         Potassium 3.9 mmol/L         Comment: Slight hemolysis detected by analyzer. Result may be falsely elevated.          Chloride 101 mmol/L         CO2 28.0 mmol/L         Calcium 9.1 mg/dL         Total Protein 7.1 g/dL         Albumin 4.1 g/dL         ALT (SGPT) 14 U/L         AST (SGOT) 16 U/L         Alkaline Phosphatase 106 U/L         Total Bilirubin 0.3 mg/dL         Globulin 3.0 gm/dL         A/G Ratio 1.4 g/dL         BUN/Creatinine Ratio 14.1       Anion Gap 10.0 mmol/L         eGFR 88.9 mL/min/1.73       Narrative:       GFR Categories in Chronic Kidney Disease (CKD)                         GFR Category          GFR (mL/min/1.73)    Interpretation  G1                       90 or greater              Normal or high (1)  G2                               60-89                Mild decrease (1)  G3a                   45-59                Mild to moderate decrease  G3b                   30-44                Moderate to severe decrease  G4                    15-29                Severe decrease  G5                    14 or less           Kidney failure                                                 (1)In the absence of evidence of kidney disease, neither GFR category G1 or G2 fulfill the criteria for CKD.     eGFR calculation 2021 CKD-EPI creatinine equation, which does not include race as a factor     CBC & Differential [[id number]]  (Abnormal) Collected: 01/03/25 0309      Specimen: Blood Updated: 01/03/25 0340     Narrative:       The following orders were created for panel order CBC & Differential.  Procedure                               Abnormality         Status                     ---------                               -----------         ------                     CBC Auto Differential[[id number]]        Abnormal            Final result                  Please view results for these tests on the individual orders.     CBC Auto Differential [[id number]]  (Abnormal) Collected: 01/03/25 0309     Specimen: Blood Updated: 01/03/25 0340       WBC 9.50 10*3/mm3         RBC 4.90 10*6/mm3         Hemoglobin 14.7 g/dL         Hematocrit 44.8 %         MCV 91.4 fL         MCH 30.0 pg         MCHC 32.8 g/dL         RDW 13.8 %         RDW-SD 46.6 fl         MPV 9.0 fL         Platelets 261 10*3/mm3         Neutrophil % 72.9 %         Lymphocyte % 18.1 %         Monocyte % 6.8 %         Eosinophil % 1.4 %         Basophil % 0.5 %         Immature Grans % 0.3 %         Neutrophils, Absolute 6.92 10*3/mm3         Lymphocytes, Absolute 1.72 10*3/mm3         Monocytes, Absolute 0.65 10*3/mm3         Eosinophils, Absolute 0.13 10*3/mm3         Basophils, Absolute 0.05 10*3/mm3         Immature Grans, Absolute 0.03 10*3/mm3         nRBC 0.0 /100 WBC       [place] Draw [[id number]] Collected: 01/03/25 0309     Specimen: Blood Updated: 01/03/25 0315     Narrative:       The following orders were created for panel order [place] Draw.  Procedure                               Abnormality         Status                     ---------                               -----------         ------                     Green Top (Gel)[[id number]]                                  Final result               Lavender Top[[id number]]                                     Final result               Red Top[[id number]]                                          Final result               Carbone Top[[id number]]                                          Final result               Light Blue Top[[id number]]                                   Final result                  Please view results for these tests on the individual orders.     Green Top (Gel) [[id number]] Collected: 01/03/25 0309     Specimen: Blood Updated: 01/03/25 0315       Extra Tube Hold for add-ons.       Comment: Auto resulted.        Lavender Top [[id number]] Collected: 01/03/25 0309     Specimen: Blood Updated: 01/03/25 0315       Extra Tube hold for add-on       Comment: Auto resulted        Red Top [[id number]] Collected: 01/03/25 0309     Specimen: Blood Updated: 01/03/25 0315       Extra Tube Hold for add-ons.       Comment: Auto resulted.        Gray Top [[id number]] Collected: 01/03/25 0309     Specimen: Blood Updated: 01/03/25 0315       Extra Tube Hold for add-ons.       Comment: Auto resulted.        Light Blue Top [[id number]] Collected: 01/03/25 0309     Specimen: Blood Updated: 01/03/25 0315       Extra Tube Hold for add-ons.       Comment: Auto resulted                   CT Abdomen Pelvis With & Without Contrast     Result Date: 1/3/2025  1. Fusiform aneurysm of the distal abdominal aorta as detailed above. There is partial lumen circumferential mural thrombosis of the aneurysm. There is evidence of hematoma/hemorrhage at the posterior wall of the aneurysm as detailed above. Further evaluation with CT angiography of the abdomen may be obtained. 2. A saccular aneurysm of the mid abdominal aorta adjacent and below the level of the right renal arteries and posterior to the IVC. Details given above. 3. No other acute findings in the abdomen or pelvis to account for patient's symptoms. No renal or ureteral calculi or obstructive uropathy. 4. No gallstones. No finding to suggest appendicitis.       This report was signed and finalized on 1/3/2025 5:58 AM by Dr. Andra Scott MD.        Result Review:  I have personally reviewed the results from the time of this admission  to 1/3/2025 09:44 CST and agree with these findings:  [x]  Laboratory list / accordion  []  Microbiology  [x]  Radiology  []  EKG/Telemetry   []  Cardiology/Vascular   []  Pathology  []  Old records  []  Other:  Most notable findings include: CT scan of the abdomen and pelvis showed a fusiform aneurysm of the distal abdominal aorta.  There is partial lumen circumferential mural thrombus Boces of the aneurysm.  There is evidence of hematoma/hemorrhage at the posterior wall of the aneurysm.  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       I have personally reviewed and interpreted the radiology studies and ECG obtained at time of admission.      Assessment / Plan   Assessment:        Active Hospital Problems     Diagnosis      AAA (abdominal aortic aneurysm)     69-year-old male with a past medical history of previous stroke, tobacco use, and hypertension admitted after presenting to the ED with complaints of abdominal pain and back pain.  He was found to have a AAA with evidence of hematoma/hemorrhage at the posterior wall of the aneurysm.  He is admitted to the CCU for close monitoring and will be started on a Cardene infusion to keep systolic blood pressure <120.  Vascular surgery has been consulted.  Patient may potentially go to the OR this afternoon.     Treatment Plan  The patient will be admitted to Intensivist service here at [facility].      AAA  -With evidence of hematoma/hemorrhage at the posterior wall of the aneurysm  -Starting patient on Cardene infusion now with goal SBP < 120  -Renal function is currently normal; however, given area of aneurysm, we will monitor UOP and renal function closely   -Serial pulse checks to BLE   -Vital signs per CCU protocol  -Pt to be kept NPO for possible surgery  -Further recommendations per vascular surgery  -Vascular surgery following, we appreciate their  recommendations     Hypertension  -Starting cardene infusion to keep SBP <120 in setting of AAA as mentioned above  -Patient takes amlodipine, clonidine, hydralazine, losartan, and metoprolol. These medications have been ordered to start tomorrow as patient is currently NPO as he may be going to OR today.   -VS per CCU protocol     Tobacco abuse  -Patient smokes 2 PPD  -Nicotine patch ordered, but patient does not want one at this time.      4. History of Stroke  -Patient had stroke in Jan 2016 with left-sided residual deficits as mentioned above     Medical Decision Making  Number and Complexity of problems: 4  Differential Diagnosis: AAA, rupture of abdominal aortic aneurysm, renal colic, pyelonephritis, splenic injury, appendicitis     Conditions and Status        Condition is unchanged as patient just arrived from ED.     MDM Data  External documents reviewed: N/A  Cardiac tracing (EKG, telemetry) interpretation: Sinus rhythm, rate controlled at time my exam  Radiology interpretation: Yes, see above  Labs reviewed: Yes, see above  Any tests that were considered but not ordered: N/A     Decision rules/scores evaluated (example [id number], Wells, etc): N/A     Discussed with: Attending, patient, patient's ex-wife, nurse     Care Planning  Shared decision making: Attending  Code status and discussions: Full     Disposition  Social Determinants of Health that impact treatment or disposition: N/A  Estimated length of stay is 3-5 days.      I confirmed that the patient's advanced care plan is present, code status is documented, and a surrogate decision maker is listed in the patient's medical record.      The patient's surrogate decision maker is his son, Alexis.      The patient was seen and examined by me on 1/3/2025.     I provided 35 minutes of total critical care time. Due to the high probability of clinically significant, life-threatening deterioration, the patient required my direct and personal management. The  critical care time does not include time spent on separately billable procedures.     Electronically signed by Abrahan Haddad PA-C on 1/3/2025 at 09:44 CST

## 2025-01-09 NOTE — PROGRESS NOTES
[facility]  INPATIENT PROGRESS NOTE    Patient Name: Brijesh Grande  Date of Admission: 1/3/2025  Today's Date: 01/09/25  Length of Stay: 6  Primary Care Physician: Eliseo Smith MD    Subjective   Chief Complaint: Abdominal pain  Abdominal Pain       69-year-old male with a past medical history of hypertension and previous stroke in January 2016 with residual left-sided weakness and abnormal sensation admitted after presenting to ED with complaints of abdominal pain, left flank pain, and back pain.  The patient is also a smoker, and he continues to smoke 2 packs/day.     Significant findings from ED include glucose elevated 194, but otherwise normal CMP.  CBC was completely normal.  UA was positive for glucose, but was otherwise normal.  CT scan of the abdomen and pelvis showed a fusiform aneurysm of the distal abdominal aorta.  There is partial lumen circumferential mural thrombus Boces of the aneurysm.  There is evidence of hematoma/hemorrhage at the posterior wall of the aneurysm.  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  Patient was noted to be hypertensive in the emergency department.     Patient is being admitted to the CCU for nicardipine infusion for blood pressure control and for close monitoring.  I saw the patient while he was still in the emergency department.  He states the pain he was having in his abdomen and back are improved.  He reports that he has had an abnormal sensation to the left side of his body since his stroke from 9 years ago.  However, he noticed very intense pain in his abdomen and back earlier this morning, which brought him to the emergency department.  He has no other complaints for me at this time      Interval history:  1/4/2025 patient is intubated sedated he is status post placement of aortobiiliac endograft for  ruptured AAA and left external iliac artery stent.  Patient sedation was weaned down this morning and patient was very agitated not following commands and had to be resedated again.  Patient is being started on Precedex and fentanyl drip.  As per discussion with vascular surgery at bedside today patient had a similar episode after surgery yesterday he was very agitated and had to be reintubated and was found to have some vocal cord edema.    1/5/2025 patient was extubated yesterday he has done very well he got agitated overnight and had to be started on Precedex which dropped his blood pressure transiently requiring Levophed.  Patient remains on Precedex 0.2 he does complain of not able to use the bathroom and had some urine retention had to have an out overnight I do think this is causing his agitation.    1/6/2025 patient remains in the ICU he is not on any drips other than heparin drip.  Discussed with vascular surgery today we are discontinuing his heparin drip and starting antiplatelets.  Patient pain is under better control and he has been off Precedex since yesterday morning.  Pulses are dopplerable.    1/7/2025 remains in intensive care and waiting for floor bed.  Patient had a run of A-fib with RVR overnight and received 1 dose of metoprolol and now is back to sinus rhythm.  Patient denies any chest pain no shortness of breath he still complains of his left leg pain.  Pulses are dopplerable.  1/8  As before was transferred to the floor been having episodes of A-fib with RVR was given Lopressor on occasional basis the patient blood pressure is not well-controlled I increase his Lopressor p.o. he went for echo and we will consult with cardiology to adjust his medications, patient vascular surgery postop day #5 status post EVAR with extension to left external iliac for rupture.  Remains on aspirin and Plavix  Protonix for GI prophylaxis, no BM x4 days   1/9  As before appreciate vascular postop day #6 blood  pressure remains not very well-controlled increased his Coreg, tachycardia resolved visit remains on aspirin and Plavix had good bowel movements care has signed off follow-up with them in 2 weeks for wound check appreciate cardiology per cardiology the patient needs full anticoagulation therefore we did discontinue Plavix continue Lovenox full dose and aspirin cardiology did discontinue Toprol-XL and Norvasc restarted Coreg increase losartan and hydralazine as as neededBecause he has been asymptomatic 2 episodes of atrial fibrillation would recommend a 14-day Holter monitor is placed at discharge. If the patient continues to have intermittent episodes of atrial fibrillation following discharge and outpatient referral to electrophysiology can be considered.   All 12 point system review were unremarkab other than was mentioned history present illness le  Objective    Temp:  [97.3 °F (36.3 °C)-98.2 °F (36.8 °C)] 98 °F (36.7 °C)  Heart Rate:  [76-91] 76  Resp:  [15-24] 20  BP: (145-171)/(65-83) 171/80  Physical Exam  Constitutional:       Appearance: He is not ill-appearing.   HENT:      Head: Normocephalic.      Mouth/Throat:      Mouth: Mucous membranes are moist.   Eyes:      Pupils: Pupils are equal, round, and reactive to light.   Cardiovascular:      Rate and Rhythm: Normal rate and regular rhythm.   Pulmonary:      Effort: No respiratory distress.      Breath sounds: No wheezing.   Abdominal:      General: Bowel sounds are normal. There is no distension.      Palpations: Abdomen is soft.      Tenderness: There is no abdominal tenderness.   Musculoskeletal:      Right lower leg: Edema present.      Left lower leg: Edema present.      Comments: Mild cyanosis of the left leg pulses are palpable   Skin:     General: Skin is warm.   Neurological:      Mental Status: He is oriented to person, place, and time.      Comments: Baseline left-sided weakness   Psychiatric:         Mood and Affect: Mood normal.              Results Review:  Lab Results (last 24 hours)       Procedure Component Value Units Date/Time    Basic Metabolic Panel [[id number]]  (Abnormal) Collected: 01/09/25 0225    Specimen: Blood Updated: 01/09/25 0254     Glucose 119 mg/dL      BUN 12 mg/dL      Creatinine 0.48 mg/dL      Sodium 139 mmol/L      Potassium 4.1 mmol/L      Comment: Slight hemolysis detected by analyzer. Result may be falsely elevated.        Chloride 102 mmol/L      CO2 29.0 mmol/L      Calcium 8.3 mg/dL      BUN/Creatinine Ratio 25.0     Anion Gap 8.0 mmol/L      eGFR 110.4 mL/min/1.73     Narrative:      GFR Categories in Chronic Kidney Disease (CKD)      GFR Category          GFR (mL/min/1.73)    Interpretation  G1                     90 or greater         Normal or high (1)  G2                      60-89                Mild decrease (1)  G3a                   45-59                Mild to moderate decrease  G3b                   30-44                Moderate to severe decrease  G4                    15-29                Severe decrease  G5                    14 or less           Kidney failure          (1)In the absence of evidence of kidney disease, neither GFR category G1 or G2 fulfill the criteria for CKD.    eGFR calculation 2021 CKD-EPI creatinine equation, which does not include race as a factor    CBC & Differential [[id number]]  (Abnormal) Collected: 01/09/25 0225    Specimen: Blood Updated: 01/09/25 0235    Narrative:      The following orders were created for panel order CBC & Differential.  Procedure                               Abnormality         Status                     ---------                               -----------         ------                     CBC Auto Differential[[id number]]        Abnormal            Final result                 Please view results for these tests on the individual orders.    CBC Auto Differential [[id number]]  (Abnormal) Collected: 01/09/25 0225    Specimen: Blood Updated: 01/09/25  "0235     WBC 8.95 10*3/mm3      RBC 3.21 10*6/mm3      Hemoglobin 9.3 g/dL      Hematocrit 29.8 %      MCV 92.8 fL      MCH 29.0 pg      MCHC 31.2 g/dL      RDW 13.7 %      RDW-SD 46.7 fl      MPV 8.8 fL      Platelets 231 10*3/mm3      Neutrophil % 75.2 %      Lymphocyte % 10.8 %      Monocyte % 9.3 %      Eosinophil % 2.9 %      Basophil % 0.6 %      Immature Grans % 1.2 %      Neutrophils, Absolute 6.73 10*3/mm3      Lymphocytes, Absolute 0.97 10*3/mm3      Monocytes, Absolute 0.83 10*3/mm3      Eosinophils, Absolute 0.26 10*3/mm3      Basophils, Absolute 0.05 10*3/mm3      Immature Grans, Absolute 0.11 10*3/mm3      nRBC 0.0 /100 WBC              No radiology results for the last day    Result Review:  I have personally reviewed the results from the time of this admission to 1/9/2025 10:16 CST and agree with these findings:  [x]  Laboratory list / accordion  []  Microbiology  [x]  Radiology  []  EKG/Telemetry   []  Cardiology/Vascular   []  Pathology  []  Old records  []  Other:  Most notable findings include:       Culture Data:   No results found for: \"BLOODCX\", \"URINECX\", \"WOUNDCX\", \"MRSACX\", \"RESPCX\", \"STOOLCX\"    I have reviewed the patient's current medications.     Assessment/Plan   Assessment  Active Hospital Problems    Diagnosis     **Abdominal aortic aneurysm     AAA (abdominal aortic aneurysm)     Abdominal pain    -Ruptured infrarenal abdominal aortic aneurysm status post aortobiiliac endograft left external iliac stent  -Acute hypoxemic respiratory failure requiring mechanical ventilation  -Laryngeal edema  -Severe agitation resolved  -Suspected COPD  -Tobacco abuse  -ICU delirium resolved  -History of CVA with left-sided weakness      Plan:  -Wean down oxygen as tolerated  -Pain management with as needed Dilaudid  -Continue gabapentin   -Patient is on Cozaar, hydralazine and Toprol for blood pressure control  -I will change scheduled DuoNebs to as needed  -Start Pulmicort  -We will keep Le " catheter for urine retension  - aspirin and Plavix as per vascular  -Diet  -Waiting for floor bed, ICU standpoint discussed with Dr. De La Fuente patient will be taking care of by .     Electronically signed by Nichol Suero MD on 1/9/2025 at 10:16 CST

## 2025-01-09 NOTE — PROGRESS NOTES
"Chief Complaint/Reason for Visit: Follow-up tachycardia    S: The patient notes no acute events overnight.  He is still noticing some discomfort in his left lower extremity.  He notes no palpitations overnight but was noted to have an elevated heart rate for a few brief periods overnight.  He denies having chest discomfort.  He did have 2 bowel movements earlier today and notes less abdominal distention.    Medications: Reviewed    Review of Systems: All pertinent negatives and positives as noted above.  Otherwise, all systems reviewed and found to be negative.    Telemetry: Tachycardic rhythm earlier this morning, irregular, suggestive of atrial fibrillation, this lasted for relatively short length of time.  Currently, the patient is in normal sinus rhythm.  Also, the patient had a similar episode last night around 6 PM.    O:  /74 (BP Location: Right arm, Patient Position: Lying)   Pulse 85   Temp 98.1 °F (36.7 °C) (Oral)   Resp 20   Ht 177.8 cm (70\")   Wt 102 kg (224 lb 3.2 oz)   SpO2 92%   BMI 32.17 kg/m²   Temp:  [97.6 °F (36.4 °C)-98.2 °F (36.8 °C)] 98.1 °F (36.7 °C)  Heart Rate:  [76-91] 85  Resp:  [15-24] 20  BP: (151-171)/(74-83) 168/74    General: Ill in appearance but in no acute distress, lying in bed  CV: Regular rate and rhythm at this time with no audible murmurs appreciated  Pulmonary: Decreased breath sounds bilaterally  GI: Obese, mildly distended, nontender to palpation, active bowel sound  Extremities: Trace edema in the left lower extremity.  Warm and well-perfused.  Some decreased motor function noted, although the patient notes that he is essentially at baseline.    Diagnostic Data:    Lab Results   Component Value Date    WBC 8.95 01/09/2025    HGB 9.3 (L) 01/09/2025    HCT 29.8 (L) 01/09/2025    MCV 92.8 01/09/2025     01/09/2025     Lab Results   Component Value Date    GLUCOSE 119 (H) 01/09/2025    CALCIUM 8.3 (L) 01/09/2025     01/09/2025    K 4.1 01/09/2025    " CO2 29.0 01/09/2025     01/09/2025    BUN 12 01/09/2025    CREATININE 0.48 (L) 01/09/2025    EGFR 110.4 01/09/2025    BCR 25.0 01/09/2025    ANIONGAP 8.0 01/09/2025     ASSESSMENT/PLAN:    1.  Paroxysmal atrial fibrillation  2.  Contained rupture of infrarenal abdominal aortic aneurysm, now status post endovascular repair  3.  Anemia without active bleeding  4.  Primary hypertension  5.  Mixed hyperlipidemia  6.  Peripheral arterial disease including carotid artery disease  7.  Tobacco abuse  8.  Prior stroke    -There were two short episodes of elevated heart rate overnight, consistent with atrial fibrillation.  He is otherwise asymptomatic and hemodynamically stable.  -Continue therapeutic Lovenox at this time but plan to transition to oral anticoagulation, either Eliquis or Xarelto at discharge.  This can be at the discretion of the primary service, I have no preference in this matter as to which agent is used.  -Blood pressure still remains elevated.  Cardiology will comment on the patient's atrial fibrillation, however the primary service to manage the patient's blood pressure and adjust medications accordingly if needed.  -At discharge, the patient should be ordered a 14-day cardiac monitor - Zio patch.  -Should the patient have symptomatic episodes of atrial fibrillation while here or prolonged episodes, recommend electrophysiology consultation.  -At this time, we will be available as needed.  Please feel free to call if there are questions.

## 2025-01-09 NOTE — PROGRESS NOTES
LOS: 6 days   Patient Care Team:  Eliseo Smith MD as PCP - General  Eliseo Smith MD as PCP - Family Medicine    Chief Complaint:  Post op day #6-EVAR    Subjective     Patient Complaints: Doing fine.  BM x 2 yesterday.  Denies abdominal pain.  Left leg pain stable.  Objective     Vital Signs  Temp:  [97.3 °F (36.3 °C)-98.2 °F (36.8 °C)] 98 °F (36.7 °C)  Heart Rate:  [76-91] 76  Resp:  [15-24] 20  BP: (145-171)/(65-83) 171/80    Physical Exam  Constitutional:       Appearance: Normal appearance. He is not ill-appearing or toxic-appearing.   HENT:      Head: Normocephalic and atraumatic.   Cardiovascular:      Rate and Rhythm: Normal rate and regular rhythm.      Pulses:           Dorsalis pedis pulses are 2+ on the right side and 1+ on the left side.        Posterior tibial pulses are 2+ on the right side and detected w/ Doppler on the left side.   Pulmonary:      Effort: Pulmonary effort is normal. No respiratory distress.   Abdominal:      Palpations: Abdomen is soft.      Tenderness: There is no abdominal tenderness. There is no guarding.   Musculoskeletal:         General: No swelling or tenderness.      Cervical back: Normal range of motion and neck supple.      Comments: Motor intact bilateral lower extremities.  Soft bilaterally.   Skin:     General: Skin is warm and dry.      Comments: Left foot warm.  Brisk cap refill coloration stable.   Neurological:      Mental Status: He is alert. Mental status is at baseline.         Laboratory Data:   Results from last 7 days   Lab Units 01/09/25 0225 01/08/25 0126 01/07/25  0547   WBC 10*3/mm3 8.95 7.88 8.69   HEMOGLOBIN g/dL 9.3* 9.3* 9.1*   HEMATOCRIT % 29.8* 29.8* 28.1*   PLATELETS 10*3/mm3 231 222 195       Results from last 7 days   Lab Units 01/09/25 0225 01/08/25  0126 01/07/25  0547 01/04/25  0721 01/04/25  0600 01/03/25  1530 01/03/25  0309   SODIUM mmol/L 139 140 137   < > 135*   < > 139   SODIUM, ARTERIAL   --   --   --   --   --    <  >  --    POTASSIUM mmol/L 4.1 4.0 3.7   < > 4.5   < > 3.9   CHLORIDE mmol/L 102 103 103   < > 100   < > 101   CO2 mmol/L 29.0 29.0 27.0   < > 24.0   < > 28.0   BUN mg/dL 12 15 15   < > 13   < > 13   CREATININE mg/dL 0.48* 0.57* 0.51*   < > 0.92   < > 0.92   CALCIUM mg/dL 8.3* 8.2* 8.1*   < > 8.2*   < > 9.1   BILIRUBIN mg/dL  --   --   --   --  0.3  --  0.3   ALK PHOS U/L  --   --   --   --  73  --  106   ALT (SGPT) U/L  --   --   --   --  10  --  14   AST (SGOT) U/L  --   --   --   --  29  --  16   GLUCOSE mg/dL 119* 108* 103*   < > 164*   < > 184*    < > = values in this interval not displayed.     Results from last 7 days   Lab Units 01/06/25  0625 01/05/25  2326 01/05/25  1817 01/05/25  1307 01/05/25  0602 01/04/25  1152 01/04/25  0600 01/03/25  2331   PROTIME Seconds  --   --   --   --  15.1*  --  14.8 14.9*   INR   --   --   --   --  1.13*  --  1.10* 1.11*   APTT seconds 47.9* 85.2* 41.6*   < > 79.7*   < > 82.3* 40.6*    < > = values in this interval not displayed.            Medication Review: Reviewed    Assessment & Plan       Abdominal aortic aneurysm    AAA (abdominal aortic aneurysm)    Abdominal pain    69-year-old male postop day 6 EVAR with extension to left external iliac for rupture.      Plan for disposition:  -Hospitalist on board appreciate their assistance.  -Tachycardia resolved Lovenox started by hospitalist.  Continue aspirin DC Plavix with anticoagulation.  -Hemoglobin stable  -Faintly palpable left lower extremity pedal pulses with multiphasic signals.  Continue gabapentin for possible reperfusion pain versus chronic pain from stroke.  -PT OT encouraged.  -BM x 2 yesterday.  Patient reports is nonbloody.  Denies abdominal pain.  -No further vascular intervention needed at this time.  -Patient to follow-up in 2 weeks for wound check.    Timmy Finney MD  Vascular Surgery  [phone]  01/09/25  09:44 CST

## 2025-01-10 LAB
ALBUMIN SERPL-MCNC: 3.1 G/DL (ref 3.5–5.2)
ALBUMIN/GLOB SERPL: 1.2 G/DL
ALP SERPL-CCNC: 60 U/L (ref 39–117)
ALT SERPL W P-5'-P-CCNC: 28 U/L (ref 1–41)
ANION GAP SERPL CALCULATED.3IONS-SCNC: 10 MMOL/L (ref 5–15)
AST SERPL-CCNC: 37 U/L (ref 1–40)
BASOPHILS # BLD AUTO: 0.03 10*3/MM3 (ref 0–0.2)
BASOPHILS NFR BLD AUTO: 0.3 % (ref 0–1.5)
BILIRUB SERPL-MCNC: 0.8 MG/DL (ref 0–1.2)
BUN SERPL-MCNC: 12 MG/DL (ref 8–23)
BUN/CREAT SERPL: 23.5 (ref 7–25)
CALCIUM SPEC-SCNC: 8.1 MG/DL (ref 8.6–10.5)
CHLORIDE SERPL-SCNC: 100 MMOL/L (ref 98–107)
CO2 SERPL-SCNC: 28 MMOL/L (ref 22–29)
CREAT SERPL-MCNC: 0.51 MG/DL (ref 0.76–1.27)
DEPRECATED RDW RBC AUTO: 46.3 FL (ref 37–54)
EGFRCR SERPLBLD CKD-EPI 2021: 108.4 ML/MIN/1.73
EOSINOPHIL # BLD AUTO: 0.22 10*3/MM3 (ref 0–0.4)
EOSINOPHIL NFR BLD AUTO: 2.4 % (ref 0.3–6.2)
ERYTHROCYTE [DISTWIDTH] IN BLOOD BY AUTOMATED COUNT: 13.9 % (ref 12.3–15.4)
GLOBULIN UR ELPH-MCNC: 2.5 GM/DL
GLUCOSE SERPL-MCNC: 100 MG/DL (ref 65–99)
HCT VFR BLD AUTO: 30.3 % (ref 37.5–51)
HGB BLD-MCNC: 9.6 G/DL (ref 13–17.7)
IMM GRANULOCYTES # BLD AUTO: 0.12 10*3/MM3 (ref 0–0.05)
IMM GRANULOCYTES NFR BLD AUTO: 1.3 % (ref 0–0.5)
LYMPHOCYTES # BLD AUTO: 1.26 10*3/MM3 (ref 0.7–3.1)
LYMPHOCYTES NFR BLD AUTO: 13.5 % (ref 19.6–45.3)
MCH RBC QN AUTO: 29.4 PG (ref 26.6–33)
MCHC RBC AUTO-ENTMCNC: 31.7 G/DL (ref 31.5–35.7)
MCV RBC AUTO: 92.7 FL (ref 79–97)
MONOCYTES # BLD AUTO: 0.9 10*3/MM3 (ref 0.1–0.9)
MONOCYTES NFR BLD AUTO: 9.6 % (ref 5–12)
NEUTROPHILS NFR BLD AUTO: 6.81 10*3/MM3 (ref 1.7–7)
NEUTROPHILS NFR BLD AUTO: 72.9 % (ref 42.7–76)
NRBC BLD AUTO-RTO: 0 /100 WBC (ref 0–0.2)
PLATELET # BLD AUTO: 293 10*3/MM3 (ref 140–450)
PMV BLD AUTO: 8.9 FL (ref 6–12)
POTASSIUM SERPL-SCNC: 3.8 MMOL/L (ref 3.5–5.2)
PROT SERPL-MCNC: 5.6 G/DL (ref 6–8.5)
RBC # BLD AUTO: 3.27 10*6/MM3 (ref 4.14–5.8)
SODIUM SERPL-SCNC: 138 MMOL/L (ref 136–145)
TSH SERPL DL<=0.05 MIU/L-ACNC: 5.72 UIU/ML (ref 0.27–4.2)
WBC NRBC COR # BLD AUTO: 9.34 10*3/MM3 (ref 3.4–10.8)

## 2025-01-10 PROCEDURE — 25010000002 MORPHINE PER 10 MG: Performed by: HOSPITALIST

## 2025-01-10 PROCEDURE — 97110 THERAPEUTIC EXERCISES: CPT

## 2025-01-10 PROCEDURE — 94799 UNLISTED PULMONARY SVC/PX: CPT

## 2025-01-10 PROCEDURE — 80053 COMPREHEN METABOLIC PANEL: CPT | Performed by: HOSPITALIST

## 2025-01-10 PROCEDURE — 25010000002 HYDRALAZINE PER 20 MG: Performed by: STUDENT IN AN ORGANIZED HEALTH CARE EDUCATION/TRAINING PROGRAM

## 2025-01-10 PROCEDURE — 97530 THERAPEUTIC ACTIVITIES: CPT

## 2025-01-10 PROCEDURE — 84443 ASSAY THYROID STIM HORMONE: CPT | Performed by: INTERNAL MEDICINE

## 2025-01-10 PROCEDURE — 94664 DEMO&/EVAL PT USE INHALER: CPT

## 2025-01-10 PROCEDURE — 85025 COMPLETE CBC W/AUTO DIFF WBC: CPT | Performed by: INTERNAL MEDICINE

## 2025-01-10 PROCEDURE — 25010000002 ENOXAPARIN PER 10 MG: Performed by: NURSE PRACTITIONER

## 2025-01-10 PROCEDURE — 94761 N-INVAS EAR/PLS OXIMETRY MLT: CPT

## 2025-01-10 PROCEDURE — 97535 SELF CARE MNGMENT TRAINING: CPT | Performed by: OCCUPATIONAL THERAPIST

## 2025-01-10 RX ORDER — HYDROCODONE BITARTRATE AND ACETAMINOPHEN 10; 325 MG/1; MG/1
1 TABLET ORAL EVERY 6 HOURS PRN
Status: DISPENSED | OUTPATIENT
Start: 2025-01-10 | End: 2025-01-15

## 2025-01-10 RX ADMIN — ENOXAPARIN SODIUM 100 MG: 100 INJECTION SUBCUTANEOUS at 20:47

## 2025-01-10 RX ADMIN — Medication 10 ML: at 08:37

## 2025-01-10 RX ADMIN — CARVEDILOL 25 MG: 3.12 TABLET, FILM COATED ORAL at 08:37

## 2025-01-10 RX ADMIN — HYDROCODONE BITARTRATE AND ACETAMINOPHEN 1 TABLET: 5; 325 TABLET ORAL at 01:28

## 2025-01-10 RX ADMIN — BUDESONIDE INHALATION 0.5 MG: 0.5 SUSPENSION RESPIRATORY (INHALATION) at 07:18

## 2025-01-10 RX ADMIN — ASPIRIN 81 MG: 81 TABLET, CHEWABLE ORAL at 08:32

## 2025-01-10 RX ADMIN — BISACODYL 10 MG: 5 TABLET, COATED ORAL at 08:37

## 2025-01-10 RX ADMIN — CARVEDILOL 25 MG: 3.12 TABLET, FILM COATED ORAL at 18:47

## 2025-01-10 RX ADMIN — LOSARTAN POTASSIUM 50 MG: 50 TABLET, FILM COATED ORAL at 08:37

## 2025-01-10 RX ADMIN — ATORVASTATIN CALCIUM 10 MG: 10 TABLET, FILM COATED ORAL at 08:37

## 2025-01-10 RX ADMIN — Medication 10 ML: at 08:38

## 2025-01-10 RX ADMIN — PANTOPRAZOLE SODIUM 40 MG: 40 TABLET, DELAYED RELEASE ORAL at 05:41

## 2025-01-10 RX ADMIN — MORPHINE SULFATE 2 MG: 2 INJECTION, SOLUTION INTRAMUSCULAR; INTRAVENOUS at 20:47

## 2025-01-10 RX ADMIN — GABAPENTIN 300 MG: 300 CAPSULE ORAL at 08:32

## 2025-01-10 RX ADMIN — BUDESONIDE INHALATION 0.5 MG: 0.5 SUSPENSION RESPIRATORY (INHALATION) at 19:25

## 2025-01-10 RX ADMIN — GABAPENTIN 300 MG: 300 CAPSULE ORAL at 20:47

## 2025-01-10 RX ADMIN — HYDRALAZINE HYDROCHLORIDE 10 MG: 20 INJECTION, SOLUTION INTRAMUSCULAR; INTRAVENOUS at 10:21

## 2025-01-10 RX ADMIN — GABAPENTIN 300 MG: 300 CAPSULE ORAL at 16:09

## 2025-01-10 RX ADMIN — Medication 10 ML: at 20:47

## 2025-01-10 RX ADMIN — Medication 10 MG: at 20:47

## 2025-01-10 RX ADMIN — HYDROCODONE BITARTRATE AND ACETAMINOPHEN 1 TABLET: 10; 325 TABLET ORAL at 08:37

## 2025-01-10 RX ADMIN — ENOXAPARIN SODIUM 100 MG: 100 INJECTION SUBCUTANEOUS at 10:18

## 2025-01-10 RX ADMIN — HYDRALAZINE HYDROCHLORIDE 25 MG: 25 TABLET ORAL at 16:20

## 2025-01-10 RX ADMIN — HYDROCODONE BITARTRATE AND ACETAMINOPHEN 1 TABLET: 10; 325 TABLET ORAL at 16:03

## 2025-01-10 RX ADMIN — TAMSULOSIN HYDROCHLORIDE 0.4 MG: 0.4 CAPSULE ORAL at 08:37

## 2025-01-10 NOTE — THERAPY TREATMENT NOTE
Acute Care - Physical Therapy Treatment Note  [facility]     Patient Name: Brijseh Grande  : 1955  MRN: [id number]  Today's Date: 1/10/2025      Visit Dx:     ICD-10-CM ICD-9-CM   1. Aneurysm of infrarenal abdominal aorta, unspecified whether ruptured  I71.43 441.4   2. Abdominal pain, unspecified abdominal location  R10.9 789.00   3. Abdominal aortic aneurysm (AAA) without rupture, unspecified part  I71.40 441.4   4. Primary hypertension  I10 401.9   5. Impaired mobility [Z74.09]  Z74.09 799.89     Patient Active Problem List   Diagnosis    AAA (abdominal aortic aneurysm)    Abdominal aortic aneurysm    Abdominal pain     Past Medical History:   Diagnosis Date    Hyperlipidemia     Hypertension     PAD (peripheral artery disease)     Stroke      Past Surgical History:   Procedure Laterality Date    ABDOMINAL AORTIC ANEURYSM REPAIR WITH ENDOGRAFT N/A 1/3/2025    Procedure: ABDOMINAL AORTIC ANEURYSM REPAIR WITH ENDOGRAFT;  Surgeon: Eduardo Whiteside DO;  Location: [facility] OR;  Service: Vascular;  Laterality: N/A;    CAROTID ENDARTERECTOMY      x 2     PT Assessment (Last 12 Hours)       PT Evaluation and Treatment       Row Name 01/10/25 0921          Physical Therapy Time and Intention    Subjective Information complains of;pain  -     Document Type therapy note (daily note)  -     Mode of Treatment physical therapy  -       Row Name 01/10/25 0921          General Information    Existing Precautions/Restrictions fall  left 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  -       Row Name 01/10/25 0921          Pain    Pretreatment Pain Rating 2/10  -     Posttreatment Pain Rating 10/10  -     Pain Location extremity  -     Pain Side/Orientation left;lower  -     Pain Management Interventions exercise or physical activity utilized;premedicated for activity  -     Response to Pain Interventions activity participation with increased pain  -     Pre/Posttreatment Pain Comment pain with movement of Left LE   -       Row Name 01/10/25 0921          Bed Mobility    Supine-Sit Witt (Bed Mobility) verbal cues;minimum assist (75% patient effort)  -     Sit-Supine Witt (Bed Mobility) verbal cues;minimum assist (75% patient effort)  -     Assistive Device (Bed Mobility) head of bed elevated  -     Comment, (Bed Mobility) pt positions right LE under left to move, but requires assist to move both legs at once. Pt. able to bridge with R LE.  -       Row Name 01/10/25 0921          Transfers    Comment, (Transfers) stood x 3 and took side steps to HOB. Pt declined to sit up in chair  -       Row Name 01/10/25 0921          Sit-Stand Transfer    Sit-Stand Witt (Transfers) verbal cues;contact guard  -     Assistive Device (Sit-Stand Transfers) walker, mckenzie  -       Row Name 01/10/25 0921          Stand-Sit Transfer    Stand-Sit Witt (Transfers) verbal cues;contact guard  -     Assistive Device (Stand-Sit Transfers) walker, mckenzie  -       Row Name 01/10/25 0921          Balance    Comment, Balance CGA for static standing with mckenzie wx  -       Row Name 01/10/25 0921          Hip (Therapeutic Exercise)    Hip AROM (Therapeutic Exercise) right;flexion;sitting;15 repititions  -       Row Name 01/10/25 0921          Knee (Therapeutic Exercise)    Knee (Therapeutic Exercise) AROM (active range of motion)  -     Knee AROM (Therapeutic Exercise) right;LAQ (long arc quad);sitting;20 repititions  -     Knee AAROM (Therapeutic Exercise) --  a couple reps of Left LAQ, but at to stop due to pain.  -       Row Name 01/10/25 0921          Ankle (Therapeutic Exercise)    Ankle (Therapeutic Exercise) AROM (active range of motion)  -     Ankle AROM (Therapeutic Exercise) right;dorsiflexion;sitting;15 repititions  -       Row Name             Wound 01/03/25 1622 Right anterior groin    Wound - Properties Group Placement Date: 01/03/25  -AC Placement Time: 1622  -AC Side: Right  -AC  Orientation: anterior  -AC Location: groin  -AC Primary Wound Type: Incision  -AC Additional Comments: PERCLOSE X2 2X2 TEGADERM  -AC    Retired Wound - Properties Group Placement Date: 01/03/25  -AC Placement Time: 1622  -AC Side: Right  -AC Orientation: anterior  -AC Location: groin  -AC Primary Wound Type: Incision  -AC Additional Comments: PERCLOSE X2 2X2 TEGADERM  -AC    Retired Wound - Properties Group Placement Date: 01/03/25  -AC Placement Time: 1622  -AC Side: Right  -AC Orientation: anterior  -AC Location: groin  -AC Primary Wound Type: Incision  -AC Additional Comments: PERCLOSE X2 2X2 TEGADERM  -AC    Retired Wound - Properties Group Date first assessed: 01/03/25  -AC Time first assessed: 1622  -AC Side: Right  -AC Location: groin  -AC Primary Wound Type: Incision  -AC Additional Comments: PERCLOSE X2 2X2 TEGADERM  -AC      Row Name             Wound 01/03/25 1622 Left anterior groin    Wound - Properties Group Placement Date: 01/03/25  -AC Placement Time: 1622  -AC Side: Left  -AC Orientation: anterior  -AC Location: groin  -AC Primary Wound Type: Incision  -AC Additional Comments: STERISTRIPS 4X4 TEGADERM  -AC    Retired Wound - Properties Group Placement Date: 01/03/25  -AC Placement Time: 1622  -AC Side: Left  -AC Orientation: anterior  -AC Location: groin  -AC Primary Wound Type: Incision  -AC Additional Comments: STERISTRIPS 4X4 TEGADERM  -AC    Retired Wound - Properties Group Placement Date: 01/03/25  -AC Placement Time: 1622  -AC Side: Left  -AC Orientation: anterior  -AC Location: groin  -AC Primary Wound Type: Incision  -AC Additional Comments: STERISTRIPS 4X4 TEGADERM  -AC    Retired Wound - Properties Group Date first assessed: 01/03/25  -AC Time first assessed: 1622  -AC Side: Left  -AC Location: groin  -AC Primary Wound Type: Incision  -AC Additional Comments: STERISTRIPS 4X4 TEGADERM  -AC      Row Name 01/10/25 0921          Plan of Care Review    Plan of Care Reviewed With patient  -MF      Progress no change  -     Outcome Evaluation Pt agreeable to therapy. He c/o pain in Left LE with any movement-up to 10/10. He needed MIN assist to complete all bed mobility. He sat EOB independently. He stood with CGA with mckenzie walker-stood x 3 and took side steps to HOB. He declined to sit up in chair due to pain. Pt. participated with a few LE exercises. Pt will need further rehab prior to returning home to regain the indepenence he had prior to sx.  -       Row Name 01/10/25 0921          Positioning and Restraints    Pre-Treatment Position in bed  -MF     Post Treatment Position bed  -MF     In Bed fowlers;call light within reach;encouraged to call for assist;side rails up x2;notified nsg;legs elevated  -               User Key  (r) = Recorded By, (t) = Taken By, (c) = Cosigned By      Initials Name Provider Type    AC Zara Balbuena RN Registered Nurse    Gabby Rossi, HEMAL Physical Therapist Assistant                    Physical Therapy Education       Title: PT OT SLP Therapies (In Progress)       Topic: Physical Therapy (In Progress)       Point: Mobility training (Done)       Learning Progress Summary            Patient Acceptance, E, VU,NR by BARON at 1/9/2025 1130    Comment: progression with amb with managing pain    Acceptance, E, VU by BARON at 1/8/2025 1131    Comment: have family bring pt's cane to progress with amb    Acceptance, E, VU,DU,NR by NITHIN at 1/6/2025 0824    Comment: Benefits of activity, progression of PT POC,                      Point: Home exercise program (Not Started)       Learner Progress:  Not documented in this visit.              Point: Body mechanics (Not Started)       Learner Progress:  Not documented in this visit.              Point: Precautions (Not Started)       Learner Progress:  Not documented in this visit.                              User Key       Initials Effective Dates Name Provider Type Discipline    NITHIN 02/03/23 -  Francis Hung PT DPT  Physical Therapist PT    BARON 02/03/23 -  Maurice Rose PTA Physical Therapist Assistant PT                  PT Recommendation and Plan     Plan of Care Reviewed With: patient  Progress: no change  Outcome Evaluation: Pt agreeable to therapy. He c/o pain in Left LE with any movement-up to 10/10. He needed MIN assist to complete all bed mobility. He sat EOB independently. He stood with CGA with mckenzie walker-stood x 3 and took side steps to HOB. He declined to sit up in chair due to pain. Pt. participated with a few LE exercises. Pt will need further rehab prior to returning home to regain the indepenence he had prior to sx.   Outcome Measures       Row Name 01/10/25 0921 01/09/25 1130 01/08/25 1131       How much help from another person do you currently need...    Turning from your back to your side while in flat bed without using bedrails? 3  -MF 3  -BARON 3  -BARON    Moving from lying on back to sitting on the side of a flat bed without bedrails? 3  -MF 3  -BARON 3  -BARON    Moving to and from a bed to a chair (including a wheelchair)? 3  -MF 3  -BARON 3  -BARON    Standing up from a chair using your arms (e.g., wheelchair, bedside chair)? 3  -MF 3  -BARON 3  -BARON    Climbing 3-5 steps with a railing? 1  -MF 1  -BARON 1  -BARON    To walk in hospital room? 2  -MF 2  -BARON 2  -BARON    AM-PAC 6 Clicks Score (PT) 15  -MF 15  -BARON 15  -BARON       Functional Assessment    Outcome Measure Options AM-PAC 6 Clicks Basic Mobility (PT)  -MF AM-PAC 6 Clicks Basic Mobility (PT)  -BARON AM-PAC 6 Clicks Basic Mobility (PT)  -BARON      Row Name 01/07/25 1026             How much help from another person do you currently need...    Turning from your back to your side while in flat bed without using bedrails? 3  -MF      Moving from lying on back to sitting on the side of a flat bed without bedrails? 3  -MF      Moving to and from a bed to a chair (including a wheelchair)? 2  -MF      Standing up from a chair using your arms (e.g., wheelchair, bedside chair)? 3  -MF       Climbing 3-5 steps with a railing? 1  -MF      To walk in hospital room? 2  -MF      AM-PAC 6 Clicks Score (PT) 14  -         Functional Assessment    Outcome Measure Options AM-PAC 6 Clicks Basic Mobility (PT)  -                User Key  (r) = Recorded By, (t) = Taken By, (c) = Cosigned By      Initials Name Provider Type    Maurice Nichols, HEMAL Physical Therapist Assistant    Gabby Rossi PTA Physical Therapist Assistant                     Time Calculation:    PT Charges       Row Name 01/10/25 1018             Time Calculation    Start Time 0921  -      Stop Time 1010  -      Time Calculation (min) 49 min  -MF      PT Received On 01/10/25  -         Time Calculation- PT    Total Timed Code Minutes- PT 49 minute(s)  -         Timed Charges    51353 - PT Therapeutic Exercise Minutes 15  -      36978 - PT Therapeutic Activity Minutes 34  -MF         Total Minutes    Timed Charges Total Minutes 49  -MF       Total Minutes 49  -MF                User Key  (r) = Recorded By, (t) = Taken By, (c) = Cosigned By      Initials Name Provider Type     Gabby Villa PTA Physical Therapist Assistant                  Therapy Charges for Today       Code Description Service Date Service Provider Modifiers Qty    10445702876 HC PT THER PROC EA 15 MIN 1/10/2025 Gabby Villa PTA GP 1    87760886723 HC PT THERAPEUTIC ACT EA 15 MIN 1/10/2025 Gabby Villa PTA GP 2            PT G-Codes  Outcome Measure Options: AM-PAC 6 Clicks Basic Mobility (PT)  AM-PAC 6 Clicks Score (PT): 15  AM-PAC 6 Clicks Score (OT): 15    Gabby Villa PTA  1/10/2025

## 2025-01-10 NOTE — PLAN OF CARE
Goal Outcome Evaluation:  Plan of Care Reviewed With: patient  Progress: improving       Pt medicated with prn pain meds x2 thus far this shift. IV saline locked. Drsgs maintained dry and intact to bilat groins. Up with asst. External cath cont in place with urine output. Resting between care. VSS. Safety maintained.

## 2025-01-10 NOTE — DISCHARGE PLACEMENT REQUEST
Signed        Expand All Collapse All[]Expand All by Default  Patient Name: Brijesh Grande               : 1955                          MRN: [id number]                              Today's Date: 1/10/2025                                   Admit Date: 1/3/2025               Visit Dx:   Visit Diagnosis       ICD-10-CM ICD-9-CM   1. Aneurysm of infrarenal abdominal aorta, unspecified whether ruptured  I71.43 441.4   2. Abdominal pain, unspecified abdominal location  R10.9 789.00   3. Abdominal aortic aneurysm (AAA) without rupture, unspecified part  I71.40 441.4   4. Primary hypertension  I10 401.9   5. Impaired mobility [Z74.09]  Z74.09 799.89         Problem List       Patient Active Problem List   Diagnosis    AAA (abdominal aortic aneurysm)    Abdominal aortic aneurysm    Abdominal pain         Medical History        Past Medical History:   Diagnosis Date    Hyperlipidemia      Hypertension      PAD (peripheral artery disease)      Stroke           Surgical History         Past Surgical History:   Procedure Laterality Date    ABDOMINAL AORTIC ANEURYSM REPAIR WITH ENDOGRAFT N/A 1/3/2025     Procedure: ABDOMINAL AORTIC ANEURYSM REPAIR WITH ENDOGRAFT;  Surgeon: Eduardo Whiteside DO;  Location:  [facility];  Service: Vascular;  Laterality: N/A;    CAROTID ENDARTERECTOMY         x 2           General Information         Row Name 01/10/25 1130 01/10/25 1123            OT Time and Intention     Subjective Information complains of;weakness;fatigue  -MM complains of;pain  -MM     Document Type -- therapy note (daily note)  -MM     Mode of Treatment -- occupational therapy  -MM        Row Name 01/10/25 1123                 General Information     Patient Profile Reviewed yes  -MM       Existing Precautions/Restrictions fall;other (see comments)  left hemiparesis from previous CVA.  -MM       Barriers to Rehab medically complex;previous functional deficit  -MM          Row Name 01/10/25 1126                  Safety Issues/Impairments Affecting Functional Mobility     Safety Issues Affecting Function (Mobility) insight into deficits/self-awareness;safety precaution awareness;safety precautions follow-through/compliance  -MM       Impairments Affecting Function (Mobility) balance;endurance/activity tolerance;range of motion (ROM);pain;strength;muscle tone abnormal  -MM                       User Key  (r) = Recorded By, (t) = Taken By, (c) = Cosigned By        Initials Name Provider Type     Carlos Garcias, OTR/L Occupational Therapist                                     Mobility/ADL's         Row Name 01/10/25 1123                 Bed Mobility     Comment, (Bed Mobility) pt refused all functional mobility.  -MM                       User Key  (r) = Recorded By, (t) = Taken By, (c) = Cosigned By        Initials Name Provider Type     Carlos Garcias, OTR/L Occupational Therapist                            Obj/Interventions         Row Name 01/10/25 1302                 Vision Assessment/Intervention     Visual Impairment/Limitations other (see comments)  Pt reports vision has gotten worse since sx. Pt with noted difficulty using his cell phone which he reports is d/t worsening vision.  -MM          Row Name 01/10/25 1302                 Motor Skills     Therapeutic Exercise other (see comments)  minimal ROM completed to LUE, pt denies need for further ROM.  -MM                       User Key  (r) = Recorded By, (t) = Taken By, (c) = Cosigned By        Initials Name Provider Type     Carlos Garcias, OTR/L Occupational Therapist                            Goals/Plan    No documentation.                        Clinical Impression         Row Name 01/10/25 1123                 Pain Assessment     Pretreatment Pain Rating 5/10  -MM       Posttreatment Pain Rating 10/10  -MM       Pain Location extremity;groin  -MM       Pain Side/Orientation left;lower  -MM          Row Name 01/10/25 1123                 Plan  of Care Review     Plan of Care Reviewed With patient  -MM       Progress no change  -MM       Outcome Evaluation OT tx completed. Pt is alert and agreeable to therapy. Pt reports pain in LLE and groin 5/10 pre tx, 10/10 post tx. Pt denies all functional mobility or repositioning in bed. Pt denies all ADLs for various reasons. Pt takes LUE through minimal ROM and reports he does not stretch LUE at baseline. Pt reports he doesn't need to complete ROM d/t LUE being at his baseline. Pt reports RUE is still strong, therefore pt denies need for BUE HEP. Pt frequently asks what this OT would like him to do next but denies each task. Pt pleasantly reports he will do better after rehab, once he is back home. Extensive education completed on benefits of increased activity, home safety education and ADL education. Pt reports vision has gotten worse since sx. Pt with noted difficulty using his cell phone which he reports is d/t worsening vision. Notified RN. Continue OT POC.  -MM          Row Name 01/10/25 1123                 Therapy Plan Review/Discharge Plan (OT)     Anticipated Discharge Disposition (OT) sub acute care setting  -MM          Row Name 01/10/25 1123                 Vital Signs     Pretreatment Heart Rate (beats/min) 76  -MM       Pre SpO2 (%) 96  -MM       O2 Delivery Pre Treatment supplemental O2  2L O2/NC  -MM       O2 Delivery Intra Treatment supplemental O2  2L O2/NC  -MM       Post SpO2 (%) --  SpO2 and HR remain WFL throughout session.  -MM       O2 Delivery Post Treatment supplemental O2  2L O2/NC  -MM       Pre Patient Position Supine  -MM       Intra Patient Position Supine  -MM       Post Patient Position Supine  -MM          Row Name 01/10/25 1123                 Positioning and Restraints     Pre-Treatment Position in bed  -MM       Post Treatment Position bed  -MM       In Bed notified nsg;fowlers;call light within reach;encouraged to call for assist;side rails up x2;legs elevated;heels elevated   -MM                       User Key  (r) = Recorded By, (t) = Taken By, (c) = Cosigned By        Initials Name Provider Type     MM Carlos Ross, OTR/L Occupational Therapist                            Outcome Measures         Row Name 01/10/25 1123                 How much help from another is currently needed...     Putting on and taking off regular lower body clothing? 2  -MM       Bathing (including washing, rinsing, and drying) 2  -MM       Toileting (which includes using toilet bed pan or urinal) 2  -MM       Putting on and taking off regular upper body clothing 2  -MM       Taking care of personal grooming (such as brushing teeth) 3  -MM       Eating meals 4  -MM       AM-PAC 6 Clicks Score (OT) 15  -MM          Row Name 01/10/25 0921 01/10/25 0753            How much help from another person do you currently need...     Turning from your back to your side while in flat bed without using bedrails? 3  -MF 4  -SH     Moving from lying on back to sitting on the side of a flat bed without bedrails? 3  -MF 3  -SH     Moving to and from a bed to a chair (including a wheelchair)? 3  -MF 3  -SH     Standing up from a chair using your arms (e.g., wheelchair, bedside chair)? 3  -MF 3  -SH     Climbing 3-5 steps with a railing? 1  -MF 1  -SH     To walk in hospital room? 2  -MF 2  -SH     AM-PAC 6 Clicks Score (PT) 15  -MF 16  -SH     Highest Level of Mobility Goal 4 --> Transfer to chair/commode  -MF 5 --> Static standing  -SH        Row Name 01/10/25 1123 01/10/25 0921            Functional Assessment     Outcome Measure Options AM-PAC 6 Clicks Daily Activity (OT)  -MM AM-PAC 6 Clicks Basic Mobility (PT)  -MF                     User Key  (r) = Recorded By, (t) = Taken By, (c) = Cosigned By        Initials Name Provider Type     Gabby Rossi PTA Physical Therapist Assistant     Carlos Garcias, OTR/L Occupational Therapist     SH Iesha Mistry, RN Registered Nurse                              Occupational Therapy Education            Title: PT OT SLP Therapies (In Progress)         Topic: Occupational Therapy (Done)         Point: ADL training (Done)         Description:   Instruct learner(s) on proper safety adaptation and remediation techniques during self care or transfers.   Instruct in proper use of assistive devices.                       Learning Progress Summary             Patient Acceptance, E, NR,VU by MM at 1/10/2025 1314     Acceptance, E, VU,NR by  at 1/6/2025 1027                            Point: Home exercise program (Done)         Description:   Instruct learner(s) on appropriate technique for monitoring, assisting and/or progressing therapeutic exercises/activities.                       Learning Progress Summary             Patient Acceptance, E, NR,VU by MM at 1/10/2025 1314                            Point: Precautions (Done)         Description:   Instruct learner(s) on prescribed precautions during self-care and functional transfers.                       Learning Progress Summary             Patient Acceptance, E, NR,VU by MM at 1/10/2025 1314     Acceptance, E, VU,NR by  at 1/6/2025 1027                            Point: Body mechanics (Done)         Description:   Instruct learner(s) on proper positioning and spine alignment during self-care, functional mobility activities and/or exercises.                       Learning Progress Summary             Patient Acceptance, E, NR,VU by MM at 1/10/2025 1314     Acceptance, E, VU,NR by  at 1/6/2025 1027                                                User Key         Initials Effective Dates Name Provider Type Discipline     CAMERON 07/11/23 -  Carlos Ross, OTR/L Occupational Therapist OT      06/20/22 -  Hetal Cottrell OTR/L Occupational Therapist OT                          OT Recommendation and Plan  Plan of Care Review  Plan of Care Reviewed With: patient  Progress: no change  Outcome Evaluation: OT tx completed. Pt is  alert and agreeable to therapy. Pt reports pain in LLE and groin /10 pre tx, 10/10 post tx. Pt denies all functional mobility or repositioning in bed. Pt denies all ADLs for various reasons. Pt takes LUE through minimal ROM and reports he does not stretch LUE at baseline. Pt reports he doesn't need to complete ROM d/t LUE being at his baseline. Pt reports RUE is still strong, therefore pt denies need for BUE HEP. Pt frequently asks what this OT would like him to do next but denies each task. Pt pleasantly reports he will do better after rehab, once he is back home. Extensive education completed on benefits of increased activity, home safety education and ADL education. Pt reports vision has gotten worse since sx. Pt with noted difficulty using his cell phone which he reports is d/t worsening vision. Notified RN. Continue OT POC.      Time Calculation:        Time Calculation- OT         Row Name 01/10/25 1123                       Time Calculation- OT     OT Start Time 1123  -MM         OT Stop Time 1210  -MM         OT Time Calculation (min) 47 min  -MM         Total Timed Code Minutes- OT 47 minute(s)  -MM         OT Received On 01/10/25  -MM                 Timed Charges     62520 - OT Self Care/Mgmt Minutes 47  -MM                 Total Minutes     Timed Charges Total Minutes 47  -MM          Total Minutes 47  -MM                      User Key  (r) = Recorded By, (t) = Taken By, (c) = Cosigned By        Initials Name Provider Type     MM Carlos Ross, OTR/L Occupational Therapist                       Therapy Charges for Today         Code Description Service Date Service Provider Modifiers Qty     47709050941  OT SELF CARE/MGMT/TRAIN EA 15 MIN 1/10/2025 Carlos Ross, OTR/L GO 3                   Carlos Ross OTR/MARE                 1/10/2025                Acute Care - Physical Therapy Treatment Note  [facility]     Patient Name: Brijesh MELENDEZ Christiane               : 1955                         MRN: [id number]  Today's Date: 1/10/2025                                  Visit Dx:   Visit Diagnosis       ICD-10-CM ICD-9-CM   1. Aneurysm of infrarenal abdominal aorta, unspecified whether ruptured  I71.43 441.4   2. Abdominal pain, unspecified abdominal location  R10.9 789.00   3. Abdominal aortic aneurysm (AAA) without rupture, unspecified part  I71.40 441.4   4. Primary hypertension  I10 401.9   5. Impaired mobility [Z74.09]  Z74.09 799.89         Problem List       Patient Active Problem List   Diagnosis    AAA (abdominal aortic aneurysm)    Abdominal aortic aneurysm    Abdominal pain         Medical History        Past Medical History:   Diagnosis Date    Hyperlipidemia      Hypertension      PAD (peripheral artery disease)      Stroke           Surgical History         Past Surgical History:   Procedure Laterality Date    ABDOMINAL AORTIC ANEURYSM REPAIR WITH ENDOGRAFT N/A 1/3/2025     Procedure: ABDOMINAL AORTIC ANEURYSM REPAIR WITH ENDOGRAFT;  Surgeon: Eduardo Whiteside DO;  Location:  [facility];  Service: Vascular;  Laterality: N/A;    CAROTID ENDARTERECTOMY         x 2         PT Assessment (Last 12 Hours)            PT Evaluation and Treatment         Row Name 01/10/25 0921                 Physical Therapy Time and Intention     Subjective Information complains of;pain  -       Document Type therapy note (daily note)  -       Mode of Treatment physical therapy  -          Row Name 01/10/25 0921                 General Information     Existing Precautions/Restrictions fall  left 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  -          Row Name 01/10/25 0921                 Pain     Pretreatment Pain Rating 2/10  -       Posttreatment Pain Rating 10/10  -       Pain Location extremity  -       Pain Side/Orientation left;lower  -       Pain Management Interventions exercise or physical activity utilized;premedicated for activity  -       Response to Pain Interventions activity participation with  increased pain  -       Pre/Posttreatment Pain Comment pain with movement of Left LE  -          Row Name 01/10/25 0921                 Bed Mobility     Supine-Sit Mathias (Bed Mobility) verbal cues;minimum assist (75% patient effort)  -       Sit-Supine Mathias (Bed Mobility) verbal cues;minimum assist (75% patient effort)  -       Assistive Device (Bed Mobility) head of bed elevated  -       Comment, (Bed Mobility) pt positions right LE under left to move, but requires assist to move both legs at once. Pt. able to bridge with R LE.  -          Row Name 01/10/25 0921                 Transfers     Comment, (Transfers) stood x 3 and took side steps to HOB. Pt declined to sit up in chair  -          Row Name 01/10/25 0921                 Sit-Stand Transfer     Sit-Stand Mathias (Transfers) verbal cues;contact guard  -       Assistive Device (Sit-Stand Transfers) walker, mckenzie  -          Row Name 01/10/25 0921                 Stand-Sit Transfer     Stand-Sit Mathias (Transfers) verbal cues;contact guard  -       Assistive Device (Stand-Sit Transfers) walker, mckenzie  -          Row Name 01/10/25 0921                 Balance     Comment, Balance CGA for static standing with mckenzie wx  -          Row Name 01/10/25 0921                 Hip (Therapeutic Exercise)     Hip AROM (Therapeutic Exercise) right;flexion;sitting;15 repititions  -          Row Name 01/10/25 0921                 Knee (Therapeutic Exercise)     Knee (Therapeutic Exercise) AROM (active range of motion)  -       Knee AROM (Therapeutic Exercise) right;LAQ (long arc quad);sitting;20 repititions  -       Knee AAROM (Therapeutic Exercise) --  a couple reps of Left LAQ, but at to stop due to pain.  -          Row Name 01/10/25 0921                 Ankle (Therapeutic Exercise)     Ankle (Therapeutic Exercise) AROM (active range of motion)  -       Ankle AROM (Therapeutic Exercise) right;dorsiflexion;sitting;15  repititions  -MF          Row Name                      Wound 01/03/25 1622 Right anterior groin     Wound - Properties Group Placement Date: 01/03/25  -AC Placement Time: 1622  -AC Side: Right  -AC Orientation: anterior  -AC Location: groin  -AC Primary Wound Type: Incision  -AC Additional Comments: PERCLOSE X2 2X2 TEGADERM  -AC     Retired Wound - Properties Group Placement Date: 01/03/25  -AC Placement Time: 1622  -AC Side: Right  -AC Orientation: anterior  -AC Location: groin  -AC Primary Wound Type: Incision  -AC Additional Comments: PERCLOSE X2 2X2 TEGADERM  -AC     Retired Wound - Properties Group Placement Date: 01/03/25  -AC Placement Time: 1622  -AC Side: Right  -AC Orientation: anterior  -AC Location: groin  -AC Primary Wound Type: Incision  -AC Additional Comments: PERCLOSE X2 2X2 TEGADERM  -AC     Retired Wound - Properties Group Date first assessed: 01/03/25  -AC Time first assessed: 1622  -AC Side: Right  -AC Location: groin  -AC Primary Wound Type: Incision  -AC Additional Comments: PERCLOSE X2 2X2 TEGADERM  -AC        Row Name                      Wound 01/03/25 1622 Left anterior groin     Wound - Properties Group Placement Date: 01/03/25  -AC Placement Time: 1622  -AC Side: Left  -AC Orientation: anterior  -AC Location: groin  -AC Primary Wound Type: Incision  -AC Additional Comments: STERISTRIPS 4X4 TEGADERM  -AC     Retired Wound - Properties Group Placement Date: 01/03/25  -AC Placement Time: 1622  -AC Side: Left  -AC Orientation: anterior  -AC Location: groin  -AC Primary Wound Type: Incision  -AC Additional Comments: STERISTRIPS 4X4 TEGADERM  -AC     Retired Wound - Properties Group Placement Date: 01/03/25  -AC Placement Time: 1622  -AC Side: Left  -AC Orientation: anterior  -AC Location: groin  -AC Primary Wound Type: Incision  -AC Additional Comments: STERISTRIPS 4X4 TEGADERM  -AC     Retired Wound - Properties Group Date first assessed: 01/03/25  -AC Time first assessed: 1622  -AC Side:  Left  -AC Location: groin  -AC Primary Wound Type: Incision  -AC Additional Comments: STERISTRIPS 4X4 TEGADERM  -AC        Row Name 01/10/25 0921                 Plan of Care Review     Plan of Care Reviewed With patient  -MF       Progress no change  -       Outcome Evaluation Pt agreeable to therapy. He c/o pain in Left LE with any movement-up to 10/10. He needed MIN assist to complete all bed mobility. He sat EOB independently. He stood with CGA with mckenzie walker-stood x 3 and took side steps to HOB. He declined to sit up in chair due to pain. Pt. participated with a few LE exercises. Pt will need further rehab prior to returning home to regain the indepenence he had prior to sx.  -          Row Name 01/10/25 0921                 Positioning and Restraints     Pre-Treatment Position in bed  -       Post Treatment Position bed  -MF       In Bed fowlers;call light within reach;encouraged to call for assist;side rails up x2;notified nsg;legs elevated  -                    User Key  (r) = Recorded By, (t) = Taken By, (c) = Cosigned By        Initials Name Provider Type     AC Zara Balbuena, RN Registered Nurse     Gabby Rossi, PTA Physical Therapist Assistant                             Physical Therapy Education            Title: PT OT SLP Therapies (In Progress)         Topic: Physical Therapy (In Progress)         Point: Mobility training (Done)         Learning Progress Summary             Patient Acceptance, E, VU,NR by BARON at 1/9/2025 1130     Comment: progression with amb with managing pain     Acceptance, RALPH VU by BARON at 1/8/2025 1131     Comment: have family bring pt's cane to progress with amb     Acceptance, E, VU,LOKESH,NR by NITHIN at 1/6/2025 0824     Comment: Benefits of activity, progression of PT POC,                            Point: Home exercise program (Not Started)         Learner Progress:  Not documented in this visit.                  Point: Body mechanics (Not Started)          Learner Progress:  Not documented in this visit.                  Point: Precautions (Not Started)         Learner Progress:  Not documented in this visit.                                      User Key         Initials Effective Dates Name Provider Type Discipline     NITHIN 02/03/23 -  Francis Hung PT DPT Physical Therapist PT     BARON 02/03/23 -  Maurice Rose, PTA Physical Therapist Assistant PT                          PT Recommendation and Plan  Plan of Care Reviewed With: patient  Progress: no change  Outcome Evaluation: Pt agreeable to therapy. He c/o pain in Left LE with any movement-up to 10/10. He needed MIN assist to complete all bed mobility. He sat EOB independently. He stood with CGA with mckenzie walker-stood x 3 and took side steps to HOB. He declined to sit up in chair due to pain. Pt. participated with a few LE exercises. Pt will need further rehab prior to returning home to regain the indepenence he had prior to sx.    Outcome Measures         Row Name 01/10/25 0921 01/09/25 1130 01/08/25 1131             How much help from another person do you currently need...     Turning from your back to your side while in flat bed without using bedrails? 3  -MF 3  -BARON 3  -BARON     Moving from lying on back to sitting on the side of a flat bed without bedrails? 3  -MF 3  -BARON 3  -BARON     Moving to and from a bed to a chair (including a wheelchair)? 3  -MF 3  -BARON 3  -BARON     Standing up from a chair using your arms (e.g., wheelchair, bedside chair)? 3  -MF 3  -BARON 3  -BARON     Climbing 3-5 steps with a railing? 1  -MF 1  -BARON 1  -BARON     To walk in hospital room? 2  -MF 2  -BARON 2  -BARON     AM-PAC 6 Clicks Score (PT) 15  -MF 15  -BARON 15  -BARON             Functional Assessment     Outcome Measure Options AM-PAC 6 Clicks Basic Mobility (PT)  -MF AM-PAC 6 Clicks Basic Mobility (PT)  -BARON AM-PAC 6 Clicks Basic Mobility (PT)  -BARON        Row Name 01/07/25 1026                       How much help from another person do you currently  need...     Turning from your back to your side while in flat bed without using bedrails? 3  -MF         Moving from lying on back to sitting on the side of a flat bed without bedrails? 3  -MF         Moving to and from a bed to a chair (including a wheelchair)? 2  -MF         Standing up from a chair using your arms (e.g., wheelchair, bedside chair)? 3  -MF         Climbing 3-5 steps with a railing? 1  -MF         To walk in hospital room? 2  -MF         AM-PAC 6 Clicks Score (PT) 14  -MF                 Functional Assessment     Outcome Measure Options AM-PAC 6 Clicks Basic Mobility (PT)  -MF                         User Key  (r) = Recorded By, (t) = Taken By, (c) = Cosigned By        Initials Name Provider Type     Maurice Nichols, HEMAL Physical Therapist Assistant     Gabby Rossi PTA Physical Therapist Assistant                              Time Calculation:     PT Charges         Row Name 01/10/25 1018                       Time Calculation     Start Time 0921  -         Stop Time 1010  -         Time Calculation (min) 49 min  -         PT Received On 01/10/25  -                 Time Calculation- PT     Total Timed Code Minutes- PT 49 minute(s)  -                 Timed Charges     90546 - PT Therapeutic Exercise Minutes 15  -         85737 - PT Therapeutic Activity Minutes 34  -MF                 Total Minutes     Timed Charges Total Minutes 49  -MF          Total Minutes 49  -MF                      User Key  (r) = Recorded By, (t) = Taken By, (c) = Cosigned By        Initials Name Provider Type      Gabby Villa PTA Physical Therapist Assistant                       Therapy Charges for Today         Code Description Service Date Service Provider Modifiers Qty     93324931468 HC PT THER PROC EA 15 MIN 1/10/2025 Gabby Villa PTA GP 1     00147074329 HC PT THERAPEUTIC ACT EA 15 MIN 1/10/2025 Gabby Villa PTA GP 2                PT G-Codes  Outcome Measure  Options: AM-PAC 6 Clicks Basic Mobility (PT)  AM-PAC 6 Clicks Score (PT): 15  AM-PAC 6 Clicks Score (OT): 15     Gabby Villa, PTA                   1/10/2025

## 2025-01-10 NOTE — PLAN OF CARE
Goal Outcome Evaluation:  Plan of Care Reviewed With: patient        Progress: no change  Outcome Evaluation: Pt agreeable to therapy. He c/o pain in Left LE with any movement-up to 10/10. He needed MIN assist to complete all bed mobility. He sat EOB independently. He stood with CGA with mckenzie walker-stood x 3 and took side steps to HOB. He declined to sit up in chair due to pain. Pt. participated with a few LE exercises. Pt will need further rehab prior to returning home to regain the indepenence he had prior to sx.

## 2025-01-10 NOTE — THERAPY TREATMENT NOTE
Patient Name: Brijesh Grande  : 1955    MRN: [id number]                              Today's Date: 1/10/2025       Admit Date: 1/3/2025    Visit Dx:     ICD-10-CM ICD-9-CM   1. Aneurysm of infrarenal abdominal aorta, unspecified whether ruptured  I71.43 441.4   2. Abdominal pain, unspecified abdominal location  R10.9 789.00   3. Abdominal aortic aneurysm (AAA) without rupture, unspecified part  I71.40 441.4   4. Primary hypertension  I10 401.9   5. Impaired mobility [Z74.09]  Z74.09 799.89     Patient Active Problem List   Diagnosis    AAA (abdominal aortic aneurysm)    Abdominal aortic aneurysm    Abdominal pain     Past Medical History:   Diagnosis Date    Hyperlipidemia     Hypertension     PAD (peripheral artery disease)     Stroke      Past Surgical History:   Procedure Laterality Date    ABDOMINAL AORTIC ANEURYSM REPAIR WITH ENDOGRAFT N/A 1/3/2025    Procedure: ABDOMINAL AORTIC ANEURYSM REPAIR WITH ENDOGRAFT;  Surgeon: Eduardo Whiteside DO;  Location:  [facility];  Service: Vascular;  Laterality: N/A;    CAROTID ENDARTERECTOMY      x 2      General Information       Row Name 01/10/25 1130 01/10/25 1123       OT Time and Intention    Subjective Information complains of;weakness;fatigue  -MM complains of;pain  -MM    Document Type -- therapy note (daily note)  -MM    Mode of Treatment -- occupational therapy  -MM      Row Name 01/10/25 1123          General Information    Patient Profile Reviewed yes  -MM     Existing Precautions/Restrictions fall;other (see comments)  left hemiparesis from previous CVA.  -MM     Barriers to Rehab medically complex;previous functional deficit  -MM       Row Name 01/10/25 1123          Safety Issues/Impairments Affecting Functional Mobility    Safety Issues Affecting Function (Mobility) insight into deficits/self-awareness;safety precaution awareness;safety precautions follow-through/compliance  -MM     Impairments Affecting Function (Mobility)  balance;endurance/activity tolerance;range of motion (ROM);pain;strength;muscle tone abnormal  -MM               User Key  (r) = Recorded By, (t) = Taken By, (c) = Cosigned By      Initials Name Provider Type    Carlos Garcias, OTR/L Occupational Therapist                     Mobility/ADL's       Row Name 01/10/25 1123          Bed Mobility    Comment, (Bed Mobility) pt refused all functional mobility.  -MM               User Key  (r) = Recorded By, (t) = Taken By, (c) = Cosigned By      Initials Name Provider Type    Carlos Garcias, OTR/L Occupational Therapist                   Obj/Interventions       Row Name 01/10/25 1302          Vision Assessment/Intervention    Visual Impairment/Limitations other (see comments)  Pt reports vision has gotten worse since sx. Pt with noted difficulty using his cell phone which he reports is d/t worsening vision.  -MM       Row Name 01/10/25 1302          Motor Skills    Therapeutic Exercise other (see comments)  minimal ROM completed to LUE, pt denies need for further ROM.  -MM               User Key  (r) = Recorded By, (t) = Taken By, (c) = Cosigned By      Initials Name Provider Type    Carlos Garcias, OTR/L Occupational Therapist                   Goals/Plan    No documentation.                  Clinical Impression       Row Name 01/10/25 1123          Pain Assessment    Pretreatment Pain Rating 5/10  -MM     Posttreatment Pain Rating 10/10  -MM     Pain Location extremity;groin  -MM     Pain Side/Orientation left;lower  -MM       Row Name 01/10/25 1123          Plan of Care Review    Plan of Care Reviewed With patient  -MM     Progress no change  -MM     Outcome Evaluation OT tx completed. Pt is alert and agreeable to therapy. Pt reports pain in LLE and groin 5/10 pre tx, 10/10 post tx. Pt denies all functional mobility or repositioning in bed. Pt denies all ADLs for various reasons. Pt takes LUE through minimal ROM and reports he does not stretch LUE at  baseline. Pt reports he doesn't need to complete ROM d/t LUE being at his baseline. Pt reports RUE is still strong, therefore pt denies need for BUE HEP. Pt frequently asks what this OT would like him to do next but denies each task. Pt pleasantly reports he will do better after rehab, once he is back home. Extensive education completed on benefits of increased activity, home safety education and ADL education. Pt reports vision has gotten worse since sx. Pt with noted difficulty using his cell phone which he reports is d/t worsening vision. Notified RN. Continue OT POC.  -MM       Row Name 01/10/25 1123          Therapy Plan Review/Discharge Plan (OT)    Anticipated Discharge Disposition (OT) sub acute care setting  -MM       Row Name 01/10/25 1123          Vital Signs    Pretreatment Heart Rate (beats/min) 76  -MM     Pre SpO2 (%) 96  -MM     O2 Delivery Pre Treatment supplemental O2  2L O2/NC  -MM     O2 Delivery Intra Treatment supplemental O2  2L O2/NC  -MM     Post SpO2 (%) --  SpO2 and HR remain WFL throughout session.  -MM     O2 Delivery Post Treatment supplemental O2  2L O2/NC  -MM     Pre Patient Position Supine  -MM     Intra Patient Position Supine  -MM     Post Patient Position Supine  -MM       Row Name 01/10/25 1123          Positioning and Restraints    Pre-Treatment Position in bed  -MM     Post Treatment Position bed  -MM     In Bed notified nsg;fowlers;call light within reach;encouraged to call for assist;side rails up x2;legs elevated;heels elevated  -MM               User Key  (r) = Recorded By, (t) = Taken By, (c) = Cosigned By      Initials Name Provider Type    Carlos Garcias, OTR/L Occupational Therapist                   Outcome Measures       Row Name 01/10/25 1123          How much help from another is currently needed...    Putting on and taking off regular lower body clothing? 2  -MM     Bathing (including washing, rinsing, and drying) 2  -MM     Toileting (which includes using  toilet bed pan or urinal) 2  -MM     Putting on and taking off regular upper body clothing 2  -MM     Taking care of personal grooming (such as brushing teeth) 3  -MM     Eating meals 4  -MM     AM-PAC 6 Clicks Score (OT) 15  -MM       Row Name 01/10/25 0921 01/10/25 0753       How much help from another person do you currently need...    Turning from your back to your side while in flat bed without using bedrails? 3  -MF 4  -SH    Moving from lying on back to sitting on the side of a flat bed without bedrails? 3  -MF 3  -SH    Moving to and from a bed to a chair (including a wheelchair)? 3  -MF 3  -SH    Standing up from a chair using your arms (e.g., wheelchair, bedside chair)? 3  -MF 3  -SH    Climbing 3-5 steps with a railing? 1  -MF 1  -SH    To walk in hospital room? 2  -MF 2  -SH    AM-PAC 6 Clicks Score (PT) 15  -MF 16  -SH    Highest Level of Mobility Goal 4 --> Transfer to chair/commode  -MF 5 --> Static standing  -SH      Row Name 01/10/25 1123 01/10/25 0921       Functional Assessment    Outcome Measure Options AM-PAC 6 Clicks Daily Activity (OT)  -MM AM-PAC 6 Clicks Basic Mobility (PT)  -MF              User Key  (r) = Recorded By, (t) = Taken By, (c) = Cosigned By      Initials Name Provider Type    Gabby Rossi PTA Physical Therapist Assistant    MM Carlos Ross, OTR/L Occupational Therapist     Iesha Mistry, RN Registered Nurse                    Occupational Therapy Education       Title: PT OT SLP Therapies (In Progress)       Topic: Occupational Therapy (Done)       Point: ADL training (Done)       Description:   Instruct learner(s) on proper safety adaptation and remediation techniques during self care or transfers.   Instruct in proper use of assistive devices.                  Learning Progress Summary            Patient Acceptance, E, NR,VU by MM at 1/10/2025 1314    Acceptance, E, VU,NR by LS at 1/6/2025 1027                      Point: Home exercise program (Done)        Description:   Instruct learner(s) on appropriate technique for monitoring, assisting and/or progressing therapeutic exercises/activities.                  Learning Progress Summary            Patient Acceptance, E, NR,VU by MM at 1/10/2025 1314                      Point: Precautions (Done)       Description:   Instruct learner(s) on prescribed precautions during self-care and functional transfers.                  Learning Progress Summary            Patient Acceptance, E, NR,VU by MM at 1/10/2025 1314    Acceptance, E, VU,NR by LS at 1/6/2025 1027                      Point: Body mechanics (Done)       Description:   Instruct learner(s) on proper positioning and spine alignment during self-care, functional mobility activities and/or exercises.                  Learning Progress Summary            Patient Acceptance, E, NR,VU by MM at 1/10/2025 1314    Acceptance, E, VU,NR by  at 1/6/2025 1027                                      User Key       Initials Effective Dates Name Provider Type Discipline     07/11/23 -  Carlos Ross, OTR/L Occupational Therapist OT    SOWMYA 06/20/22 -  Hetal Cottrell OTR/L Occupational Therapist OT                  OT Recommendation and Plan     Plan of Care Review  Plan of Care Reviewed With: patient  Progress: no change  Outcome Evaluation: OT tx completed. Pt is alert and agreeable to therapy. Pt reports pain in LLE and groin 5/10 pre tx, 10/10 post tx. Pt denies all functional mobility or repositioning in bed. Pt denies all ADLs for various reasons. Pt takes LUE through minimal ROM and reports he does not stretch LUE at baseline. Pt reports he doesn't need to complete ROM d/t LUE being at his baseline. Pt reports RUE is still strong, therefore pt denies need for BUE HEP. Pt frequently asks what this OT would like him to do next but denies each task. Pt pleasantly reports he will do better after rehab, once he is back home. Extensive education completed on benefits of increased  activity, home safety education and ADL education. Pt reports vision has gotten worse since sx. Pt with noted difficulty using his cell phone which he reports is d/t worsening vision. Notified RN. Continue OT POC.     Time Calculation:         Time Calculation- OT       Row Name 01/10/25 1123             Time Calculation- OT    OT Start Time 1123  -MM      OT Stop Time 1210  -MM      OT Time Calculation (min) 47 min  -MM      Total Timed Code Minutes- OT 47 minute(s)  -MM      OT Received On 01/10/25  -MM         Timed Charges    55694 - OT Self Care/Mgmt Minutes 47  -MM         Total Minutes    Timed Charges Total Minutes 47  -MM       Total Minutes 47  -MM                User Key  (r) = Recorded By, (t) = Taken By, (c) = Cosigned By      Initials Name Provider Type    MM Carlos Ross, OTR/L Occupational Therapist                  Therapy Charges for Today       Code Description Service Date Service Provider Modifiers Qty    [id number] HC OT SELF CARE/MGMT/TRAIN EA 15 MIN 1/10/2025 Carlos Ross OTR/L GO 3                 ELENA Singer/MARE  1/10/2025

## 2025-01-10 NOTE — PLAN OF CARE
Goal Outcome Evaluation:                    Patient alert and oriented x4. Moderate chronic pain in left leg being treated with prn medication. No needs at this time. Awaiting rehab placement.

## 2025-01-10 NOTE — PLAN OF CARE
Problem: Adult Inpatient Plan of Care  Goal: Plan of Care Review  Recent Flowsheet Documentation  Taken 1/10/2025 1123 by Carlos Ross, OTR/L  Progress: no change  Outcome Evaluation: OT tx completed. Pt is alert and agreeable to therapy. Pt reports pain in LLE and groin 5/10 pre tx, 10/10 post tx. Pt denies all functional mobility or repositioning in bed. Pt denies all ADLs for various reasons. Pt takes LUE through minimal ROM and reports he does not stretch LUE at baseline. Pt reports he doesn't need to complete ROM d/t LUE being at his baseline. Pt reports RUE is still strong, therefore pt denies need for BUE HEP. Pt frequently asks what this OT would like him to do next but denies each task. Pt pleasantly reports he will do better after rehab, once he is back home. Extensive education completed on benefits of increased activity, home safety education and ADL education. Pt reports vision has gotten worse since sx. Pt with noted difficulty using his cell phone which he reports is d/t worsening vision. Notified RN. Continue OT POC.

## 2025-01-10 NOTE — CASE MANAGEMENT/SOCIAL WORK
Continued Stay Note   [place]     Patient Name: Brijesh Grande  MRN: [id number]  Today's Date: 1/10/2025    Admit Date: 1/3/2025    Plan: Referral to [facility]   Discharge Plan       Row Name 01/10/25 1353       Plan    Plan Referral to [facility]    Plan Comments [facility] reviewing. Sent updated therapy notes. Their MD wants to see how pt goes over the weekend.                   Discharge Codes    No documentation.                       TALI Frederick

## 2025-01-10 NOTE — PROGRESS NOTES
Nutrition Services    Patient Name:  Brijesh Grande  YOB: 1955  MRN: [id number]  Admit Date:  1/3/2025    Nutrition screening and chart review completed. Pt remains at low risk/no risk for malnutrition. Continue with daily menu selections and observe food preferences. Will monitor while inpatient and follow per protocol.      Electronically signed by:  Mary Pelayo RDN, LD  01/10/25 13:22 CST

## 2025-01-10 NOTE — PAYOR COMM NOTE
"1/9 CLINICAL    Brea Grande (69 y.o. Male)       Date of Birth   1955    Social Security Number       Address   [address] [facility] [address]    Home Phone   [phone]    MRN   [id number]       Sabianism   Other    Marital Status                               Admission Date   1/3/25    Admission Type   Emergency    Admitting Provider   Nichol Suero MD    Attending Provider   Nichol Suero MD    Department, Room/Bed   [facility] 3C, 374/1       Discharge Date       Discharge Disposition       Discharge Destination                                 Attending Provider: Nichol Suero MD    Allergies: No Known Allergies    Isolation: None   Infection: None   Code Status: CPR    Ht: 177.8 cm (70\")   Wt: 102 kg (224 lb 3.2 oz)    Admission Cmt: None   Principal Problem: Abdominal aortic aneurysm [I71.40]                   Active Insurance as of 1/3/2025       Primary Coverage       Payor Plan Insurance Group Employer/Plan Group    ANTHEM MEDICARE REPLACEMENT ANTHEM MED ADV PPO [id number]       Payor Plan Address Payor Plan Phone Number Payor Plan Fax Number Effective Dates    [address] [phone]  1/1/2024 - None Entered    [facility] [id number]         Subscriber Name Subscriber Birth Date Member ID       BREA GRANDE 1955 [id number]                     Emergency Contacts        (Rel.) Home Phone Work Phone Mobile Phone    Alexis Grande (Son) -- -- [phone]    Mildred Pendleton (Friend) -- -- [phone]              Current Facility-Administered Medications   Medication Dose Route Frequency Provider Last Rate Last Admin    aspirin chewable tablet 81 mg  81 mg Oral Daily Timmy Finney MD   81 mg at 01/09/25 0957    atorvastatin (LIPITOR) tablet 10 mg  10 mg Oral Daily Shaun Gallego MD   10 mg at 01/09/25 0957    bisacodyl (DULCOLAX) EC tablet 10 mg  10 mg Oral Daily Halley Leone APRN        budesonide (PULMICORT) nebulizer solution 0.5 mg  0.5 mg " Nebulization BID - RT Shaun Gallego MD   0.5 mg at 01/09/25 0726    carvedilol (COREG) tablet 25 mg  25 mg Oral BID With Meals Nichol Suero MD        Enoxaparin Sodium (LOVENOX) syringe 100 mg  1 mg/kg Subcutaneous Q12H Armida Maradiaga APRN   100 mg at 01/09/25 0957    gabapentin (NEURONTIN) capsule 300 mg  300 mg Oral TID Timmy Finney MD   300 mg at 01/09/25 0957    hydrALAZINE (APRESOLINE) injection 10 mg  10 mg Intravenous Q4H PRN Timmy Finney MD   10 mg at 01/05/25 1112    hydrALAZINE (APRESOLINE) tablet 25 mg  25 mg Oral TID PRN Armida Maradiaga APRN        ipratropium-albuterol (DUO-NEB) nebulizer solution 3 mL  3 mL Nebulization Q4H PRN Shaun Gallego MD        lactulose solution 20 g  20 g Oral TID Nichol Suero MD   20 g at 01/08/25 2107    losartan (COZAAR) tablet 50 mg  50 mg Oral Daily Armida Maradiaga APRN   50 mg at 01/09/25 0957    melatonin tablet 5 mg  5 mg Oral Nightly PRN Ryder Reed APRN   5 mg at 01/07/25 2051    Morphine sulfate (PF) injection 2 mg  2 mg Intravenous Q4H PRN Nichol Suero MD   2 mg at 01/08/25 2100    nitroglycerin (NITROSTAT) SL tablet 0.4 mg  0.4 mg Sublingual Q5 Min PRN Timmy Finney MD        ondansetron ODT (ZOFRAN-ODT) disintegrating tablet 4 mg  4 mg Oral Q6H PRN Timmy Finney MD   4 mg at 01/07/25 0851    pantoprazole (PROTONIX) EC tablet 40 mg  40 mg Oral Q AM Nichol Suero MD   40 mg at 01/09/25 0705    Pharmacy to Dose enoxaparin (LOVENOX)   Not Applicable Continuous PRN Armida Maradiaga APRN        polyethylene glycol (MIRALAX) packet 17 g  17 g Oral BID Halley Leone APRN   17 g at 01/08/25 2107    sodium chloride 0.9 % flush 10 mL  10 mL Intravenous PRN Reg, MD Timmy        sodium chloride 0.9 % flush 10 mL  10 mL Intravenous Q12H Reg, MD Timmy   10 mL at 01/08/25 2107    sodium chloride 0.9 % flush 10 mL  10 mL Intravenous PRN Reg, MD Timmy        sodium chloride 0.9 % flush 10 mL  10 mL Intravenous Q12H Reg,  MD Timmy   10 mL at 01/08/25 2107    sodium chloride 0.9 % flush 10 mL  10 mL Intravenous PRN Timmy Finney MD        sodium chloride 0.9 % infusion 40 mL  40 mL Intravenous PRN Timmy Finney MD        sodium chloride 0.9 % infusion 40 mL  40 mL Intravenous PRN Timmy Finney MD        tamsulosin (FLOMAX) 24 hr capsule 0.4 mg  0.4 mg Oral Daily Timmy Finney MD   0.4 mg at 01/09/25 0957     Orders (last 24 hrs)        Start     Ordered    01/10/25 0600  Comprehensive Metabolic Panel  Morning Draw         01/09/25 1232    01/10/25 0600  TSH  Morning Draw         01/09/25 1241    01/09/25 1800  carvedilol (COREG) tablet 25 mg  2 Times Daily With Meals         01/09/25 1020    01/09/25 1233  Discontinue Cardiac Monitoring  Once         01/09/25 1232    01/09/25 0900  metoprolol succinate XL (TOPROL-XL) 24 hr tablet 100 mg  Every 24 Hours Scheduled,   Status:  Discontinued         01/08/25 0949    01/09/25 0900  losartan (COZAAR) tablet 50 mg  Daily         01/08/25 1114    01/09/25 0900  bisacodyl (DULCOLAX) EC tablet 10 mg  Daily         01/08/25 1959 01/09/25 0600  CBC Auto Differential  PROCEDURE ONCE         01/08/25 2201 01/08/25 2100  polyethylene glycol (MIRALAX) packet 17 g  2 Times Daily         01/08/25 1959 01/08/25 2045  bisacodyl (DULCOLAX) suppository 10 mg  Once         01/08/25 1959 01/08/25 2014  HYDROcodone-acetaminophen (NORCO) 5-325 MG per tablet 1 tablet  Every 6 Hours PRN         01/08/25 2014    01/08/25 1800  carvedilol (COREG) tablet 12.5 mg  2 Times Daily With Meals,   Status:  Discontinued         01/08/25 1115    01/08/25 1600  lactulose solution 20 g  3 Times Daily         01/08/25 1207    01/08/25 1230  Enoxaparin Sodium (LOVENOX) syringe 100 mg  Every 12 Hours Scheduled         01/08/25 1142    01/08/25 1206  Morphine sulfate (PF) injection 2 mg  Every 4 Hours PRN         01/08/25 1206    01/08/25 1117  Pharmacy to Dose enoxaparin (LOVENOX)  Continuous PRN       "   01/08/25 1132    01/08/25 1115  hydrALAZINE (APRESOLINE) tablet 25 mg  3 Times Daily PRN         01/08/25 1115    01/08/25 1045  pantoprazole (PROTONIX) EC tablet 40 mg  Every Early Morning         01/08/25 0953    01/07/25 1400  heparin (porcine) 5000 UNIT/ML injection 5,000 Units  Every 8 Hours Scheduled,   Status:  Discontinued         01/07/25 1038    01/07/25 1215  tamsulosin (FLOMAX) 24 hr capsule 0.4 mg  Daily         01/07/25 1127    01/07/25 0945  budesonide (PULMICORT) nebulizer solution 0.5 mg  2 Times Daily - RT         01/07/25 0849    01/07/25 0900  ipratropium-albuterol (DUO-NEB) nebulizer solution 3 mL  Every 4 Hours PRN         01/07/25 0849    01/06/25 1857  melatonin tablet 5 mg  Nightly PRN         01/06/25 1857    01/05/25 1345  gabapentin (NEURONTIN) capsule 300 mg  3 Times Daily         01/05/25 1249    01/05/25 0905  Urinary Catheter Care  Every Shift      Placed in \"And\" Linked Group    01/05/25 0905    01/05/25 0400  Basic Metabolic Panel  Daily       01/04/25 1752    01/04/25 0900  aspirin chewable tablet 81 mg  Daily         01/03/25 1632    01/04/25 0400  CBC & Differential  Daily       01/04/25 1752    01/03/25 2100  sodium chloride 0.9 % flush 10 mL  Every 12 Hours Scheduled         01/03/25 1630    01/03/25 1800  Incentive Spirometry  Every 2 Hours While Awake       01/03/25 1630    01/03/25 1628  ondansetron ODT (ZOFRAN-ODT) disintegrating tablet 4 mg  Every 6 Hours PRN         01/03/25 1630    01/03/25 1622  Arterial Line Monitoring  Every Shift       01/03/25 1630    01/03/25 1621  sodium chloride 0.9 % flush 10 mL  As Needed         01/03/25 1630    01/03/25 1621  sodium chloride 0.9 % infusion 40 mL  As Needed         01/03/25 1630    01/03/25 1118  atorvastatin (LIPITOR) tablet 10 mg  Daily         01/03/25 1102    01/03/25 1104  hydrALAZINE (APRESOLINE) injection 10 mg  Every 4 Hours PRN         01/03/25 1104    01/03/25 1002  sodium chloride 0.9 % flush 10 mL  Every 12 " "Hours Scheduled         01/03/25 0946    01/03/25 1002  sennosides-docusate (PERICOLACE) 8.6-50 MG per tablet 2 tablet  2 Times Daily,   Status:  Discontinued        Placed in \"And\" Linked Group    01/03/25 0946    01/03/25 1000  Vital Signs Every Hour and Per Hospital Policy Based on Patient Condition  Every Hour       01/03/25 0946    01/03/25 1000  Intake & Output  Every Hour       01/03/25 0946    01/03/25 0947  Daily Weights  Daily       01/03/25 0946    01/03/25 0945  Oral Care - Patient Not on NPPV & Not Intubated  Every Shift       01/03/25 0946    01/03/25 0944  polyethylene glycol (MIRALAX) packet 17 g  Daily PRN,   Status:  Discontinued        Placed in \"And\" Linked Group    01/03/25 0946    01/03/25 0944  bisacodyl (DULCOLAX) EC tablet 5 mg  Daily PRN,   Status:  Discontinued        Placed in \"And\" Linked Group    01/03/25 0946    01/03/25 0944  bisacodyl (DULCOLAX) suppository 10 mg  Daily PRN,   Status:  Discontinued        Placed in \"And\" Linked Group    01/03/25 0946    01/03/25 0944  nitroglycerin (NITROSTAT) SL tablet 0.4 mg  Every 5 Minutes PRN         01/03/25 0946    01/03/25 0944  sodium chloride 0.9 % flush 10 mL  As Needed         01/03/25 0946    01/03/25 0944  sodium chloride 0.9 % infusion 40 mL  As Needed         01/03/25 0946    01/03/25 0303  sodium chloride 0.9 % flush 10 mL  As Needed         01/03/25 0303    Unscheduled  Blood Gas, Arterial -  As Needed       01/03/25 1824    --  aspirin 325 MG EC tablet  Every 6 Hours PRN         01/03/25 0313    --  hydrALAZINE (APRESOLINE) 25 MG tablet  3 Times Daily         01/03/25 0313    --  metoprolol succinate XL (TOPROL-XL) 50 MG 24 hr tablet  2 Times Daily         01/03/25 1752    --  Evolocumab (REPATHA) solution prefilled syringe injection  Every 14 Days         01/03/25 1752    Pending  HYDROcodone-acetaminophen (NORCO) 5-325 MG per tablet 1 tablet  Every 6 Hours PRN         Pending                     Physician Progress Notes (last 24 " "hours)        Charli Aldana MD at 01/09/25 8224          Chief Complaint/Reason for Visit: Follow-up tachycardia    S: The patient notes no acute events overnight.  He is still noticing some discomfort in his left lower extremity.  He notes no palpitations overnight but was noted to have an elevated heart rate for a few brief periods overnight.  He denies having chest discomfort.  He did have 2 bowel movements earlier today and notes less abdominal distention.    Medications: Reviewed    Review of Systems: All pertinent negatives and positives as noted above.  Otherwise, all systems reviewed and found to be negative.    Telemetry: Tachycardic rhythm earlier this morning, irregular, suggestive of atrial fibrillation, this lasted for relatively short length of time.  Currently, the patient is in normal sinus rhythm.  Also, the patient had a similar episode last night around 6 PM.    O:  /74 (BP Location: Right arm, Patient Position: Lying)   Pulse 85   Temp 98.1 °F (36.7 °C) (Oral)   Resp 20   Ht 177.8 cm (70\")   Wt 102 kg (224 lb 3.2 oz)   SpO2 92%   BMI 32.17 kg/m²   Temp:  [97.6 °F (36.4 °C)-98.2 °F (36.8 °C)] 98.1 °F (36.7 °C)  Heart Rate:  [76-91] 85  Resp:  [15-24] 20  BP: (151-171)/(74-83) 168/74    General: Ill in appearance but in no acute distress, lying in bed  CV: Regular rate and rhythm at this time with no audible murmurs appreciated  Pulmonary: Decreased breath sounds bilaterally  GI: Obese, mildly distended, nontender to palpation, active bowel sound  Extremities: Trace edema in the left lower extremity.  Warm and well-perfused.  Some decreased motor function noted, although the patient notes that he is essentially at baseline.    Diagnostic Data:    Lab Results   Component Value Date    WBC 8.95 01/09/2025    HGB 9.3 (L) 01/09/2025    HCT 29.8 (L) 01/09/2025    MCV 92.8 01/09/2025     01/09/2025     Lab Results   Component Value Date    GLUCOSE 119 (H) 01/09/2025    " CALCIUM 8.3 (L) 01/09/2025     01/09/2025    K 4.1 01/09/2025    CO2 29.0 01/09/2025     01/09/2025    BUN 12 01/09/2025    CREATININE 0.48 (L) 01/09/2025    EGFR 110.4 01/09/2025    BCR 25.0 01/09/2025    ANIONGAP 8.0 01/09/2025     ASSESSMENT/PLAN:    1.  Paroxysmal atrial fibrillation  2.  Contained rupture of infrarenal abdominal aortic aneurysm, now status post endovascular repair  3.  Anemia without active bleeding  4.  Primary hypertension  5.  Mixed hyperlipidemia  6.  Peripheral arterial disease including carotid artery disease  7.  Tobacco abuse  8.  Prior stroke    -There were two short episodes of elevated heart rate overnight, consistent with atrial fibrillation.  He is otherwise asymptomatic and hemodynamically stable.  -Continue therapeutic Lovenox at this time but plan to transition to oral anticoagulation, either Eliquis or Xarelto at discharge.  This can be at the discretion of the primary service, I have no preference in this matter as to which agent is used.  -Blood pressure still remains elevated.  Cardiology will comment on the patient's atrial fibrillation, however the primary service to manage the patient's blood pressure and adjust medications accordingly if needed.  -At discharge, the patient should be ordered a 14-day cardiac monitor - Zio patch.  -Should the patient have symptomatic episodes of atrial fibrillation while here or prolonged episodes, recommend electrophysiology consultation.  -At this time, we will be available as needed.  Please feel free to call if there are questions.      Electronically signed by Charli Aldana MD at 01/09/25 1252       Nichol Suero MD at 01/09/25 1016              [facility]  INPATIENT PROGRESS NOTE    Patient Name: Brijesh Grande  Date of Admission: 1/3/2025  Today's Date: 01/09/25  Length of Stay: 6  Primary Care Physician: Eliseo Smith MD    Subjective   Chief  Complaint: Abdominal pain  Abdominal Pain       69-year-old male with a past medical history of hypertension and previous stroke in January 2016 with residual left-sided weakness and abnormal sensation admitted after presenting to ED with complaints of abdominal pain, left flank pain, and back pain.  The patient is also a smoker, and he continues to smoke 2 packs/day.     Significant findings from ED include glucose elevated 194, but otherwise normal CMP.  CBC was completely normal.  UA was positive for glucose, but was otherwise normal.  CT scan of the abdomen and pelvis showed a fusiform aneurysm of the distal abdominal aorta.  There is partial lumen circumferential mural thrombus Boces of the aneurysm.  There is evidence of hematoma/hemorrhage at the posterior wall of the aneurysm.  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  Patient was noted to be hypertensive in the emergency department.     Patient is being admitted to the CCU for nicardipine infusion for blood pressure control and for close monitoring.  I saw the patient while he was still in the emergency department.  He states the pain he was having in his abdomen and back are improved.  He reports that he has had an abnormal sensation to the left side of his body since his stroke from 9 years ago.  However, he noticed very intense pain in his abdomen and back earlier this morning, which brought him to the emergency department.  He has no other complaints for me at this time      Interval history:  1/4/2025 patient is intubated sedated he is status post placement of aortobiiliac endograft for ruptured AAA and left external iliac artery stent.  Patient sedation was weaned down this morning and patient was very agitated not following commands and had to be resedated again.  Patient is being started on Precedex and fentanyl drip.  As per discussion with  vascular surgery at bedside today patient had a similar episode after surgery yesterday he was very agitated and had to be reintubated and was found to have some vocal cord edema.    1/5/2025 patient was extubated yesterday he has done very well he got agitated overnight and had to be started on Precedex which dropped his blood pressure transiently requiring Levophed.  Patient remains on Precedex 0.2 he does complain of not able to use the bathroom and had some urine retention had to have an out overnight I do think this is causing his agitation.    1/6/2025 patient remains in the ICU he is not on any drips other than heparin drip.  Discussed with vascular surgery today we are discontinuing his heparin drip and starting antiplatelets.  Patient pain is under better control and he has been off Precedex since yesterday morning.  Pulses are dopplerable.    1/7/2025 remains in intensive care and waiting for floor bed.  Patient had a run of A-fib with RVR overnight and received 1 dose of metoprolol and now is back to sinus rhythm.  Patient denies any chest pain no shortness of breath he still complains of his left leg pain.  Pulses are dopplerable.  1/8  As before was transferred to the floor been having episodes of A-fib with RVR was given Lopressor on occasional basis the patient blood pressure is not well-controlled I increase his Lopressor p.o. he went for echo and we will consult with cardiology to adjust his medications, patient vascular surgery postop day #5 status post EVAR with extension to left external iliac for rupture.  Remains on aspirin and Plavix  Protonix for GI prophylaxis, no BM x4 days   1/9  As before appreciate vascular postop day #6 blood pressure remains not very well-controlled increased his Coreg, tachycardia resolved visit remains on aspirin and Plavix had good bowel movements care has signed off follow-up with them in 2 weeks for wound check appreciate cardiology per cardiology the patient  needs full anticoagulation therefore we did discontinue Plavix continue Lovenox full dose and aspirin cardiology did discontinue Toprol-XL and Norvasc restarted Coreg increase losartan and hydralazine as as neededBecause he has been asymptomatic 2 episodes of atrial fibrillation would recommend a 14-day Holter monitor is placed at discharge. If the patient continues to have intermittent episodes of atrial fibrillation following discharge and outpatient referral to electrophysiology can be considered.   All 12 point system review were unremarkab other than was mentioned history present illness le  Objective    Temp:  [97.3 °F (36.3 °C)-98.2 °F (36.8 °C)] 98 °F (36.7 °C)  Heart Rate:  [76-91] 76  Resp:  [15-24] 20  BP: (145-171)/(65-83) 171/80  Physical Exam  Constitutional:       Appearance: He is not ill-appearing.   HENT:      Head: Normocephalic.      Mouth/Throat:      Mouth: Mucous membranes are moist.   Eyes:      Pupils: Pupils are equal, round, and reactive to light.   Cardiovascular:      Rate and Rhythm: Normal rate and regular rhythm.   Pulmonary:      Effort: No respiratory distress.      Breath sounds: No wheezing.   Abdominal:      General: Bowel sounds are normal. There is no distension.      Palpations: Abdomen is soft.      Tenderness: There is no abdominal tenderness.   Musculoskeletal:      Right lower leg: Edema present.      Left lower leg: Edema present.      Comments: Mild cyanosis of the left leg pulses are palpable   Skin:     General: Skin is warm.   Neurological:      Mental Status: He is oriented to person, place, and time.      Comments: Baseline left-sided weakness   Psychiatric:         Mood and Affect: Mood normal.             Results Review:  Lab Results (last 24 hours)       Procedure Component Value Units Date/Time    Basic Metabolic Panel [[id number]]  (Abnormal) Collected: 01/09/25 0225    Specimen: Blood Updated: 01/09/25 0254     Glucose 119 mg/dL      BUN 12 mg/dL       Creatinine 0.48 mg/dL      Sodium 139 mmol/L      Potassium 4.1 mmol/L      Comment: Slight hemolysis detected by analyzer. Result may be falsely elevated.        Chloride 102 mmol/L      CO2 29.0 mmol/L      Calcium 8.3 mg/dL      BUN/Creatinine Ratio 25.0     Anion Gap 8.0 mmol/L      eGFR 110.4 mL/min/1.73     Narrative:      GFR Categories in Chronic Kidney Disease (CKD)      GFR Category          GFR (mL/min/1.73)    Interpretation  G1                     90 or greater         Normal or high (1)  G2                      60-89                Mild decrease (1)  G3a                   45-59                Mild to moderate decrease  G3b                   30-44                Moderate to severe decrease  G4                    15-29                Severe decrease  G5                    14 or less           Kidney failure          (1)In the absence of evidence of kidney disease, neither GFR category G1 or G2 fulfill the criteria for CKD.    eGFR calculation 2021 CKD-EPI creatinine equation, which does not include race as a factor    CBC & Differential [[id number]]  (Abnormal) Collected: 01/09/25 0225    Specimen: Blood Updated: 01/09/25 0235    Narrative:      The following orders were created for panel order CBC & Differential.  Procedure                               Abnormality         Status                     ---------                               -----------         ------                     CBC Auto Differential[[id number]]        Abnormal            Final result                 Please view results for these tests on the individual orders.    CBC Auto Differential [[id number]]  (Abnormal) Collected: 01/09/25 0225    Specimen: Blood Updated: 01/09/25 0235     WBC 8.95 10*3/mm3      RBC 3.21 10*6/mm3      Hemoglobin 9.3 g/dL      Hematocrit 29.8 %      MCV 92.8 fL      MCH 29.0 pg      MCHC 31.2 g/dL      RDW 13.7 %      RDW-SD 46.7 fl      MPV 8.8 fL      Platelets 231 10*3/mm3      Neutrophil % 75.2 %       "Lymphocyte % 10.8 %      Monocyte % 9.3 %      Eosinophil % 2.9 %      Basophil % 0.6 %      Immature Grans % 1.2 %      Neutrophils, Absolute 6.73 10*3/mm3      Lymphocytes, Absolute 0.97 10*3/mm3      Monocytes, Absolute 0.83 10*3/mm3      Eosinophils, Absolute 0.26 10*3/mm3      Basophils, Absolute 0.05 10*3/mm3      Immature Grans, Absolute 0.11 10*3/mm3      nRBC 0.0 /100 WBC              No radiology results for the last day    Result Review:  I have personally reviewed the results from the time of this admission to 1/9/2025 10:16 CST and agree with these findings:  [x]  Laboratory list / accordion  []  Microbiology  [x]  Radiology  []  EKG/Telemetry   []  Cardiology/Vascular   []  Pathology  []  Old records  []  Other:  Most notable findings include:       Culture Data:   No results found for: \"BLOODCX\", \"URINECX\", \"WOUNDCX\", \"MRSACX\", \"RESPCX\", \"STOOLCX\"    I have reviewed the patient's current medications.     Assessment/Plan   Assessment  Active Hospital Problems    Diagnosis     **Abdominal aortic aneurysm     AAA (abdominal aortic aneurysm)     Abdominal pain    -Ruptured infrarenal abdominal aortic aneurysm status post aortobiiliac endograft left external iliac stent  -Acute hypoxemic respiratory failure requiring mechanical ventilation  -Laryngeal edema  -Severe agitation resolved  -Suspected COPD  -Tobacco abuse  -ICU delirium resolved  -History of CVA with left-sided weakness      Plan:  -Wean down oxygen as tolerated  -Pain management with as needed Dilaudid  -Continue gabapentin   -Patient is on Cozaar, hydralazine and Toprol for blood pressure control  -I will change scheduled DuoNebs to as needed  -Start Pulmicort  -We will keep Le catheter for urine retension  - aspirin and Plavix as per vascular  -Diet  -Waiting for floor bed, ICU standpoint discussed with Dr. De La Fuente patient will be taking care of by .     Electronically signed by Nichol Suero MD on 1/9/2025 at 10:16 " CST    Electronically signed by Nichol Suero MD at 01/09/25 1231       Timmy Finney MD at 01/09/25 0944             LOS: 6 days   Patient Care Team:  Eliseo Smith MD as PCP - General  Eliseo Smith MD as PCP - Family Medicine    Chief Complaint:  Post op day #6-EVAR    Subjective     Patient Complaints: Doing fine.  BM x 2 yesterday.  Denies abdominal pain.  Left leg pain stable.  Objective     Vital Signs  Temp:  [97.3 °F (36.3 °C)-98.2 °F (36.8 °C)] 98 °F (36.7 °C)  Heart Rate:  [76-91] 76  Resp:  [15-24] 20  BP: (145-171)/(65-83) 171/80    Physical Exam  Constitutional:       Appearance: Normal appearance. He is not ill-appearing or toxic-appearing.   HENT:      Head: Normocephalic and atraumatic.   Cardiovascular:      Rate and Rhythm: Normal rate and regular rhythm.      Pulses:           Dorsalis pedis pulses are 2+ on the right side and 1+ on the left side.        Posterior tibial pulses are 2+ on the right side and detected w/ Doppler on the left side.   Pulmonary:      Effort: Pulmonary effort is normal. No respiratory distress.   Abdominal:      Palpations: Abdomen is soft.      Tenderness: There is no abdominal tenderness. There is no guarding.   Musculoskeletal:         General: No swelling or tenderness.      Cervical back: Normal range of motion and neck supple.      Comments: Motor intact bilateral lower extremities.  Soft bilaterally.   Skin:     General: Skin is warm and dry.      Comments: Left foot warm.  Brisk cap refill coloration stable.   Neurological:      Mental Status: He is alert. Mental status is at baseline.         Laboratory Data:   Results from last 7 days   Lab Units 01/09/25  0225 01/08/25  0126 01/07/25  0547   WBC 10*3/mm3 8.95 7.88 8.69   HEMOGLOBIN g/dL 9.3* 9.3* 9.1*   HEMATOCRIT % 29.8* 29.8* 28.1*   PLATELETS 10*3/mm3 231 222 195       Results from last 7 days   Lab Units 01/09/25  0225 01/08/25  0126 01/07/25  0547 01/04/25  0721 01/04/25  0600  01/03/25  1530 01/03/25  0309   SODIUM mmol/L 139 140 137   < > 135*   < > 139   SODIUM, ARTERIAL   --   --   --   --   --    < >  --    POTASSIUM mmol/L 4.1 4.0 3.7   < > 4.5   < > 3.9   CHLORIDE mmol/L 102 103 103   < > 100   < > 101   CO2 mmol/L 29.0 29.0 27.0   < > 24.0   < > 28.0   BUN mg/dL 12 15 15   < > 13   < > 13   CREATININE mg/dL 0.48* 0.57* 0.51*   < > 0.92   < > 0.92   CALCIUM mg/dL 8.3* 8.2* 8.1*   < > 8.2*   < > 9.1   BILIRUBIN mg/dL  --   --   --   --  0.3  --  0.3   ALK PHOS U/L  --   --   --   --  73  --  106   ALT (SGPT) U/L  --   --   --   --  10  --  14   AST (SGOT) U/L  --   --   --   --  29  --  16   GLUCOSE mg/dL 119* 108* 103*   < > 164*   < > 184*    < > = values in this interval not displayed.     Results from last 7 days   Lab Units 01/06/25  0625 01/05/25  2326 01/05/25  1817 01/05/25  1307 01/05/25  0602 01/04/25  1152 01/04/25  0600 01/03/25  2331   PROTIME Seconds  --   --   --   --  15.1*  --  14.8 14.9*   INR   --   --   --   --  1.13*  --  1.10* 1.11*   APTT seconds 47.9* 85.2* 41.6*   < > 79.7*   < > 82.3* 40.6*    < > = values in this interval not displayed.            Medication Review: Reviewed    Assessment & Plan       Abdominal aortic aneurysm    AAA (abdominal aortic aneurysm)    Abdominal pain    69-year-old male postop day 6 EVAR with extension to left external iliac for rupture.      Plan for disposition:  -Hospitalist on board appreciate their assistance.  -Tachycardia resolved Lovenox started by hospitalist.  Continue aspirin DC Plavix with anticoagulation.  -Hemoglobin stable  -Faintly palpable left lower extremity pedal pulses with multiphasic signals.  Continue gabapentin for possible reperfusion pain versus chronic pain from stroke.  -PT OT encouraged.  -BM x 2 yesterday.  Patient reports is nonbloody.  Denies abdominal pain.  -No further vascular intervention needed at this time.  -Patient to follow-up in 2 weeks for wound check.    Timmy Finney MD  Vascular  Surgery  [phone]  01/09/25  09:44 CST      Electronically signed by Timmy Finney MD at 01/09/25 0926       Consult Notes (last 24 hours)  Notes from 01/08/25 1805 through 01/09/25 1805   No notes of this type exist for this encounter.

## 2025-01-10 NOTE — PROGRESS NOTES
[facility] Intensivist Services  INPATIENT PROGRESS NOTE    Patient Name: Brijesh Grande  Date of Admission: 1/3/2025  Today's Date: 01/10/25  Length of Stay: 7  Primary Care Physician: Eliseo Smith MD    Subjective   Chief Complaint: Abdominal pain  Abdominal Pain       69-year-old male with a past medical history of hypertension and previous stroke in January 2016 with residual left-sided weakness and abnormal sensation admitted after presenting to ED with complaints of abdominal pain, left flank pain, and back pain.  The patient is also a smoker, and he continues to smoke 2 packs/day.     Significant findings from ED include glucose elevated 194, but otherwise normal CMP.  CBC was completely normal.  UA was positive for glucose, but was otherwise normal.  CT scan of the abdomen and pelvis showed a fusiform aneurysm of the distal abdominal aorta.  There is partial lumen circumferential mural thrombus Boces of the aneurysm.  There is evidence of hematoma/hemorrhage at the posterior wall of the aneurysm.  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  Patient was noted to be hypertensive in the emergency department.     Patient is being admitted to the CCU for nicardipine infusion for blood pressure control and for close monitoring.  I saw the patient while he was still in the emergency department.  He states the pain he was having in his abdomen and back are improved.  He reports that he has had an abnormal sensation to the left side of his body since his stroke from 9 years ago.  However, he noticed very intense pain in his abdomen and back earlier this morning, which brought him to the emergency department.  He has no other complaints for me at this time      Interval history:  1/4/2025 patient is intubated sedated he is status post placement of aortobiiliac endograft for  ruptured AAA and left external iliac artery stent.  Patient sedation was weaned down this morning and patient was very agitated not following commands and had to be resedated again.  Patient is being started on Precedex and fentanyl drip.  As per discussion with vascular surgery at bedside today patient had a similar episode after surgery yesterday he was very agitated and had to be reintubated and was found to have some vocal cord edema.    1/5/2025 patient was extubated yesterday he has done very well he got agitated overnight and had to be started on Precedex which dropped his blood pressure transiently requiring Levophed.  Patient remains on Precedex 0.2 he does complain of not able to use the bathroom and had some urine retention had to have an out overnight I do think this is causing his agitation.    1/6/2025 patient remains in the ICU he is not on any drips other than heparin drip.  Discussed with vascular surgery today we are discontinuing his heparin drip and starting antiplatelets.  Patient pain is under better control and he has been off Precedex since yesterday morning.  Pulses are dopplerable.    1/7/2025 remains in intensive care and waiting for floor bed.  Patient had a run of A-fib with RVR overnight and received 1 dose of metoprolol and now is back to sinus rhythm.  Patient denies any chest pain no shortness of breath he still complains of his left leg pain.  Pulses are dopplerable.  1/8  As before was transferred to the floor been having episodes of A-fib with RVR was given Lopressor on occasional basis the patient blood pressure is not well-controlled I increase his Lopressor p.o. he went for echo and we will consult with cardiology to adjust his medications, patient vascular surgery postop day #5 status post EVAR with extension to left external iliac for rupture.  Remains on aspirin and Plavix  Protonix for GI prophylaxis, no BM x4 days   1/9  As before appreciate vascular postop day #6 blood  pressure remains not very well-controlled increased his Coreg, tachycardia resolved visit remains on aspirin and Plavix had good bowel movements care has signed off follow-up with them in 2 weeks for wound check appreciate cardiology per cardiology the patient needs full anticoagulation therefore we did discontinue Plavix continue Lovenox full dose and aspirin cardiology did discontinue Toprol-XL and Norvasc restarted Coreg increase losartan and hydralazine as as neededBecause he has been asymptomatic 2 episodes of atrial fibrillation would recommend a 14-day Holter monitor is placed at discharge. If the patient continues to have intermittent episodes of atrial fibrillation following discharge and outpatient referral to electrophysiology can be considered.   1/10  As before pressure is better, [facility] rehab was unable to take patient  [facility] Ref[facility] sent.  Cardiology had signed off again recommending 14-hour Zio patch monitor will need AC on discharge  Objective    Temp:  [97.7 °F (36.5 °C)-98.5 °F (36.9 °C)] 98.5 °F (36.9 °C)  Heart Rate:  [76-92] 80  Resp:  [16-20] 16  BP: (134-168)/(66-91) 152/91  Physical Exam  Constitutional:       Appearance: He is not ill-appearing.   HENT:      Head: Normocephalic.      Mouth/Throat:      Mouth: Mucous membranes are moist.   Eyes:      Pupils: Pupils are equal, round, and reactive to light.   Cardiovascular:      Rate and Rhythm: Normal rate and regular rhythm.   Pulmonary:      Effort: No respiratory distress.      Breath sounds: No wheezing.   Abdominal:      General: Bowel sounds are normal. There is no distension.      Palpations: Abdomen is soft.      Tenderness: There is no abdominal tenderness.   Musculoskeletal:      Right lower leg: Edema present.      Left lower leg: Edema present.      Comments: Mild cyanosis of the left leg pulses are palpable   Skin:     General: Skin is warm.   Neurological:      Mental Status: He is oriented to  person, place, and time.      Comments: Baseline left-sided weakness   Psychiatric:         Mood and Affect: Mood normal.             Results Review:  Lab Results (last 24 hours)       Procedure Component Value Units Date/Time    Comprehensive Metabolic Panel [[id number]]  (Abnormal) Collected: 01/10/25 0411    Specimen: Blood Updated: 01/10/25 0509     Glucose 100 mg/dL      BUN 12 mg/dL      Creatinine 0.51 mg/dL      Sodium 138 mmol/L      Potassium 3.8 mmol/L      Chloride 100 mmol/L      CO2 28.0 mmol/L      Calcium 8.1 mg/dL      Total Protein 5.6 g/dL      Albumin 3.1 g/dL      ALT (SGPT) 28 U/L      AST (SGOT) 37 U/L      Alkaline Phosphatase 60 U/L      Total Bilirubin 0.8 mg/dL      Globulin 2.5 gm/dL      A/G Ratio 1.2 g/dL      BUN/Creatinine Ratio 23.5     Anion Gap 10.0 mmol/L      eGFR 108.4 mL/min/1.73     Narrative:      GFR Categories in Chronic Kidney Disease (CKD)      GFR Category          GFR (mL/min/1.73)    Interpretation  G1                     90 or greater         Normal or high (1)  G2                      60-89                Mild decrease (1)  G3a                   45-59                Mild to moderate decrease  G3b                   30-44                Moderate to severe decrease  G4                    15-29                Severe decrease  G5                    14 or less           Kidney failure          (1)In the absence of evidence of kidney disease, neither GFR category G1 or G2 fulfill the criteria for CKD.    eGFR calculation 2021 CKD-EPI creatinine equation, which does not include race as a factor    TSH [[id number]]  (Abnormal) Collected: 01/10/25 0411    Specimen: Blood Updated: 01/10/25 0509     TSH 5.720 uIU/mL     CBC & Differential [[id number]]  (Abnormal) Collected: 01/10/25 0411    Specimen: Blood Updated: 01/10/25 0443    Narrative:      The following orders were created for panel order CBC & Differential.  Procedure                               Abnormality          "Status                     ---------                               -----------         ------                     CBC Auto Differential[[id number]]        Abnormal            Final result                 Please view results for these tests on the individual orders.    CBC Auto Differential [[id number]]  (Abnormal) Collected: 01/10/25 0411    Specimen: Blood Updated: 01/10/25 0443     WBC 9.34 10*3/mm3      RBC 3.27 10*6/mm3      Hemoglobin 9.6 g/dL      Hematocrit 30.3 %      MCV 92.7 fL      MCH 29.4 pg      MCHC 31.7 g/dL      RDW 13.9 %      RDW-SD 46.3 fl      MPV 8.9 fL      Platelets 293 10*3/mm3      Neutrophil % 72.9 %      Lymphocyte % 13.5 %      Monocyte % 9.6 %      Eosinophil % 2.4 %      Basophil % 0.3 %      Immature Grans % 1.3 %      Neutrophils, Absolute 6.81 10*3/mm3      Lymphocytes, Absolute 1.26 10*3/mm3      Monocytes, Absolute 0.90 10*3/mm3      Eosinophils, Absolute 0.22 10*3/mm3      Basophils, Absolute 0.03 10*3/mm3      Immature Grans, Absolute 0.12 10*3/mm3      nRBC 0.0 /100 WBC              No radiology results for the last day    Result Review:  I have personally reviewed the results from the time of this admission to 1/10/2025 09:44 CST and agree with these findings:  [x]  Laboratory list / accordion  []  Microbiology  [x]  Radiology  []  EKG/Telemetry   []  Cardiology/Vascular   []  Pathology  []  Old records  []  Other:  Most notable findings include:       Culture Data:   No results found for: \"BLOODCX\", \"URINECX\", \"WOUNDCX\", \"MRSACX\", \"RESPCX\", \"STOOLCX\"    I have reviewed the patient's current medications.     Assessment/Plan   Assessment  Active Hospital Problems    Diagnosis     **Abdominal aortic aneurysm     AAA (abdominal aortic aneurysm)     Abdominal pain    -Ruptured infrarenal abdominal aortic aneurysm status post aortobiiliac endograft left external iliac stent  -Acute hypoxemic respiratory failure requiring mechanical ventilation  -Laryngeal edema  -Severe agitation " resolved  -Suspected COPD  -Tobacco abuse  -ICU delirium resolved  -History of CVA with left-sided weakness      Plan:  -Wean down oxygen as tolerated  -Pain management with as needed Dilaudid  -Continue gabapentin   -Patient is on Cozaar, hydralazine and Toprol for blood pressure control  -I will change scheduled DuoNebs to as needed  -Start Pulmicort  -We will keep Le catheter for urine retension  - aspirin and Plavix as per vascular  -Diet  -Waiting for floor bed, ICU standpoint discussed with Dr. De La Fuente patient will be taking care of by .     Electronically signed by Nichol Suero MD on 1/10/2025 at 09:44 CST

## 2025-01-11 LAB
ANION GAP SERPL CALCULATED.3IONS-SCNC: 6 MMOL/L (ref 5–15)
BASOPHILS # BLD AUTO: 0.04 10*3/MM3 (ref 0–0.2)
BASOPHILS NFR BLD AUTO: 0.4 % (ref 0–1.5)
BUN SERPL-MCNC: 14 MG/DL (ref 8–23)
BUN/CREAT SERPL: 24.6 (ref 7–25)
CALCIUM SPEC-SCNC: 8.5 MG/DL (ref 8.6–10.5)
CHLORIDE SERPL-SCNC: 99 MMOL/L (ref 98–107)
CO2 SERPL-SCNC: 31 MMOL/L (ref 22–29)
CREAT SERPL-MCNC: 0.57 MG/DL (ref 0.76–1.27)
DEPRECATED RDW RBC AUTO: 47.4 FL (ref 37–54)
EGFRCR SERPLBLD CKD-EPI 2021: 104.8 ML/MIN/1.73
EOSINOPHIL # BLD AUTO: 0.21 10*3/MM3 (ref 0–0.4)
EOSINOPHIL NFR BLD AUTO: 2.3 % (ref 0.3–6.2)
ERYTHROCYTE [DISTWIDTH] IN BLOOD BY AUTOMATED COUNT: 14.1 % (ref 12.3–15.4)
GLUCOSE SERPL-MCNC: 120 MG/DL (ref 65–99)
HCT VFR BLD AUTO: 30.4 % (ref 37.5–51)
HGB BLD-MCNC: 9.6 G/DL (ref 13–17.7)
IMM GRANULOCYTES # BLD AUTO: 0.12 10*3/MM3 (ref 0–0.05)
IMM GRANULOCYTES NFR BLD AUTO: 1.3 % (ref 0–0.5)
LYMPHOCYTES # BLD AUTO: 1.06 10*3/MM3 (ref 0.7–3.1)
LYMPHOCYTES NFR BLD AUTO: 11.5 % (ref 19.6–45.3)
MCH RBC QN AUTO: 29.3 PG (ref 26.6–33)
MCHC RBC AUTO-ENTMCNC: 31.6 G/DL (ref 31.5–35.7)
MCV RBC AUTO: 92.7 FL (ref 79–97)
MONOCYTES # BLD AUTO: 0.98 10*3/MM3 (ref 0.1–0.9)
MONOCYTES NFR BLD AUTO: 10.7 % (ref 5–12)
NEUTROPHILS NFR BLD AUTO: 6.77 10*3/MM3 (ref 1.7–7)
NEUTROPHILS NFR BLD AUTO: 73.8 % (ref 42.7–76)
NRBC BLD AUTO-RTO: 0 /100 WBC (ref 0–0.2)
PLATELET # BLD AUTO: 332 10*3/MM3 (ref 140–450)
PMV BLD AUTO: 9 FL (ref 6–12)
POTASSIUM SERPL-SCNC: 3.8 MMOL/L (ref 3.5–5.2)
RBC # BLD AUTO: 3.28 10*6/MM3 (ref 4.14–5.8)
SODIUM SERPL-SCNC: 136 MMOL/L (ref 136–145)
WBC NRBC COR # BLD AUTO: 9.18 10*3/MM3 (ref 3.4–10.8)

## 2025-01-11 PROCEDURE — 25010000002 ENOXAPARIN PER 10 MG: Performed by: NURSE PRACTITIONER

## 2025-01-11 PROCEDURE — 97116 GAIT TRAINING THERAPY: CPT

## 2025-01-11 PROCEDURE — 25010000002 MORPHINE PER 10 MG: Performed by: HOSPITALIST

## 2025-01-11 PROCEDURE — 94799 UNLISTED PULMONARY SVC/PX: CPT

## 2025-01-11 PROCEDURE — 80048 BASIC METABOLIC PNL TOTAL CA: CPT | Performed by: INTERNAL MEDICINE

## 2025-01-11 PROCEDURE — 97110 THERAPEUTIC EXERCISES: CPT

## 2025-01-11 PROCEDURE — 94664 DEMO&/EVAL PT USE INHALER: CPT

## 2025-01-11 PROCEDURE — 85025 COMPLETE CBC W/AUTO DIFF WBC: CPT | Performed by: INTERNAL MEDICINE

## 2025-01-11 PROCEDURE — 94761 N-INVAS EAR/PLS OXIMETRY MLT: CPT

## 2025-01-11 RX ORDER — LOSARTAN POTASSIUM 50 MG/1
100 TABLET ORAL DAILY
Status: DISCONTINUED | OUTPATIENT
Start: 2025-01-12 | End: 2025-01-15 | Stop reason: HOSPADM

## 2025-01-11 RX ADMIN — BUDESONIDE INHALATION 0.5 MG: 0.5 SUSPENSION RESPIRATORY (INHALATION) at 18:58

## 2025-01-11 RX ADMIN — BUDESONIDE INHALATION 0.5 MG: 0.5 SUSPENSION RESPIRATORY (INHALATION) at 07:10

## 2025-01-11 RX ADMIN — HYDROCODONE BITARTRATE AND ACETAMINOPHEN 1 TABLET: 10; 325 TABLET ORAL at 04:32

## 2025-01-11 RX ADMIN — PANTOPRAZOLE SODIUM 40 MG: 40 TABLET, DELAYED RELEASE ORAL at 04:32

## 2025-01-11 RX ADMIN — GABAPENTIN 300 MG: 300 CAPSULE ORAL at 11:11

## 2025-01-11 RX ADMIN — POLYETHYLENE GLYCOL 3350 17 G: 17 POWDER, FOR SOLUTION ORAL at 11:11

## 2025-01-11 RX ADMIN — GABAPENTIN 300 MG: 300 CAPSULE ORAL at 15:23

## 2025-01-11 RX ADMIN — CARVEDILOL 25 MG: 3.12 TABLET, FILM COATED ORAL at 11:11

## 2025-01-11 RX ADMIN — POLYETHYLENE GLYCOL 3350 17 G: 17 POWDER, FOR SOLUTION ORAL at 21:06

## 2025-01-11 RX ADMIN — BISACODYL 10 MG: 5 TABLET, COATED ORAL at 11:11

## 2025-01-11 RX ADMIN — Medication 10 ML: at 11:12

## 2025-01-11 RX ADMIN — Medication 10 ML: at 21:06

## 2025-01-11 RX ADMIN — CARVEDILOL 25 MG: 3.12 TABLET, FILM COATED ORAL at 17:20

## 2025-01-11 RX ADMIN — ENOXAPARIN SODIUM 100 MG: 100 INJECTION SUBCUTANEOUS at 21:06

## 2025-01-11 RX ADMIN — TAMSULOSIN HYDROCHLORIDE 0.4 MG: 0.4 CAPSULE ORAL at 11:11

## 2025-01-11 RX ADMIN — HYDRALAZINE HYDROCHLORIDE 25 MG: 25 TABLET ORAL at 15:36

## 2025-01-11 RX ADMIN — ATORVASTATIN CALCIUM 10 MG: 10 TABLET, FILM COATED ORAL at 11:11

## 2025-01-11 RX ADMIN — MORPHINE SULFATE 2 MG: 2 INJECTION, SOLUTION INTRAMUSCULAR; INTRAVENOUS at 15:36

## 2025-01-11 RX ADMIN — Medication 10 MG: at 21:06

## 2025-01-11 RX ADMIN — HYDROCODONE BITARTRATE AND ACETAMINOPHEN 1 TABLET: 10; 325 TABLET ORAL at 11:18

## 2025-01-11 RX ADMIN — ASPIRIN 81 MG: 81 TABLET, CHEWABLE ORAL at 11:11

## 2025-01-11 RX ADMIN — GABAPENTIN 300 MG: 300 CAPSULE ORAL at 21:06

## 2025-01-11 RX ADMIN — LOSARTAN POTASSIUM 50 MG: 50 TABLET, FILM COATED ORAL at 11:11

## 2025-01-11 RX ADMIN — ENOXAPARIN SODIUM 100 MG: 100 INJECTION SUBCUTANEOUS at 11:18

## 2025-01-11 NOTE — THERAPY TREATMENT NOTE
Acute Care - Physical Therapy Treatment Note  [facility]     Patient Name: Brijesh Grande  : 1955  MRN: [id number]  Today's Date: 2025      Visit Dx:     ICD-10-CM ICD-9-CM   1. Aneurysm of infrarenal abdominal aorta, unspecified whether ruptured  I71.43 441.4   2. Abdominal pain, unspecified abdominal location  R10.9 789.00   3. Abdominal aortic aneurysm (AAA) without rupture, unspecified part  I71.40 441.4   4. Primary hypertension  I10 401.9   5. Impaired mobility [Z74.09]  Z74.09 799.89     Patient Active Problem List   Diagnosis    AAA (abdominal aortic aneurysm)    Abdominal aortic aneurysm    Abdominal pain     Past Medical History:   Diagnosis Date    Hyperlipidemia     Hypertension     PAD (peripheral artery disease)     Stroke      Past Surgical History:   Procedure Laterality Date    ABDOMINAL AORTIC ANEURYSM REPAIR WITH ENDOGRAFT N/A 1/3/2025    Procedure: ABDOMINAL AORTIC ANEURYSM REPAIR WITH ENDOGRAFT;  Surgeon: Eduardo Whiteside DO;  Location: [facility] OR;  Service: Vascular;  Laterality: N/A;    CAROTID ENDARTERECTOMY      x 2     PT Assessment (Last 12 Hours)       PT Evaluation and Treatment       Row Name 25 1552          Physical Therapy Time and Intention    Subjective Information complains of;pain  -     Document Type therapy note (daily note)  -     Mode of Treatment physical therapy  -       Row Name 25 1552          General Information    Existing Precautions/Restrictions fall;oxygen therapy device and L/min  Left side weakness from old stroke  -       Row Name 25 1552          Pain    Pretreatment Pain Rating 6/10  -     Posttreatment Pain Rating 10/10  -     Pain Location hip;groin  -     Pain Side/Orientation left  -     Pain Management Interventions exercise or physical activity utilized;nursing notified;premedicated for activity  -       Row Name 25 1556          Bed Mobility    Sit-Supine [place] (Bed Mobility) standby  assist  -     Comment, (Bed Mobility) up in chair upon arrival  -       Row Name 01/11/25 1552          Sit-Stand Transfer    Sit-Stand [place] (Transfers) contact guard  -       Row Name 01/11/25 1552          Stand-Sit Transfer    Stand-Sit [place] (Transfers) contact guard  -       Row Name 01/11/25 1552          Gait/Stairs (Locomotion)    [place] Level (Gait) verbal cues;contact guard;minimum assist (75% patient effort)  -     Assistive Device (Gait) walker, mckenzie  -MF     Distance in Feet (Gait) 10  x 2 with one sitting rest  -     Deviations/Abnormal Patterns (Gait) antalgic;carl decreased;stride length decreased  -     Left Sided Gait Deviations foot drop/toe drag  -     Comment, (Gait/Stairs) LOB x 1 during second attempt-Min assist to remain up.  -       Row Name 01/11/25 1552          Motor Skills    Comments, Therapeutic Exercise gentle stretching of Left knee, AROM R LE-LAQ, hip flex  -       Row Name             Wound 01/03/25 1622 Right anterior groin    Wound - Properties Group Placement Date: 01/03/25  -AC Placement Time: 1622  -AC Side: Right  -AC Orientation: anterior  -AC Location: groin  -AC Primary Wound Type: Incision  -AC Additional Comments: PERCLOSE X2 2X2 TEGADERM  -AC    Retired Wound - Properties Group Placement Date: 01/03/25  -AC Placement Time: 1622  -AC Side: Right  -AC Orientation: anterior  -AC Location: groin  -AC Primary Wound Type: Incision  -AC Additional Comments: PERCLOSE X2 2X2 TEGADERM  -AC    Retired Wound - Properties Group Placement Date: 01/03/25  -AC Placement Time: 1622  -AC Side: Right  -AC Orientation: anterior  -AC Location: groin  -AC Primary Wound Type: Incision  -AC Additional Comments: PERCLOSE X2 2X2 TEGADERM  -AC    Retired Wound - Properties Group Date first assessed: 01/03/25  -AC Time first assessed: 1622  -AC Side: Right  -AC Location: groin  -AC Primary Wound Type: Incision  -AC Additional Comments: PERCLOSE X2 2X2  TEGADERM  -AC      Row Name             Wound 01/03/25 1622 Left anterior groin    Wound - Properties Group Placement Date: 01/03/25  -AC Placement Time: 1622  -AC Side: Left  -AC Orientation: anterior  -AC Location: groin  -AC Primary Wound Type: Incision  -AC Additional Comments: STERISTRIPS 4X4 TEGADERM  -AC    Retired Wound - Properties Group Placement Date: 01/03/25  -AC Placement Time: 1622  -AC Side: Left  -AC Orientation: anterior  -AC Location: groin  -AC Primary Wound Type: Incision  -AC Additional Comments: STERISTRIPS 4X4 TEGADERM  -AC    Retired Wound - Properties Group Placement Date: 01/03/25  -AC Placement Time: 1622  -AC Side: Left  -AC Orientation: anterior  -AC Location: groin  -AC Primary Wound Type: Incision  -AC Additional Comments: STERISTRIPS 4X4 TEGADERM  -AC    Retired Wound - Properties Group Date first assessed: 01/03/25  -AC Time first assessed: 1622  -AC Side: Left  -AC Location: groin  -AC Primary Wound Type: Incision  -AC Additional Comments: STERISTRIPS 4X4 TEGADERM  -AC      Row Name 01/11/25 1552          Plan of Care Review    Plan of Care Reviewed With patient  -MF     Progress improving  -MF     Outcome Evaluation PT tx completed. Pt. up in chair upon arrival. He states his pain is a little better today. Pt. was able to stand with CGA. He walked 10' x 2 with one sitting rest while using mckenzie walker for support. Pt did have LOB during second round and needed MIN assist to correct. Pt with increased pain with walking. He would benefit from further rehab to gain his independence for returning home.  -MF       Row Name 01/11/25 1552          Positioning and Restraints    Pre-Treatment Position sitting in chair/recliner  -MF     Post Treatment Position bed  -MF     In Bed fowlers;call light within reach;encouraged to call for assist;side rails up x3;LLE elevated  -               User Key  (r) = Recorded By, (t) = Taken By, (c) = Cosigned By      Initials Name Provider Type      Zara Balbuena, RN Registered Nurse    Gabby Rossi, HEMAL Physical Therapist Assistant                    Physical Therapy Education       Title: PT OT SLP Therapies (In Progress)       Topic: Physical Therapy (In Progress)       Point: Mobility training (Done)       Learning Progress Summary            Patient Acceptance, E, VU,NR by BARON at 1/9/2025 1130    Comment: progression with amb with managing pain    Acceptance, E, VU by BARON at 1/8/2025 1131    Comment: have family bring pt's cane to progress with amb    Acceptance, E, VU,DU,NR by NITHIN at 1/6/2025 0824    Comment: Benefits of activity, progression of PT POC,                      Point: Home exercise program (Not Started)       Learner Progress:  Not documented in this visit.              Point: Body mechanics (Not Started)       Learner Progress:  Not documented in this visit.              Point: Precautions (Not Started)       Learner Progress:  Not documented in this visit.                              User Key       Initials Effective Dates Name Provider Type Discipline     02/03/23 -  Francis Hung PT DPT Physical Therapist PT    BARON 02/03/23 -  Maurice Rose PTA Physical Therapist Assistant PT                  PT Recommendation and Plan     Plan of Care Reviewed With: patient  Progress: improving  Outcome Evaluation: PT tx completed. Pt. up in chair upon arrival. He states his pain is a little better today. Pt. was able to stand with CGA. He walked 10' x 2 with one sitting rest while using mckenzie walker for support. Pt did have LOB during second round and needed MIN assist to correct. Pt with increased pain with walking. He would benefit from further rehab to gain his independence for returning home.   Outcome Measures       Row Name 01/11/25 1552 01/10/25 0921 01/09/25 1130       How much help from another person do you currently need...    Turning from your back to your side while in flat bed without using bedrails? 4  -MF 3  -MF 3   -BARON    Moving from lying on back to sitting on the side of a flat bed without bedrails? 3  -MF 3  -MF 3  -BARON    Moving to and from a bed to a chair (including a wheelchair)? 3  -MF 3  -MF 3  -BARON    Standing up from a chair using your arms (e.g., wheelchair, bedside chair)? 3  -MF 3  -MF 3  -BARON    Climbing 3-5 steps with a railing? 1  -MF 1  -MF 1  -BARON    To walk in hospital room? 3  -MF 2  -MF 2  -BARON    AM-PAC 6 Clicks Score (PT) 17  -MF 15  -MF 15  -BARON       Functional Assessment    Outcome Measure Options AM-PAC 6 Clicks Basic Mobility (PT)  -MF AM-PAC 6 Clicks Basic Mobility (PT)  -MF AM-PAC 6 Clicks Basic Mobility (PT)  -BARON              User Key  (r) = Recorded By, (t) = Taken By, (c) = Cosigned By      Initials Name Provider Type    Maurice Nichols PTA Physical Therapist Assistant    Gabby Rossi PTA Physical Therapist Assistant                     Time Calculation:    PT Charges       Row Name 01/11/25 1634             Time Calculation    Start Time 1552  -MF      Stop Time 1615  -MF      Time Calculation (min) 23 min  -MF      PT Received On 01/11/25  -MF         Time Calculation- PT    Total Timed Code Minutes- PT 23 minute(s)  -MF         Timed Charges    48687 - PT Therapeutic Exercise Minutes 8  -MF      66559 - Gait Training Minutes  15  -MF         Total Minutes    Timed Charges Total Minutes 23  -MF       Total Minutes 23  -MF                User Key  (r) = Recorded By, (t) = Taken By, (c) = Cosigned By      Initials Name Provider Type    Gabby Rossi PTA Physical Therapist Assistant                  Therapy Charges for Today       Code Description Service Date Service Provider Modifiers Qty    74751609351 HC PT THER PROC EA 15 MIN 1/10/2025 Gabby Villa, PTA GP 1    70716225405 HC PT THERAPEUTIC ACT EA 15 MIN 1/10/2025 Gabby Villa, HEMAL GP 2    03182925108 HC PT THER PROC EA 15 MIN 1/11/2025 Gabby Villa, PTA GP 1    93011123343 HC GAIT TRAINING EA 15  MIN 1/11/2025 Gabby Villa, PTA GP 1            PT G-Codes  Outcome Measure Options: AM-PAC 6 Clicks Basic Mobility (PT)  AM-PAC 6 Clicks Score (PT): 17  AM-PAC 6 Clicks Score (OT): 15    Gabby Villa, HEMAL  1/11/2025

## 2025-01-11 NOTE — PLAN OF CARE
Goal Outcome Evaluation:  Plan of Care Reviewed With: patient        Progress: improving  Outcome Evaluation: PT tx completed. Pt. up in chair upon arrival. He states his pain is a little better today. Pt. was able to stand with CGA. He walked 10' x 2 with one sitting rest while using mckenzie walker for support. Pt did have LOB during second round and needed MIN assist to correct. Pt with increased pain with walking. He would benefit from further rehab to gain his independence for returning home.

## 2025-01-11 NOTE — PROGRESS NOTES
[facility] Intensivist Services  INPATIENT PROGRESS NOTE    Patient Name: Brijesh Grande  Date of Admission: 1/3/2025  Today's Date: 01/11/25  Length of Stay: 8  Primary Care Physician: Eliseo Smith MD    Subjective   Chief Complaint: Abdominal pain  Abdominal Pain       69-year-old male with a past medical history of hypertension and previous stroke in January 2016 with residual left-sided weakness and abnormal sensation admitted after presenting to ED with complaints of abdominal pain, left flank pain, and back pain.  The patient is also a smoker, and he continues to smoke 2 packs/day.     Significant findings from ED include glucose elevated 194, but otherwise normal CMP.  CBC was completely normal.  UA was positive for glucose, but was otherwise normal.  CT scan of the abdomen and pelvis showed a fusiform aneurysm of the distal abdominal aorta.  There is partial lumen circumferential mural thrombus Boces of the aneurysm.  There is evidence of hematoma/hemorrhage at the posterior wall of the aneurysm.  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  Patient was noted to be hypertensive in the emergency department.     Patient is being admitted to the CCU for nicardipine infusion for blood pressure control and for close monitoring.  I saw the patient while he was still in the emergency department.  He states the pain he was having in his abdomen and back are improved.  He reports that he has had an abnormal sensation to the left side of his body since his stroke from 9 years ago.  However, he noticed very intense pain in his abdomen and back earlier this morning, which brought him to the emergency department.  He has no other complaints for me at this time      Interval history:  1/4/2025 patient is intubated sedated he is status post placement of aortobiiliac endograft for  ruptured AAA and left external iliac artery stent.  Patient sedation was weaned down this morning and patient was very agitated not following commands and had to be resedated again.  Patient is being started on Precedex and fentanyl drip.  As per discussion with vascular surgery at bedside today patient had a similar episode after surgery yesterday he was very agitated and had to be reintubated and was found to have some vocal cord edema.    1/5/2025 patient was extubated yesterday he has done very well he got agitated overnight and had to be started on Precedex which dropped his blood pressure transiently requiring Levophed.  Patient remains on Precedex 0.2 he does complain of not able to use the bathroom and had some urine retention had to have an out overnight I do think this is causing his agitation.    1/6/2025 patient remains in the ICU he is not on any drips other than heparin drip.  Discussed with vascular surgery today we are discontinuing his heparin drip and starting antiplatelets.  Patient pain is under better control and he has been off Precedex since yesterday morning.  Pulses are dopplerable.    1/7/2025 remains in intensive care and waiting for floor bed.  Patient had a run of A-fib with RVR overnight and received 1 dose of metoprolol and now is back to sinus rhythm.  Patient denies any chest pain no shortness of breath he still complains of his left leg pain.  Pulses are dopplerable.  1/8  As before was transferred to the floor been having episodes of A-fib with RVR was given Lopressor on occasional basis the patient blood pressure is not well-controlled I increase his Lopressor p.o. he went for echo and we will consult with cardiology to adjust his medications, patient vascular surgery postop day #5 status post EVAR with extension to left external iliac for rupture.  Remains on aspirin and Plavix  Protonix for GI prophylaxis, no BM x4 days   1/9  As before appreciate vascular postop day #6 blood  pressure remains not very well-controlled increased his Coreg, tachycardia resolved visit remains on aspirin and Plavix had good bowel movements care has signed off follow-up with them in 2 weeks for wound check appreciate cardiology per cardiology the patient needs full anticoagulation therefore we did discontinue Plavix continue Lovenox full dose and aspirin cardiology did discontinue Toprol-XL and Norvasc restarted Coreg increase losartan and hydralazine as as neededBecause he has been asymptomatic 2 episodes of atrial fibrillation would recommend a 14-day Holter monitor is placed at discharge. If the patient continues to have intermittent episodes of atrial fibrillation following discharge and outpatient referral to electrophysiology can be considered.   1/10  As before pressure is better, [facility] rehab was unable to take patient  [facility]. Referral sent.  Cardiology had signed off again recommending 14-hour Zio patch monitor will need AC on discharge  1/11  As before awaiting SNF continue PT OT, [facility] reviewing continue Neurontin pain medications cardiologist to set up and ambulate as before needs to be discharged on Eliquis aspirin Coreg bandar Coreg was maxed out ,   Temp:  [98.1 °F (36.7 °C)-98.6 °F (37 °C)] 98.1 °F (36.7 °C)  Heart Rate:  [75-92] 75  Resp:  [16-18] 18  BP: (129-169)/(71-97) 158/74  Physical Exam  Constitutional:       Appearance: He is not ill-appearing.   HENT:      Head: Normocephalic.      Mouth/Throat:      Mouth: Mucous membranes are moist.   Eyes:      Pupils: Pupils are equal, round, and reactive to light.   Cardiovascular:      Rate and Rhythm: Normal rate and regular rhythm.   Pulmonary:      Effort: No respiratory distress.      Breath sounds: No wheezing.   Abdominal:      General: Bowel sounds are normal. There is no distension.      Palpations: Abdomen is soft.      Tenderness: There is no abdominal tenderness.   Musculoskeletal:      Right  lower leg: Edema present.      Left lower leg: Edema present.      Comments: Mild cyanosis of the left leg pulses are palpable   Skin:     General: Skin is warm.   Neurological:      Mental Status: He is oriented to person, place, and time.      Comments: Baseline left-sided weakness   Psychiatric:         Mood and Affect: Mood normal.             Results Review:  Lab Results (last 24 hours)       Procedure Component Value Units Date/Time    Basic Metabolic Panel [[id number]]  (Abnormal) Collected: 01/11/25 0503    Specimen: Blood Updated: 01/11/25 0600     Glucose 120 mg/dL      BUN 14 mg/dL      Creatinine 0.57 mg/dL      Sodium 136 mmol/L      Potassium 3.8 mmol/L      Chloride 99 mmol/L      CO2 31.0 mmol/L      Calcium 8.5 mg/dL      BUN/Creatinine Ratio 24.6     Anion Gap 6.0 mmol/L      eGFR 104.8 mL/min/1.73     Narrative:      GFR Categories in Chronic Kidney Disease (CKD)      GFR Category          GFR (mL/min/1.73)    Interpretation  G1                     90 or greater         Normal or high (1)  G2                      60-89                Mild decrease (1)  G3a                   45-59                Mild to moderate decrease  G3b                   30-44                Moderate to severe decrease  G4                    15-29                Severe decrease  G5                    14 or less           Kidney failure          (1)In the absence of evidence of kidney disease, neither GFR category G1 or G2 fulfill the criteria for CKD.    eGFR calculation 2021 CKD-EPI creatinine equation, which does not include race as a factor    CBC & Differential [[id number]]  (Abnormal) Collected: 01/11/25 0503    Specimen: Blood Updated: 01/11/25 0536    Narrative:      The following orders were created for panel order CBC & Differential.  Procedure                               Abnormality         Status                     ---------                               -----------         ------                     CBC Auto  "Differential[[id number]]        Abnormal            Final result                 Please view results for these tests on the individual orders.    CBC Auto Differential [[id number]]  (Abnormal) Collected: 01/11/25 0503    Specimen: Blood Updated: 01/11/25 0536     WBC 9.18 10*3/mm3      RBC 3.28 10*6/mm3      Hemoglobin 9.6 g/dL      Hematocrit 30.4 %      MCV 92.7 fL      MCH 29.3 pg      MCHC 31.6 g/dL      RDW 14.1 %      RDW-SD 47.4 fl      MPV 9.0 fL      Platelets 332 10*3/mm3      Neutrophil % 73.8 %      Lymphocyte % 11.5 %      Monocyte % 10.7 %      Eosinophil % 2.3 %      Basophil % 0.4 %      Immature Grans % 1.3 %      Neutrophils, Absolute 6.77 10*3/mm3      Lymphocytes, Absolute 1.06 10*3/mm3      Monocytes, Absolute 0.98 10*3/mm3      Eosinophils, Absolute 0.21 10*3/mm3      Basophils, Absolute 0.04 10*3/mm3      Immature Grans, Absolute 0.12 10*3/mm3      nRBC 0.0 /100 WBC              No radiology results for the last day    Result Review:  I have personally reviewed the results from the time of this admission to 1/11/2025 10:07 CST and agree with these findings:  [x]  Laboratory list / accordion  []  Microbiology  [x]  Radiology  []  EKG/Telemetry   []  Cardiology/Vascular   []  Pathology  []  Old records  []  Other:  Most notable findings include:       Culture Data:   No results found for: \"BLOODCX\", \"URINECX\", \"WOUNDCX\", \"MRSACX\", \"RESPCX\", \"STOOLCX\"    I have reviewed the patient's current medications.     Assessment/Plan   Assessment  Active Hospital Problems    Diagnosis     **Abdominal aortic aneurysm     AAA (abdominal aortic aneurysm)     Abdominal pain    -Ruptured infrarenal abdominal aortic aneurysm status post aortobiiliac endograft left external iliac stent  -Acute hypoxemic respiratory failure requiring mechanical ventilation  -Laryngeal edema  -Severe agitation resolved  -Suspected COPD  -Tobacco abuse  -ICU delirium resolved  -History of CVA with left-sided weakness      Plan:  -Wean " down oxygen as tolerated  -Pain management with as needed Dilaudid  -Continue gabapentin   -Patient is on Cozaar, hydralazine and Toprol for blood pressure control  -I will change scheduled DuoNebs to as needed  -Start Pulmicort  -We will keep Le catheter for urine retension  - aspirin and Plavix as per vascular  -Diet  -Waiting for floor bed, ICU standpoint discussed with Dr. De La Fuente patient will be taking care of by .     Electronically signed by Nichol Suero MD on 1/11/2025 at 10:07 CST

## 2025-01-11 NOTE — PLAN OF CARE
Goal Outcome Evaluation:  Plan of Care Reviewed With: patient  Progress: no change       Pt medicated with prn pain meds. IV's cont saline locked. External cath cont in place with urine output. Up with asst x1-2. Drsgs intact to bilat groins. Cont referral process to [facility]. VSS. Safety maintained.

## 2025-01-11 NOTE — PLAN OF CARE
Goal Outcome Evaluation:  Plan of Care Reviewed With: patient, family        Progress: improving  Outcome Evaluation: A&O. O2 @ 2L this shift. Up with assist x1, up in chair, worked with PT. C/o pain, see MAR. IIMIGUEL A. Tele, NSR. Reg diet. Lovenox. Safety maintained.

## 2025-01-11 NOTE — PLAN OF CARE
Goal Outcome Evaluation:              Outcome Evaluation: A&O. Room air. Tele, NSR this shift. C/o pain, see MAR. Voiding. PRN BP meds given per order. Safety maintained.

## 2025-01-12 LAB
ALBUMIN SERPL-MCNC: 2.6 G/DL (ref 3.5–5.2)
ALBUMIN/GLOB SERPL: 1 G/DL
ALP SERPL-CCNC: 59 U/L (ref 39–117)
ALT SERPL W P-5'-P-CCNC: 28 U/L (ref 1–41)
ANION GAP SERPL CALCULATED.3IONS-SCNC: 7 MMOL/L (ref 5–15)
AST SERPL-CCNC: 31 U/L (ref 1–40)
BASOPHILS # BLD AUTO: 0.04 10*3/MM3 (ref 0–0.2)
BASOPHILS NFR BLD AUTO: 0.5 % (ref 0–1.5)
BILIRUB SERPL-MCNC: 0.6 MG/DL (ref 0–1.2)
BUN SERPL-MCNC: 16 MG/DL (ref 8–23)
BUN/CREAT SERPL: 28.6 (ref 7–25)
CALCIUM SPEC-SCNC: 8 MG/DL (ref 8.6–10.5)
CHLORIDE SERPL-SCNC: 102 MMOL/L (ref 98–107)
CO2 SERPL-SCNC: 28 MMOL/L (ref 22–29)
CREAT SERPL-MCNC: 0.56 MG/DL (ref 0.76–1.27)
DEPRECATED RDW RBC AUTO: 47.5 FL (ref 37–54)
EGFRCR SERPLBLD CKD-EPI 2021: 105.4 ML/MIN/1.73
EOSINOPHIL # BLD AUTO: 0.2 10*3/MM3 (ref 0–0.4)
EOSINOPHIL NFR BLD AUTO: 2.7 % (ref 0.3–6.2)
ERYTHROCYTE [DISTWIDTH] IN BLOOD BY AUTOMATED COUNT: 14.1 % (ref 12.3–15.4)
GLOBULIN UR ELPH-MCNC: 2.7 GM/DL
GLUCOSE SERPL-MCNC: 117 MG/DL (ref 65–99)
HCT VFR BLD AUTO: 27.8 % (ref 37.5–51)
HGB BLD-MCNC: 8.8 G/DL (ref 13–17.7)
IMM GRANULOCYTES # BLD AUTO: 0.1 10*3/MM3 (ref 0–0.05)
IMM GRANULOCYTES NFR BLD AUTO: 1.3 % (ref 0–0.5)
LYMPHOCYTES # BLD AUTO: 1.11 10*3/MM3 (ref 0.7–3.1)
LYMPHOCYTES NFR BLD AUTO: 14.9 % (ref 19.6–45.3)
MCH RBC QN AUTO: 29.4 PG (ref 26.6–33)
MCHC RBC AUTO-ENTMCNC: 31.7 G/DL (ref 31.5–35.7)
MCV RBC AUTO: 93 FL (ref 79–97)
MONOCYTES # BLD AUTO: 0.78 10*3/MM3 (ref 0.1–0.9)
MONOCYTES NFR BLD AUTO: 10.5 % (ref 5–12)
NEUTROPHILS NFR BLD AUTO: 5.2 10*3/MM3 (ref 1.7–7)
NEUTROPHILS NFR BLD AUTO: 70.1 % (ref 42.7–76)
NRBC BLD AUTO-RTO: 0 /100 WBC (ref 0–0.2)
PLATELET # BLD AUTO: 336 10*3/MM3 (ref 140–450)
PMV BLD AUTO: 8.9 FL (ref 6–12)
POTASSIUM SERPL-SCNC: 4 MMOL/L (ref 3.5–5.2)
PROT SERPL-MCNC: 5.3 G/DL (ref 6–8.5)
RBC # BLD AUTO: 2.99 10*6/MM3 (ref 4.14–5.8)
SODIUM SERPL-SCNC: 137 MMOL/L (ref 136–145)
WBC NRBC COR # BLD AUTO: 7.43 10*3/MM3 (ref 3.4–10.8)

## 2025-01-12 PROCEDURE — 94799 UNLISTED PULMONARY SVC/PX: CPT

## 2025-01-12 PROCEDURE — 25010000002 ENOXAPARIN PER 10 MG: Performed by: NURSE PRACTITIONER

## 2025-01-12 PROCEDURE — 94761 N-INVAS EAR/PLS OXIMETRY MLT: CPT

## 2025-01-12 PROCEDURE — 85025 COMPLETE CBC W/AUTO DIFF WBC: CPT | Performed by: INTERNAL MEDICINE

## 2025-01-12 PROCEDURE — 97530 THERAPEUTIC ACTIVITIES: CPT

## 2025-01-12 PROCEDURE — 94664 DEMO&/EVAL PT USE INHALER: CPT

## 2025-01-12 PROCEDURE — 97116 GAIT TRAINING THERAPY: CPT

## 2025-01-12 PROCEDURE — 25010000002 HYDRALAZINE PER 20 MG: Performed by: STUDENT IN AN ORGANIZED HEALTH CARE EDUCATION/TRAINING PROGRAM

## 2025-01-12 PROCEDURE — 80053 COMPREHEN METABOLIC PANEL: CPT | Performed by: HOSPITALIST

## 2025-01-12 RX ADMIN — ENOXAPARIN SODIUM 100 MG: 100 INJECTION SUBCUTANEOUS at 21:04

## 2025-01-12 RX ADMIN — Medication 10 MG: at 20:57

## 2025-01-12 RX ADMIN — HYDRALAZINE HYDROCHLORIDE 10 MG: 20 INJECTION, SOLUTION INTRAMUSCULAR; INTRAVENOUS at 04:15

## 2025-01-12 RX ADMIN — BUDESONIDE INHALATION 0.5 MG: 0.5 SUSPENSION RESPIRATORY (INHALATION) at 06:48

## 2025-01-12 RX ADMIN — Medication 10 ML: at 20:57

## 2025-01-12 RX ADMIN — Medication 10 ML: at 09:28

## 2025-01-12 RX ADMIN — POLYETHYLENE GLYCOL 3350 17 G: 17 POWDER, FOR SOLUTION ORAL at 20:57

## 2025-01-12 RX ADMIN — ENOXAPARIN SODIUM 100 MG: 100 INJECTION SUBCUTANEOUS at 09:22

## 2025-01-12 RX ADMIN — IPRATROPIUM BROMIDE AND ALBUTEROL SULFATE 3 ML: .5; 3 SOLUTION RESPIRATORY (INHALATION) at 00:55

## 2025-01-12 RX ADMIN — HYDRALAZINE HYDROCHLORIDE 10 MG: 20 INJECTION, SOLUTION INTRAMUSCULAR; INTRAVENOUS at 16:15

## 2025-01-12 RX ADMIN — Medication 10 ML: at 21:12

## 2025-01-12 RX ADMIN — BISACODYL 10 MG: 5 TABLET, COATED ORAL at 09:22

## 2025-01-12 RX ADMIN — LOSARTAN POTASSIUM 100 MG: 50 TABLET, FILM COATED ORAL at 09:21

## 2025-01-12 RX ADMIN — BUDESONIDE INHALATION 0.5 MG: 0.5 SUSPENSION RESPIRATORY (INHALATION) at 19:43

## 2025-01-12 RX ADMIN — CARVEDILOL 25 MG: 3.12 TABLET, FILM COATED ORAL at 09:22

## 2025-01-12 RX ADMIN — HYDROCODONE BITARTRATE AND ACETAMINOPHEN 1 TABLET: 10; 325 TABLET ORAL at 02:35

## 2025-01-12 RX ADMIN — HYDROCODONE BITARTRATE AND ACETAMINOPHEN 1 TABLET: 10; 325 TABLET ORAL at 09:22

## 2025-01-12 RX ADMIN — GABAPENTIN 300 MG: 300 CAPSULE ORAL at 09:22

## 2025-01-12 RX ADMIN — CARVEDILOL 25 MG: 3.12 TABLET, FILM COATED ORAL at 18:12

## 2025-01-12 RX ADMIN — ATORVASTATIN CALCIUM 10 MG: 10 TABLET, FILM COATED ORAL at 09:22

## 2025-01-12 RX ADMIN — POLYETHYLENE GLYCOL 3350 17 G: 17 POWDER, FOR SOLUTION ORAL at 09:28

## 2025-01-12 RX ADMIN — PANTOPRAZOLE SODIUM 40 MG: 40 TABLET, DELAYED RELEASE ORAL at 04:14

## 2025-01-12 RX ADMIN — TAMSULOSIN HYDROCHLORIDE 0.4 MG: 0.4 CAPSULE ORAL at 09:21

## 2025-01-12 RX ADMIN — ASPIRIN 81 MG: 81 TABLET, CHEWABLE ORAL at 09:21

## 2025-01-12 NOTE — PLAN OF CARE
Goal Outcome Evaluation:  Plan of Care Reviewed With: patient, family        Progress: improving  Outcome Evaluation: Patient alert and oriented with intermittent agitation noted. Physical therapy working with pt and got him up to chair today tolerated well. Hydralazine given once for systolic >120 as ordered. Pain med x1 this am and refused rest of the day. Pulses remain 2+ dopplered. safety maintained

## 2025-01-12 NOTE — PLAN OF CARE
Goal Outcome Evaluation:              Outcome Evaluation: Patient has had intermittent confusion this shift. Bowel regimen continued. C/o pain; see MAR. Hydralazine given x1 for SBP >120. Lovenox. 2L NC. Bed-alarm. Call light within reach, safety maintained.

## 2025-01-12 NOTE — THERAPY TREATMENT NOTE
Acute Care - Physical Therapy Treatment Note  [facility]     Patient Name: Brijesh Grande  : 1955  MRN: [id number]  Today's Date: 2025      Visit Dx:     ICD-10-CM ICD-9-CM   1. Aneurysm of infrarenal abdominal aorta, unspecified whether ruptured  I71.43 441.4   2. Abdominal pain, unspecified abdominal location  R10.9 789.00   3. Abdominal aortic aneurysm (AAA) without rupture, unspecified part  I71.40 441.4   4. Primary hypertension  I10 401.9   5. Impaired mobility [Z74.09]  Z74.09 799.89     Patient Active Problem List   Diagnosis    AAA (abdominal aortic aneurysm)    Abdominal aortic aneurysm    Abdominal pain     Past Medical History:   Diagnosis Date    Hyperlipidemia     Hypertension     PAD (peripheral artery disease)     Stroke      Past Surgical History:   Procedure Laterality Date    ABDOMINAL AORTIC ANEURYSM REPAIR WITH ENDOGRAFT N/A 1/3/2025    Procedure: ABDOMINAL AORTIC ANEURYSM REPAIR WITH ENDOGRAFT;  Surgeon: Eduardo Whiteside DO;  Location: [facility] OR;  Service: Vascular;  Laterality: N/A;    CAROTID ENDARTERECTOMY      x 2     PT Assessment (Last 12 Hours)       PT Evaluation and Treatment       Row Name 25 1618          Physical Therapy Time and Intention    Subjective Information complains of;pain  -     Document Type therapy note (daily note)  -     Mode of Treatment physical therapy  -       Row Name 25 1618          General Information    Existing Precautions/Restrictions fall  Left side weakness from old stroke  -       Row Name 25 1618          Pain    Pretreatment Pain Rating 6/10  -     Posttreatment Pain Rating 9/10  -     Pain Location groin;hip  -     Pain Side/Orientation left  -     Pain Management Interventions exercise or physical activity utilized  -     Response to Pain Interventions activity participation with increased pain  -       Row Name 25 1618          Bed Mobility    Supine-Sit [place] (Bed Mobility)  "minimum assist (75% patient effort)  -     Sit-Supine [place] (Bed Mobility) minimum assist (75% patient effort)  -     Assistive Device (Bed Mobility) bed rails;head of bed elevated  -     Comment, (Bed Mobility) pt uses right leg to \"scoop\" left leg to move  -       Row Name 01/12/25 1618          Transfers    Comment, (Transfers) stood x 3  -       Row Name 01/12/25 1618          Sit-Stand Transfer    Sit-Stand [place] (Transfers) verbal cues;contact guard  -       Row Name 01/12/25 1618          Stand-Sit Transfer    Stand-Sit [place] (Transfers) contact guard  -       Row Name 01/12/25 1618          Gait/Stairs (Locomotion)    [place] Level (Gait) verbal cues;minimum assist (75% patient effort)  -     Assistive Device (Gait) walker, mckenzie  -     Distance in Feet (Gait) 10  x 2  -MF     Deviations/Abnormal Patterns (Gait) antalgic;carl decreased;stride length decreased  -     Left Sided Gait Deviations heel strike decreased;hip circumduction  minimal weight bearing on left leg  -     Comment, (Gait/Stairs) unsteady   -       Row Name 01/12/25 1618          Balance    Comment, Balance Independent with static sitting balance-sat EOB for 15 minutes.  -       Row Name 01/12/25 1618          Motor Skills    Comments, Therapeutic Exercise PROM LAQ Left  knee  -       Row Name             Wound 01/03/25 1622 Right anterior groin    Wound - Properties Group Placement Date: 01/03/25  -AC Placement Time: 1622  -AC Side: Right  -AC Orientation: anterior  -AC Location: groin  -AC Primary Wound Type: Incision  -AC Additional Comments: PERCLOSE X2 2X2 TEGADERM  -AC    Retired Wound - Properties Group Placement Date: 01/03/25  -AC Placement Time: 1622  -AC Side: Right  -AC Orientation: anterior  -AC Location: groin  -AC Primary Wound Type: Incision  -AC Additional Comments: PERCLOSE X2 2X2 TEGADERM  -AC    Retired Wound - Properties Group Placement Date: 01/03/25  -AC Placement " Time: 1622 -AC Side: Right  -AC Orientation: anterior  -AC Location: groin  -AC Primary Wound Type: Incision  -AC Additional Comments: PERCLOSE X2 2X2 TEGADERM  -AC    Retired Wound - Properties Group Date first assessed: 01/03/25  -AC Time first assessed: 1622  -AC Side: Right  -AC Location: groin  -AC Primary Wound Type: Incision  -AC Additional Comments: PERCLOSE X2 2X2 TEGADERM  -AC      Row Name             Wound 01/03/25 1622 Left anterior groin    Wound - Properties Group Placement Date: 01/03/25  -AC Placement Time: 1622 -AC Side: Left  -AC Orientation: anterior  -AC Location: groin  -AC Primary Wound Type: Incision  -AC Additional Comments: STERISTRIPS 4X4 TEGADERM  -AC    Retired Wound - Properties Group Placement Date: 01/03/25  -AC Placement Time: 1622 -AC Side: Left  -AC Orientation: anterior  -AC Location: groin  -AC Primary Wound Type: Incision  -AC Additional Comments: STERISTRIPS 4X4 TEGADERM  -AC    Retired Wound - Properties Group Placement Date: 01/03/25  -AC Placement Time: 1622 -AC Side: Left  -AC Orientation: anterior  -AC Location: groin  -AC Primary Wound Type: Incision  -AC Additional Comments: STERISTRIPS 4X4 TEGADERM  -AC    Retired Wound - Properties Group Date first assessed: 01/03/25  -AC Time first assessed: 1622  -AC Side: Left  -AC Location: groin  -AC Primary Wound Type: Incision  -AC Additional Comments: STERISTRIPS 4X4 TEGADERM  -AC      Row Name 01/12/25 1618          Positioning and Restraints    Pre-Treatment Position in bed  -MF     Post Treatment Position bed  -MF     In Bed fowlers;call light within reach;encouraged to call for assist;exit alarm on;side rails up x2;LLE elevated  -MF               User Key  (r) = Recorded By, (t) = Taken By, (c) = Cosigned By      Initials Name Provider Type    AC Zara Balbuena RN Registered Nurse    Gabby Rossi PTA Physical Therapist Assistant                    Physical Therapy Education       Title: PT OT SLP  Therapies (In Progress)       Topic: Physical Therapy (In Progress)       Point: Mobility training (Done)       Learning Progress Summary            Patient Acceptance, E, VU,NR by BARON at 1/9/2025 1130    Comment: progression with amb with managing pain    Acceptance, E, VU by BARON at 1/8/2025 1131    Comment: have family bring pt's cane to progress with amb    Acceptance, E, VU,DU,NR by NITHIN at 1/6/2025 0824    Comment: Benefits of activity, progression of PT POC,                      Point: Home exercise program (Not Started)       Learner Progress:  Not documented in this visit.              Point: Body mechanics (Not Started)       Learner Progress:  Not documented in this visit.              Point: Precautions (Not Started)       Learner Progress:  Not documented in this visit.                              User Key       Initials Effective Dates Name Provider Type Discipline    NITHIN 02/03/23 -  Francis Hung, PT DPT Physical Therapist PT    BARON 02/03/23 -  Maurice Rose, PTA Physical Therapist Assistant PT                  PT Recommendation and Plan     Plan of Care Reviewed With: patient  Progress: improving  Outcome Evaluation: PT tx completed. Pt. up in chair upon arrival. He states his pain is a little better today. Pt. was able to stand with CGA. He walked 10' x 2 with one sitting rest while using mckenzie walker for support. Pt did have LOB during second round and needed MIN assist to correct. Pt with increased pain with walking. He would benefit from further rehab to gain his independence for returning home.   Outcome Measures       Row Name 01/12/25 1618 01/11/25 1552 01/10/25 0921       How much help from another person do you currently need...    Turning from your back to your side while in flat bed without using bedrails? 4  -MF 4  -MF 3  -MF    Moving from lying on back to sitting on the side of a flat bed without bedrails? 3  -MF 3  -MF 3  -MF    Moving to and from a bed to a chair (including a  wheelchair)? 3  -MF 3  -MF 3  -MF    Standing up from a chair using your arms (e.g., wheelchair, bedside chair)? 3  -MF 3  -MF 3  -MF    Climbing 3-5 steps with a railing? 1  -MF 1  -MF 1  -MF    To walk in hospital room? 3  -MF 3  -MF 2  -MF    AM-PAC 6 Clicks Score (PT) 17  -MF 17  -MF 15  -MF       Functional Assessment    Outcome Measure Options AM-PAC 6 Clicks Basic Mobility (PT)  -MF AM-PAC 6 Clicks Basic Mobility (PT)  -MF AM-PAC 6 Clicks Basic Mobility (PT)  -MF              User Key  (r) = Recorded By, (t) = Taken By, (c) = Cosigned By      Initials Name Provider Type    Gabby Rossi PTA Physical Therapist Assistant                     Time Calculation:    PT Charges       Row Name 01/12/25 1701             Time Calculation    Start Time 1618  -MF      Stop Time 1648  -MF      Time Calculation (min) 30 min  -MF      PT Received On 01/12/25  -MF         Time Calculation- PT    Total Timed Code Minutes- PT 30 minute(s)  -MF         Timed Charges    62741 - Gait Training Minutes  15  -MF      41542 - PT Therapeutic Activity Minutes 15  -MF         Total Minutes    Timed Charges Total Minutes 30  -MF       Total Minutes 30  -MF                User Key  (r) = Recorded By, (t) = Taken By, (c) = Cosigned By      Initials Name Provider Type    Gabby Rossi PTA Physical Therapist Assistant                  Therapy Charges for Today       Code Description Service Date Service Provider Modifiers Qty    61927897012 HC PT THER PROC EA 15 MIN 1/11/2025 Gabby Villa, PTA GP 1    71784701000 HC GAIT TRAINING EA 15 MIN 1/11/2025 Gabby Villa, PTA GP 1    21847116080 HC GAIT TRAINING EA 15 MIN 1/12/2025 Gabby Villa, PTA GP 1    69224644973 HC PT THERAPEUTIC ACT EA 15 MIN 1/12/2025 Gabby Villa, PTA GP 1            PT G-Codes  Outcome Measure Options: AM-PAC 6 Clicks Basic Mobility (PT)  AM-PAC 6 Clicks Score (PT): 17  AM-PAC 6 Clicks Score (OT): 15    Gabby Villa  PTA  1/12/2025

## 2025-01-12 NOTE — PROGRESS NOTES
[facility] Intensivist Services  INPATIENT PROGRESS NOTE    Patient Name: Brijesh Grande  Date of Admission: 1/3/2025  Today's Date: 01/12/25  Length of Stay: 9  Primary Care Physician: Eliseo Smith MD    Subjective   Chief Complaint: Abdominal pain  Abdominal Pain       69-year-old male with a past medical history of hypertension and previous stroke in January 2016 with residual left-sided weakness and abnormal sensation admitted after presenting to ED with complaints of abdominal pain, left flank pain, and back pain.  The patient is also a smoker, and he continues to smoke 2 packs/day.     Significant findings from ED include glucose elevated 194, but otherwise normal CMP.  CBC was completely normal.  UA was positive for glucose, but was otherwise normal.  CT scan of the abdomen and pelvis showed a fusiform aneurysm of the distal abdominal aorta.  There is partial lumen circumferential mural thrombus Boces of the aneurysm.  There is evidence of hematoma/hemorrhage at the posterior wall of the aneurysm.  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  Patient was noted to be hypertensive in the emergency department.     Patient is being admitted to the CCU for nicardipine infusion for blood pressure control and for close monitoring.  I saw the patient while he was still in the emergency department.  He states the pain he was having in his abdomen and back are improved.  He reports that he has had an abnormal sensation to the left side of his body since his stroke from 9 years ago.  However, he noticed very intense pain in his abdomen and back earlier this morning, which brought him to the emergency department.  He has no other complaints for me at this time      Interval history:  1/4/2025 patient is intubated sedated he is status post placement of aortobiiliac endograft for  ruptured AAA and left external iliac artery stent.  Patient sedation was weaned down this morning and patient was very agitated not following commands and had to be resedated again.  Patient is being started on Precedex and fentanyl drip.  As per discussion with vascular surgery at bedside today patient had a similar episode after surgery yesterday he was very agitated and had to be reintubated and was found to have some vocal cord edema.    1/5/2025 patient was extubated yesterday he has done very well he got agitated overnight and had to be started on Precedex which dropped his blood pressure transiently requiring Levophed.  Patient remains on Precedex 0.2 he does complain of not able to use the bathroom and had some urine retention had to have an out overnight I do think this is causing his agitation.    1/6/2025 patient remains in the ICU he is not on any drips other than heparin drip.  Discussed with vascular surgery today we are discontinuing his heparin drip and starting antiplatelets.  Patient pain is under better control and he has been off Precedex since yesterday morning.  Pulses are dopplerable.    1/7/2025 remains in intensive care and waiting for floor bed.  Patient had a run of A-fib with RVR overnight and received 1 dose of metoprolol and now is back to sinus rhythm.  Patient denies any chest pain no shortness of breath he still complains of his left leg pain.  Pulses are dopplerable.  1/8  As before was transferred to the floor been having episodes of A-fib with RVR was given Lopressor on occasional basis the patient blood pressure is not well-controlled I increase his Lopressor p.o. he went for echo and we will consult with cardiology to adjust his medications, patient vascular surgery postop day #5 status post EVAR with extension to left external iliac for rupture.  Remains on aspirin and Plavix  Protonix for GI prophylaxis, no BM x4 days   1/9  As before appreciate vascular postop day #6 blood  pressure remains not very well-controlled increased his Coreg, tachycardia resolved visit remains on aspirin and Plavix had good bowel movements care has signed off follow-up with them in 2 weeks for wound check appreciate cardiology per cardiology the patient needs full anticoagulation therefore we did discontinue Plavix continue Lovenox full dose and aspirin cardiology did discontinue Toprol-XL and Norvasc restarted Coreg increase losartan and hydralazine as as neededBecause he has been asymptomatic 2 episodes of atrial fibrillation would recommend a 14-day Holter monitor is placed at discharge. If the patient continues to have intermittent episodes of atrial fibrillation following discharge and outpatient referral to electrophysiology can be considered.   1/10  As before pressure is better, [facility] rehab was unable to take patient  requests a referral to [facility]. Referral sent.  Cardiology had signed off again recommending 14-hour Zio patch monitor will need AC on discharge  1/11  As before awaiting SNF continue PT OT, [facility] reviewing continue Neurontin pain medications cardiologist to set up and ambulate as before needs to be discharged on Eliquis aspirin Coreg bandar Coreg was maxed out ,   1/12  Before awaiting SNF continue PT OT blood pressure well-controlled  Temp:  [98 °F (36.7 °C)-98.5 °F (36.9 °C)] 98.5 °F (36.9 °C)  Heart Rate:  [62-86] 75  Resp:  [16-24] 18  BP: (108-170)/(62-93) 108/62  Physical Exam  Constitutional:       Appearance: He is not ill-appearing.   HENT:      Head: Normocephalic.      Mouth/Throat:      Mouth: Mucous membranes are moist.   Eyes:      Pupils: Pupils are equal, round, and reactive to light.   Cardiovascular:      Rate and Rhythm: Normal rate and regular rhythm.   Pulmonary:      Effort: No respiratory distress.      Breath sounds: No wheezing.   Abdominal:      General: Bowel sounds are normal. There is no distension.      Palpations: Abdomen is soft.       Tenderness: There is no abdominal tenderness.   Musculoskeletal:      Right lower leg: Edema present.      Left lower leg: Edema present.      Comments: Mild cyanosis of the left leg pulses are palpable   Skin:     General: Skin is warm.   Neurological:      Mental Status: He is oriented to person, place, and time.      Comments: Baseline left-sided weakness   Psychiatric:         Mood and Affect: Mood normal.             Results Review:  Lab Results (last 24 hours)       Procedure Component Value Units Date/Time    Comprehensive Metabolic Panel [[id number]]  (Abnormal) Collected: 01/12/25 0336    Specimen: Blood Updated: 01/12/25 0428     Glucose 117 mg/dL      BUN 16 mg/dL      Creatinine 0.56 mg/dL      Sodium 137 mmol/L      Potassium 4.0 mmol/L      Comment: Slight hemolysis detected by analyzer. Result may be falsely elevated.        Chloride 102 mmol/L      CO2 28.0 mmol/L      Calcium 8.0 mg/dL      Total Protein 5.3 g/dL      Albumin 2.6 g/dL      ALT (SGPT) 28 U/L      AST (SGOT) 31 U/L      Comment: Slight hemolysis detected by analyzer. Result may be falsely elevated.        Alkaline Phosphatase 59 U/L      Total Bilirubin 0.6 mg/dL      Globulin 2.7 gm/dL      A/G Ratio 1.0 g/dL      BUN/Creatinine Ratio 28.6     Anion Gap 7.0 mmol/L      eGFR 105.4 mL/min/1.73     Narrative:      GFR Categories in Chronic Kidney Disease (CKD)      GFR Category          GFR (mL/min/1.73)    Interpretation  G1                     90 or greater         Normal or high (1)  G2                      60-89                Mild decrease (1)  G3a                   45-59                Mild to moderate decrease  G3b                   30-44                Moderate to severe decrease  G4                    15-29                Severe decrease  G5                    14 or less           Kidney failure          (1)In the absence of evidence of kidney disease, neither GFR category G1 or G2 fulfill the criteria for CKD.    eGFR  "calculation 2021 CKD-EPI creatinine equation, which does not include race as a factor    CBC & Differential [[id number]]  (Abnormal) Collected: 01/12/25 0336    Specimen: Blood Updated: 01/12/25 0406    Narrative:      The following orders were created for panel order CBC & Differential.  Procedure                               Abnormality         Status                     ---------                               -----------         ------                     CBC Auto Differential[[id number]]        Abnormal            Final result                 Please view results for these tests on the individual orders.    CBC Auto Differential [[id number]]  (Abnormal) Collected: 01/12/25 0336    Specimen: Blood Updated: 01/12/25 0406     WBC 7.43 10*3/mm3      RBC 2.99 10*6/mm3      Hemoglobin 8.8 g/dL      Hematocrit 27.8 %      MCV 93.0 fL      MCH 29.4 pg      MCHC 31.7 g/dL      RDW 14.1 %      RDW-SD 47.5 fl      MPV 8.9 fL      Platelets 336 10*3/mm3      Neutrophil % 70.1 %      Lymphocyte % 14.9 %      Monocyte % 10.5 %      Eosinophil % 2.7 %      Basophil % 0.5 %      Immature Grans % 1.3 %      Neutrophils, Absolute 5.20 10*3/mm3      Lymphocytes, Absolute 1.11 10*3/mm3      Monocytes, Absolute 0.78 10*3/mm3      Eosinophils, Absolute 0.20 10*3/mm3      Basophils, Absolute 0.04 10*3/mm3      Immature Grans, Absolute 0.10 10*3/mm3      nRBC 0.0 /100 WBC              No radiology results for the last day    Result Review:  I have personally reviewed the results from the time of this admission to 1/12/2025 10:35 CST and agree with these findings:  [x]  Laboratory list / accordion  []  Microbiology  [x]  Radiology  []  EKG/Telemetry   []  Cardiology/Vascular   []  Pathology  []  Old records  []  Other:  Most notable findings include:       Culture Data:   No results found for: \"BLOODCX\", \"URINECX\", \"WOUNDCX\", \"MRSACX\", \"RESPCX\", \"STOOLCX\"    I have reviewed the patient's current medications.     Assessment/Plan "   Assessment  Active Hospital Problems    Diagnosis     **Abdominal aortic aneurysm     AAA (abdominal aortic aneurysm)     Abdominal pain    -Ruptured infrarenal abdominal aortic aneurysm status post aortobiiliac endograft left external iliac stent  -Acute hypoxemic respiratory failure requiring mechanical ventilation  -Laryngeal edema  -Severe agitation resolved  -Suspected COPD  -Tobacco abuse  -ICU delirium resolved  -History of CVA with left-sided weakness      Plan:  -Wean down oxygen as tolerated  -Pain management with as needed Dilaudid  -Continue gabapentin   -Patient is on Cozaar, hydralazine and Toprol for blood pressure control  -I will change scheduled DuoNebs to as needed  -Start Pulmicort  -We will keep Le catheter for urine retension  - aspirin and Plavix as per vascular  -Diet  -Waiting for floor bed, ICU standpoint discussed with Dr. De La Fuente patient will be taking care of by .     Electronically signed by Nichol Suero MD on 1/12/2025 at 10:35 CST

## 2025-01-12 NOTE — PAYOR COMM NOTE
"1/11 clinical    Brea Grande (69 y.o. Male)       Date of Birth   1955    Social Security Number       Address   [address] [place] [address]    Home Phone   [phone]    MRN   [id number]       [employer]   Other    Marital Status                               Admission Date   1/3/25    Admission Type   Emergency    Admitting Provider   Nichol Suero MD    Attending Provider   Nichol Suero MD    Department, Room/Bed   [facility] 3C, 374/1       Discharge Date       Discharge Disposition       Discharge Destination                                 Attending Provider: Nichol Suero MD    Allergies: No Known Allergies    Isolation: None   Infection: None   Code Status: CPR    Ht: 177.8 cm (70\")   Wt: 101 kg (223 lb 1.6 oz)    Admission Cmt: None   Principal Problem: Abdominal aortic aneurysm [I71.40]                   Active Insurance as of 1/3/2025       Primary Coverage       Payor Plan Insurance Group Employer/Plan Group    ANTHEM MEDICARE REPLACEMENT ANTHEM MED ADV PPO [id number]       Payor Plan Address Payor Plan Phone Number Payor Plan Fax Number Effective Dates    [address] [phone]  1/1/2024 - None Entered    [facility] [id number]         Subscriber Name Subscriber Birth Date Member ID       BREA GRANDE 1955 [id number]                     Emergency Contacts        (Rel.) Home Phone Work Phone Mobile Phone    Alexis Grande (Son) -- -- [phone]    Mildred Pendleton (Friend) -- -- [phone]              Current Facility-Administered Medications   Medication Dose Route Frequency Provider Last Rate Last Admin    acetaminophen (TYLENOL) tablet 650 mg  650 mg Oral Q6H PRN Suzi Sorto APRN        aspirin chewable tablet 81 mg  81 mg Oral Daily Timmy Finney MD   81 mg at 01/12/25 0921    atorvastatin (LIPITOR) tablet 10 mg  10 mg Oral Daily Shaun Gallego MD   10 mg at 01/12/25 0922    bisacodyl (DULCOLAX) EC tablet 10 mg  10 mg " Oral Daily Lax, Halley GARVEY, APRN   10 mg at 01/12/25 0922    budesonide (PULMICORT) nebulizer solution 0.5 mg  0.5 mg Nebulization BID - RT Shaun Gallego MD   0.5 mg at 01/12/25 0648    carvedilol (COREG) tablet 25 mg  25 mg Oral BID With Meals Nichol Suero MD   25 mg at 01/12/25 0922    Enoxaparin Sodium (LOVENOX) syringe 100 mg  1 mg/kg Subcutaneous Q12H Armida Maradiaga APRN   100 mg at 01/12/25 0922    hydrALAZINE (APRESOLINE) injection 10 mg  10 mg Intravenous Q4H PRN Timmy Finney MD   10 mg at 01/12/25 0415    hydrALAZINE (APRESOLINE) tablet 25 mg  25 mg Oral TID PRN Armida Maradiaga APRN   25 mg at 01/11/25 1536    HYDROcodone-acetaminophen (NORCO)  MG per tablet 1 tablet  1 tablet Oral Q6H PRN Nichol Suero MD   1 tablet at 01/12/25 0922    ipratropium-albuterol (DUO-NEB) nebulizer solution 3 mL  3 mL Nebulization Q4H PRN Shaun Gallego MD   3 mL at 01/12/25 0055    lactulose solution 20 g  20 g Oral TID Nichol Suero MD   20 g at 01/08/25 2107    losartan (COZAAR) tablet 100 mg  100 mg Oral Daily Nichol Suero MD   100 mg at 01/12/25 0921    melatonin tablet 10 mg  10 mg Oral Nightly Nichol Suero MD   10 mg at 01/11/25 2106    Morphine sulfate (PF) injection 2 mg  2 mg Intravenous Q4H PRN Nichol Suero MD   2 mg at 01/11/25 1536    nitroglycerin (NITROSTAT) SL tablet 0.4 mg  0.4 mg Sublingual Q5 Min PRN Timmy Finney MD        ondansetron ODT (ZOFRAN-ODT) disintegrating tablet 4 mg  4 mg Oral Q6H PRN Timmy Finney MD   4 mg at 01/07/25 0851    pantoprazole (PROTONIX) EC tablet 40 mg  40 mg Oral Q AM Nichol Suero MD   40 mg at 01/12/25 0414    Pharmacy to Dose enoxaparin (LOVENOX)   Not Applicable Continuous PRN Armida Maradiaga APRN        polyethylene glycol (MIRALAX) packet 17 g  17 g Oral BID Halley Leone APRN   17 g at 01/12/25 0998    sodium chloride 0.9 % flush 10 mL  10 mL Intravenous PRN Timmy Finney MD        sodium chloride 0.9 % flush 10 mL  10 mL Intravenous  Q12H Timmy Finney MD   10 mL at 01/12/25 0928    sodium chloride 0.9 % flush 10 mL  10 mL Intravenous PRN Timmy Finney MD        sodium chloride 0.9 % flush 10 mL  10 mL Intravenous Q12H Timmy Finney MD   10 mL at 01/12/25 0928    sodium chloride 0.9 % flush 10 mL  10 mL Intravenous PRN Timmy Finney MD        sodium chloride 0.9 % infusion 40 mL  40 mL Intravenous PRN Timmy Finney MD        sodium chloride 0.9 % infusion 40 mL  40 mL Intravenous PRN Timmy Finney MD        tamsulosin (FLOMAX) 24 hr capsule 0.4 mg  0.4 mg Oral Daily Timmy Finney MD   0.4 mg at 01/12/25 0921     Orders (last 24 hrs)        Start     Ordered    01/12/25 0900  losartan (COZAAR) tablet 100 mg  Daily         01/11/25 1248    01/12/25 0600  Comprehensive Metabolic Panel  Morning Draw         01/11/25 1248    01/12/25 0600  CBC Auto Differential  PROCEDURE ONCE         01/11/25 2201    01/10/25 2100  melatonin tablet 10 mg  Nightly         01/10/25 1027    01/10/25 0800  HYDROcodone-acetaminophen (NORCO)  MG per tablet 1 tablet  Every 6 Hours PRN         01/10/25 0800    01/09/25 2354  acetaminophen (TYLENOL) tablet 650 mg  Every 6 Hours PRN         01/09/25 2355    01/09/25 1800  carvedilol (COREG) tablet 25 mg  2 Times Daily With Meals         01/09/25 1020    01/09/25 0900  losartan (COZAAR) tablet 50 mg  Daily,   Status:  Discontinued         01/08/25 1114    01/09/25 0900  bisacodyl (DULCOLAX) EC tablet 10 mg  Daily         01/08/25 1959 01/08/25 2100  polyethylene glycol (MIRALAX) packet 17 g  2 Times Daily         01/08/25 1959 01/08/25 1600  lactulose solution 20 g  3 Times Daily         01/08/25 1207    01/08/25 1230  Enoxaparin Sodium (LOVENOX) syringe 100 mg  Every 12 Hours Scheduled         01/08/25 1142    01/08/25 1206  Morphine sulfate (PF) injection 2 mg  Every 4 Hours PRN         01/08/25 1206    01/08/25 1117  Pharmacy to Dose enoxaparin (LOVENOX)  Continuous PRN         01/08/25 1132  "   01/08/25 1115  hydrALAZINE (APRESOLINE) tablet 25 mg  3 Times Daily PRN         01/08/25 1115    01/08/25 1045  pantoprazole (PROTONIX) EC tablet 40 mg  Every Early Morning         01/08/25 0953    01/07/25 1215  tamsulosin (FLOMAX) 24 hr capsule 0.4 mg  Daily         01/07/25 1127    01/07/25 0945  budesonide (PULMICORT) nebulizer solution 0.5 mg  2 Times Daily - RT         01/07/25 0849    01/07/25 0900  ipratropium-albuterol (DUO-NEB) nebulizer solution 3 mL  Every 4 Hours PRN         01/07/25 0849    01/05/25 1345  gabapentin (NEURONTIN) capsule 300 mg  3 Times Daily         01/05/25 1249    01/05/25 0905  Urinary Catheter Care  Every Shift      Placed in \"And\" Linked Group    01/05/25 0905    01/05/25 0400  Basic Metabolic Panel  Daily       01/04/25 1752    01/04/25 0900  aspirin chewable tablet 81 mg  Daily         01/03/25 1632    01/04/25 0400  CBC & Differential  Daily       01/04/25 1752    01/03/25 2100  sodium chloride 0.9 % flush 10 mL  Every 12 Hours Scheduled         01/03/25 1630    01/03/25 1800  Incentive Spirometry  Every 2 Hours While Awake       01/03/25 1630    01/03/25 1628  ondansetron ODT (ZOFRAN-ODT) disintegrating tablet 4 mg  Every 6 Hours PRN         01/03/25 1630    01/03/25 1622  Arterial Line Monitoring  Every Shift       01/03/25 1630    01/03/25 1621  sodium chloride 0.9 % flush 10 mL  As Needed         01/03/25 1630    01/03/25 1621  sodium chloride 0.9 % infusion 40 mL  As Needed         01/03/25 1630    01/03/25 1118  atorvastatin (LIPITOR) tablet 10 mg  Daily         01/03/25 1102    01/03/25 1104  hydrALAZINE (APRESOLINE) injection 10 mg  Every 4 Hours PRN         01/03/25 1104    01/03/25 1002  sodium chloride 0.9 % flush 10 mL  Every 12 Hours Scheduled         01/03/25 0946    01/03/25 1000  Vital Signs Every Hour and Per Hospital Policy Based on Patient Condition  Every Hour       01/03/25 0946    01/03/25 1000  Intake & Output  Every Hour       01/03/25 0946    01/03/25 " 0945  Oral Care - Patient Not on NPPV & Not Intubated  Every Shift       01/03/25 0946    01/03/25 0944  nitroglycerin (NITROSTAT) SL tablet 0.4 mg  Every 5 Minutes PRN         01/03/25 0946    01/03/25 0944  sodium chloride 0.9 % flush 10 mL  As Needed         01/03/25 0946    01/03/25 0944  sodium chloride 0.9 % infusion 40 mL  As Needed         01/03/25 0946    01/03/25 0303  sodium chloride 0.9 % flush 10 mL  As Needed         01/03/25 0303    Unscheduled  Blood Gas, Arterial -  As Needed       01/03/25 1824    --  aspirin 325 MG EC tablet  Every 6 Hours PRN         01/03/25 0313    --  hydrALAZINE (APRESOLINE) 25 MG tablet  3 Times Daily         01/03/25 0313    --  metoprolol succinate XL (TOPROL-XL) 50 MG 24 hr tablet  2 Times Daily         01/03/25 1752    --  Evolocumab (REPATHA) solution prefilled syringe injection  Every 14 Days         01/03/25 1752    Pending  HYDROcodone-acetaminophen (NORCO) 5-325 MG per tablet 1 tablet  Every 6 Hours PRN         Pending                     Physician Progress Notes (last 48 hours)        Nichol Suero MD at 01/11/25 [address] [place] [address] Intensivist Services  INPATIENT PROGRESS NOTE    Patient Name: Brijesh Grande  Date of Admission: 1/3/2025  Today's Date: 01/11/25  Length of Stay: 8  Primary Care Physician: Eliseo Smith MD    Subjective   Chief Complaint: Abdominal pain  Abdominal Pain       69-year-old male with a past medical history of hypertension and previous stroke in January 2016 with residual left-sided weakness and abnormal sensation admitted after presenting to ED with complaints of abdominal pain, left flank pain, and back pain.  The patient is also a smoker, and he continues to smoke 2 packs/day.     Significant findings from ED include glucose elevated 194, but otherwise normal CMP.  CBC was completely normal.  UA was positive for glucose, but was otherwise normal.  CT scan of the abdomen and pelvis showed  a fusiform aneurysm of the distal abdominal aorta.  There is partial lumen circumferential mural thrombus Boces of the aneurysm.  There is evidence of hematoma/hemorrhage at the posterior wall of the aneurysm.  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  Patient was noted to be hypertensive in the emergency department.     Patient is being admitted to the CCU for nicardipine infusion for blood pressure control and for close monitoring.  I saw the patient while he was still in the emergency department.  He states the pain he was having in his abdomen and back are improved.  He reports that he has had an abnormal sensation to the left side of his body since his stroke from 9 years ago.  However, he noticed very intense pain in his abdomen and back earlier this morning, which brought him to the emergency department.  He has no other complaints for me at this time      Interval history:  1/4/2025 patient is intubated sedated he is status post placement of aortobiiliac endograft for ruptured AAA and left external iliac artery stent.  Patient sedation was weaned down this morning and patient was very agitated not following commands and had to be resedated again.  Patient is being started on Precedex and fentanyl drip.  As per discussion with vascular surgery at bedside today patient had a similar episode after surgery yesterday he was very agitated and had to be reintubated and was found to have some vocal cord edema.    1/5/2025 patient was extubated yesterday he has done very well he got agitated overnight and had to be started on Precedex which dropped his blood pressure transiently requiring Levophed.  Patient remains on Precedex 0.2 he does complain of not able to use the bathroom and had some urine retention had to have an out overnight I do think this is causing his agitation.    1/6/2025 patient remains in the ICU  he is not on any drips other than heparin drip.  Discussed with vascular surgery today we are discontinuing his heparin drip and starting antiplatelets.  Patient pain is under better control and he has been off Precedex since yesterday morning.  Pulses are dopplerable.    1/7/2025 remains in intensive care and waiting for floor bed.  Patient had a run of A-fib with RVR overnight and received 1 dose of metoprolol and now is back to sinus rhythm.  Patient denies any chest pain no shortness of breath he still complains of his left leg pain.  Pulses are dopplerable.  1/8  As before was transferred to the floor been having episodes of A-fib with RVR was given Lopressor on occasional basis the patient blood pressure is not well-controlled I increase his Lopressor p.o. he went for echo and we will consult with cardiology to adjust his medications, patient vascular surgery postop day #5 status post EVAR with extension to left external iliac for rupture.  Remains on aspirin and Plavix  Protonix for GI prophylaxis, no BM x4 days   1/9  As before appreciate vascular postop day #6 blood pressure remains not very well-controlled increased his Coreg, tachycardia resolved visit remains on aspirin and Plavix had good bowel movements care has signed off follow-up with them in 2 weeks for wound check appreciate cardiology per cardiology the patient needs full anticoagulation therefore we did discontinue Plavix continue Lovenox full dose and aspirin cardiology did discontinue Toprol-XL and Norvasc restarted Coreg increase losartan and hydralazine as as neededBecause he has been asymptomatic 2 episodes of atrial fibrillation would recommend a 14-day Holter monitor is placed at discharge. If the patient continues to have intermittent episodes of atrial fibrillation following discharge and outpatient referral to electrophysiology can be considered.   1/10  As before pressure is better, [facility] rehab was unable to take patient  requests a  [facility]. Referral sent.  Cardiology had signed off again recommending 14-hour Zio patch monitor will need AC on discharge  1/11  As before awaiting SNF continue PT OT, [facility] reviewing continue Neurontin pain medications cardiologist to set up and ambulate as before needs to be discharged on Eliquis aspirin Coreg bandar Coreg was maxed out ,   Temp:  [98.1 °F (36.7 °C)-98.6 °F (37 °C)] 98.1 °F (36.7 °C)  Heart Rate:  [75-92] 75  Resp:  [16-18] 18  BP: (129-169)/(71-97) 158/74  Physical Exam  Constitutional:       Appearance: He is not ill-appearing.   HENT:      Head: Normocephalic.      Mouth/Throat:      Mouth: Mucous membranes are moist.   Eyes:      Pupils: Pupils are equal, round, and reactive to light.   Cardiovascular:      Rate and Rhythm: Normal rate and regular rhythm.   Pulmonary:      Effort: No respiratory distress.      Breath sounds: No wheezing.   Abdominal:      General: Bowel sounds are normal. There is no distension.      Palpations: Abdomen is soft.      Tenderness: There is no abdominal tenderness.   Musculoskeletal:      Right lower leg: Edema present.      Left lower leg: Edema present.      Comments: Mild cyanosis of the left leg pulses are palpable   Skin:     General: Skin is warm.   Neurological:      Mental Status: He is oriented to person, place, and time.      Comments: Baseline left-sided weakness   Psychiatric:         Mood and Affect: Mood normal.             Results Review:  Lab Results (last 24 hours)       Procedure Component Value Units Date/Time    Basic Metabolic Panel [[id number]]  (Abnormal) Collected: 01/11/25 0503    Specimen: Blood Updated: 01/11/25 0600     Glucose 120 mg/dL      BUN 14 mg/dL      Creatinine 0.57 mg/dL      Sodium 136 mmol/L      Potassium 3.8 mmol/L      Chloride 99 mmol/L      CO2 31.0 mmol/L      Calcium 8.5 mg/dL      BUN/Creatinine Ratio 24.6     Anion Gap 6.0 mmol/L      eGFR 104.8 mL/min/1.73     Narrative:      GFR  Categories in Chronic Kidney Disease (CKD)      GFR Category          GFR (mL/min/1.73)    Interpretation  G1                     90 or greater         Normal or high (1)  G2                      60-89                Mild decrease (1)  G3a                   45-59                Mild to moderate decrease  G3b                   30-44                Moderate to severe decrease  G4                    15-29                Severe decrease  G5                    14 or less           Kidney failure          (1)In the absence of evidence of kidney disease, neither GFR category G1 or G2 fulfill the criteria for CKD.    eGFR calculation 2021 CKD-EPI creatinine equation, which does not include race as a factor    CBC & Differential [[id number]]  (Abnormal) Collected: 01/11/25 0503    Specimen: Blood Updated: 01/11/25 0536    Narrative:      The following orders were created for panel order CBC & Differential.  Procedure                               Abnormality         Status                     ---------                               -----------         ------                     CBC Auto Differential[[id number]]        Abnormal            Final result                 Please view results for these tests on the individual orders.    CBC Auto Differential [[id number]]  (Abnormal) Collected: 01/11/25 0503    Specimen: Blood Updated: 01/11/25 0536     WBC 9.18 10*3/mm3      RBC 3.28 10*6/mm3      Hemoglobin 9.6 g/dL      Hematocrit 30.4 %      MCV 92.7 fL      MCH 29.3 pg      MCHC 31.6 g/dL      RDW 14.1 %      RDW-SD 47.4 fl      MPV 9.0 fL      Platelets 332 10*3/mm3      Neutrophil % 73.8 %      Lymphocyte % 11.5 %      Monocyte % 10.7 %      Eosinophil % 2.3 %      Basophil % 0.4 %      Immature Grans % 1.3 %      Neutrophils, Absolute 6.77 10*3/mm3      Lymphocytes, Absolute 1.06 10*3/mm3      Monocytes, Absolute 0.98 10*3/mm3      Eosinophils, Absolute 0.21 10*3/mm3      Basophils, Absolute 0.04 10*3/mm3      Immature Grans,  "Absolute 0.12 10*3/mm3      nRBC 0.0 /100 WBC              No radiology results for the last day    Result Review:  I have personally reviewed the results from the time of this admission to 1/11/2025 10:07 CST and agree with these findings:  [x]  Laboratory list / accordion  []  Microbiology  [x]  Radiology  []  EKG/Telemetry   []  Cardiology/Vascular   []  Pathology  []  Old records  []  Other:  Most notable findings include:       Culture Data:   No results found for: \"BLOODCX\", \"URINECX\", \"WOUNDCX\", \"MRSACX\", \"RESPCX\", \"STOOLCX\"    I have reviewed the patient's current medications.     Assessment/Plan   Assessment  Active Hospital Problems    Diagnosis     **Abdominal aortic aneurysm     AAA (abdominal aortic aneurysm)     Abdominal pain    -Ruptured infrarenal abdominal aortic aneurysm status post aortobiiliac endograft left external iliac stent  -Acute hypoxemic respiratory failure requiring mechanical ventilation  -Laryngeal edema  -Severe agitation resolved  -Suspected COPD  -Tobacco abuse  -ICU delirium resolved  -History of CVA with left-sided weakness      Plan:  -Wean down oxygen as tolerated  -Pain management with as needed Dilaudid  -Continue gabapentin   -Patient is on Cozaar, hydralazine and Toprol for blood pressure control  -I will change scheduled DuoNebs to as needed  -Start Pulmicort  -We will keep Le catheter for urine retension  - aspirin and Plavix as per vascular  -Diet  -Waiting for floor bed, ICU standpoint discussed with Dr. De La Fuente patient will be taking care of by .     Electronically signed by Nichol Suero MD on 1/11/2025 at 10:07 CST    Electronically signed by Nichol Suero MD at 01/11/25 1248       Consult Notes (last 48 hours)  Notes from 01/10/25 1235 through 01/12/25 1235   No notes of this type exist for this encounter.       "

## 2025-01-13 LAB
ALBUMIN SERPL-MCNC: 2.9 G/DL (ref 3.5–5.2)
ALBUMIN/GLOB SERPL: 1 G/DL
ALP SERPL-CCNC: 70 U/L (ref 39–117)
ALT SERPL W P-5'-P-CCNC: 32 U/L (ref 1–41)
ANION GAP SERPL CALCULATED.3IONS-SCNC: 8 MMOL/L (ref 5–15)
AST SERPL-CCNC: 32 U/L (ref 1–40)
BASOPHILS # BLD AUTO: 0.03 10*3/MM3 (ref 0–0.2)
BASOPHILS NFR BLD AUTO: 0.4 % (ref 0–1.5)
BILIRUB SERPL-MCNC: 0.5 MG/DL (ref 0–1.2)
BUN SERPL-MCNC: 13 MG/DL (ref 8–23)
BUN/CREAT SERPL: 20.3 (ref 7–25)
CALCIUM SPEC-SCNC: 8.2 MG/DL (ref 8.6–10.5)
CHLORIDE SERPL-SCNC: 101 MMOL/L (ref 98–107)
CK SERPL-CCNC: 145 U/L (ref 20–200)
CO2 SERPL-SCNC: 28 MMOL/L (ref 22–29)
CREAT SERPL-MCNC: 0.64 MG/DL (ref 0.76–1.27)
DEPRECATED RDW RBC AUTO: 47.8 FL (ref 37–54)
EGFRCR SERPLBLD CKD-EPI 2021: 101.2 ML/MIN/1.73
EOSINOPHIL # BLD AUTO: 0.15 10*3/MM3 (ref 0–0.4)
EOSINOPHIL NFR BLD AUTO: 1.8 % (ref 0.3–6.2)
ERYTHROCYTE [DISTWIDTH] IN BLOOD BY AUTOMATED COUNT: 14.4 % (ref 12.3–15.4)
GLOBULIN UR ELPH-MCNC: 2.9 GM/DL
GLUCOSE SERPL-MCNC: 121 MG/DL (ref 65–99)
HCT VFR BLD AUTO: 31 % (ref 37.5–51)
HGB BLD-MCNC: 9.7 G/DL (ref 13–17.7)
IMM GRANULOCYTES # BLD AUTO: 0.08 10*3/MM3 (ref 0–0.05)
IMM GRANULOCYTES NFR BLD AUTO: 1 % (ref 0–0.5)
LYMPHOCYTES # BLD AUTO: 1.06 10*3/MM3 (ref 0.7–3.1)
LYMPHOCYTES NFR BLD AUTO: 12.8 % (ref 19.6–45.3)
MCH RBC QN AUTO: 29 PG (ref 26.6–33)
MCHC RBC AUTO-ENTMCNC: 31.3 G/DL (ref 31.5–35.7)
MCV RBC AUTO: 92.8 FL (ref 79–97)
MONOCYTES # BLD AUTO: 0.84 10*3/MM3 (ref 0.1–0.9)
MONOCYTES NFR BLD AUTO: 10.2 % (ref 5–12)
NEUTROPHILS NFR BLD AUTO: 6.11 10*3/MM3 (ref 1.7–7)
NEUTROPHILS NFR BLD AUTO: 73.8 % (ref 42.7–76)
NRBC BLD AUTO-RTO: 0 /100 WBC (ref 0–0.2)
PLATELET # BLD AUTO: 374 10*3/MM3 (ref 140–450)
PMV BLD AUTO: 9.2 FL (ref 6–12)
POTASSIUM SERPL-SCNC: 4.1 MMOL/L (ref 3.5–5.2)
PROT SERPL-MCNC: 5.8 G/DL (ref 6–8.5)
QT INTERVAL: 300 MS
QTC INTERVAL: 422 MS
RBC # BLD AUTO: 3.34 10*6/MM3 (ref 4.14–5.8)
SODIUM SERPL-SCNC: 137 MMOL/L (ref 136–145)
WBC NRBC COR # BLD AUTO: 8.27 10*3/MM3 (ref 3.4–10.8)

## 2025-01-13 PROCEDURE — 85025 COMPLETE CBC W/AUTO DIFF WBC: CPT | Performed by: INTERNAL MEDICINE

## 2025-01-13 PROCEDURE — 94761 N-INVAS EAR/PLS OXIMETRY MLT: CPT

## 2025-01-13 PROCEDURE — 97530 THERAPEUTIC ACTIVITIES: CPT

## 2025-01-13 PROCEDURE — 80053 COMPREHEN METABOLIC PANEL: CPT | Performed by: HOSPITALIST

## 2025-01-13 PROCEDURE — 82550 ASSAY OF CK (CPK): CPT | Performed by: INTERNAL MEDICINE

## 2025-01-13 PROCEDURE — 94799 UNLISTED PULMONARY SVC/PX: CPT

## 2025-01-13 PROCEDURE — 97110 THERAPEUTIC EXERCISES: CPT

## 2025-01-13 PROCEDURE — 94664 DEMO&/EVAL PT USE INHALER: CPT

## 2025-01-13 PROCEDURE — 25010000002 ENOXAPARIN PER 10 MG: Performed by: NURSE PRACTITIONER

## 2025-01-13 PROCEDURE — 97116 GAIT TRAINING THERAPY: CPT

## 2025-01-13 RX ORDER — POLYETHYLENE GLYCOL 3350 17 G/17G
17 POWDER, FOR SOLUTION ORAL DAILY
Status: DISCONTINUED | OUTPATIENT
Start: 2025-01-15 | End: 2025-01-15 | Stop reason: HOSPADM

## 2025-01-13 RX ADMIN — ACETAMINOPHEN 650 MG: 325 TABLET ORAL at 14:22

## 2025-01-13 RX ADMIN — ACETAMINOPHEN 650 MG: 325 TABLET ORAL at 05:49

## 2025-01-13 RX ADMIN — HYDRALAZINE HYDROCHLORIDE 25 MG: 25 TABLET ORAL at 08:00

## 2025-01-13 RX ADMIN — Medication 10 MG: at 20:29

## 2025-01-13 RX ADMIN — Medication 10 ML: at 08:05

## 2025-01-13 RX ADMIN — CARVEDILOL 25 MG: 3.12 TABLET, FILM COATED ORAL at 08:01

## 2025-01-13 RX ADMIN — TAMSULOSIN HYDROCHLORIDE 0.4 MG: 0.4 CAPSULE ORAL at 08:00

## 2025-01-13 RX ADMIN — ASPIRIN 81 MG: 81 TABLET, CHEWABLE ORAL at 08:00

## 2025-01-13 RX ADMIN — ENOXAPARIN SODIUM 100 MG: 100 INJECTION SUBCUTANEOUS at 09:38

## 2025-01-13 RX ADMIN — ATORVASTATIN CALCIUM 10 MG: 10 TABLET, FILM COATED ORAL at 08:01

## 2025-01-13 RX ADMIN — Medication 10 ML: at 20:32

## 2025-01-13 RX ADMIN — ACETAMINOPHEN 650 MG: 325 TABLET ORAL at 20:29

## 2025-01-13 RX ADMIN — LOSARTAN POTASSIUM 100 MG: 50 TABLET, FILM COATED ORAL at 08:01

## 2025-01-13 RX ADMIN — BUDESONIDE INHALATION 0.5 MG: 0.5 SUSPENSION RESPIRATORY (INHALATION) at 19:14

## 2025-01-13 RX ADMIN — BUDESONIDE INHALATION 0.5 MG: 0.5 SUSPENSION RESPIRATORY (INHALATION) at 07:41

## 2025-01-13 RX ADMIN — CARVEDILOL 25 MG: 3.12 TABLET, FILM COATED ORAL at 18:28

## 2025-01-13 RX ADMIN — ENOXAPARIN SODIUM 100 MG: 100 INJECTION SUBCUTANEOUS at 20:29

## 2025-01-13 RX ADMIN — PANTOPRAZOLE SODIUM 40 MG: 40 TABLET, DELAYED RELEASE ORAL at 05:45

## 2025-01-13 NOTE — CASE MANAGEMENT/SOCIAL WORK
Continued Stay Note  [facility]     Patient Name: Brijesh Grande  MRN: [id number]  Today's Date: 1/13/2025    Admit Date: 1/3/2025    [facility]   Discharge Plan       Row Name 01/13/25 0915       Plan    Plan [facility] pending    Plan Comments Updates faxed to [facility].                   Discharge Codes    No documentation.                       ALAYNA Nolasco

## 2025-01-13 NOTE — CASE MANAGEMENT/SOCIAL WORK
Continued Stay Note  [facility]     Patient Name: Brijesh Grande  MRN: [id number]  Today's Date: 1/13/2025    Admit Date: 1/3/2025    Plan: [facility] pending   Discharge Plan       Row Name 01/13/25 1327       Plan    Plan [facility] pending    Plan Comments [facility] plans to start insurance prior approval today.  Will advise of insurance's decision when known.                   Discharge Codes    No documentation.                 Expected Discharge Date and Time       Expected Discharge Date Expected Discharge Time    Bear 15, 2025               ALAYNA Nolasco

## 2025-01-13 NOTE — PLAN OF CARE
Goal Outcome Evaluation:              Outcome Evaluation: Pt A/Ox4, room air. PT tx today, up to chair and up to BSC this shift. BM 1/13/25. refusing pain medication, tylenol only. pulses dopplered. PRN hydralazine given for hypertension, vitals otherwise stable. awaiting insurance approval for [place] [facility]. safety maintained.

## 2025-01-13 NOTE — PROGRESS NOTES
[facility] Medicine Services  INPATIENT PROGRESS NOTE    Patient Name: Brijesh Grande  Date of Admission: 1/3/2025  Today's Date: 01/13/25  Length of Stay: 10  Primary Care Physician: Eliseo Smith MD    Subjective   Chief Complaint: Follow-up  HPI   This is my first encounter of patient  This 69-year-old man who initially was under the service of intensivist.  Patient was hypertensive and requiring nicardipine drip.  He got admitted on January 3 presenting with abdominal pain, left flank pain and back pain.  CT scan of the abdomen and pelvis done on admission revealed fusiform aneurysm of the distal abdominal aorta.  There is a partial lumen circumferential mural thrombus.  It also described evidence of hematoma/hemorrhage at the posterior wall of the aneurysm. 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    Patient been with hospital service since January 8.    Consultants:  Cardiology.  Last note dates back to January 9 with assessment and plan as follows:  1.  Paroxysmal atrial fibrillation  2.  Contained rupture of infrarenal abdominal aortic aneurysm, now status post endovascular repair  3.  Anemia without active bleeding  4.  Primary hypertension  5.  Mixed hyperlipidemia  6.  Peripheral arterial disease including carotid artery disease  7.  Tobacco abuse  8.  Prior stroke     -There were two short episodes of elevated heart rate overnight, consistent with atrial fibrillation.  He is otherwise asymptomatic and hemodynamically stable.  -Continue therapeutic Lovenox at this time but plan to transition to oral anticoagulation, either Eliquis or Xarelto at discharge.  This can be at the discretion of the primary service, I have no preference in this matter as to which agent is used.  -Blood pressure still remains elevated.  Cardiology will comment on the patient's atrial  fibrillation, however the primary service to manage the patient's blood pressure and adjust medications accordingly if needed.  -At discharge, the patient should be ordered a 14-day cardiac monitor - Zio patch.  -Should the patient have symptomatic episodes of atrial fibrillation while here or prolonged episodes, recommend electrophysiology consultation.  -At this time, we will be available as needed.  Please feel free to call if there are questions.         Vascular surgery-last note dated back January 9 with assessment as follow:  Assessment & Plan    Abdominal aortic aneurysm    AAA (abdominal aortic aneurysm)    Abdominal pain     69-year-old male postop day 6 EVAR with extension to left external iliac for rupture.        Plan for disposition:  -Hospitalist on board appreciate their assistance.  -Tachycardia resolved Lovenox started by hospitalist.  Continue aspirin DC Plavix with anticoagulation.  -Hemoglobin stable  -Faintly palpable left lower extremity pedal pulses with multiphasic signals.  Continue gabapentin for possible reperfusion pain versus chronic pain from stroke.  -PT OT encouraged.  -BM x 2 yesterday.  Patient reports is nonbloody.  Denies abdominal pain.  -No further vascular intervention needed at this time.  -Patient to follow-up in 2 weeks for wound check.     Timmy Finney MD  Vascular Surgery  [phone]  01/09/25  09:44 CST    Procedure:  1/3/2025     PREOPERATIVE DIAGNOSIS: Abdominal pain, unspecified abdominal location [R10.9]  Ruptured abdominal aortic aneurysm     POSTOPERATIVE DIAGNOSIS: Post-Op Diagnosis Codes:     * Abdominal pain, unspecified abdominal location [R10.9]  Ruptured abdominal aortic aneurysm     PROCEDURE PERFORMED:   Percutaneous access and closure of right common femoral artery  Ultrasound-guided access of left common femoral artery  Placement of aortobiiliac endograft for rupture  Concurrent placement of extension stent to left external iliac artery  Left  external iliac artery stent placement  Exposure of left common femoral artery with primary repair  Attempted left internal iliac artery coil embolization            Social service, therapist note and nursing staff note reviewed  Patient has been referred to rehab facility.    Vital signs stable.  Has had hypertensive blood pressure reading  Been transitioned to room air with saturation of 93  Hemoglobin stable  Creatinine stable    States that she has been having increased bowel movement and relates it to bowel regimen.  Noticed that patient is on MiraLAX twice daily and lactulose.  I will discontinue the lactulose.  Will monitor response to change just made in bowel regimen.  Review of Systems   All pertinent negatives and positives are as above. All other systems have been reviewed and are negative unless otherwise stated.     Objective    Temp:  [97.9 °F (36.6 °C)-99.6 °F (37.6 °C)] 97.9 °F (36.6 °C)  Heart Rate:  [69-85] 78  Resp:  [16-18] 16  BP: (125-185)/(62-99) 133/63  Physical Exam  Seated on bedside commode  Not in any distress  Noticeable weakness left upper extremity with posturing setting of prior stroke 9 years ago  Normal respiratory effort  Fluent speech  Appropriate affect  Well-built.    .        Results Review:  I have reviewed the labs, radiology results, and diagnostic studies.    Laboratory Data:   Results from last 7 days   Lab Units 01/13/25  0250 01/12/25  0336 01/11/25  0503   WBC 10*3/mm3 8.27 7.43 9.18   HEMOGLOBIN g/dL 9.7* 8.8* 9.6*   HEMATOCRIT % 31.0* 27.8* 30.4*   PLATELETS 10*3/mm3 374 336 332        Results from last 7 days   Lab Units 01/13/25  0250 01/12/25  0336 01/11/25  0503 01/10/25  0411   SODIUM mmol/L 137 137 136 138   POTASSIUM mmol/L 4.1 4.0 3.8 3.8   CHLORIDE mmol/L 101 102 99 100   CO2 mmol/L 28.0 28.0 31.0* 28.0   BUN mg/dL 13 16 14 12   CREATININE mg/dL 0.64* 0.56* 0.57* 0.51*   CALCIUM mg/dL 8.2* 8.0* 8.5* 8.1*   BILIRUBIN mg/dL 0.5 0.6  --  0.8   ALK PHOS U/L 70 59  " --  60   ALT (SGPT) U/L 32 28  --  28   AST (SGOT) U/L 32 31  --  37   GLUCOSE mg/dL 121* 117* 120* 100*       Culture Data:   No results found for: \"BLOODCX\", \"URINECX\", \"WOUNDCX\", \"MRSACX\", \"RESPCX\", \"STOOLCX\"    Radiology Data:   Imaging Results (Last 24 Hours)       ** No results found for the last 24 hours. **            I have reviewed the patient's current medications.     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 Finney: Left external iliac artery stent placement complicated by ruptured left external iliac artery and Perclose failure left common femoral artery  Paroxysmal atrial fibrillation  (?) Operative blood loss anemia 14.7.  Current hemoglobin less than 10  Elevated CK (2436 on January 5) I did not see that this was followed up.  Otherwise normal creatinine.  Etiology not clear  Status post mechanical ventilation for acute hypoxemic respiratory failure   Laryngeal edema  Suspected COPD  Tobacco abuse  ICU delirium  History of CVA with left-sided weakness.      Treatment Plan  Hemodynamically stable.  Rechecking CK particularly in the setting of atorvastatin.  Noted patient is on Lovenox at 1 mg/kg which should cover for DVT prophylaxis as well.  Plan to transition to oral anticoagulation either Eliquis or Xarelto at discharge per cardiology note.  14-day cardiac monitor-Zio patch  I received to vascular service regarding adding gabapentin  for possible reperfusion pain versus chronic pain as per their note dated January 9  Vascular service note indicates continuing aspirin will discontinue Plavix with anticoagulation.  Currently on aspirin and Lovenox with plan to transition to Eliquis with aspirin upon " discharge.      Social service on discharge planning  Monitor blood pressure and address accordingly (carvedilol, losartan)      Medications reviewed  aspirin, 81 mg, Oral, Daily  atorvastatin, 10 mg, Oral, Daily  bisacodyl, 10 mg, Oral, Daily  budesonide, 0.5 mg, Nebulization, BID - RT  carvedilol, 25 mg, Oral, BID With Meals  enoxaparin, 1 mg/kg, Subcutaneous, Q12H  lactulose, 20 g, Oral, TID  losartan, 100 mg, Oral, Daily  melatonin, 10 mg, Oral, Nightly  pantoprazole, 40 mg, Oral, Q AM  polyethylene glycol, 17 g, Oral, BID  sodium chloride, 10 mL, Intravenous, Q12H  sodium chloride, 10 mL, Intravenous, Q12H  tamsulosin, 0.4 mg, Oral, Daily          Conditions and Status  Fair     MDM Data  External documents reviewed: Reviewed epic record  Cardiac tracing (EKG, telemetry) interpretation: -  Radiology interpretation: Reviewed  Labs reviewed: Yes  Any tests that were considered but not ordered: None     Decision rules/scores evaluated (example [id number], [place], etc): -     Discussed with: Patient     Care Planning  Shared decision making: Patient  Code status and discussions: Full code    Disposition  Social Determinants of Health that impact treatment or disposition: None identified at this time  I expect the patient to be discharged to (?)         Electronically signed by Maulik Osborn MD, 01/13/25, 12:49 CST.

## 2025-01-13 NOTE — PLAN OF CARE
Goal Outcome Evaluation:  Plan of Care Reviewed With: patient        Progress: improving  Outcome Evaluation: PT tx completed. Pt. up on BSC upon entry. He was CGa/SBA for sit to stand. Pt. stood with SBA while PTA assisted with wiping following use of BSC. He was able to walk a total of 20' at one time in room while using straight cane. Pt leans to the right and has to end quickly due to pain in Left thigh during gait. Pt. lived at home independently prior to admission. he would benefit from further rehab to further progress his independence.

## 2025-01-13 NOTE — DISCHARGE PLACEMENT REQUEST
"To: [facility]    From: Iman PORTER [phone]          Brea Grande (69 y.o. Male)       Date of Birth   1955    Social Security Number       Address   [address] [place] [address]    Home Phone   [phone]    MRN   [id number]       Catholic   Other    Marital Status                               Admission Date   1/3/25    Admission Type   Emergency    Admitting Provider   Maulik Osborn MD    Attending Provider   Maulik Osborn MD    Department, Room/Bed   [facility] 3C, 374/1       Discharge Date       Discharge Disposition       Discharge Destination                                 Attending Provider: Maulik Osborn MD    Allergies: No Known Allergies    Isolation: None   Infection: None   Code Status: CPR    Ht: 177.8 cm (70\")   Wt: 101 kg (223 lb 1.6 oz)    Admission Cmt: None   Principal Problem: Abdominal aortic aneurysm [I71.40]                   Active Insurance as of 1/3/2025       Primary Coverage       Payor Plan Insurance Group Employer/Plan Group    ANTHEM MEDICARE REPLACEMENT ANTHEM MED ADV PPO [id number]       Payor Plan Address Payor Plan Phone Number Payor Plan Fax Number Effective Dates    [address] [phone]  1/1/2024 - None Entered    [facility] [address]         Subscriber Name Subscriber Birth Date Member ID       BREA GRANDE 1955 [id number]                     Emergency Contacts        (Rel.) Home Phone Work Phone Mobile Phone    Alexis Grande (Son) -- -- [phone]    Mildred Pendleton (Friend) -- -- [phone]                 Physician Progress Notes (last 48 hours)        Nichol Suero MD at 01/12/25 1035              [facility] Intensivist Services  INPATIENT PROGRESS NOTE    Patient Name: Brea Grande  Date of Admission: 1/3/2025  Today's Date: 01/12/25  Length of Stay: 9  Primary Care Physician: Eliseo Smith MD    Subjective   Chief " Complaint: Abdominal pain  Abdominal Pain       69-year-old male with a past medical history of hypertension and previous stroke in January 2016 with residual left-sided weakness and abnormal sensation admitted after presenting to ED with complaints of abdominal pain, left flank pain, and back pain.  The patient is also a smoker, and he continues to smoke 2 packs/day.     Significant findings from ED include glucose elevated 194, but otherwise normal CMP.  CBC was completely normal.  UA was positive for glucose, but was otherwise normal.  CT scan of the abdomen and pelvis showed a fusiform aneurysm of the distal abdominal aorta.  There is partial lumen circumferential mural thrombus Boces of the aneurysm.  There is evidence of hematoma/hemorrhage at the posterior wall of the aneurysm.  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  Patient was noted to be hypertensive in the emergency department.     Patient is being admitted to the CCU for nicardipine infusion for blood pressure control and for close monitoring.  I saw the patient while he was still in the emergency department.  He states the pain he was having in his abdomen and back are improved.  He reports that he has had an abnormal sensation to the left side of his body since his stroke from 9 years ago.  However, he noticed very intense pain in his abdomen and back earlier this morning, which brought him to the emergency department.  He has no other complaints for me at this time      Interval history:  1/4/2025 patient is intubated sedated he is status post placement of aortobiiliac endograft for ruptured AAA and left external iliac artery stent.  Patient sedation was weaned down this morning and patient was very agitated not following commands and had to be resedated again.  Patient is being started on Precedex and fentanyl drip.  As per discussion with  vascular surgery at bedside today patient had a similar episode after surgery yesterday he was very agitated and had to be reintubated and was found to have some vocal cord edema.    1/5/2025 patient was extubated yesterday he has done very well he got agitated overnight and had to be started on Precedex which dropped his blood pressure transiently requiring Levophed.  Patient remains on Precedex 0.2 he does complain of not able to use the bathroom and had some urine retention had to have an out overnight I do think this is causing his agitation.    1/6/2025 patient remains in the ICU he is not on any drips other than heparin drip.  Discussed with vascular surgery today we are discontinuing his heparin drip and starting antiplatelets.  Patient pain is under better control and he has been off Precedex since yesterday morning.  Pulses are dopplerable.    1/7/2025 remains in intensive care and waiting for floor bed.  Patient had a run of A-fib with RVR overnight and received 1 dose of metoprolol and now is back to sinus rhythm.  Patient denies any chest pain no shortness of breath he still complains of his left leg pain.  Pulses are dopplerable.  1/8  As before was transferred to the floor been having episodes of A-fib with RVR was given Lopressor on occasional basis the patient blood pressure is not well-controlled I increase his Lopressor p.o. he went for echo and we will consult with cardiology to adjust his medications, patient vascular surgery postop day #5 status post EVAR with extension to left external iliac for rupture.  Remains on aspirin and Plavix  Protonix for GI prophylaxis, no BM x4 days   1/9  As before appreciate vascular postop day #6 blood pressure remains not very well-controlled increased his Coreg, tachycardia resolved visit remains on aspirin and Plavix had good bowel movements care has signed off follow-up with them in 2 weeks for wound check appreciate cardiology per cardiology the patient  needs full anticoagulation therefore we did discontinue Plavix continue Lovenox full dose and aspirin cardiology did discontinue Toprol-XL and Norvasc restarted Coreg increase losartan and hydralazine as as neededBecause he has been asymptomatic 2 episodes of atrial fibrillation would recommend a 14-day Holter monitor is placed at discharge. If the patient continues to have intermittent episodes of atrial fibrillation following discharge and outpatient referral to electrophysiology can be considered.   1/10  As before [facility] is [facility]  requests a referral to [facility]. Referral sent.  Cardiology had signed off again recommending 14-hour Zio patch monitor will need AC on discharge  1/11  As before awaiting SNF [facility] continue Neurontin pain medications cardiologist to set up and ambulate as before needs to be discharged on Eliquis aspirin Coreg losarta Coreg was maxed out ,   1/12  Before awaiting SNF continue PT OT blood pressure well-controlled  Temp:  [98 °F (36.7 °C)-98.5 °F (36.9 °C)] 98.5 °F (36.9 °C)  Heart Rate:  [62-86] 75  Resp:  [16-24] 18  BP: (108-170)/(62-93) 108/62  Physical Exam  Constitutional:       Appearance: He is not ill-appearing.   HENT:      Head: Normocephalic.      Mouth/Throat:      Mouth: Mucous membranes are moist.   Eyes:      Pupils: Pupils are equal, round, and reactive to light.   Cardiovascular:      Rate and Rhythm: Normal rate and regular rhythm.   Pulmonary:      Effort: No respiratory distress.      Breath sounds: No wheezing.   Abdominal:      General: Bowel sounds are normal. There is no distension.      Palpations: Abdomen is soft.      Tenderness: There is no abdominal tenderness.   Musculoskeletal:      Right lower leg: Edema present.      Left lower leg: Edema present.      Comments: Mild cyanosis of the left leg pulses are palpable   Skin:     General: Skin is warm.   Neurological:      Mental  Status: He is oriented to person, place, and time.      Comments: Baseline left-sided weakness   Psychiatric:         Mood and Affect: Mood normal.             Results Review:  Lab Results (last 24 hours)       Procedure Component Value Units Date/Time    Comprehensive Metabolic Panel [[id number]]  (Abnormal) Collected: 01/12/25 0336    Specimen: Blood Updated: 01/12/25 0428     Glucose 117 mg/dL      BUN 16 mg/dL      Creatinine 0.56 mg/dL      Sodium 137 mmol/L      Potassium 4.0 mmol/L      Comment: Slight hemolysis detected by analyzer. Result may be falsely elevated.        Chloride 102 mmol/L      CO2 28.0 mmol/L      Calcium 8.0 mg/dL      Total Protein 5.3 g/dL      Albumin 2.6 g/dL      ALT (SGPT) 28 U/L      AST (SGOT) 31 U/L      Comment: Slight hemolysis detected by analyzer. Result may be falsely elevated.        Alkaline Phosphatase 59 U/L      Total Bilirubin 0.6 mg/dL      Globulin 2.7 gm/dL      A/G Ratio 1.0 g/dL      BUN/Creatinine Ratio 28.6     Anion Gap 7.0 mmol/L      eGFR 105.4 mL/min/1.73     Narrative:      GFR Categories in Chronic Kidney Disease (CKD)      GFR Category          GFR (mL/min/1.73)    Interpretation  G1                     90 or greater         Normal or high (1)  G2                      60-89                Mild decrease (1)  G3a                   45-59                Mild to moderate decrease  G3b                   30-44                Moderate to severe decrease  G4                    15-29                Severe decrease  G5                    14 or less           Kidney failure          (1)In the absence of evidence of kidney disease, neither GFR category G1 or G2 fulfill the criteria for CKD.    eGFR calculation 2021 CKD-EPI creatinine equation, which does not include race as a factor    CBC & Differential [[id number]]  (Abnormal) Collected: 01/12/25 0336    Specimen: Blood Updated: 01/12/25 0406    Narrative:      The following orders were created for panel order CBC &  "Differential.  Procedure                               Abnormality         Status                     ---------                               -----------         ------                     CBC Auto Differential[[id number]]        Abnormal            Final result                 Please view results for these tests on the individual orders.    CBC Auto Differential [[id number]]  (Abnormal) Collected: 01/12/25 0336    Specimen: Blood Updated: 01/12/25 0406     WBC 7.43 10*3/mm3      RBC 2.99 10*6/mm3      Hemoglobin 8.8 g/dL      Hematocrit 27.8 %      MCV 93.0 fL      MCH 29.4 pg      MCHC 31.7 g/dL      RDW 14.1 %      RDW-SD 47.5 fl      MPV 8.9 fL      Platelets 336 10*3/mm3      Neutrophil % 70.1 %      Lymphocyte % 14.9 %      Monocyte % 10.5 %      Eosinophil % 2.7 %      Basophil % 0.5 %      Immature Grans % 1.3 %      Neutrophils, Absolute 5.20 10*3/mm3      Lymphocytes, Absolute 1.11 10*3/mm3      Monocytes, Absolute 0.78 10*3/mm3      Eosinophils, Absolute 0.20 10*3/mm3      Basophils, Absolute 0.04 10*3/mm3      Immature Grans, Absolute 0.10 10*3/mm3      nRBC 0.0 /100 WBC              No radiology results for the last day    Result Review:  I have personally reviewed the results from the time of this admission to 1/12/2025 10:35 CST and agree with these findings:  [x]  Laboratory list / accordion  []  Microbiology  [x]  Radiology  []  EKG/Telemetry   []  Cardiology/Vascular   []  Pathology  []  Old records  []  Other:  Most notable findings include:       Culture Data:   No results found for: \"BLOODCX\", \"URINECX\", \"WOUNDCX\", \"MRSACX\", \"RESPCX\", \"STOOLCX\"    I have reviewed the patient's current medications.     Assessment/Plan   Assessment  Active Hospital Problems    Diagnosis     **Abdominal aortic aneurysm     AAA (abdominal aortic aneurysm)     Abdominal pain    -Ruptured infrarenal abdominal aortic aneurysm status post aortobiiliac endograft left external iliac stent  -Acute hypoxemic respiratory " failure requiring mechanical ventilation  -Laryngeal edema  -Severe agitation resolved  -Suspected COPD  -Tobacco abuse  -ICU delirium resolved  -History of CVA with left-sided weakness      Plan:  -Wean down oxygen as tolerated  -Pain management with as needed Dilaudid  -Continue gabapentin   -Patient is on Cozaar, hydralazine and Toprol for blood pressure control  -I will change scheduled DuoNebs to as needed  -Start Pulmicort  -We will keep Le catheter for urine retension  - aspirin and Plavix as per vascular  -Diet  -Waiting for floor bed, ICU standpoint discussed with Dr. De La Fuente patient will be taking care of by .     Electronically signed by Nichol Suero MD on 1/12/2025 at 10:35 CST    Electronically signed by Nichol Suero MD at 01/12/25 1236       Nichol Suero MD at 01/11/25 1007              [facility] Intensivist Services  INPATIENT PROGRESS NOTE    Patient Name: Brijesh Grande  Date of Admission: 1/3/2025  Today's Date: 01/11/25  Length of Stay: 8  Primary Care Physician: Eliseo Smith MD    Subjective   Chief Complaint: Abdominal pain  Abdominal Pain       69-year-old male with a past medical history of hypertension and previous stroke in January 2016 with residual left-sided weakness and abnormal sensation admitted after presenting to ED with complaints of abdominal pain, left flank pain, and back pain.  The patient is also a smoker, and he continues to smoke 2 packs/day.     Significant findings from ED include glucose elevated 194, but otherwise normal CMP.  CBC was completely normal.  UA was positive for glucose, but was otherwise normal.  CT scan of the abdomen and pelvis showed a fusiform aneurysm of the distal abdominal aorta.  There is partial lumen circumferential mural thrombus Boces of the aneurysm.  There is evidence of hematoma/hemorrhage at the posterior wall of the aneurysm.  A saccular aneurysm of the mid abdominal aorta adjacent and  below the level of the right renal arteries and posterior to the IVC was also found.  Patient was noted to be hypertensive in the emergency department.     Patient is being admitted to the CCU for nicardipine infusion for blood pressure control and for close monitoring.  I saw the patient while he was still in the emergency department.  He states the pain he was having in his abdomen and back are improved.  He reports that he has had an abnormal sensation to the left side of his body since his stroke from 9 years ago.  However, he noticed very intense pain in his abdomen and back earlier this morning, which brought him to the emergency department.  He has no other complaints for me at this time      Interval history:  1/4/2025 patient is intubated sedated he is status post placement of aortobiiliac endograft for ruptured AAA and left external iliac artery stent.  Patient sedation was weaned down this morning and patient was very agitated not following commands and had to be resedated again.  Patient is being started on Precedex and fentanyl drip.  As per discussion with vascular surgery at bedside today patient had a similar episode after surgery yesterday he was very agitated and had to be reintubated and was found to have some vocal cord edema.    1/5/2025 patient was extubated yesterday he has done very well he got agitated overnight and had to be started on Precedex which dropped his blood pressure transiently requiring Levophed.  Patient remains on Precedex 0.2 he does complain of not able to use the bathroom and had some urine retention had to have an out overnight I do think this is causing his agitation.    1/6/2025 patient remains in the ICU he is not on any drips other than heparin drip.  Discussed with vascular surgery today we are discontinuing his heparin drip and starting antiplatelets.  Patient pain is under better control and he has been off Precedex since yesterday morning.  Pulses are  dopplerable.    1/7/2025 remains in intensive care and waiting for floor bed.  Patient had a run of A-fib with RVR overnight and received 1 dose of metoprolol and now is back to sinus rhythm.  Patient denies any chest pain no shortness of breath he still complains of his left leg pain.  Pulses are dopplerable.  1/8  As before was transferred to the floor been having episodes of A-fib with RVR was given Lopressor on occasional basis the patient blood pressure is not well-controlled I increase his Lopressor p.o. he went for echo and we will consult with cardiology to adjust his medications, patient vascular surgery postop day #5 status post EVAR with extension to left external iliac for rupture.  Remains on aspirin and Plavix  Protonix for GI prophylaxis, no BM x4 days   1/9  As before appreciate vascular postop day #6 blood pressure remains not very well-controlled increased his Coreg, tachycardia resolved visit remains on aspirin and Plavix had good bowel movements care has signed off follow-up with them in 2 weeks for wound check appreciate cardiology per cardiology the patient needs full anticoagulation therefore we did discontinue Plavix continue Lovenox full dose and aspirin cardiology did discontinue Toprol-XL and Norvasc restarted Coreg increase losartan and hydralazine as as neededBecause he has been asymptomatic 2 episodes of atrial fibrillation would recommend a 14-day Holter monitor is placed at discharge. If the patient continues to have intermittent episodes of atrial fibrillation following discharge and outpatient referral to electrophysiology can be considered.   1/10  As before pressure is better, [facility] rehab was unable to take patient  requests a [facility] to [facility]. Referral sent.  Cardiology had signed off again recommending 14-hour Zio patch monitor will need AC on discharge  1/11  As before awaiting SNF [facility] continue Neurontin pain medications cardiologist  to set up and ambulate as before needs to be discharged on Eliquis aspirin Coreg bandar Lewis was maxed out ,   Temp:  [98.1 °F (36.7 °C)-98.6 °F (37 °C)] 98.1 °F (36.7 °C)  Heart Rate:  [75-92] 75  Resp:  [16-18] 18  BP: (129-169)/(71-97) 158/74  Physical Exam  Constitutional:       Appearance: He is not ill-appearing.   HENT:      Head: Normocephalic.      Mouth/Throat:      Mouth: Mucous membranes are moist.   Eyes:      Pupils: Pupils are equal, round, and reactive to light.   Cardiovascular:      Rate and Rhythm: Normal rate and regular rhythm.   Pulmonary:      Effort: No respiratory distress.      Breath sounds: No wheezing.   Abdominal:      General: Bowel sounds are normal. There is no distension.      Palpations: Abdomen is soft.      Tenderness: There is no abdominal tenderness.   Musculoskeletal:      Right lower leg: Edema present.      Left lower leg: Edema present.      Comments: Mild cyanosis of the left leg pulses are palpable   Skin:     General: Skin is warm.   Neurological:      Mental Status: He is oriented to person, place, and time.      Comments: Baseline left-sided weakness   Psychiatric:         Mood and Affect: Mood normal.             Results Review:  Lab Results (last 24 hours)       Procedure Component Value Units Date/Time    Basic Metabolic Panel [[id number]]  (Abnormal) Collected: 01/11/25 0503    Specimen: Blood Updated: 01/11/25 0600     Glucose 120 mg/dL      BUN 14 mg/dL      Creatinine 0.57 mg/dL      Sodium 136 mmol/L      Potassium 3.8 mmol/L      Chloride 99 mmol/L      CO2 31.0 mmol/L      Calcium 8.5 mg/dL      BUN/Creatinine Ratio 24.6     Anion Gap 6.0 mmol/L      eGFR 104.8 mL/min/1.73     Narrative:      GFR Categories in Chronic Kidney Disease (CKD)      GFR Category          GFR (mL/min/1.73)    Interpretation  G1                     90 or greater         Normal or high (1)  G2                      60-89                Mild decrease (1)  G3a                   45-59                 Mild to moderate decrease  G3b                   30-44                Moderate to severe decrease  G4                    15-29                Severe decrease  G5                    14 or less           Kidney failure          (1)In the absence of evidence of kidney disease, neither GFR category G1 or G2 fulfill the criteria for CKD.    eGFR calculation 2021 CKD-EPI creatinine equation, which does not include race as a factor    CBC & Differential [[id number]]  (Abnormal) Collected: 01/11/25 0503    Specimen: Blood Updated: 01/11/25 0536    Narrative:      The following orders were created for panel order CBC & Differential.  Procedure                               Abnormality         Status                     ---------                               -----------         ------                     CBC Auto Differential[[id number]]        Abnormal            Final result                 Please view results for these tests on the individual orders.    CBC Auto Differential [[id number]]  (Abnormal) Collected: 01/11/25 0503    Specimen: Blood Updated: 01/11/25 0536     WBC 9.18 10*3/mm3      RBC 3.28 10*6/mm3      Hemoglobin 9.6 g/dL      Hematocrit 30.4 %      MCV 92.7 fL      MCH 29.3 pg      MCHC 31.6 g/dL      RDW 14.1 %      RDW-SD 47.4 fl      MPV 9.0 fL      Platelets 332 10*3/mm3      Neutrophil % 73.8 %      Lymphocyte % 11.5 %      Monocyte % 10.7 %      Eosinophil % 2.3 %      Basophil % 0.4 %      Immature Grans % 1.3 %      Neutrophils, Absolute 6.77 10*3/mm3      Lymphocytes, Absolute 1.06 10*3/mm3      Monocytes, Absolute 0.98 10*3/mm3      Eosinophils, Absolute 0.21 10*3/mm3      Basophils, Absolute 0.04 10*3/mm3      Immature Grans, Absolute 0.12 10*3/mm3      nRBC 0.0 /100 WBC              No radiology results for the last day    Result Review:  I have personally reviewed the results from the time of this admission to 1/11/2025 10:07 CST and agree with these findings:  [x]  Laboratory list /  "accordion  []  Microbiology  [x]  Radiology  []  EKG/Telemetry   []  Cardiology/Vascular   []  Pathology  []  Old records  []  Other:  Most notable findings include:       Culture Data:   No results found for: \"BLOODCX\", \"URINECX\", \"WOUNDCX\", \"MRSACX\", \"RESPCX\", \"STOOLCX\"    I have reviewed the patient's current medications.     Assessment/Plan   Assessment  Active Hospital Problems    Diagnosis     **Abdominal aortic aneurysm     AAA (abdominal aortic aneurysm)     Abdominal pain    -Ruptured infrarenal abdominal aortic aneurysm status post aortobiiliac endograft left external iliac stent  -Acute hypoxemic respiratory failure requiring mechanical ventilation  -Laryngeal edema  -Severe agitation resolved  -Suspected COPD  -Tobacco abuse  -ICU delirium resolved  -History of CVA with left-sided weakness      Plan:  -Wean down oxygen as tolerated  -Pain management with as needed Dilaudid  -Continue gabapentin   -Patient is on Cozaar, hydralazine and Toprol for blood pressure control  -I will change scheduled DuoNebs to as needed  -Start Pulmicort  -We will keep Le catheter for urine retension  - aspirin and Plavix as per vascular  -Diet  -Waiting for floor bed, ICU standpoint discussed with Dr. De La Fuente patient will be taking care of by .     Electronically signed by Nichol Suero MD on 1/11/2025 at 10:07 CST    Electronically signed by Nichol Suero MD at 01/11/25 1248       Consult Notes (last 48 hours)  Notes from 01/11/25 0914 through 01/13/25 0914   No notes of this type exist for this encounter.          Physical Therapy Notes (last 48 hours)        Gabby Villa PTA at 01/11/25 1634  Version 1 of 1         Goal Outcome Evaluation:  Plan of Care Reviewed With: patient        Progress: improving  Outcome Evaluation: PT tx completed. Pt. up in chair upon arrival. He states his pain is a little better today. Pt. was able to stand with CGA. He walked 10' x 2 with one sitting rest while using mckenzie " walker for support. Pt did have LOB during second round and needed MIN assist to correct. Pt with increased pain with walking. He would benefit from further rehab to gain his independence for returning home.                               Electronically signed by Gabby Villa PTA at 25 1634       Gabby Villa PTA at 25 1634  Version 1 of 1         Acute Care - Physical Therapy Treatment Note  [facility]     Patient Name: Brijesh Grande  : 1955  MRN: [id number]  Today's Date: 2025      Visit Dx:     ICD-10-CM ICD-9-CM   1. Aneurysm of infrarenal abdominal aorta, unspecified whether ruptured  I71.43 441.4   2. Abdominal pain, unspecified abdominal location  R10.9 789.00   3. Abdominal aortic aneurysm (AAA) without rupture, unspecified part  I71.40 441.4   4. Primary hypertension  I10 401.9   5. Impaired mobility [Z74.09]  Z74.09 799.89     Patient Active Problem List   Diagnosis    AAA (abdominal aortic aneurysm)    Abdominal aortic aneurysm    Abdominal pain     Past Medical History:   Diagnosis Date    Hyperlipidemia     Hypertension     PAD (peripheral artery disease)     Stroke      Past Surgical History:   Procedure Laterality Date    ABDOMINAL AORTIC ANEURYSM REPAIR WITH ENDOGRAFT N/A 1/3/2025    Procedure: ABDOMINAL AORTIC ANEURYSM REPAIR WITH ENDOGRAFT;  Surgeon: Eduardo Whiteside DO;  Location: [facility] OR;  Service: Vascular;  Laterality: N/A;    CAROTID ENDARTERECTOMY      x 2     PT Assessment (Last 12 Hours)       PT Evaluation and Treatment       Row Name 25 155          Physical Therapy Time and Intention    Subjective Information complains of;pain  -     Document Type therapy note (daily note)  -     Mode of Treatment physical therapy  -       Row Name 25 2564          General Information    Existing Precautions/Restrictions fall;oxygen therapy device and L/min  Left side weakness from old stroke  -       Row Name 25 1348           Pain    Pretreatment Pain Rating 6/10  -     Posttreatment Pain Rating 10/10  -     Pain Location hip;groin  -     Pain Side/Orientation left  -     Pain Management Interventions exercise or physical activity utilized;nursing notified;premedicated for activity  -       Row Name 01/11/25 1552          Bed Mobility    Sit-Supine Cecil (Bed Mobility) standby assist  -     Comment, (Bed Mobility) up in chair upon arrival  -       Row Name 01/11/25 1552          Sit-Stand Transfer    Sit-Stand Cecil (Transfers) contact guard  -       Row Name 01/11/25 1552          Stand-Sit Transfer    Stand-Sit Cecil (Transfers) contact guard  -       Row Name 01/11/25 1552          Gait/Stairs (Locomotion)    Cecil Level (Gait) verbal cues;contact guard;minimum assist (75% patient effort)  -     Assistive Device (Gait) walker, mckenzie  -     Distance in Feet (Gait) 10  x 2 with one sitting rest  -     Deviations/Abnormal Patterns (Gait) antalgic;carl decreased;stride length decreased  -     Left Sided Gait Deviations foot drop/toe drag  -     Comment, (Gait/Stairs) LOB x 1 during second attempt-Min assist to remain up.  -       Row Name 01/11/25 1552          Motor Skills    Comments, Therapeutic Exercise gentle stretching of Left knee, AROM R LE-LAQ, hip flex  -       Row Name             Wound 01/03/25 1622 Right anterior groin    Wound - Properties Group Placement Date: 01/03/25  -AC Placement Time: 1622  -AC Side: Right  -AC Orientation: anterior  -AC Location: groin  -AC Primary Wound Type: Incision  -AC Additional Comments: PERCLOSE X2 2X2 TEGADERM  -AC    Retired Wound - Properties Group Placement Date: 01/03/25  -AC Placement Time: 1622  -AC Side: Right  -AC Orientation: anterior  -AC Location: groin  -AC Primary Wound Type: Incision  -AC Additional Comments: PERCLOSE X2 2X2 TEGADERM  -AC    Retired Wound - Properties Group Placement Date: 01/03/25  -AC Placement  Time: 1622  -AC Side: Right  -AC Orientation: anterior  -AC Location: groin  -AC Primary Wound Type: Incision  -AC Additional Comments: PERCLOSE X2 2X2 TEGADERM  -AC    Retired Wound - Properties Group Date first assessed: 01/03/25  -AC Time first assessed: 1622  -AC Side: Right  -AC Location: groin  -AC Primary Wound Type: Incision  -AC Additional Comments: PERCLOSE X2 2X2 TEGADERM  -AC      Row Name             Wound 01/03/25 1622 Left anterior groin    Wound - Properties Group Placement Date: 01/03/25  -AC Placement Time: 1622  -AC Side: Left  -AC Orientation: anterior  -AC Location: groin  -AC Primary Wound Type: Incision  -AC Additional Comments: STERISTRIPS 4X4 TEGADERM  -AC    Retired Wound - Properties Group Placement Date: 01/03/25  -AC Placement Time: 1622  -AC Side: Left  -AC Orientation: anterior  -AC Location: groin  -AC Primary Wound Type: Incision  -AC Additional Comments: STERISTRIPS 4X4 TEGADERM  -AC    Retired Wound - Properties Group Placement Date: 01/03/25  -AC Placement Time: 1622  -AC Side: Left  -AC Orientation: anterior  -AC Location: groin  -AC Primary Wound Type: Incision  -AC Additional Comments: STERISTRIPS 4X4 TEGADERM  -AC    Retired Wound - Properties Group Date first assessed: 01/03/25  -AC Time first assessed: 1622  -AC Side: Left  -AC Location: groin  -AC Primary Wound Type: Incision  -AC Additional Comments: STERISTRIPS 4X4 TEGADERM  -AC      Row Name 01/11/25 1552          Plan of Care Review    Plan of Care Reviewed With patient  -MF     Progress improving  -     Outcome Evaluation PT tx completed. Pt. up in chair upon arrival. He states his pain is a little better today. Pt. was able to stand with CGA. He walked 10' x 2 with one sitting rest while using mckenzie walker for support. Pt did have LOB during second round and needed MIN assist to correct. Pt with increased pain with walking. He would benefit from further rehab to gain his independence for returning home.  -        Row Name 01/11/25 1552          Positioning and Restraints    Pre-Treatment Position sitting in chair/recliner  -     Post Treatment Position bed  -MF     In Bed fowlers;call light within reach;encouraged to call for assist;side rails up x3;LLE elevated  -               User Key  (r) = Recorded By, (t) = Taken By, (c) = Cosigned By      Initials Name Provider Type    AC Zara Balbuena, RN Registered Nurse    Gabby Rossi PTA Physical Therapist Assistant                    Physical Therapy Education       Title: PT OT SLP Therapies (In Progress)       Topic: Physical Therapy (In Progress)       Point: Mobility training (Done)       Learning Progress Summary            Patient Acceptance, E, VU,NR by  at 1/9/2025 1130    Comment: progression with amb with managing pain    Acceptance, E, VU by  at 1/8/2025 1131    Comment: have family bring pt's cane to progress with amb    Acceptance, E, VU,DU,NR by NITHIN at 1/6/2025 0824    Comment: Benefits of activity, progression of PT POC,                      Point: Home exercise program (Not Started)       Learner Progress:  Not documented in this visit.              Point: Body mechanics (Not Started)       Learner Progress:  Not documented in this visit.              Point: Precautions (Not Started)       Learner Progress:  Not documented in this visit.                              User Key       Initials Effective Dates Name Provider Type Discipline    NITHIN 02/03/23 -  Francis Hung PT DPT Physical Therapist PT    BARON 02/03/23 -  Maurice Rose PTA Physical Therapist Assistant PT                  PT Recommendation and Plan     Plan of Care Reviewed With: patient  Progress: improving  Outcome Evaluation: PT tx completed. Pt. up in chair upon arrival. He states his pain is a little better today. Pt. was able to stand with CGA. He walked 10' x 2 with one sitting rest while using mckenzie walker for support. Pt did have LOB during second round and needed  MIN assist to correct. Pt with increased pain with walking. He would benefit from further rehab to gain his independence for returning home.   Outcome Measures       Row Name 01/11/25 1552 01/10/25 0921 01/09/25 1130       How much help from another person do you currently need...    Turning from your back to your side while in flat bed without using bedrails? 4  -MF 3  -MF 3  -BARON    Moving from lying on back to sitting on the side of a flat bed without bedrails? 3  -MF 3  -MF 3  -BARON    Moving to and from a bed to a chair (including a wheelchair)? 3  -MF 3  -MF 3  -BARON    Standing up from a chair using your arms (e.g., wheelchair, bedside chair)? 3  -MF 3  -MF 3  -BARON    Climbing 3-5 steps with a railing? 1  -MF 1  -MF 1  -BARON    To walk in hospital room? 3  -MF 2  -MF 2  -BARON    AM-PAC 6 Clicks Score (PT) 17  -MF 15  -MF 15  -BARON       Functional Assessment    Outcome Measure Options AM-PAC 6 Clicks Basic Mobility (PT)  -MF AM-PAC 6 Clicks Basic Mobility (PT)  -MF AM-PAC 6 Clicks Basic Mobility (PT)  -BARON              User Key  (r) = Recorded By, (t) = Taken By, (c) = Cosigned By      Initials Name Provider Type    Maurice Nichols, HEMAL Physical Therapist Assistant    Gabby Rossi PTA Physical Therapist Assistant                     Time Calculation:    PT Charges       Row Name 01/11/25 1634             Time Calculation    Start Time 1552  -      Stop Time 1615  -      Time Calculation (min) 23 min  -      PT Received On 01/11/25  -         Time Calculation- PT    Total Timed Code Minutes- PT 23 minute(s)  -         Timed Charges    78150 - PT Therapeutic Exercise Minutes 8  -MF      02605 - Gait Training Minutes  15  -MF         Total Minutes    Timed Charges Total Minutes 23  -MF       Total Minutes 23  -MF                User Key  (r) = Recorded By, (t) = Taken By, (c) = Cosigned By      Initials Name Provider Type    Gabby Rossi PTA Physical Therapist Assistant                   Therapy Charges for Today       Code Description Service Date Service Provider Modifiers Qty    01509000847 HC PT THER PROC EA 15 MIN 1/10/2025 Gabby Villa, PTA GP 1    44258274064 HC PT THERAPEUTIC ACT EA 15 MIN 1/10/2025 Gabby Villa, PTA GP 2    56718403105 HC PT THER PROC EA 15 MIN 2025 Gabby Villa, PTA GP 1    24672961242 HC GAIT TRAINING EA 15 MIN 2025 Gabby Villa, PTA GP 1            PT G-Codes  Outcome Measure Options: AM-PAC 6 Clicks Basic Mobility (PT)  AM-PAC 6 Clicks Score (PT): 17  AM-PAC 6 Clicks Score (OT): 15    Gabby Villa PTA  2025      Electronically signed by Gabby Villa PTA at 25 1635       Gabby Villa PTA at 25 1702  Version 1 of 1         Acute Care - Physical Therapy Treatment Note  [facility]     Patient Name: Brijesh Grande  : 1955  MRN: [id number]  Today's Date: 2025      Visit Dx:     ICD-10-CM ICD-9-CM   1. Aneurysm of infrarenal abdominal aorta, unspecified whether ruptured  I71.43 441.4   2. Abdominal pain, unspecified abdominal location  R10.9 789.00   3. Abdominal aortic aneurysm (AAA) without rupture, unspecified part  I71.40 441.4   4. Primary hypertension  I10 401.9   5. Impaired mobility [Z74.09]  Z74.09 799.89     Patient Active Problem List   Diagnosis    AAA (abdominal aortic aneurysm)    Abdominal aortic aneurysm    Abdominal pain     Past Medical History:   Diagnosis Date    Hyperlipidemia     Hypertension     PAD (peripheral artery disease)     Stroke      Past Surgical History:   Procedure Laterality Date    ABDOMINAL AORTIC ANEURYSM REPAIR WITH ENDOGRAFT N/A 1/3/2025    Procedure: ABDOMINAL AORTIC ANEURYSM REPAIR WITH ENDOGRAFT;  Surgeon: Eduardo Whiteside DO;  Location: [facility] OR;  Service: Vascular;  Laterality: N/A;    CAROTID ENDARTERECTOMY      x 2     PT Assessment (Last 12 Hours)       PT Evaluation and Treatment       Row Name 25 1618          Physical  "Therapy Time and Intention    Subjective Information complains of;pain  -     Document Type therapy note (daily note)  -     Mode of Treatment physical therapy  -       Row Name 01/12/25 1618          General Information    Existing Precautions/Restrictions fall  Left side weakness from old stroke  -       Row Name 01/12/25 1618          Pain    Pretreatment Pain Rating 6/10  -     Posttreatment Pain Rating 9/10  -     Pain Location groin;hip  -     Pain Side/Orientation left  -     Pain Management Interventions exercise or physical activity utilized  -     Response to Pain Interventions activity participation with increased pain  -       Row Name 01/12/25 1618          Bed Mobility    Supine-Sit Emery (Bed Mobility) minimum assist (75% patient effort)  -     Sit-Supine Emery (Bed Mobility) minimum assist (75% patient effort)  -     Assistive Device (Bed Mobility) bed rails;head of bed elevated  -     Comment, (Bed Mobility) pt uses right leg to \"scoop\" left leg to move  -       Row Name 01/12/25 1618          Transfers    Comment, (Transfers) stood x 3  -       Row Name 01/12/25 1618          Sit-Stand Transfer    Sit-Stand Emery (Transfers) verbal cues;contact guard  -       Row Name 01/12/25 1618          Stand-Sit Transfer    Stand-Sit Emery (Transfers) contact guard  -       Row Name 01/12/25 1618          Gait/Stairs (Locomotion)    Emery Level (Gait) verbal cues;minimum assist (75% patient effort)  -     Assistive Device (Gait) walker, mckenzie  -     Distance in Feet (Gait) 10  x 2  -     Deviations/Abnormal Patterns (Gait) antalgic;carl decreased;stride length decreased  -     Left Sided Gait Deviations heel strike decreased;hip circumduction  minimal weight bearing on left leg  -     Comment, (Gait/Stairs) unsteady   -       Row Name 01/12/25 1618          Balance    Comment, Balance Independent with static sitting balance-sat " EOB for 15 minutes.  -MF       Row Name 01/12/25 1618          Motor Skills    Comments, Therapeutic Exercise PROM LAQ Left  knee  -MF       Row Name             Wound 01/03/25 1622 Right anterior groin    Wound - Properties Group Placement Date: 01/03/25  -AC Placement Time: 1622  -AC Side: Right  -AC Orientation: anterior  -AC Location: groin  -AC Primary Wound Type: Incision  -AC Additional Comments: PERCLOSE X2 2X2 TEGADERM  -AC    Retired Wound - Properties Group Placement Date: 01/03/25  -AC Placement Time: 1622  -AC Side: Right  -AC Orientation: anterior  -AC Location: groin  -AC Primary Wound Type: Incision  -AC Additional Comments: PERCLOSE X2 2X2 TEGADERM  -AC    Retired Wound - Properties Group Placement Date: 01/03/25  -AC Placement Time: 1622  -AC Side: Right  -AC Orientation: anterior  -AC Location: groin  -AC Primary Wound Type: Incision  -AC Additional Comments: PERCLOSE X2 2X2 TEGADERM  -AC    Retired Wound - Properties Group Date first assessed: 01/03/25  -AC Time first assessed: 1622  -AC Side: Right  -AC Location: groin  -AC Primary Wound Type: Incision  -AC Additional Comments: PERCLOSE X2 2X2 TEGADERM  -AC      Row Name             Wound 01/03/25 1622 Left anterior groin    Wound - Properties Group Placement Date: 01/03/25  -AC Placement Time: 1622  -AC Side: Left  -AC Orientation: anterior  -AC Location: groin  -AC Primary Wound Type: Incision  -AC Additional Comments: STERISTRIPS 4X4 TEGADERM  -AC    Retired Wound - Properties Group Placement Date: 01/03/25  -AC Placement Time: 1622  -AC Side: Left  -AC Orientation: anterior  -AC Location: groin  -AC Primary Wound Type: Incision  -AC Additional Comments: STERISTRIPS 4X4 TEGADERM  -AC    Retired Wound - Properties Group Placement Date: 01/03/25  -AC Placement Time: 1622  -AC Side: Left  -AC Orientation: anterior  -AC Location: groin  -AC Primary Wound Type: Incision  -AC Additional Comments: STERISTRIPS 4X4 TEGADERM  -AC    Retired Wound -  Properties Group Date first assessed: 01/03/25  -AC Time first assessed: 1622  -AC Side: Left  -AC Location: groin  -AC Primary Wound Type: Incision  -AC Additional Comments: STERISTRIPS 4X4 TEGADERM  -AC      Row Name 01/12/25 1618          Positioning and Restraints    Pre-Treatment Position in bed  -MF     Post Treatment Position bed  -MF     In Bed fowlers;call light within reach;encouraged to call for assist;exit alarm on;side rails up x2;LLE elevated  -               User Key  (r) = Recorded By, (t) = Taken By, (c) = Cosigned By      Initials Name Provider Type    AC Zara Balbuena, RN Registered Nurse    Gabby Rossi PTA Physical Therapist Assistant                    Physical Therapy Education       Title: PT OT SLP Therapies (In Progress)       Topic: Physical Therapy (In Progress)       Point: Mobility training (Done)       Learning Progress Summary            Patient Acceptance, E, VU,NR by BARON at 1/9/2025 1130    Comment: progression with amb with managing pain    Acceptance, E, VU by BARON at 1/8/2025 1131    Comment: have family bring pt's cane to progress with amb    Acceptance, E, VU,DU,NR by NITHIN at 1/6/2025 0824    Comment: Benefits of activity, progression of PT POC,                      Point: Home exercise program (Not Started)       Learner Progress:  Not documented in this visit.              Point: Body mechanics (Not Started)       Learner Progress:  Not documented in this visit.              Point: Precautions (Not Started)       Learner Progress:  Not documented in this visit.                              User Key       Initials Effective Dates Name Provider Type Discipline    NITHIN 02/03/23 -  Francis Hung PT DPT Physical Therapist PT    BARON 02/03/23 -  Maurice Rose PTA Physical Therapist Assistant PT                  PT Recommendation and Plan     Plan of Care Reviewed With: patient  Progress: improving  Outcome Evaluation: PT tx completed. Pt. up in chair upon  arrival. He states his pain is a little better today. Pt. was able to stand with CGA. He walked 10' x 2 with one sitting rest while using mckenzie walker for support. Pt did have LOB during second round and needed MIN assist to correct. Pt with increased pain with walking. He would benefit from further rehab to gain his independence for returning home.   Outcome Measures       Row Name 01/12/25 1618 01/11/25 1552 01/10/25 0921       How much help from another person do you currently need...    Turning from your back to your side while in flat bed without using bedrails? 4  -MF 4  -MF 3  -MF    Moving from lying on back to sitting on the side of a flat bed without bedrails? 3  -MF 3  -MF 3  -MF    Moving to and from a bed to a chair (including a wheelchair)? 3  -MF 3  -MF 3  -MF    Standing up from a chair using your arms (e.g., wheelchair, bedside chair)? 3  -MF 3  -MF 3  -MF    Climbing 3-5 steps with a railing? 1  -MF 1  -MF 1  -MF    To walk in hospital room? 3  -MF 3  -MF 2  -MF    AM-PAC 6 Clicks Score (PT) 17  -MF 17  -MF 15  -MF       Functional Assessment    Outcome Measure Options AM-PAC 6 Clicks Basic Mobility (PT)  -MF AM-PAC 6 Clicks Basic Mobility (PT)  -MF AM-PAC 6 Clicks Basic Mobility (PT)  -MF              User Key  (r) = Recorded By, (t) = Taken By, (c) = Cosigned By      Initials Name Provider Type    Gabby Rossi PTA Physical Therapist Assistant                     Time Calculation:    PT Charges       Row Name 01/12/25 1701             Time Calculation    Start Time 1618  -MF      Stop Time 1648  -MF      Time Calculation (min) 30 min  -MF      PT Received On 01/12/25  -MF         Time Calculation- PT    Total Timed Code Minutes- PT 30 minute(s)  -MF         Timed Charges    22578 - Gait Training Minutes  15  -MF      58077 - PT Therapeutic Activity Minutes 15  -MF         Total Minutes    Timed Charges Total Minutes 30  -MF       Total Minutes 30  -MF                User Key  (r) =  Recorded By, (t) = Taken By, (c) = Cosigned By      Initials Name Provider Type    Gabby Rossi PTA Physical Therapist Assistant                  Therapy Charges for Today       Code Description Service Date Service Provider Modifiers Qty    34673982245 HC PT THER PROC EA 15 MIN 1/11/2025 Gabby Villa, HEMAL GP 1    80584358810 HC GAIT TRAINING EA 15 MIN 1/11/2025 Gabby Villa, HEMAL GP 1    19579650919 HC GAIT TRAINING EA 15 MIN 1/12/2025 Gabby Villa, HEMAL GP 1    66124425032 HC PT THERAPEUTIC ACT EA 15 MIN 1/12/2025 Gabby Villa, HEMAL GP 1            PT G-Codes  Outcome Measure Options: AM-PAC 6 Clicks Basic Mobility (PT)  AM-PAC 6 Clicks Score (PT): 17  AM-PAC 6 Clicks Score (OT): 15    Gabby Villa PTA  1/12/2025      Electronically signed by Gabby Villa PTA at 01/12/25 1702       Occupational Therapy Notes (last 48 hours)  Notes from 01/11/25 0914 through 01/13/25 0914   No notes exist for this encounter.

## 2025-01-13 NOTE — DISCHARGE PLACEMENT REQUEST
"Brea Grande (69 y.o. Male)       Date of Birth   1955    Social Security Number       Address   [address] [place] [address]    Home Phone   [phone]    MRN   [id number]       Bahai   Other    Marital Status                               Admission Date   1/3/25    Admission Type   Emergency    Admitting Provider   Maulik Osborn MD    Attending Provider   Maulik Osborn MD    Department, Room/Bed   [facility] 3C, 374/1       Discharge Date       Discharge Disposition       Discharge Destination                                 Attending Provider: Maulik Osborn MD    Allergies: No Known Allergies    Isolation: None   Infection: None   Code Status: CPR    Ht: 177.8 cm (70\")   Wt: 101 kg (223 lb 1.6 oz)    Admission Cmt: None   Principal Problem: Abdominal aortic aneurysm [I71.40]                   Active Insurance as of 1/3/2025       Primary Coverage       Payor Plan Insurance Group Employer/Plan Group    ANTHEM MEDICARE REPLACEMENT ANTHEM MED ADV PPO [id number]       Payor Plan Address Payor Plan Phone Number Payor Plan Fax Number Effective Dates    [address] [phone]  2024 - None Entered    [facility] [id number]         Subscriber Name Subscriber Birth Date Member ID       BREA GRANDE 1955 [id number]                     Emergency Contacts        (Rel.) Home Phone Work Phone Mobile Phone    Alexis Grande (Son) -- -- [phone]    HelderMildred (Friend) -- -- [phone]                 Occupational Therapy Notes (most recent note)        Carlos Ross, OTR/L at 01/10/25 1123          Patient Name: Brea Grande  : 1955    MRN: [id number]                              Today's Date: 1/10/2025       Admit Date: 1/3/2025    Visit Dx:     ICD-10-CM ICD-9-CM   1. Aneurysm of infrarenal abdominal aorta, unspecified whether ruptured  I71.43 441.4   2. Abdominal pain, unspecified abdominal location  R10.9 " 789.00   3. Abdominal aortic aneurysm (AAA) without rupture, unspecified part  I71.40 441.4   4. Primary hypertension  I10 401.9   5. Impaired mobility [Z74.09]  Z74.09 799.89     Patient Active Problem List   Diagnosis    AAA (abdominal aortic aneurysm)    Abdominal aortic aneurysm    Abdominal pain     Past Medical History:   Diagnosis Date    Hyperlipidemia     Hypertension     PAD (peripheral artery disease)     Stroke      Past Surgical History:   Procedure Laterality Date    ABDOMINAL AORTIC ANEURYSM REPAIR WITH ENDOGRAFT N/A 1/3/2025    Procedure: ABDOMINAL AORTIC ANEURYSM REPAIR WITH ENDOGRAFT;  Surgeon: Eduardo Whiteside DO;  Location:  [facility];  Service: Vascular;  Laterality: N/A;    CAROTID ENDARTERECTOMY      x 2      General Information       Row Name 01/10/25 1130 01/10/25 1123       OT Time and Intention    Subjective Information complains of;weakness;fatigue  -MM complains of;pain  -MM    Document Type -- therapy note (daily note)  -MM    Mode of Treatment -- occupational therapy  -MM      Row Name 01/10/25 1123          General Information    Patient Profile Reviewed yes  -MM     Existing Precautions/Restrictions fall;other (see comments)  left hemiparesis from previous CVA.  -MM     Barriers to Rehab medically complex;previous functional deficit  -MM       Row Name 01/10/25 1123          Safety Issues/Impairments Affecting Functional Mobility    Safety Issues Affecting Function (Mobility) insight into deficits/self-awareness;safety precaution awareness;safety precautions follow-through/compliance  -MM     Impairments Affecting Function (Mobility) balance;endurance/activity tolerance;range of motion (ROM);pain;strength;muscle tone abnormal  -MM               User Key  (r) = Recorded By, (t) = Taken By, (c) = Cosigned By      Initials Name Provider Type    MM Carlos Ross, OTR/L Occupational Therapist                     Mobility/ADL's       Row Name 01/10/25 1123          Bed  Mobility    Comment, (Bed Mobility) pt refused all functional mobility.  -MM               User Key  (r) = Recorded By, (t) = Taken By, (c) = Cosigned By      Initials Name Provider Type    Carlos Garcias, OTR/L Occupational Therapist                   Obj/Interventions       Row Name 01/10/25 1302          Vision Assessment/Intervention    Visual Impairment/Limitations other (see comments)  Pt reports vision has gotten worse since sx. Pt with noted difficulty using his cell phone which he reports is d/t worsening vision.  -MM       Row Name 01/10/25 1302          Motor Skills    Therapeutic Exercise other (see comments)  minimal ROM completed to LUE, pt denies need for further ROM.  -MM               User Key  (r) = Recorded By, (t) = Taken By, (c) = Cosigned By      Initials Name Provider Type    Carlos Garcias, OTR/L Occupational Therapist                   Goals/Plan    No documentation.                  Clinical Impression       Row Name 01/10/25 1123          Pain Assessment    Pretreatment Pain Rating 5/10  -MM     Posttreatment Pain Rating 10/10  -MM     Pain Location extremity;groin  -MM     Pain Side/Orientation left;lower  -MM       Row Name 01/10/25 1123          Plan of Care Review    Plan of Care Reviewed With patient  -MM     Progress no change  -MM     Outcome Evaluation OT tx completed. Pt is alert and agreeable to therapy. Pt reports pain in LLE and groin 5/10 pre tx, 10/10 post tx. Pt denies all functional mobility or repositioning in bed. Pt denies all ADLs for various reasons. Pt takes LUE through minimal ROM and reports he does not stretch LUE at baseline. Pt reports he doesn't need to complete ROM d/t LUE being at his baseline. Pt reports RUE is still strong, therefore pt denies need for BUE HEP. Pt frequently asks what this OT would like him to do next but denies each task. Pt pleasantly reports he will do better after rehab, once he is back home. Extensive education completed on  benefits of increased activity, home safety education and ADL education. Pt reports vision has gotten worse since sx. Pt with noted difficulty using his cell phone which he reports is d/t worsening vision. Notified RN. Continue OT POC.  -MM       Row Name 01/10/25 1123          Therapy Plan Review/Discharge Plan (OT)    Anticipated Discharge Disposition (OT) sub acute care setting  -MM       Row Name 01/10/25 1123          Vital Signs    Pretreatment Heart Rate (beats/min) 76  -MM     Pre SpO2 (%) 96  -MM     O2 Delivery Pre Treatment supplemental O2  2L O2/NC  -MM     O2 Delivery Intra Treatment supplemental O2  2L O2/NC  -MM     Post SpO2 (%) --  SpO2 and HR remain WFL throughout session.  -MM     O2 Delivery Post Treatment supplemental O2  2L O2/NC  -MM     Pre Patient Position Supine  -MM     Intra Patient Position Supine  -MM     Post Patient Position Supine  -MM       Row Name 01/10/25 1123          Positioning and Restraints    Pre-Treatment Position in bed  -MM     Post Treatment Position bed  -MM     In Bed notified nsg;fowlers;call light within reach;encouraged to call for assist;side rails up x2;legs elevated;heels elevated  -MM               User Key  (r) = Recorded By, (t) = Taken By, (c) = Cosigned By      Initials Name Provider Type    MM Carlos Ross, OTR/L Occupational Therapist                   Outcome Measures       Row Name 01/10/25 1123          How much help from another is currently needed...    Putting on and taking off regular lower body clothing? 2  -MM     Bathing (including washing, rinsing, and drying) 2  -MM     Toileting (which includes using toilet bed pan or urinal) 2  -MM     Putting on and taking off regular upper body clothing 2  -MM     Taking care of personal grooming (such as brushing teeth) 3  -MM     Eating meals 4  -MM     AM-PAC 6 Clicks Score (OT) 15  -MM       Row Name 01/10/25 0921 01/10/25 7743       How much help from another person do you currently need...     Turning from your back to your side while in flat bed without using bedrails? 3  -MF 4  -SH    Moving from lying on back to sitting on the side of a flat bed without bedrails? 3  -MF 3  -SH    Moving to and from a bed to a chair (including a wheelchair)? 3  -MF 3  -SH    Standing up from a chair using your arms (e.g., wheelchair, bedside chair)? 3  -MF 3  -SH    Climbing 3-5 steps with a railing? 1  -MF 1  -SH    To walk in hospital room? 2  -MF 2  -SH    AM-PAC 6 Clicks Score (PT) 15  -MF 16  -SH    Highest Level of Mobility Goal 4 --> Transfer to chair/commode  -MF 5 --> Static standing  -SH      Row Name 01/10/25 1123 01/10/25 0921       Functional Assessment    Outcome Measure Options AM-PAC 6 Clicks Daily Activity (OT)  -MM AM-PAC 6 Clicks Basic Mobility (PT)  -              User Key  (r) = Recorded By, (t) = Taken By, (c) = Cosigned By      Initials Name Provider Type    Gabby Rossi, PTA Physical Therapist Assistant    MM Carlos Ross, OTR/L Occupational Therapist     Iesha Mistry, RN Registered Nurse                    Occupational Therapy Education       Title: PT OT SLP Therapies (In Progress)       Topic: Occupational Therapy (Done)       Point: ADL training (Done)       Description:   Instruct learner(s) on proper safety adaptation and remediation techniques during self care or transfers.   Instruct in proper use of assistive devices.                  Learning Progress Summary            Patient Acceptance, E, NR,VU by MM at 1/10/2025 1314    Acceptance, E, VU,NR by  at 1/6/2025 1027                      Point: Home exercise program (Done)       Description:   Instruct learner(s) on appropriate technique for monitoring, assisting and/or progressing therapeutic exercises/activities.                  Learning Progress Summary            Patient Acceptance, E, NR,VU by MM at 1/10/2025 1314                      Point: Precautions (Done)       Description:   Instruct learner(s) on  prescribed precautions during self-care and functional transfers.                  Learning Progress Summary            Patient Acceptance, E, NR,VU by MM at 1/10/2025 1314    Acceptance, E, VU,NR by  at 1/6/2025 1027                      Point: Body mechanics (Done)       Description:   Instruct learner(s) on proper positioning and spine alignment during self-care, functional mobility activities and/or exercises.                  Learning Progress Summary            Patient Acceptance, E, NR,VU by MM at 1/10/2025 1314    Acceptance, E, VU,NR by  at 1/6/2025 1027                                      User Key       Initials Effective Dates Name Provider Type Discipline     07/11/23 -  Carlos Ross, OTR/L Occupational Therapist OT    SOWMYA 06/20/22 -  Hetal Cottrell OTR/L Occupational Therapist OT                  OT Recommendation and Plan     Plan of Care Review  Plan of Care Reviewed With: patient  Progress: no change  Outcome Evaluation: OT tx completed. Pt is alert and agreeable to therapy. Pt reports pain in LLE and groin 5/10 pre tx, 10/10 post tx. Pt denies all functional mobility or repositioning in bed. Pt denies all ADLs for various reasons. Pt takes LUE through minimal ROM and reports he does not stretch LUE at baseline. Pt reports he doesn't need to complete ROM d/t LUE being at his baseline. Pt reports RUE is still strong, therefore pt denies need for BUE HEP. Pt frequently asks what this OT would like him to do next but denies each task. Pt pleasantly reports he will do better after rehab, once he is back home. Extensive education completed on benefits of increased activity, home safety education and ADL education. Pt reports vision has gotten worse since sx. Pt with noted difficulty using his cell phone which he reports is d/t worsening vision. Notified RN. Continue OT POC.     Time Calculation:         Time Calculation- OT       Row Name 01/10/25 1123             Time Calculation- OT    OT  Start Time 1123  -MM      OT Stop Time 1210  -MM      OT Time Calculation (min) 47 min  -MM      Total Timed Code Minutes- OT 47 minute(s)  -MM      OT Received On 01/10/25  -MM         Timed Charges    87718 - OT Self Care/Mgmt Minutes 47  -MM         Total Minutes    Timed Charges Total Minutes 47  -MM       Total Minutes 47  -MM                User Key  (r) = Recorded By, (t) = Taken By, (c) = Cosigned By      Initials Name Provider Type    MM Carlos Ross, OTR/L Occupational Therapist                  Therapy Charges for Today       Code Description Service Date Service Provider Modifiers Qty    36508535377 HC OT SELF CARE/MGMT/TRAIN EA 15 MIN 1/10/2025 Carlos Ross OTR/L GO 3                 Carlos ROSAS. ELENA Ross/MARE  1/10/2025    Electronically signed by Carlos Ross OTR/MARE at 01/10/25 1316

## 2025-01-13 NOTE — PLAN OF CARE
Goal Outcome Evaluation:         Patient had a medium sized bm. Urinating without difficulty. Pulses dopplered. B groins soft, slightly tender. Patient sat in chair about 30 minutes. Safety maintained. No falls or injuries this shift. Bed alarm on. Patient resting comfortably in bed. Denies needs at this time.

## 2025-01-13 NOTE — THERAPY TREATMENT NOTE
Acute Care - Physical Therapy Treatment Note  [facility]     Patient Name: Brijesh Grande  : 1955  MRN: [id number]  Today's Date: 2025      Visit Dx:     ICD-10-CM ICD-9-CM   1. Aneurysm of infrarenal abdominal aorta, unspecified whether ruptured  I71.43 441.4   2. Abdominal pain, unspecified abdominal location  R10.9 789.00   3. Abdominal aortic aneurysm (AAA) without rupture, unspecified part  I71.40 441.4   4. Primary hypertension  I10 401.9   5. Impaired mobility [Z74.09]  Z74.09 799.89     Patient Active Problem List   Diagnosis    AAA (abdominal aortic aneurysm)    Abdominal aortic aneurysm    Abdominal pain     Past Medical History:   Diagnosis Date    Hyperlipidemia     Hypertension     PAD (peripheral artery disease)     Stroke      Past Surgical History:   Procedure Laterality Date    ABDOMINAL AORTIC ANEURYSM REPAIR WITH ENDOGRAFT N/A 1/3/2025    Procedure: ABDOMINAL AORTIC ANEURYSM REPAIR WITH ENDOGRAFT;  Surgeon: Eduardo Whiteside DO;  Location: [facility] OR;  Service: Vascular;  Laterality: N/A;    CAROTID ENDARTERECTOMY      x 2     PT Assessment (Last 12 Hours)       PT Evaluation and Treatment       Row Name 25 1314          Physical Therapy Time and Intention    Subjective Information complains of;pain  -     Document Type therapy note (daily note)  -     Mode of Treatment physical therapy  -       Row Name 25 1314          General Information    Existing Precautions/Restrictions fall  Left side weakness from old stroke  -       Row Name 25 1314          Pain    Pretreatment Pain Rating 0/10 - no pain  -     Posttreatment Pain Rating 8/10  -     Pain Location other (see comments)  thigh  -     Pain Side/Orientation left  -     Pain Management Interventions exercise or physical activity utilized  -     Response to Pain Interventions activity participation with increased pain  -       Row Name 25 1314          Bed Mobility    Sit-Supine  Johnston (Bed Mobility) modified independence  -     Assistive Device (Bed Mobility) bed rails  -       Row Name 01/13/25 1314          Transfers    Comment, (Transfers) stood x 5 during session  -       Row Name 01/13/25 1314          Sit-Stand Transfer    Sit-Stand Johnston (Transfers) contact guard;standby assist  -       Row Name 01/13/25 1314          Stand-Sit Transfer    Stand-Sit Johnston (Transfers) contact guard  -       Row Name 01/13/25 1314          Toilet Transfer    Type (Toilet Transfer) sit-stand  -     Johnston Level (Toilet Transfer) contact guard  -     Assistive Device (Toilet Transfer) commode, bedside without drop arms  -     Comment, (Toilet Transfer) on BSC upon entry. Pt stood and PTA wiped pt following BM. Pt. stood for 2 minutes with SBA.  -       Row Name 01/13/25 1314          Gait/Stairs (Locomotion)    Johnston Level (Gait) contact guard  -     Assistive Device (Gait) walker, mckenzie;cane, straight  -     Distance in Feet (Gait) --  10,20,20 with sitting rests in between  -     Deviations/Abnormal Patterns (Gait) antalgic  -     Left Sided Gait Deviations hip hiking;hip circumduction;foot drop/toe drag  -     Right Sided Gait Deviations leans right  -       Row Name 01/13/25 1314          Motor Skills    Comments, Therapeutic Exercise AROM of R LE-P-AAROm L LE in sitting  -       Row Name             Wound 01/03/25 1622 Right anterior groin    Wound - Properties Group Placement Date: 01/03/25  -AC Placement Time: 1622  -AC Side: Right  -AC Orientation: anterior  -AC Location: groin  -AC Primary Wound Type: Incision  -AC Additional Comments: PERCLOSE X2 2X2 TEGADERM  -AC    Retired Wound - Properties Group Placement Date: 01/03/25  -AC Placement Time: 1622  -AC Side: Right  -AC Orientation: anterior  -AC Location: groin  -AC Primary Wound Type: Incision  -AC Additional Comments: PERCLOSE X2 2X2 TEGADERM  -AC    Retired Wound - Properties  Group Placement Date: 01/03/25  -AC Placement Time: 1622  -AC Side: Right  -AC Orientation: anterior  -AC Location: groin  -AC Primary Wound Type: Incision  -AC Additional Comments: PERCLOSE X2 2X2 TEGADERM  -AC    Retired Wound - Properties Group Date first assessed: 01/03/25  -AC Time first assessed: 1622  -AC Side: Right  -AC Location: groin  -AC Primary Wound Type: Incision  -AC Additional Comments: PERCLOSE X2 2X2 TEGADERM  -AC      Row Name             Wound 01/03/25 1622 Left anterior groin    Wound - Properties Group Placement Date: 01/03/25  -AC Placement Time: 1622  -AC Side: Left  -AC Orientation: anterior  -AC Location: groin  -AC Primary Wound Type: Incision  -AC Additional Comments: STERISTRIPS 4X4 TEGADERM  -AC    Retired Wound - Properties Group Placement Date: 01/03/25  -AC Placement Time: 1622  -AC Side: Left  -AC Orientation: anterior  -AC Location: groin  -AC Primary Wound Type: Incision  -AC Additional Comments: STERISTRIPS 4X4 TEGADERM  -AC    Retired Wound - Properties Group Placement Date: 01/03/25  -AC Placement Time: 1622  -AC Side: Left  -AC Orientation: anterior  -AC Location: groin  -AC Primary Wound Type: Incision  -AC Additional Comments: STERISTRIPS 4X4 TEGADERM  -AC    Retired Wound - Properties Group Date first assessed: 01/03/25  -AC Time first assessed: 1622  -AC Side: Left  -AC Location: groin  -AC Primary Wound Type: Incision  -AC Additional Comments: STERISTRIPS 4X4 TEGADERM  -AC      Row Name 01/13/25 1314          Plan of Care Review    Plan of Care Reviewed With patient  -MF     Progress improving  -MF     Outcome Evaluation PT tx completed. Pt. up on BSC upon entry. He was CGa/SBA for sit to stand. Pt. stood with SBA while PTA assisted with wiping following use of BSC. He was able to walk a total of 20' at one time in room while using straight cane. Pt leans to the right and has to end quickly due to pain in Left thigh during gait. Pt. lived at home independently prior to  admission. he would benefit from further rehab to further progress his independence.  -       Row Name 01/13/25 1314          Positioning and Restraints    Pre-Treatment Position bedside commode  -MF     Post Treatment Position bed  -MF     In Bed supine;call light within reach;encouraged to call for assist;LLE elevated;side rails up x3  -               User Key  (r) = Recorded By, (t) = Taken By, (c) = Cosigned By      Initials Name Provider Type    Zara Reid, RN Registered Nurse    Gabby Rossi, HEMAL Physical Therapist Assistant                    Physical Therapy Education       Title: PT OT SLP Therapies (In Progress)       Topic: Physical Therapy (In Progress)       Point: Mobility training (Done)       Learning Progress Summary            Patient Acceptance, E, VU,NR by BARON at 1/9/2025 1130    Comment: progression with amb with managing pain    Acceptance, E, VU by BARON at 1/8/2025 1131    Comment: have family bring pt's cane to progress with amb    Acceptance, E, VU,DU,NR by NITHIN at 1/6/2025 0824    Comment: Benefits of activity, progression of PT POC,                      Point: Home exercise program (Not Started)       Learner Progress:  Not documented in this visit.              Point: Body mechanics (Not Started)       Learner Progress:  Not documented in this visit.              Point: Precautions (Not Started)       Learner Progress:  Not documented in this visit.                              User Key       Initials Effective Dates Name Provider Type Discipline    NITHIN 02/03/23 -  Francis Hung PT DPT Physical Therapist PT    BARON 02/03/23 -  Maurice Rose, HEMAL Physical Therapist Assistant PT                  PT Recommendation and Plan     Plan of Care Reviewed With: patient  Progress: improving  Outcome Evaluation: PT tx completed. Pt. up on BSC upon entry. He was CGa/SBA for sit to stand. Pt. stood with SBA while PTA assisted with wiping following use of BSC. He was able to  walk a total of 20' at one time in room while using straight cane. Pt leans to the right and has to end quickly due to pain in Left thigh during gait. Pt. lived at home independently prior to admission. he would benefit from further rehab to further progress his independence.   Outcome Measures       Row Name 01/13/25 1314 01/12/25 1618 01/11/25 1552       How much help from another person do you currently need...    Turning from your back to your side while in flat bed without using bedrails? 4  -MF 4  -MF 4  -MF    Moving from lying on back to sitting on the side of a flat bed without bedrails? 3  -MF 3  -MF 3  -MF    Moving to and from a bed to a chair (including a wheelchair)? 3  -MF 3  -MF 3  -MF    Standing up from a chair using your arms (e.g., wheelchair, bedside chair)? 3  -MF 3  -MF 3  -MF    Climbing 3-5 steps with a railing? 1  -MF 1  -MF 1  -MF    To walk in hospital room? 3  -MF 3  -MF 3  -MF    AM-PAC 6 Clicks Score (PT) 17  -MF 17  -MF 17  -MF       Functional Assessment    Outcome Measure Options AM-PAC 6 Clicks Basic Mobility (PT)  -MF AM-PAC 6 Clicks Basic Mobility (PT)  -MF AM-PAC 6 Clicks Basic Mobility (PT)  -MF              User Key  (r) = Recorded By, (t) = Taken By, (c) = Cosigned By      Initials Name Provider Type    Gabby Rossi PTA Physical Therapist Assistant                     Time Calculation:    PT Charges       Row Name 01/13/25 1408             Time Calculation    Start Time 1314  -      Stop Time 1355  -MF      Time Calculation (min) 41 min  -MF      PT Received On 01/13/25  -MF         Time Calculation- PT    Total Timed Code Minutes- PT 41 minute(s)  -MF         Timed Charges    29912 - PT Therapeutic Exercise Minutes 10  -MF      46439 - Gait Training Minutes  20  -MF      22780 - PT Therapeutic Activity Minutes 11  -MF         Total Minutes    Timed Charges Total Minutes 41  -MF       Total Minutes 41  -MF                User Key  (r) = Recorded By, (t) = Taken  By, (c) = Cosigned By      Initials Name Provider Type     Gabby Villa PTA Physical Therapist Assistant                  Therapy Charges for Today       Code Description Service Date Service Provider Modifiers Qty    42522659102 HC GAIT TRAINING EA 15 MIN 1/12/2025 Gabby Villa, PTA GP 1    46172089981 HC PT THERAPEUTIC ACT EA 15 MIN 1/12/2025 Gabby Villa, PTA GP 1    74288153447 HC PT THER PROC EA 15 MIN 1/13/2025 Gabby Villa, PTA GP 1    57570160372 HC GAIT TRAINING EA 15 MIN 1/13/2025 Gabby Villa, PTA GP 1    23268845454 HC PT THERAPEUTIC ACT EA 15 MIN 1/13/2025 Gabby Villa, PTA GP 1            PT G-Codes  Outcome Measure Options: AM-PAC 6 Clicks Basic Mobility (PT)  AM-PAC 6 Clicks Score (PT): 17  AM-PAC 6 Clicks Score (OT): 15    Gabby Villa PTA  1/13/2025

## 2025-01-14 LAB
ANION GAP SERPL CALCULATED.3IONS-SCNC: 8 MMOL/L (ref 5–15)
BASOPHILS # BLD AUTO: 0.04 10*3/MM3 (ref 0–0.2)
BASOPHILS NFR BLD AUTO: 0.5 % (ref 0–1.5)
BUN SERPL-MCNC: 9 MG/DL (ref 8–23)
BUN/CREAT SERPL: 17.6 (ref 7–25)
CALCIUM SPEC-SCNC: 8.2 MG/DL (ref 8.6–10.5)
CHLORIDE SERPL-SCNC: 101 MMOL/L (ref 98–107)
CO2 SERPL-SCNC: 29 MMOL/L (ref 22–29)
CREAT SERPL-MCNC: 0.51 MG/DL (ref 0.76–1.27)
DEPRECATED RDW RBC AUTO: 47.7 FL (ref 37–54)
EGFRCR SERPLBLD CKD-EPI 2021: 108.4 ML/MIN/1.73
EOSINOPHIL # BLD AUTO: 0.2 10*3/MM3 (ref 0–0.4)
EOSINOPHIL NFR BLD AUTO: 2.5 % (ref 0.3–6.2)
ERYTHROCYTE [DISTWIDTH] IN BLOOD BY AUTOMATED COUNT: 14.3 % (ref 12.3–15.4)
GLUCOSE SERPL-MCNC: 112 MG/DL (ref 65–99)
HCT VFR BLD AUTO: 31.8 % (ref 37.5–51)
HGB BLD-MCNC: 9.9 G/DL (ref 13–17.7)
IMM GRANULOCYTES # BLD AUTO: 0.09 10*3/MM3 (ref 0–0.05)
IMM GRANULOCYTES NFR BLD AUTO: 1.1 % (ref 0–0.5)
LYMPHOCYTES # BLD AUTO: 1.15 10*3/MM3 (ref 0.7–3.1)
LYMPHOCYTES NFR BLD AUTO: 14.4 % (ref 19.6–45.3)
MCH RBC QN AUTO: 29.1 PG (ref 26.6–33)
MCHC RBC AUTO-ENTMCNC: 31.1 G/DL (ref 31.5–35.7)
MCV RBC AUTO: 93.5 FL (ref 79–97)
MONOCYTES # BLD AUTO: 0.7 10*3/MM3 (ref 0.1–0.9)
MONOCYTES NFR BLD AUTO: 8.8 % (ref 5–12)
NEUTROPHILS NFR BLD AUTO: 5.79 10*3/MM3 (ref 1.7–7)
NEUTROPHILS NFR BLD AUTO: 72.7 % (ref 42.7–76)
NRBC BLD AUTO-RTO: 0 /100 WBC (ref 0–0.2)
PLATELET # BLD AUTO: 394 10*3/MM3 (ref 140–450)
PMV BLD AUTO: 9 FL (ref 6–12)
POTASSIUM SERPL-SCNC: 3.6 MMOL/L (ref 3.5–5.2)
RBC # BLD AUTO: 3.4 10*6/MM3 (ref 4.14–5.8)
SODIUM SERPL-SCNC: 138 MMOL/L (ref 136–145)
WBC NRBC COR # BLD AUTO: 7.97 10*3/MM3 (ref 3.4–10.8)

## 2025-01-14 PROCEDURE — 97530 THERAPEUTIC ACTIVITIES: CPT | Performed by: OCCUPATIONAL THERAPIST

## 2025-01-14 PROCEDURE — 94761 N-INVAS EAR/PLS OXIMETRY MLT: CPT

## 2025-01-14 PROCEDURE — 97116 GAIT TRAINING THERAPY: CPT

## 2025-01-14 PROCEDURE — 85025 COMPLETE CBC W/AUTO DIFF WBC: CPT | Performed by: INTERNAL MEDICINE

## 2025-01-14 PROCEDURE — 80048 BASIC METABOLIC PNL TOTAL CA: CPT | Performed by: INTERNAL MEDICINE

## 2025-01-14 PROCEDURE — 94799 UNLISTED PULMONARY SVC/PX: CPT

## 2025-01-14 PROCEDURE — 94664 DEMO&/EVAL PT USE INHALER: CPT

## 2025-01-14 PROCEDURE — 97110 THERAPEUTIC EXERCISES: CPT

## 2025-01-14 PROCEDURE — 97535 SELF CARE MNGMENT TRAINING: CPT | Performed by: OCCUPATIONAL THERAPIST

## 2025-01-14 PROCEDURE — 25010000002 ENOXAPARIN PER 10 MG: Performed by: NURSE PRACTITIONER

## 2025-01-14 RX ORDER — AMLODIPINE BESYLATE 5 MG/1
5 TABLET ORAL
Status: DISCONTINUED | OUTPATIENT
Start: 2025-01-14 | End: 2025-01-15 | Stop reason: HOSPADM

## 2025-01-14 RX ORDER — BISACODYL 5 MG/1
10 TABLET, DELAYED RELEASE ORAL DAILY PRN
Status: DISCONTINUED | OUTPATIENT
Start: 2025-01-15 | End: 2025-01-15 | Stop reason: HOSPADM

## 2025-01-14 RX ORDER — GUAIFENESIN/DEXTROMETHORPHAN 100-10MG/5
5 SYRUP ORAL EVERY 4 HOURS PRN
Status: DISCONTINUED | OUTPATIENT
Start: 2025-01-14 | End: 2025-01-15 | Stop reason: HOSPADM

## 2025-01-14 RX ADMIN — Medication 10 MG: at 21:30

## 2025-01-14 RX ADMIN — HYDROCODONE BITARTRATE AND ACETAMINOPHEN 1 TABLET: 10; 325 TABLET ORAL at 00:46

## 2025-01-14 RX ADMIN — Medication 10 ML: at 08:29

## 2025-01-14 RX ADMIN — BISACODYL 10 MG: 5 TABLET, COATED ORAL at 08:29

## 2025-01-14 RX ADMIN — ATORVASTATIN CALCIUM 10 MG: 10 TABLET, FILM COATED ORAL at 08:29

## 2025-01-14 RX ADMIN — HYDROCODONE BITARTRATE AND ACETAMINOPHEN 1 TABLET: 10; 325 TABLET ORAL at 08:29

## 2025-01-14 RX ADMIN — TAMSULOSIN HYDROCHLORIDE 0.4 MG: 0.4 CAPSULE ORAL at 08:29

## 2025-01-14 RX ADMIN — LOSARTAN POTASSIUM 100 MG: 50 TABLET, FILM COATED ORAL at 08:29

## 2025-01-14 RX ADMIN — CARVEDILOL 25 MG: 3.12 TABLET, FILM COATED ORAL at 18:38

## 2025-01-14 RX ADMIN — ENOXAPARIN SODIUM 100 MG: 100 INJECTION SUBCUTANEOUS at 21:30

## 2025-01-14 RX ADMIN — BUDESONIDE INHALATION 0.5 MG: 0.5 SUSPENSION RESPIRATORY (INHALATION) at 20:35

## 2025-01-14 RX ADMIN — ASPIRIN 81 MG: 81 TABLET, CHEWABLE ORAL at 08:29

## 2025-01-14 RX ADMIN — HYDROCODONE BITARTRATE AND ACETAMINOPHEN 1 TABLET: 10; 325 TABLET ORAL at 23:24

## 2025-01-14 RX ADMIN — Medication 10 ML: at 08:30

## 2025-01-14 RX ADMIN — CARVEDILOL 25 MG: 3.12 TABLET, FILM COATED ORAL at 08:29

## 2025-01-14 RX ADMIN — ENOXAPARIN SODIUM 100 MG: 100 INJECTION SUBCUTANEOUS at 10:56

## 2025-01-14 RX ADMIN — PANTOPRAZOLE SODIUM 40 MG: 40 TABLET, DELAYED RELEASE ORAL at 05:55

## 2025-01-14 RX ADMIN — AMLODIPINE BESYLATE 5 MG: 5 TABLET ORAL at 12:15

## 2025-01-14 RX ADMIN — GUAIFENESIN AND DEXTROMETHORPHAN 5 ML: 100; 10 SYRUP ORAL at 00:41

## 2025-01-14 RX ADMIN — BUDESONIDE INHALATION 0.5 MG: 0.5 SUSPENSION RESPIRATORY (INHALATION) at 07:01

## 2025-01-14 NOTE — THERAPY TREATMENT NOTE
Patient Name: Brijesh Grande  : 1955    MRN: [id number]                              Today's Date: 2025       Admit Date: 1/3/2025    Visit Dx:     ICD-10-CM ICD-9-CM   1. Aneurysm of infrarenal abdominal aorta, unspecified whether ruptured  I71.43 441.4   2. Abdominal pain, unspecified abdominal location  R10.9 789.00   3. Abdominal aortic aneurysm (AAA) without rupture, unspecified part  I71.40 441.4   4. Primary hypertension  I10 401.9   5. Impaired mobility [Z74.09]  Z74.09 799.89     Patient Active Problem List   Diagnosis    AAA (abdominal aortic aneurysm)    Abdominal aortic aneurysm    Abdominal pain     Past Medical History:   Diagnosis Date    Hyperlipidemia     Hypertension     PAD (peripheral artery disease)     Stroke      Past Surgical History:   Procedure Laterality Date    ABDOMINAL AORTIC ANEURYSM REPAIR WITH ENDOGRAFT N/A 1/3/2025    Procedure: ABDOMINAL AORTIC ANEURYSM REPAIR WITH ENDOGRAFT;  Surgeon: Eduardo Whiteside DO;  Location:  [facility];  Service: Vascular;  Laterality: N/A;    CAROTID ENDARTERECTOMY      x 2      General Information       Row Name 25 1410          OT Time and Intention    Subjective Information complains of;pain  -MM     Document Type therapy note (daily note)  -MM     Mode of Treatment occupational therapy  -MM       Row Name 25 1410          General Information    Patient Profile Reviewed yes  -MM     Existing Precautions/Restrictions fall  left sided weakness from previous CVA  -MM     Barriers to Rehab medically complex;previous functional deficit  -MM       Row Name 25 1410          Cognition    Orientation Status (Cognition) --  -MM       Row Name 25 1410          Safety Issues/Impairments Affecting Functional Mobility    Safety Issues Affecting Function (Mobility) at risk behavior observed;awareness of need for assistance;insight into deficits/self-awareness;safety precaution awareness;safety precautions  follow-through/compliance  -MM     Impairments Affecting Function (Mobility) balance;endurance/activity tolerance;range of motion (ROM);pain;strength;muscle tone abnormal  -MM               User Key  (r) = Recorded By, (t) = Taken By, (c) = Cosigned By      Initials Name Provider Type    MM Carlos Ross, OTR/L Occupational Therapist                     Mobility/ADL's       Row Name 01/14/25 1410          Bed Mobility    Bed Mobility supine-sit;sit-supine  -MM     Supine-Sit [place] (Bed Mobility) supervision;verbal cues  -MM     Sit-Supine [place] (Bed Mobility) supervision;verbal cues  -MM     Assistive Device (Bed Mobility) head of bed elevated;bed rails  -MM       Row Name 01/14/25 1410          Transfers    Transfers sit-stand transfer;stand-sit transfer  -MM       Row Name 01/14/25 1410          Sit-Stand Transfer    Sit-Stand [place] (Transfers) contact guard;supervision;verbal cues  -MM       Row Name 01/14/25 1410          Stand-Sit Transfer    Stand-Sit [place] (Transfers) contact guard;supervision;verbal cues  -MM       Row Name 01/14/25 1410          Functional Mobility    Functional Mobility- Ind. Level contact guard assist;verbal cues required  -MM     Functional Mobility- Device cane, straight;other (see comments)  pt refused to utilize the mckenzie walker.  -MM     Functional Mobility- Comment Pt walked 3 laps within room, pt denies further distance. Pt visibly SOA, SpO2 and HR remain WFL.  -MM       Row Name 01/14/25 1410          Activities of Daily Living    BADL Assessment/Intervention lower body dressing;grooming;upper body dressing  -MM       Row Name 01/14/25 1410          Lower Body Dressing Assessment/Training    [place] Level (Lower Body Dressing) don;doff;socks;supervision;set up;verbal cues  additional time and effort required  -MM     Position (Lower Body Dressing) edge of bed sitting  -MM       Row Name 01/14/25 1410          Grooming Assessment/Training     [place] Level (Grooming) oral care regimen;wash face, hands;supervision;set up;verbal cues  -MM     Position (Grooming) edge of bed sitting  -MM       Row Name 01/14/25 1410          Upper Body Dressing Assessment/Training    [place] Level (Upper Body Dressing) don;doff;supervision;verbal cues  coat. Pt wants to make sure he can do it.  -MM     Position (Upper Body Dressing) unsupported standing  -MM               User Key  (r) = Recorded By, (t) = Taken By, (c) = Cosigned By      Initials Name Provider Type    Carlos Garcias, OTR/L Occupational Therapist                   Obj/Interventions       Row Name 01/14/25 1410          Balance    Balance Assessment sitting static balance;sitting dynamic balance;standing static balance;standing dynamic balance  -MM     Static Sitting Balance standby assist  -MM     Dynamic Sitting Balance standby assist;verbal cues  -MM     Position, Sitting Balance unsupported;sitting edge of bed  -MM     Static Standing Balance contact guard;supervision;verbal cues  -MM     Dynamic Standing Balance contact guard;supervision;verbal cues  -MM     Position/Device Used, Standing Balance supported;cane, straight  -MM               User Key  (r) = Recorded By, (t) = Taken By, (c) = Cosigned By      Initials Name Provider Type    Carlos Garcias, OTR/L Occupational Therapist                   Goals/Plan    No documentation.                  Clinical Impression       Row Name 01/14/25 1410          Pain Assessment    Pretreatment Pain Rating 5/10  -MM     Posttreatment Pain Rating 5/10  -MM     Pain Location groin  -MM     Pain Management Interventions exercise or physical activity utilized;nursing notified  -MM     Response to Pain Interventions no change per patient report  -MM       Row Name 01/14/25 1410          Plan of Care Review    Plan of Care Reviewed With patient  -MM     Progress no change  -MM     Outcome Evaluation OT tx completed. Pt is alert and agreeable to  therapy. Pt reports pain in groin 5/10 pre and post tx. Pt was was supervision with HOB elevated for supine to sit and sit to supine. Pt was CGA-supervision for sit to stand t/f. pt was CGA for functional mobility with straight cane.  Pt walked 3 laps within room, pt denies further distance. Pt visibly SOA, SpO2 and HR remain WFL. pt was supervision with set up to brush teeth and wash face. Pt was supervision with set up for UB and LB dressing tasks with additional time and effort of tasks. Verbal cues provided throughout session. Pt with decreased safety awareness noted. Continue OT POC.  -MM       Row Name 01/14/25 1410          Therapy Plan Review/Discharge Plan (OT)    Anticipated Discharge Disposition (OT) sub acute care setting  -MM       Row Name 01/14/25 1410          Vital Signs    Pre SpO2 (%) 96  -MM     O2 Delivery Pre Treatment room air  -MM     Post SpO2 (%) 96  -MM     O2 Delivery Post Treatment room air  -MM     Pre Patient Position Supine  -MM     Intra Patient Position Sitting  -MM     Post Patient Position Supine  -MM       Row Name 01/14/25 1410          Positioning and Restraints    Pre-Treatment Position in bed  -MM     Post Treatment Position bed  -MM     In Bed notified nsg;fowlers;call light within reach;encouraged to call for assist;exit alarm on;side rails up x2  -MM               User Key  (r) = Recorded By, (t) = Taken By, (c) = Cosigned By      Initials Name Provider Type    MM Carlos Ross, OTR/L Occupational Therapist                   Outcome Measures       Row Name 01/14/25 1410          How much help from another is currently needed...    Putting on and taking off regular lower body clothing? 3  -MM     Bathing (including washing, rinsing, and drying) 2  -MM     Toileting (which includes using toilet bed pan or urinal) 2  -MM     Putting on and taking off regular upper body clothing 3  -MM     Taking care of personal grooming (such as brushing teeth) 3  -MM     Eating meals 4   -MM     AM-PAC 6 Clicks Score (OT) 17  -MM       Row Name 01/14/25 0830          How much help from another person do you currently need...    Turning from your back to your side while in flat bed without using bedrails? 4  -CF     Moving from lying on back to sitting on the side of a flat bed without bedrails? 3  -CF     Moving to and from a bed to a chair (including a wheelchair)? 3  -CF     Standing up from a chair using your arms (e.g., wheelchair, bedside chair)? 3  -CF     Climbing 3-5 steps with a railing? 1  -CF     To walk in hospital room? 3  -CF     AM-PAC 6 Clicks Score (PT) 17  -CF     Highest Level of Mobility Goal 5 --> Static standing  -CF       Row Name 01/14/25 1410          Functional Assessment    Outcome Measure Options AM-PAC 6 Clicks Daily Activity (OT)  -MM               User Key  (r) = Recorded By, (t) = Taken By, (c) = Cosigned By      Initials Name Provider Type    CF Janina Aldana, RN Registered Nurse    Carlos Garcias, OTR/L Occupational Therapist                    Occupational Therapy Education       Title: PT OT SLP Therapies (In Progress)       Topic: Occupational Therapy (Done)       Point: ADL training (Done)       Description:   Instruct learner(s) on proper safety adaptation and remediation techniques during self care or transfers.   Instruct in proper use of assistive devices.                  Learning Progress Summary            Patient Acceptance, E, VU,NR by MM at 1/14/2025 1505    Acceptance, E, NR,VU by MM at 1/10/2025 1314    Acceptance, E, VU,NR by  at 1/6/2025 1027                      Point: Home exercise program (Done)       Description:   Instruct learner(s) on appropriate technique for monitoring, assisting and/or progressing therapeutic exercises/activities.                  Learning Progress Summary            Patient Acceptance, E, NR,VU by MM at 1/10/2025 1314                      Point: Precautions (Done)       Description:   Instruct learner(s)  on prescribed precautions during self-care and functional transfers.                  Learning Progress Summary            Patient Acceptance, E, VU,NR by MM at 1/14/2025 1505    Acceptance, E, NR,VU by MM at 1/10/2025 1314    Acceptance, E, VU,NR by  at 1/6/2025 1027                      Point: Body mechanics (Done)       Description:   Instruct learner(s) on proper positioning and spine alignment during self-care, functional mobility activities and/or exercises.                  Learning Progress Summary            Patient Acceptance, E, VU,NR by MM at 1/14/2025 1505    Acceptance, E, NR,VU by MM at 1/10/2025 1314    Acceptance, E, VU,NR by  at 1/6/2025 1027                                      User Key       Initials Effective Dates Name Provider Type Discipline     07/11/23 -  Carlos Ross, OTR/L Occupational Therapist OT     06/20/22 -  Hetal Cottrell OTR/L Occupational Therapist OT                  OT Recommendation and Plan     Plan of Care Review  Plan of Care Reviewed With: patient  Progress: no change  Outcome Evaluation: OT tx completed. Pt is alert and agreeable to therapy. Pt reports pain in groin 5/10 pre and post tx. Pt was was supervision with HOB elevated for supine to sit and sit to supine. Pt was CGA-supervision for sit to stand t/f. pt was CGA for functional mobility with straight cane.  Pt walked 3 laps within room, pt denies further distance. Pt visibly SOA, SpO2 and HR remain WFL. pt was supervision with set up to brush teeth and wash face. Pt was supervision with set up for UB and LB dressing tasks with additional time and effort of tasks. Verbal cues provided throughout session. Pt with decreased safety awareness noted. Continue OT POC.     Time Calculation:         Time Calculation- OT       Row Name 01/14/25 1410             Time Calculation- OT    OT Start Time 1410  -MM      OT Stop Time 1448  -MM      OT Time Calculation (min) 38 min  -MM      Total Timed Code Minutes- OT  38 minute(s)  -MM      OT Received On 01/14/25  -MM         Timed Charges    03394 - OT Therapeutic Activity Minutes 15  -MM      77137 - OT Self Care/Mgmt Minutes 23  -MM         Total Minutes    Timed Charges Total Minutes 38  -MM       Total Minutes 38  -MM                User Key  (r) = Recorded By, (t) = Taken By, (c) = Cosigned By      Initials Name Provider Type    MM Carlos Ross, OTR/L Occupational Therapist                  Therapy Charges for Today       Code Description Service Date Service Provider Modifiers Qty    87185095198 HC OT THERAPEUTIC ACT EA 15 MIN 1/14/2025 Carlos Ross OTR/L GO 1    63363109735 HC OT SELF CARE/MGMT/TRAIN EA 15 MIN 1/14/2025 Carlos Ross OTR/L GO 2                 Carlos Ross OTR/MARE  1/14/2025

## 2025-01-14 NOTE — PLAN OF CARE
Goal Outcome Evaluation:   Several formed soft bm's this shift. Patient transferred to BSC with 1 assist. Pain meds given x1. Pulses palpable/doppler. Safety maintained. No falls or injuries this shift. Call light within reach. Patient resting in bed. No needs at this time.

## 2025-01-14 NOTE — PLAN OF CARE
Goal Outcome Evaluation:  Plan of Care Reviewed With: patient        Progress: improving  Outcome Evaluation: He was SBA supine to sit.He was  CGA/SBA for sit to stand.He was able to walk a total of 20' at one time in room while using straight cane. Pt was CGA-min x 1 to walk and had several LOB. Pt. lived at home independently prior to admission. He would benefit from further rehab to further progress his independence.

## 2025-01-14 NOTE — PAYOR COMM NOTE
"1/13  clinical      Brea Grande (69 y.o. Male)       Date of Birth   1955    Social Security Number       Address   [address] [facility] [address]    Home Phone   [phone]    MRN   [id number]       Holiness   Other    Marital Status                               Admission Date   1/3/25    Admission Type   Emergency    Admitting Provider   Maulik Osborn MD    Attending Provider   Maulik Osborn MD    Department, Room/Bed   [facility] 3C, 374/1       Discharge Date       Discharge Disposition       Discharge Destination                                 Attending Provider: Maulik Osborn MD    Allergies: No Known Allergies    Isolation: None   Infection: None   Code Status: CPR    Ht: 177.8 cm (70\")   Wt: 101 kg (223 lb 1.6 oz)    Admission Cmt: None   Principal Problem: Abdominal aortic aneurysm [I71.40]                   Active Insurance as of 1/3/2025       Primary Coverage       Payor Plan Insurance Group Employer/Plan Group    ANTHEM MEDICARE REPLACEMENT ANTHEM MED ADV PPO [id number]       Payor Plan Address Payor Plan Phone Number Payor Plan Fax Number Effective Dates    [address] [phone]  1/1/2024 - None Entered    [facility] [id number]         Subscriber Name Subscriber Birth Date Member ID       BREA GRANDE 1955 [id number]                     Emergency Contacts        (Rel.) Home Phone Work Phone Mobile Phone    Alexis Grande (Son) -- -- [phone]    Mildred Pendleton (Friend) -- -- [phone]              Current Facility-Administered Medications   Medication Dose Route Frequency Provider Last Rate Last Admin    acetaminophen (TYLENOL) tablet 650 mg  650 mg Oral Q6H PRN Suzi Sorto APRN   650 mg at 01/13/25 1422    aspirin chewable tablet 81 mg  81 mg Oral Daily Timmy Finney MD   81 mg at 01/13/25 0800    atorvastatin (LIPITOR) tablet 10 mg  10 mg Oral Daily Shaun Gallego MD   10 mg at 01/13/25 " 0801    bisacodyl (DULCOLAX) EC tablet 10 mg  10 mg Oral Daily Lax, Halley L, APRN   10 mg at 01/12/25 0922    budesonide (PULMICORT) nebulizer solution 0.5 mg  0.5 mg Nebulization BID - RT Shaun Gallego MD   0.5 mg at 01/13/25 0741    carvedilol (COREG) tablet 25 mg  25 mg Oral BID With Meals Nichol Suero MD   25 mg at 01/13/25 0801    Enoxaparin Sodium (LOVENOX) syringe 100 mg  1 mg/kg Subcutaneous Q12H Armida Maradiaga APRN   100 mg at 01/13/25 0938    hydrALAZINE (APRESOLINE) injection 10 mg  10 mg Intravenous Q4H PRN Timmy Finney MD   10 mg at 01/12/25 1615    hydrALAZINE (APRESOLINE) tablet 25 mg  25 mg Oral TID PRN Armida Maradiaga APRN   25 mg at 01/13/25 0800    HYDROcodone-acetaminophen (NORCO)  MG per tablet 1 tablet  1 tablet Oral Q6H PRN Nichol Suero MD   1 tablet at 01/12/25 0922    ipratropium-albuterol (DUO-NEB) nebulizer solution 3 mL  3 mL Nebulization Q4H PRN Shaun Gallego MD   3 mL at 01/12/25 0055    losartan (COZAAR) tablet 100 mg  100 mg Oral Daily Nichol Suero MD   100 mg at 01/13/25 0801    melatonin tablet 10 mg  10 mg Oral Nightly Nichol Suero MD   10 mg at 01/12/25 2057    nitroglycerin (NITROSTAT) SL tablet 0.4 mg  0.4 mg Sublingual Q5 Min PRN Timmy Finney MD        ondansetron ODT (ZOFRAN-ODT) disintegrating tablet 4 mg  4 mg Oral Q6H PRN Timmy Finney MD   4 mg at 01/07/25 0851    pantoprazole (PROTONIX) EC tablet 40 mg  40 mg Oral Q AM Nichol Suero MD   40 mg at 01/13/25 0545    Pharmacy to Dose enoxaparin (LOVENOX)   Not Applicable Continuous PRN Armida Maradiaga, MENDEZ        [START ON 1/15/2025] polyethylene glycol (MIRALAX) packet 17 g  17 g Oral Daily Maulik Osborn MD        sodium chloride 0.9 % flush 10 mL  10 mL Intravenous PRN Timmy Finney MD        sodium chloride 0.9 % flush 10 mL  10 mL Intravenous Q12H Timmy Finney MD   10 mL at 01/12/25 2112    sodium chloride 0.9 % flush 10 mL  10 mL Intravenous PRN Timmy Finney  MD        sodium chloride 0.9 % flush 10 mL  10 mL Intravenous Q12H Timmy Finney MD   10 mL at 01/13/25 0805    sodium chloride 0.9 % flush 10 mL  10 mL Intravenous PRN Timmy Finney MD        sodium chloride 0.9 % infusion 40 mL  40 mL Intravenous PRN Timmy Finney MD        sodium chloride 0.9 % infusion 40 mL  40 mL Intravenous PRN Timmy Finney MD        tamsulosin (FLOMAX) 24 hr capsule 0.4 mg  0.4 mg Oral Daily Timmy Finney MD   0.4 mg at 01/13/25 0800     Orders (last 24 hrs)        Start     Ordered    01/15/25 0900  polyethylene glycol (MIRALAX) packet 17 g  Daily         01/13/25 1313    01/13/25 1258  CK  Once         01/13/25 1257    01/13/25 0600  Comprehensive Metabolic Panel  Morning Draw         01/12/25 1236    01/13/25 0600  CBC Auto Differential  PROCEDURE ONCE         01/12/25 2201    01/12/25 0900  losartan (COZAAR) tablet 100 mg  Daily         01/11/25 1248    01/10/25 2100  melatonin tablet 10 mg  Nightly         01/10/25 1027    01/10/25 0800  HYDROcodone-acetaminophen (NORCO)  MG per tablet 1 tablet  Every 6 Hours PRN         01/10/25 0800    01/09/25 2354  acetaminophen (TYLENOL) tablet 650 mg  Every 6 Hours PRN         01/09/25 2355    01/09/25 1800  carvedilol (COREG) tablet 25 mg  2 Times Daily With Meals         01/09/25 1020    01/09/25 0900  bisacodyl (DULCOLAX) EC tablet 10 mg  Daily         01/08/25 1959 01/08/25 2100  polyethylene glycol (MIRALAX) packet 17 g  2 Times Daily,   Status:  Discontinued         01/08/25 1959 01/08/25 1600  lactulose solution 20 g  3 Times Daily,   Status:  Discontinued         01/08/25 1207    01/08/25 1230  Enoxaparin Sodium (LOVENOX) syringe 100 mg  Every 12 Hours Scheduled         01/08/25 1142    01/08/25 1206  Morphine sulfate (PF) injection 2 mg  Every 4 Hours PRN         01/08/25 1206    01/08/25 1117  Pharmacy to Dose enoxaparin (LOVENOX)  Continuous PRN         01/08/25 1132    01/08/25 1115  hydrALAZINE  "(APRESOLINE) tablet 25 mg  3 Times Daily PRN         01/08/25 1115    01/08/25 1045  pantoprazole (PROTONIX) EC tablet 40 mg  Every Early Morning         01/08/25 0953    01/07/25 1215  tamsulosin (FLOMAX) 24 hr capsule 0.4 mg  Daily         01/07/25 1127    01/07/25 0945  budesonide (PULMICORT) nebulizer solution 0.5 mg  2 Times Daily - RT         01/07/25 0849    01/07/25 0900  ipratropium-albuterol (DUO-NEB) nebulizer solution 3 mL  Every 4 Hours PRN         01/07/25 0849    01/05/25 0905  Urinary Catheter Care  Every Shift      Placed in \"And\" Linked Group    01/05/25 0905    01/05/25 0400  Basic Metabolic Panel  Daily       01/04/25 1752    01/04/25 0900  aspirin chewable tablet 81 mg  Daily         01/03/25 1632    01/04/25 0400  CBC & Differential  Daily       01/04/25 1752    01/03/25 2100  sodium chloride 0.9 % flush 10 mL  Every 12 Hours Scheduled         01/03/25 1630    01/03/25 1800  Incentive Spirometry  Every 2 Hours While Awake       01/03/25 1630    01/03/25 1628  ondansetron ODT (ZOFRAN-ODT) disintegrating tablet 4 mg  Every 6 Hours PRN         01/03/25 1630    01/03/25 1622  Arterial Line Monitoring  Every Shift       01/03/25 1630    01/03/25 1621  sodium chloride 0.9 % flush 10 mL  As Needed         01/03/25 1630    01/03/25 1621  sodium chloride 0.9 % infusion 40 mL  As Needed         01/03/25 1630    01/03/25 1118  atorvastatin (LIPITOR) tablet 10 mg  Daily         01/03/25 1102    01/03/25 1104  hydrALAZINE (APRESOLINE) injection 10 mg  Every 4 Hours PRN         01/03/25 1104    01/03/25 1002  sodium chloride 0.9 % flush 10 mL  Every 12 Hours Scheduled         01/03/25 0946    01/03/25 1000  Vital Signs Every Hour and Per Hospital Policy Based on Patient Condition  Every Hour       01/03/25 0946    01/03/25 1000  Intake & Output  Every Hour       01/03/25 0946    01/03/25 0945  Oral Care - Patient Not on NPPV & Not Intubated  Every Shift       01/03/25 0946    01/03/25 0944  nitroglycerin " (NITROSTAT) SL tablet 0.4 mg  Every 5 Minutes PRN         01/03/25 0946    01/03/25 0944  sodium chloride 0.9 % flush 10 mL  As Needed         01/03/25 0946    01/03/25 0944  sodium chloride 0.9 % infusion 40 mL  As Needed         01/03/25 0946    01/03/25 0303  sodium chloride 0.9 % flush 10 mL  As Needed         01/03/25 0303    Unscheduled  Blood Gas, Arterial -  As Needed       01/03/25 1824    --  aspirin 325 MG EC tablet  Every 6 Hours PRN         01/03/25 0313    --  hydrALAZINE (APRESOLINE) 25 MG tablet  3 Times Daily         01/03/25 0313    --  metoprolol succinate XL (TOPROL-XL) 50 MG 24 hr tablet  2 Times Daily         01/03/25 1752    --  Evolocumab (REPATHA) solution prefilled syringe injection  Every 14 Days         01/03/25 1752                     Physician Progress Notes (last 24 hours)        Maulik Osborn MD at 01/13/25 1249              [facility] Medicine Services  INPATIENT PROGRESS NOTE    Patient Name: Brijesh Grande  Date of Admission: 1/3/2025  Today's Date: 01/13/25  Length of Stay: 10  Primary Care Physician: Eliseo Smith MD    Subjective   Chief Complaint: Follow-up  HPI   This is my first encounter of patient  This 69-year-old man who initially was under the service of intensivist.  Patient was hypertensive and requiring nicardipine drip.  He got admitted on January 3 presenting with abdominal pain, left flank pain and back pain.  CT scan of the abdomen and pelvis done on admission revealed fusiform aneurysm of the distal abdominal aorta.  There is a partial lumen circumferential mural thrombus.  It also described evidence of hematoma/hemorrhage at the posterior wall of the aneurysm. 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    Patient been with hospital service since January 8.    Consultants:  Cardiology.  Last note dates back to  January 9 with assessment and plan as follows:  1.  Paroxysmal atrial fibrillation  2.  Contained rupture of infrarenal abdominal aortic aneurysm, now status post endovascular repair  3.  Anemia without active bleeding  4.  Primary hypertension  5.  Mixed hyperlipidemia  6.  Peripheral arterial disease including carotid artery disease  7.  Tobacco abuse  8.  Prior stroke     -There were two short episodes of elevated heart rate overnight, consistent with atrial fibrillation.  He is otherwise asymptomatic and hemodynamically stable.  -Continue therapeutic Lovenox at this time but plan to transition to oral anticoagulation, either Eliquis or Xarelto at discharge.  This can be at the discretion of the primary service, I have no preference in this matter as to which agent is used.  -Blood pressure still remains elevated.  Cardiology will comment on the patient's atrial fibrillation, however the primary service to manage the patient's blood pressure and adjust medications accordingly if needed.  -At discharge, the patient should be ordered a 14-day cardiac monitor - Zio patch.  -Should the patient have symptomatic episodes of atrial fibrillation while here or prolonged episodes, recommend electrophysiology consultation.  -At this time, we will be available as needed.  Please feel free to call if there are questions.         Vascular surgery-last note dated back January 9 with assessment as follow:  Assessment & Plan    Abdominal aortic aneurysm    AAA (abdominal aortic aneurysm)    Abdominal pain     69-year-old male postop day 6 EVAR with extension to left external iliac for rupture.        Plan for disposition:  -Hospitalist on board appreciate their assistance.  -Tachycardia resolved Lovenox started by hospitalist.  Continue aspirin DC Plavix with anticoagulation.  -Hemoglobin stable  -Faintly palpable left lower extremity pedal pulses with multiphasic signals.  Continue gabapentin for possible reperfusion pain versus  chronic pain from stroke.  -PT OT encouraged.  -BM x 2 yesterday.  Patient reports is nonbloody.  Denies abdominal pain.  -No further vascular intervention needed at this time.  -Patient to follow-up in 2 weeks for wound check.     Timmy Finney MD  Vascular Surgery  [phone]  01/09/25  09:44 CST    Procedure:  1/3/2025     PREOPERATIVE DIAGNOSIS: Abdominal pain, unspecified abdominal location [R10.9]  Ruptured abdominal aortic aneurysm     POSTOPERATIVE DIAGNOSIS: Post-Op Diagnosis Codes:     * Abdominal pain, unspecified abdominal location [R10.9]  Ruptured abdominal aortic aneurysm     PROCEDURE PERFORMED:   Percutaneous access and closure of right common femoral artery  Ultrasound-guided access of left common femoral artery  Placement of aortobiiliac endograft for rupture  Concurrent placement of extension stent to left external iliac artery  Left external iliac artery stent placement  Exposure of left common femoral artery with primary repair  Attempted left internal iliac artery coil embolization            Social service, therapist note and nursing staff note reviewed  Patient has been referred to rehab facility.    Vital signs stable.  Has had hypertensive blood pressure reading  Been transitioned to room air with saturation of 93  Hemoglobin stable  Creatinine stable    States that she has been having increased bowel movement and relates it to bowel regimen.  Noticed that patient is on MiraLAX twice daily and lactulose.  I will discontinue the lactulose.  Will monitor response to change just made in bowel regimen.  Review of Systems   All pertinent negatives and positives are as above. All other systems have been reviewed and are negative unless otherwise stated.     Objective    Temp:  [97.9 °F (36.6 °C)-99.6 °F (37.6 °C)] 97.9 °F (36.6 °C)  Heart Rate:  [69-85] 78  Resp:  [16-18] 16  BP: (125-185)/(62-99) 133/63  Physical Exam  Seated on bedside commode  Not in any distress  Noticeable weakness left  "upper extremity with posturing setting of prior stroke 9 years ago  Normal respiratory effort  Fluent speech  Appropriate affect  Well-built.    .        Results Review:  I have reviewed the labs, radiology results, and diagnostic studies.    Laboratory Data:   Results from last 7 days   Lab Units 01/13/25  0250 01/12/25  0336 01/11/25  0503   WBC 10*3/mm3 8.27 7.43 9.18   HEMOGLOBIN g/dL 9.7* 8.8* 9.6*   HEMATOCRIT % 31.0* 27.8* 30.4*   PLATELETS 10*3/mm3 374 336 332        Results from last 7 days   Lab Units 01/13/25  0250 01/12/25  0336 01/11/25  0503 01/10/25  0411   SODIUM mmol/L 137 137 136 138   POTASSIUM mmol/L 4.1 4.0 3.8 3.8   CHLORIDE mmol/L 101 102 99 100   CO2 mmol/L 28.0 28.0 31.0* 28.0   BUN mg/dL 13 16 14 12   CREATININE mg/dL 0.64* 0.56* 0.57* 0.51*   CALCIUM mg/dL 8.2* 8.0* 8.5* 8.1*   BILIRUBIN mg/dL 0.5 0.6  --  0.8   ALK PHOS U/L 70 59  --  60   ALT (SGPT) U/L 32 28  --  28   AST (SGOT) U/L 32 31  --  37   GLUCOSE mg/dL 121* 117* 120* 100*       Culture Data:   No results found for: \"BLOODCX\", \"URINECX\", \"WOUNDCX\", \"MRSACX\", \"RESPCX\", \"STOOLCX\"    Radiology Data:   Imaging Results (Last 24 Hours)       ** No results found for the last 24 hours. **            I have reviewed the patient's current medications.     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 Finney: Left external iliac artery stent placement complicated by ruptured left external iliac artery and Perclose failure left common femoral artery  Paroxysmal atrial fibrillation  (?) Operative blood loss anemia 14.7.  Current hemoglobin less than 10  Elevated CK (2436 on January 5) I did not see that this was followed up.  Otherwise normal creatinine.  " Etiology not clear  Status post mechanical ventilation for acute hypoxemic respiratory failure   Laryngeal edema  Suspected COPD  Tobacco abuse  ICU delirium  History of CVA with left-sided weakness.      Treatment Plan  Hemodynamically stable.  Rechecking CK particularly in the setting of atorvastatin.  Noted patient is on Lovenox at 1 mg/kg which should cover for DVT prophylaxis as well.  Plan to transition to oral anticoagulation either Eliquis or Xarelto at discharge per cardiology note.  14-day cardiac monitor-Zio patch  I received to vascular service regarding adding gabapentin  for possible reperfusion pain versus chronic pain as per their note dated January 9  Vascular service note indicates continuing aspirin will discontinue Plavix with anticoagulation.  Currently on aspirin and Lovenox with plan to transition to Eliquis with aspirin upon discharge.      Social service on discharge planning  Monitor blood pressure and address accordingly (carvedilol, losartan)      Medications reviewed  aspirin, 81 mg, Oral, Daily  atorvastatin, 10 mg, Oral, Daily  bisacodyl, 10 mg, Oral, Daily  budesonide, 0.5 mg, Nebulization, BID - RT  carvedilol, 25 mg, Oral, BID With Meals  enoxaparin, 1 mg/kg, Subcutaneous, Q12H  lactulose, 20 g, Oral, TID  losartan, 100 mg, Oral, Daily  melatonin, 10 mg, Oral, Nightly  pantoprazole, 40 mg, Oral, Q AM  polyethylene glycol, 17 g, Oral, BID  sodium chloride, 10 mL, Intravenous, Q12H  sodium chloride, 10 mL, Intravenous, Q12H  tamsulosin, 0.4 mg, Oral, Daily          Conditions and Status  Fair     MDM Data  External documents reviewed: Reviewed epic record  Cardiac tracing (EKG, telemetry) interpretation: -  Radiology interpretation: Reviewed  Labs reviewed: Yes  Any tests that were considered but not ordered: None     Decision rules/scores evaluated (example [id number], Wells, etc): -     Discussed with: Patient     Care Planning  Shared decision making: Patient  Code status and  discussions: Full code    Disposition  Social Determinants of Health that impact treatment or disposition: None identified at this time  I expect the patient to be discharged to (?)         Electronically signed by Maulik Osborn MD, 01/13/25, 12:49 CST.     Electronically signed by Maulik Osborn MD at 01/13/25 1315       Consult Notes (last 24 hours)  Notes from 01/12/25 1814 through 01/13/25 1814   No notes of this type exist for this encounter.

## 2025-01-14 NOTE — PLAN OF CARE
Goal Outcome Evaluation:           Progress: no change  Outcome Evaluation: Patient up with assist. C/o pain. See Mar. Voiding. Family at bedside.

## 2025-01-14 NOTE — THERAPY TREATMENT NOTE
Acute Care - Physical Therapy Treatment Note  [facility]     Patient Name: Brijesh Grande  : 1955  MRN: [id number]  Today's Date: 2025      Visit Dx:     ICD-10-CM ICD-9-CM   1. Aneurysm of infrarenal abdominal aorta, unspecified whether ruptured  I71.43 441.4   2. Abdominal pain, unspecified abdominal location  R10.9 789.00   3. Abdominal aortic aneurysm (AAA) without rupture, unspecified part  I71.40 441.4   4. Primary hypertension  I10 401.9   5. Impaired mobility [Z74.09]  Z74.09 799.89     Patient Active Problem List   Diagnosis    AAA (abdominal aortic aneurysm)    Abdominal aortic aneurysm    Abdominal pain     Past Medical History:   Diagnosis Date    Hyperlipidemia     Hypertension     PAD (peripheral artery disease)     Stroke      Past Surgical History:   Procedure Laterality Date    ABDOMINAL AORTIC ANEURYSM REPAIR WITH ENDOGRAFT N/A 1/3/2025    Procedure: ABDOMINAL AORTIC ANEURYSM REPAIR WITH ENDOGRAFT;  Surgeon: Eduardo Whiteside DO;  Location: [facility] OR;  Service: Vascular;  Laterality: N/A;    CAROTID ENDARTERECTOMY      x 2     PT Assessment (Last 12 Hours)       PT Evaluation and Treatment       Row Name 25 1440          Physical Therapy Time and Intention    Subjective Information complains of;weakness;pain  -AE     Document Type therapy note (daily note)  -AE     Mode of Treatment physical therapy  -AE       Row Name 25 1440          General Information    Existing Precautions/Restrictions fall  L sided weakness from old CVA  -AE       Row Name 25 1440          Pain    Pretreatment Pain Rating 5/10  -AE     Posttreatment Pain Rating 5/10  -AE     Pain Location groin  -AE       Row Name 25 1440          Bed Mobility    Sit-Supine Cement (Bed Mobility) supervision  -AE     Comment, (Bed Mobility) seated EOB  -AE       Row Name 25 1440          Sit-Stand Transfer    Sit-Stand Cement (Transfers) contact guard;verbal cues;minimum assist (75%  patient effort)  -AE       Row Name 01/14/25 1440          Gait/Stairs (Locomotion)    [place] Level (Gait) contact guard;minimum assist (75% patient effort)  -AE     Assistive Device (Gait) cane, straight  -AE     Distance in Feet (Gait) 20  X 3  -AE     Left Sided Gait Deviations hip hiking;hip circumduction;foot drop/toe drag  -AE     Comment, (Gait/Stairs) IMPAIRED BALANCE   -AE       Row Name 01/14/25 1440          Hip (Therapeutic Exercise)    Hip (Therapeutic Exercise) AROM (active range of motion)  -AE     Hip AROM (Therapeutic Exercise) bilateral;20 repititions  LIMITED ROM L LE  -AE       Row Name 01/14/25 1440          Knee (Therapeutic Exercise)    Knee AROM (Therapeutic Exercise) LAQ (long arc quad);20 repititions  Limited ROM L LE  -AE       Row Name 01/14/25 1440          Ankle (Therapeutic Exercise)    Ankle (Therapeutic Exercise) AROM (active range of motion)  -AE     Ankle AROM (Therapeutic Exercise) bilateral;dorsiflexion;plantarflexion;20 repititions  LIMITED ROM L LE  -AE       Row Name             Wound 01/03/25 1622 Right anterior groin    Wound - Properties Group Placement Date: 01/03/25  -AC Placement Time: 1622  -AC Side: Right  -AC Orientation: anterior  -AC Location: groin  -AC Primary Wound Type: Incision  -AC Additional Comments: PERCLOSE X2 2X2 TEGADERM  -AC    Retired Wound - Properties Group Placement Date: 01/03/25  -AC Placement Time: 1622  -AC Side: Right  -AC Orientation: anterior  -AC Location: groin  -AC Primary Wound Type: Incision  -AC Additional Comments: PERCLOSE X2 2X2 TEGADERM  -AC    Retired Wound - Properties Group Placement Date: 01/03/25  -AC Placement Time: 1622  -AC Side: Right  -AC Orientation: anterior  -AC Location: groin  -AC Primary Wound Type: Incision  -AC Additional Comments: PERCLOSE X2 2X2 TEGADERM  -AC    Retired Wound - Properties Group Date first assessed: 01/03/25  -AC Time first assessed: 1622  -AC Side: Right  -AC Location: groin  -AC Primary  Wound Type: Incision  -AC Additional Comments: PERCLOSE X2 2X2 TEGADERM  -AC      Row Name             Wound 01/03/25 1622 Left anterior groin    Wound - Properties Group Placement Date: 01/03/25  -AC Placement Time: 1622  -AC Side: Left  -AC Orientation: anterior  -AC Location: groin  -AC Primary Wound Type: Incision  -AC Additional Comments: STERISTRIPS 4X4 TEGADERM  -AC    Retired Wound - Properties Group Placement Date: 01/03/25  -AC Placement Time: 1622  -AC Side: Left  -AC Orientation: anterior  -AC Location: groin  -AC Primary Wound Type: Incision  -AC Additional Comments: STERISTRIPS 4X4 TEGADERM  -AC    Retired Wound - Properties Group Placement Date: 01/03/25  -AC Placement Time: 1622  -AC Side: Left  -AC Orientation: anterior  -AC Location: groin  -AC Primary Wound Type: Incision  -AC Additional Comments: STERISTRIPS 4X4 TEGADERM  -AC    Retired Wound - Properties Group Date first assessed: 01/03/25  -AC Time first assessed: 1622  -AC Side: Left  -AC Location: groin  -AC Primary Wound Type: Incision  -AC Additional Comments: STERISTRIPS 4X4 TEGADERM  -AC      Row Name 01/14/25 1440          Vital Signs    Pre SpO2 (%) 96  -AE     O2 Delivery Pre Treatment room air  -AE     Post SpO2 (%) 94  -AE     O2 Delivery Post Treatment room air  -AE       Row Name 01/14/25 1440          Positioning and Restraints    Pre-Treatment Position in bed  -AE     Post Treatment Position bed  -AE     In Bed fowlers;call light within reach  -AE               User Key  (r) = Recorded By, (t) = Taken By, (c) = Cosigned By      Initials Name Provider Type    AE Irene Felton PTA Physical Therapist Assistant    Zara Reid RN Registered Nurse                    Physical Therapy Education       Title: PT OT SLP Therapies (In Progress)       Topic: Physical Therapy (In Progress)       Point: Mobility training (Done)       Learning Progress Summary            Patient Acceptance, CRISTEL ROSAS,NR by BARON at 1/9/2025 3295     Comment: progression with amb with managing pain    RALPH Arceo VU by BARON at 1/8/2025 1131    Comment: have family bring pt's cane to progress with amb    RALPH Arceo VU,LOKESH,NR by NITHIN at 1/6/2025 0824    Comment: Benefits of activity, progression of PT POC,                      Point: Home exercise program (Not Started)       Learner Progress:  Not documented in this visit.              Point: Body mechanics (Not Started)       Learner Progress:  Not documented in this visit.              Point: Precautions (Not Started)       Learner Progress:  Not documented in this visit.                              User Key       Initials Effective Dates Name Provider Type Discipline    NITHIN 02/03/23 -  Frnacis Hung, PT DPT Physical Therapist PT    BARON 02/03/23 -  Maurice Rose, PTA Physical Therapist Assistant PT                  PT Recommendation and Plan     Progress: improving  Outcome Evaluation: He was SBA supine to sit.He was  CGA/SBA for sit to stand.He was able to walk a total of 20' at one time in room while using straight cane. Pt was CGA-min x 1 to walk and had several LOB. Pt. lived at home independently prior to admission. He would benefit from further rehab to further progress his independence.   Outcome Measures       Row Name 01/13/25 1314 01/12/25 1618 01/11/25 1552       How much help from another person do you currently need...    Turning from your back to your side while in flat bed without using bedrails? 4  -MF 4  -MF 4  -MF    Moving from lying on back to sitting on the side of a flat bed without bedrails? 3  -MF 3  -MF 3  -MF    Moving to and from a bed to a chair (including a wheelchair)? 3  -MF 3  -MF 3  -MF    Standing up from a chair using your arms (e.g., wheelchair, bedside chair)? 3  -MF 3  -MF 3  -MF    Climbing 3-5 steps with a railing? 1  -MF 1  -MF 1  -MF    To walk in hospital room? 3  -MF 3  -MF 3  -MF    AM-PAC 6 Clicks Score (PT) 17  -MF 17  -MF 17  -MF       Functional  Assessment    Outcome Measure Options AM-PAC 6 Clicks Basic Mobility (PT)  -MF AM-PAC 6 Clicks Basic Mobility (PT)  -MF AM-PAC 6 Clicks Basic Mobility (PT)  -MF              User Key  (r) = Recorded By, (t) = Taken By, (c) = Cosigned By      Initials Name Provider Type    Gabby Rossi PTA Physical Therapist Assistant                     Time Calculation:    PT Charges       Row Name 01/14/25 1529             Time Calculation    Start Time 1440  -AE      Stop Time 1510  -AE      Time Calculation (min) 30 min  -AE      PT Received On 01/14/25  -AE      PT Goal Re-Cert Due Date 01/16/25  -AE         Time Calculation- PT    Total Timed Code Minutes- PT 30 minute(s)  -AE         Timed Charges    53626 - PT Therapeutic Exercise Minutes 15  -AE      77790 - Gait Training Minutes  15  -AE         Total Minutes    Timed Charges Total Minutes 30  -AE       Total Minutes 30  -AE                User Key  (r) = Recorded By, (t) = Taken By, (c) = Cosigned By      Initials Name Provider Type    AE Irene Felton PTA Physical Therapist Assistant                  Therapy Charges for Today       Code Description Service Date Service Provider Modifiers Qty    87249952741 HC GAIT TRAINING EA 15 MIN 1/14/2025 Irene Felton PTA GP 1    69084580396 HC PT THER PROC EA 15 MIN 1/14/2025 Irene Felton PTA GP 1            PT G-Codes  Outcome Measure Options: AM-PAC 6 Clicks Daily Activity (OT)  AM-PAC 6 Clicks Score (PT): 17  AM-PAC 6 Clicks Score (OT): 17    Irene Felton PTA  1/14/2025

## 2025-01-14 NOTE — PROGRESS NOTES
[facility] Medicine Services  INPATIENT PROGRESS NOTE    Patient Name: Brijesh Grande  Date of Admission: 1/3/2025  Today's Date: 01/14/25  Length of Stay: 11  Primary Care Physician: Eliseo Smith MD    Subjective   Chief Complaint: Follow-up  HPI   No new event  No new complaint  States that the bowel movement has significantly decreased since discontinuation of lactulose.    Review of Systems   All pertinent negatives and positives are as above. All other systems have been reviewed and are negative unless otherwise stated.     Objective    Temp:  [97.7 °F (36.5 °C)-98.5 °F (36.9 °C)] 98.3 °F (36.8 °C)  Heart Rate:  [69-84] 74  Resp:  [16-20] 16  BP: (133-177)/(63-91) 177/91  Physical Exam  Comfortably in bed  Nurse Radha at bedside  Normocephalic/atraumatic head  Anicteric sclera  No thyromegaly  Lungs clear to auscultation  S1-S2 regular rate and rhythm, telemetry showed normal sinus rhythm at 73 at the time of visit  Soft abdomen, nontender, no peripheral edema.  He still has a bilateral dressing on groin area.  He has some ecchymosis lateral aspect of right thigh, bruising on groin areas bilateral, small bruise on his scrotum.  I did not appreciate any gross hematoma  Fluent speech  Appropriate affect  Well-built.    .        Results Review:  I have reviewed the labs, radiology results, and diagnostic studies.    Laboratory Data:   Results from last 7 days   Lab Units 01/14/25 0327 01/13/25 0250 01/12/25  0336   WBC 10*3/mm3 7.97 8.27 7.43   HEMOGLOBIN g/dL 9.9* 9.7* 8.8*   HEMATOCRIT % 31.8* 31.0* 27.8*   PLATELETS 10*3/mm3 394 374 336        Results from last 7 days   Lab Units 01/14/25 0327 01/13/25  0250 01/12/25  0336 01/11/25  0503 01/10/25  0411   SODIUM mmol/L 138 137 137   < > 138   POTASSIUM mmol/L 3.6 4.1 4.0   < > 3.8   CHLORIDE mmol/L 101 101 102   < > 100   CO2 mmol/L 29.0 28.0 28.0   < > 28.0   BUN mg/dL 9 13 16   < > 12   CREATININE mg/dL 0.51*  "0.64* 0.56*   < > 0.51*   CALCIUM mg/dL 8.2* 8.2* 8.0*   < > 8.1*   BILIRUBIN mg/dL  --  0.5 0.6  --  0.8   ALK PHOS U/L  --  70 59  --  60   ALT (SGPT) U/L  --  32 28  --  28   AST (SGOT) U/L  --  32 31  --  37   GLUCOSE mg/dL 112* 121* 117*   < > 100*    < > = values in this interval not displayed.       Culture Data:   No results found for: \"BLOODCX\", \"URINECX\", \"WOUNDCX\", \"MRSACX\", \"RESPCX\", \"STOOLCX\"    Radiology Data:   Imaging Results (Last 24 Hours)       ** No results found for the last 24 hours. **            I have reviewed the patient's current medications.     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 Finney: Left external iliac artery stent placement complicated by ruptured left external iliac artery and Perclose failure left common femoral artery  Paroxysmal atrial fibrillation maintaining normal sinus on telemetry  (?) Operative blood loss anemia 14.7.  Current hemoglobin less than 10  Elevated CK (2436 on January 5) I did not see that this was followed up.  Otherwise normal creatinine.  Etiology not clear.  This is now resolved January 14 (CK1 45)  Status post mechanical ventilation for acute hypoxemic respiratory failure   Laryngeal edema-resolved  Suspected COPD-will need follow-up in outpatient consideration for pulmonary function test  Tobacco abuse  ICU delirium  History of CVA with left-sided weakness.  Diarrhea believed related to bowel regimen-improved      Treatment Plan  Hemodynamically stable.  Rechecking CK particularly in the setting of atorvastatin.  Noted patient is on Lovenox at 1 mg/kg which should cover for DVT prophylaxis as well.  Plan to transition to oral anticoagulation either Eliquis or " Xarelto at discharge per cardiology note.  14-day cardiac monitor-Zio patch  I received to vascular service regarding adding gabapentin  for possible reperfusion pain versus chronic pain as per their note dated January 9  Vascular service note indicates continuing aspirin will discontinue Plavix with anticoagulation.  Currently on aspirin and Lovenox with plan to transition to Eliquis with aspirin upon discharge.      Social service on discharge planning  Monitor blood pressure and address accordingly (carvedilol, losartan)    January 14.  CKD noted back to normal  Xarelto at discharge per cardiology note.  14-day cardiac monitor-Zio patch  I received to vascular service regarding adding gabapentin  for possible reperfusion pain versus chronic pain as per their note dated January 9  Vascular service note indicates continuing aspirin will discontinue Plavix with anticoagulation.  Currently on aspirin and Lovenox with plan to transition to Eliquis with aspirin upon discharge.  Improvement in bowel movement after addressing bowel regimen.  Elevated blood pressure.  Patient on carvedilol maximum dose, losartan at 100.  Will add Norvasc.  Monitor.      Medications reviewed  aspirin, 81 mg, Oral, Daily  atorvastatin, 10 mg, Oral, Daily  bisacodyl, 10 mg, Oral, Daily  budesonide, 0.5 mg, Nebulization, BID - RT  carvedilol, 25 mg, Oral, BID With Meals  enoxaparin, 1 mg/kg, Subcutaneous, Q12H  losartan, 100 mg, Oral, Daily  melatonin, 10 mg, Oral, Nightly  pantoprazole, 40 mg, Oral, Q AM  [START ON 1/15/2025] polyethylene glycol, 17 g, Oral, Daily  sodium chloride, 10 mL, Intravenous, Q12H  sodium chloride, 10 mL, Intravenous, Q12H  tamsulosin, 0.4 mg, Oral, Daily      PT note reviewed: stood with SBA while PTA assisted with wiping following use of BSC. He was able to walk a total of 20' at one time in room while using straight cane. Pt leans to the right and has to end quickly due to pain in Left thigh during gait. Pt.  lived at home independently prior to admission. he would benefit from further rehab to further progress his independence.     Conditions and Status  Fair     [facility] Data  External documents reviewed: Reviewed epic record  Cardiac tracing (EKG, telemetry) interpretation: Maintaining normal sinus rhythm  Radiology interpretation: Reviewed  Labs reviewed: Yes  Any tests that were considered but not ordered: None     Decision rules/scores evaluated (example [id number], [place], etc): -     Discussed with: Patient, nurse Radha     Care Planning  Shared decision making: Patient  Code status and discussions: Full code    Disposition  Social Determinants of Health that impact treatment or disposition: None identified at this time  I expect the patient to be discharged to (?) pending peer to peer review/insurance approval for rehab facility          Electronically signed by Maulik Osborn MD, 01/14/25, 11:03 CST.

## 2025-01-14 NOTE — DISCHARGE PLACEMENT REQUEST
[facility] Medicine Services  INPATIENT PROGRESS NOTE     Patient Name: Brijesh Grande  Date of Admission: 1/3/2025  Today's Date: 01/14/25  Length of Stay: 11  Primary Care Physician: Eliseo Smith MD     Subjective   Chief Complaint: Follow-up  HPI   No new event  No new complaint  States that the bowel movement has significantly decreased since discontinuation of lactulose.     Review of Systems   All pertinent negatives and positives are as above. All other systems have been reviewed and are negative unless otherwise stated.      Objective    Temp:  [97.7 °F (36.5 °C)-98.5 °F (36.9 °C)] 98.3 °F (36.8 °C)  Heart Rate:  [69-84] 74  Resp:  [16-20] 16  BP: (133-177)/(63-91) 177/91  Physical Exam  Comfortably in bed  Nurse Radha at bedside  Normocephalic/atraumatic head  Anicteric sclera  No thyromegaly  Lungs clear to auscultation  S1-S2 regular rate and rhythm, telemetry showed normal sinus rhythm at 73 at the time of visit  Soft abdomen, nontender, no peripheral edema.  He still has a bilateral dressing on groin area.  He has some ecchymosis lateral aspect of right thigh, bruising on groin areas bilateral, small bruise on his scrotum.  I did not appreciate any gross hematoma  Fluent speech  Appropriate affect  Well-built.     .           Results Review:  I have reviewed the labs, radiology results, and diagnostic studies.     Laboratory Data:          Results from last 7 days   Lab Units 01/14/25 0327 01/13/25 0250 01/12/25  0336   WBC 10*3/mm3 7.97 8.27 7.43   HEMOGLOBIN g/dL 9.9* 9.7* 8.8*   HEMATOCRIT % 31.8* 31.0* 27.8*   PLATELETS 10*3/mm3 394 374 336                  Results from last 7 days   Lab Units 01/14/25 0327 01/13/25  0250 01/12/25  0336 01/11/25  0503 01/10/25  0411   SODIUM mmol/L 138 137 137   < > 138   POTASSIUM mmol/L 3.6 4.1 4.0   < > 3.8   CHLORIDE mmol/L 101 101 102   < > 100   CO2 mmol/L 29.0 28.0 28.0   < > 28.0   BUN mg/dL 9 13 16   < > 12  "  CREATININE mg/dL 0.51* 0.64* 0.56*   < > 0.51*   CALCIUM mg/dL 8.2* 8.2* 8.0*   < > 8.1*   BILIRUBIN mg/dL  --  0.5 0.6  --  0.8   ALK PHOS U/L  --  70 59  --  60   ALT (SGPT) U/L  --  32 28  --  28   AST (SGOT) U/L  --  32 31  --  37   GLUCOSE mg/dL 112* 121* 117*   < > 100*    < > = values in this interval not displayed.         Culture Data:   No results found for: \"BLOODCX\", \"URINECX\", \"WOUNDCX\", \"MRSACX\", \"RESPCX\", \"STOOLCX\"     Radiology Data:   Imaging Results (Last 24 Hours)         ** No results found for the last 24 hours. **                I have reviewed the patient's current medications.      Assessment/Plan   Assessment       Active Hospital Problems     Diagnosis      **Abdominal aortic aneurysm      AAA (abdominal aortic aneurysm)      Abdominal pain                    Medical Decision Making  Number and Complexity of problems:   Status post placement of aortobiiliac endograft for ruptured abdominal aortic aneurysm on January 3 by Dr. Finney: Left external iliac artery stent placement complicated by ruptured left external iliac artery and Perclose failure left common femoral artery  Paroxysmal atrial fibrillation maintaining normal sinus on telemetry  (?) Operative blood loss anemia 14.7.  Current hemoglobin less than 10  Elevated CK (2436 on January 5) I did not see that this was followed up.  Otherwise normal creatinine.  Etiology not clear.  This is now resolved January 14 (CK1 45)  Status post mechanical ventilation for acute hypoxemic respiratory failure   Laryngeal edema-resolved  Suspected COPD-will need follow-up in outpatient consideration for pulmonary function test  Tobacco abuse  ICU delirium  History of CVA with left-sided weakness.  Diarrhea believed related to bowel regimen-improved        Treatment Plan  Hemodynamically stable.  Rechecking CK particularly in the setting of atorvastatin.  Noted patient is on Lovenox at 1 mg/kg which should cover for DVT prophylaxis as well.  Plan to " transition to oral anticoagulation either Eliquis or Xarelto at discharge per cardiology note.  14-day cardiac monitor-Zio patch  I received to vascular service regarding adding gabapentin  for possible reperfusion pain versus chronic pain as per their note dated January 9  Vascular service note indicates continuing aspirin will discontinue Plavix with anticoagulation.  Currently on aspirin and Lovenox with plan to transition to Eliquis with aspirin upon discharge.        Social service on discharge planning  Monitor blood pressure and address accordingly (carvedilol, losartan)     January 14.  CKD noted back to normal  Xarelto at discharge per cardiology note.  14-day cardiac monitor-Zio patch  I received to vascular service regarding adding gabapentin  for possible reperfusion pain versus chronic pain as per their note dated January 9  Vascular service note indicates continuing aspirin will discontinue Plavix with anticoagulation.  Currently on aspirin and Lovenox with plan to transition to Eliquis with aspirin upon discharge.  Improvement in bowel movement after addressing bowel regimen.  Elevated blood pressure.  Patient on carvedilol maximum dose, losartan at 100.  Will add Norvasc.  Monitor.        Medications reviewed    Scheduled Medication   aspirin, 81 mg, Oral, Daily  atorvastatin, 10 mg, Oral, Daily  bisacodyl, 10 mg, Oral, Daily  budesonide, 0.5 mg, Nebulization, BID - RT  carvedilol, 25 mg, Oral, BID With Meals  enoxaparin, 1 mg/kg, Subcutaneous, Q12H  losartan, 100 mg, Oral, Daily  melatonin, 10 mg, Oral, Nightly  pantoprazole, 40 mg, Oral, Q AM  [START ON 1/15/2025] polyethylene glycol, 17 g, Oral, Daily  sodium chloride, 10 mL, Intravenous, Q12H  sodium chloride, 10 mL, Intravenous, Q12H  tamsulosin, 0.4 mg, Oral, Daily         PT note reviewed: stood with SBA while PTA assisted with wiping following use of BSC. He was able to walk a total of 20' at one time in room while using straight cane. Pt  leans to the right and has to end quickly due to pain in Left thigh during gait. Pt. lived at home independently prior to admission. he would benefit from further rehab to further progress his independence.      Conditions and Status  Fair     [facility] Data  External documents reviewed: Reviewed epic record  Cardiac tracing (EKG, telemetry) interpretation: Maintaining normal sinus rhythm  Radiology interpretation: Reviewed  Labs reviewed: Yes  Any tests that were considered but not ordered: None     Decision rules/scores evaluated (example [id number], Wells, etc): -     Discussed with: Patient, nurse Radha     Care Planning  Shared decision making: Patient  Code status and discussions: Full code     Disposition  Social Determinants of Health that impact treatment or disposition: None identified at this time  I expect the patient to be discharged to (?) pending peer to peer review/insurance approval for rehab facility              Electronically signed by Maulik Osborn MD, 01/14/25, 11:03 CST.          [facility] Medicine Services  INPATIENT PROGRESS NOTE     Patient Name: Brijesh Grande  Date of Admission: 1/3/2025  Today's Date: 01/13/25  Length of Stay: 10  Primary Care Physician: Eliseo Smith MD     Subjective   Chief Complaint: Follow-up  HPI   This is my first encounter of patient  This 69-year-old man who initially was under the service of intensivist.  Patient was hypertensive and requiring nicardipine drip.  He got admitted on January 3 presenting with abdominal pain, left flank pain and back pain.  CT scan of the abdomen and pelvis done on admission revealed fusiform aneurysm of the distal abdominal aorta.  There is a partial lumen circumferential mural thrombus.  It also described evidence of hematoma/hemorrhage at the posterior wall of the aneurysm. A saccular aneurysm of the mid abdominal aorta adjacent and below the level of the right renal arteries and  posterior to the IVC was also found.    Patient been with hospital service since January 8.     Consultants:  Cardiology.  Last note dates back to January 9 with assessment and plan as follows:  1.  Paroxysmal atrial fibrillation  2.  Contained rupture of infrarenal abdominal aortic aneurysm, now status post endovascular repair  3.  Anemia without active bleeding  4.  Primary hypertension  5.  Mixed hyperlipidemia  6.  Peripheral arterial disease including carotid artery disease  7.  Tobacco abuse  8.  Prior stroke     -There were two short episodes of elevated heart rate overnight, consistent with atrial fibrillation.  He is otherwise asymptomatic and hemodynamically stable.  -Continue therapeutic Lovenox at this time but plan to transition to oral anticoagulation, either Eliquis or Xarelto at discharge.  This can be at the discretion of the primary service, I have no preference in this matter as to which agent is used.  -Blood pressure still remains elevated.  Cardiology will comment on the patient's atrial fibrillation, however the primary service to manage the patient's blood pressure and adjust medications accordingly if needed.  -At discharge, the patient should be ordered a 14-day cardiac monitor - Zio patch.  -Should the patient have symptomatic episodes of atrial fibrillation while here or prolonged episodes, recommend electrophysiology consultation.  -At this time, we will be available as needed.  Please feel free to call if there are questions.           Vascular surgery-last note dated back January 9 with assessment as follow:  Assessment & Plan    Abdominal aortic aneurysm    AAA (abdominal aortic aneurysm)    Abdominal pain     69-year-old male postop day 6 EVAR with extension to left external iliac for rupture.        Plan for disposition:  -Hospitalist on board appreciate their assistance.  -Tachycardia resolved Lovenox started by hospitalist.  Continue aspirin DC Plavix with  anticoagulation.  -Hemoglobin stable  -Faintly palpable left lower extremity pedal pulses with multiphasic signals.  Continue gabapentin for possible reperfusion pain versus chronic pain from stroke.  -PT OT encouraged.  -BM x 2 yesterday.  Patient reports is nonbloody.  Denies abdominal pain.  -No further vascular intervention needed at this time.  -Patient to follow-up in 2 weeks for wound check.     Timmy Finney MD  Vascular Surgery  [phone]  01/09/25  09:44 CST     Procedure:  1/3/2025     PREOPERATIVE DIAGNOSIS: Abdominal pain, unspecified abdominal location [R10.9]  Ruptured abdominal aortic aneurysm     POSTOPERATIVE DIAGNOSIS: Post-Op Diagnosis Codes:     * Abdominal pain, unspecified abdominal location [R10.9]  Ruptured abdominal aortic aneurysm     PROCEDURE PERFORMED:   Percutaneous access and closure of right common femoral artery  Ultrasound-guided access of left common femoral artery  Placement of aortobiiliac endograft for rupture  Concurrent placement of extension stent to left external iliac artery  Left external iliac artery stent placement  Exposure of left common femoral artery with primary repair  Attempted left internal iliac artery coil embolization              Social service, therapist note and nursing staff note reviewed  Patient has been referred to rehab facility.     Vital signs stable.  Has had hypertensive blood pressure reading  Been transitioned to room air with saturation of 93  Hemoglobin stable  Creatinine stable     States that she has been having increased bowel movement and relates it to bowel regimen.  Noticed that patient is on MiraLAX twice daily and lactulose.  I will discontinue the lactulose.  Will monitor response to change just made in bowel regimen.  Review of Systems   All pertinent negatives and positives are as above. All other systems have been reviewed and are negative unless otherwise stated.      Objective    Temp:  [97.9 °F (36.6 °C)-99.6 °F (37.6 °C)]  "97.9 °F (36.6 °C)  Heart Rate:  [69-85] 78  Resp:  [16-18] 16  BP: (125-185)/(62-99) 133/63  Physical Exam  Seated on bedside commode  Not in any distress  Noticeable weakness left upper extremity with posturing setting of prior stroke 9 years ago  Normal respiratory effort  Fluent speech  Appropriate affect  Well-built.     .           Results Review:  I have reviewed the labs, radiology results, and diagnostic studies.     Laboratory Data:          Results from last 7 days   Lab Units 01/13/25 0250 01/12/25  0336 01/11/25  0503   WBC 10*3/mm3 8.27 7.43 9.18   HEMOGLOBIN g/dL 9.7* 8.8* 9.6*   HEMATOCRIT % 31.0* 27.8* 30.4*   PLATELETS 10*3/mm3 374 336 332                 Results from last 7 days   Lab Units 01/13/25 0250 01/12/25  0336 01/11/25  0503 01/10/25  0411   SODIUM mmol/L 137 137 136 138   POTASSIUM mmol/L 4.1 4.0 3.8 3.8   CHLORIDE mmol/L 101 102 99 100   CO2 mmol/L 28.0 28.0 31.0* 28.0   BUN mg/dL 13 16 14 12   CREATININE mg/dL 0.64* 0.56* 0.57* 0.51*   CALCIUM mg/dL 8.2* 8.0* 8.5* 8.1*   BILIRUBIN mg/dL 0.5 0.6  --  0.8   ALK PHOS U/L 70 59  --  60   ALT (SGPT) U/L 32 28  --  28   AST (SGOT) U/L 32 31  --  37   GLUCOSE mg/dL 121* 117* 120* 100*         Culture Data:   No results found for: \"BLOODCX\", \"URINECX\", \"WOUNDCX\", \"MRSACX\", \"RESPCX\", \"STOOLCX\"     Radiology Data:   Imaging Results (Last 24 Hours)         ** No results found for the last 24 hours. **                I have reviewed the patient's current medications.      Assessment/Plan   Assessment       Active Hospital Problems     Diagnosis      **Abdominal aortic aneurysm      AAA (abdominal aortic aneurysm)      Abdominal pain                    Medical Decision Making  Number and Complexity of problems:   Status post placement of aortobiiliac endograft for ruptured abdominal aortic aneurysm on January 3 by Dr. Finney: Left external iliac artery stent placement complicated by ruptured left external iliac artery and Perclose failure left " common femoral artery  Paroxysmal atrial fibrillation  (?) Operative blood loss anemia 14.7.  Current hemoglobin less than 10  Elevated CK (2436 on January 5) I did not see that this was followed up.  Otherwise normal creatinine.  Etiology not clear  Status post mechanical ventilation for acute hypoxemic respiratory failure   Laryngeal edema  Suspected COPD  Tobacco abuse  ICU delirium  History of CVA with left-sided weakness.        Treatment Plan  Hemodynamically stable.  Rechecking CK particularly in the setting of atorvastatin.  Noted patient is on Lovenox at 1 mg/kg which should cover for DVT prophylaxis as well.  Plan to transition to oral anticoagulation either Eliquis or Xarelto at discharge per cardiology note.  14-day cardiac monitor-Zio patch  I received to vascular service regarding adding gabapentin  for possible reperfusion pain versus chronic pain as per their note dated January 9  Vascular service note indicates continuing aspirin will discontinue Plavix with anticoagulation.  Currently on aspirin and Lovenox with plan to transition to Eliquis with aspirin upon discharge.        Social service on discharge planning  Monitor blood pressure and address accordingly (carvedilol, losartan)        Medications reviewed    Scheduled Medication   aspirin, 81 mg, Oral, Daily  atorvastatin, 10 mg, Oral, Daily  bisacodyl, 10 mg, Oral, Daily  budesonide, 0.5 mg, Nebulization, BID - RT  carvedilol, 25 mg, Oral, BID With Meals  enoxaparin, 1 mg/kg, Subcutaneous, Q12H  lactulose, 20 g, Oral, TID  losartan, 100 mg, Oral, Daily  melatonin, 10 mg, Oral, Nightly  pantoprazole, 40 mg, Oral, Q AM  polyethylene glycol, 17 g, Oral, BID  sodium chloride, 10 mL, Intravenous, Q12H  sodium chloride, 10 mL, Intravenous, Q12H  tamsulosin, 0.4 mg, Oral, Daily               Conditions and Status  Fair     [facility] Data  External documents reviewed: Reviewed epic record  Cardiac tracing (EKG, telemetry) interpretation: -  Radiology  interpretation: Reviewed  Labs reviewed: Yes  Any tests that were considered but not ordered: None     Decision rules/scores evaluated (example [id number], Wells, etc): -     Discussed with: Patient     Care Planning  Shared decision making: Patient  Code status and discussions: Full code     Disposition  Social Determinants of Health that impact treatment or disposition: None identified at this time  I expect the patient to be discharged to (?)            Electronically signed by Maulik Osborn MD, 25, 12:49 CST.                    Signed        Expand All Collapse All[]Expand All by Default  Acute Care - Physical Therapy Treatment Note  [facility]     Patient Name: Brijesh Grande               : 1955                        MRN: [id number]  Today's Date: 2025                                  Visit Dx:   Visit Diagnosis       ICD-10-CM ICD-9-CM   1. Aneurysm of infrarenal abdominal aorta, unspecified whether ruptured  I71.43 441.4   2. Abdominal pain, unspecified abdominal location  R10.9 789.00   3. Abdominal aortic aneurysm (AAA) without rupture, unspecified part  I71.40 441.4   4. Primary hypertension  I10 401.9   5. Impaired mobility [Z74.09]  Z74.09 799.89         Problem List       Patient Active Problem List   Diagnosis    AAA (abdominal aortic aneurysm)    Abdominal aortic aneurysm    Abdominal pain         Medical History        Past Medical History:   Diagnosis Date    Hyperlipidemia      Hypertension      PAD (peripheral artery disease)      Stroke           Surgical History         Past Surgical History:   Procedure Laterality Date    ABDOMINAL AORTIC ANEURYSM REPAIR WITH ENDOGRAFT N/A 1/3/2025     Procedure: ABDOMINAL AORTIC ANEURYSM REPAIR WITH ENDOGRAFT;  Surgeon: Eduardo Whiteside DO;  Location: [facility] OR;  Service: Vascular;  Laterality: N/A;    CAROTID ENDARTERECTOMY         x 2         PT Assessment (Last 12 Hours)            PT Evaluation and Treatment          Row Name 01/13/25 1314                 Physical Therapy Time and Intention     Subjective Information complains of;pain  -       Document Type therapy note (daily note)  -       Mode of Treatment physical therapy  -          Row Name 01/13/25 1314                 General Information     Existing Precautions/Restrictions fall  Left side weakness from old stroke  -          Row Name 01/13/25 1314                 Pain     Pretreatment Pain Rating 0/10 - no pain  -       Posttreatment Pain Rating 8/10  -       Pain Location other (see comments)  thigh  -       Pain Side/Orientation left  -       Pain Management Interventions exercise or physical activity utilized  -       Response to Pain Interventions activity participation with increased pain  -          Row Name 01/13/25 1314                 Bed Mobility     Sit-Supine [place] (Bed Mobility) modified independence  -       Assistive Device (Bed Mobility) bed rails  -[facility] Name 01/13/25 1314                 Transfers     Comment, (Transfers) stood x 5 during session  -[facility] Name 01/13/25 1314                 Sit-Stand Transfer     Sit-Stand [place] (Transfers) contact guard;standby assist  -          Row Name 01/13/25 1314                 Stand-Sit Transfer     Stand-Sit [place] (Transfers) contact guard  -[facility] Name 01/13/25 1314                 Toilet Transfer     Type (Toilet Transfer) sit-stand  -       [place] Level (Toilet Transfer) contact guard  -       Assistive Device (Toilet Transfer) commode, bedside without drop arms  -       Comment, (Toilet Transfer) on [facility] [place]. Pt stood and PTA wiped pt following BM. Pt. stood for 2 minutes with SBA.  -          Row Name 01/13/25 1314                 Gait/Stairs (Locomotion)     [place] Level (Gait) contact guard  -       Assistive Device (Gait) walker, mckenzie;cane, straight  -       Distance in Feet (Gait) --  10,20,20 with  sitting rests in between  -MF       Deviations/Abnormal Patterns (Gait) antalgic  -MF       Left Sided Gait Deviations hip hiking;hip circumduction;foot drop/toe drag  -MF       Right Sided Gait Deviations leans right  -MF          Row Name 01/13/25 1314                 Motor Skills     Comments, Therapeutic Exercise AROM of R LE-P-AAROm L LE in sitting  -MF          Row Name                      Wound 01/03/25 1622 Right anterior groin     Wound - Properties Group Placement Date: 01/03/25  -AC Placement Time: 1622  -AC Side: Right  -AC Orientation: anterior  -AC Location: groin  -AC Primary Wound Type: Incision  -AC Additional Comments: PERCLOSE X2 2X2 TEGADERM  -AC     Retired Wound - Properties Group Placement Date: 01/03/25  -AC Placement Time: 1622  -AC Side: Right  -AC Orientation: anterior  -AC Location: groin  -AC Primary Wound Type: Incision  -AC Additional Comments: PERCLOSE X2 2X2 TEGADERM  -AC     Retired Wound - Properties Group Placement Date: 01/03/25  -AC Placement Time: 1622  -AC Side: Right  -AC Orientation: anterior  -AC Location: groin  -AC Primary Wound Type: Incision  -AC Additional Comments: PERCLOSE X2 2X2 TEGADERM  -AC     Retired Wound - Properties Group Date first assessed: 01/03/25  -AC Time first assessed: 1622  -AC Side: Right  -AC Location: groin  -AC Primary Wound Type: Incision  -AC Additional Comments: PERCLOSE X2 2X2 TEGADERM  -AC        Row Name                      Wound 01/03/25 1622 Left anterior groin     Wound - Properties Group Placement Date: 01/03/25  -AC Placement Time: 1622  -AC Side: Left  -AC Orientation: anterior  -AC Location: groin  -AC Primary Wound Type: Incision  -AC Additional Comments: STERISTRIPS 4X4 TEGADERM  -AC     Retired Wound - Properties Group Placement Date: 01/03/25  -AC Placement Time: 1622  -AC Side: Left  -AC Orientation: anterior  -AC Location: groin  -AC Primary Wound Type: Incision  -AC Additional Comments: STERISTRIPS 4X4 TEGADERM  -AC      Retired Wound - Properties Group Placement Date: 01/03/25  -AC Placement Time: 1622 -AC Side: Left  -AC Orientation: anterior  -AC Location: groin  -AC Primary Wound Type: Incision  -AC Additional Comments: STERISTRIPS 4X4 TEGADERM  -AC     Retired Wound - Properties Group Date first assessed: 01/03/25  -AC Time first assessed: 1622  -AC Side: Left  -AC Location: groin  -AC Primary Wound Type: Incision  -AC Additional Comments: STERISTRIPS 4X4 TEGADERM  -AC        Row Name 01/13/25 1314                 Plan of Care Review     Plan of Care Reviewed With patient  -MF       Progress improving  -       Outcome Evaluation PT tx completed. Pt. up on BSC upon entry. He was CGa/SBA for sit to stand. Pt. stood with SBA while PTA assisted with wiping following use of BSC. He was able to walk a total of 20' at one time in room while using straight cane. Pt leans to the right and has to end quickly due to pain in Left thigh during gait. Pt. lived at home independently prior to admission. he would benefit from further rehab to further progress his independence.  -          Row Name 01/13/25 1314                 Positioning and Restraints     Pre-Treatment Position bedside commode  -       Post Treatment Position bed  -MF       In Bed supine;call light within reach;encouraged to call for assist;LLE elevated;side rails up x3  -                    User Key  (r) = Recorded By, (t) = Taken By, (c) = Cosigned By        Initials Name Provider Type     AC Zara Balbuena RN Registered Nurse     Gabby Rossi PTA Physical Therapist Assistant                             Physical Therapy Education            Title: PT OT SLP Therapies (In Progress)         Topic: Physical Therapy (In Progress)         Point: Mobility training (Done)         Learning Progress Summary             Patient Acceptance, E, VU,NR by BARON at 1/9/2025 1130     Comment: progression with amb with managing pain     Acceptance, E, VU by BARON at  1/8/2025 1131     Comment: have family bring pt's cane to progress with amb     RALPH Arceo VU, DU,NR by NITHIN at 1/6/2025 0824     Comment: Benefits of activity, progression of PT POC,                            Point: Home exercise program (Not Started)         Learner Progress:  Not documented in this visit.                  Point: Body mechanics (Not Started)         Learner Progress:  Not documented in this visit.                  Point: Precautions (Not Started)         Learner Progress:  Not documented in this visit.                                      User Key         Initials Effective Dates Name Provider Type Discipline     NITHIN 02/03/23 -  Francis Hung, PT DPT Physical Therapist PT     BARON 02/03/23 -  Maurice Rose, PTA Physical Therapist Assistant PT                          PT Recommendation and Plan  Plan of Care Reviewed With: patient  Progress: improving  Outcome Evaluation: PT tx completed. Pt. up on BSC upon entry. He was CGa/SBA for sit to stand. Pt. stood with SBA while PTA assisted with wiping following use of BSC. He was able to walk a total of 20' at one time in room while using straight cane. Pt leans to the right and has to end quickly due to pain in Left thigh during gait. Pt. lived at home independently prior to admission. he would benefit from further rehab to further progress his independence.    Outcome Measures         Row Name 01/13/25 1314 01/12/25 1618 01/11/25 1552             How much help from another person do you currently need...     Turning from your back to your side while in flat bed without using bedrails? 4  -MF 4  -MF 4  -MF     Moving from lying on back to sitting on the side of a flat bed without bedrails? 3  -MF 3  -MF 3  -MF     Moving to and from a bed to a chair (including a wheelchair)? 3  -MF 3  -MF 3  -MF     Standing up from a chair using your arms (e.g., wheelchair, bedside chair)? 3  -MF 3  -MF 3  -MF     Climbing 3-5 steps with a railing? 1  -MF 1  -MF  1  -MF     To walk in hospital room? 3  -MF 3  -MF 3  -MF     AM-PAC 6 Clicks Score (PT) 17  -MF 17  -MF 17  -MF             Functional Assessment     Outcome Measure Options AM-PAC 6 Clicks Basic Mobility (PT)  -MF AM-PAC 6 Clicks Basic Mobility (PT)  -MF AM-PAC 6 Clicks Basic Mobility (PT)  -MF                     User Key  (r) = Recorded By, (t) = Taken By, (c) = Cosigned By        Initials Name Provider Type     Gabby Rossi PTA Physical Therapist Assistant                              Time Calculation:     PT Charges         Row Name 01/13/25 1408                       Time Calculation     Start Time 1314  -MF         Stop Time 1355  -MF         Time Calculation (min) 41 min  -MF         PT Received On 01/13/25  -MF                 Time Calculation- PT     Total Timed Code Minutes- PT 41 minute(s)  -MF                 Timed Charges     45229 - PT Therapeutic Exercise Minutes 10  -MF         30047 - Gait Training Minutes  20  -MF         10459 - PT Therapeutic Activity Minutes 11  -MF                 Total Minutes     Timed Charges Total Minutes 41  -MF          Total Minutes 41  -MF                      User Key  (r) = Recorded By, (t) = Taken By, (c) = Cosigned By        Initials Name Provider Type     Gabby Rossi PTA Physical Therapist Assistant                       Therapy Charges for Today         Code Description Service Date Service Provider Modifiers Qty     38755531742 HC GAIT TRAINING EA 15 MIN 1/12/2025 Gabby Villa, PTA GP 1     64299078191 HC PT THERAPEUTIC ACT EA 15 MIN 1/12/2025 Gabby Villa, PTA GP 1     99945103282 HC PT THER PROC EA 15 MIN 1/13/2025 Gabby Villa, PTA GP 1     38246224775 HC GAIT TRAINING EA 15 MIN 1/13/2025 Gabby Villa, PTA GP 1     21453168870 HC PT THERAPEUTIC ACT EA 15 MIN 1/13/2025 Gabby Villa, PTA GP 1                PT G-Codes  Outcome Measure Options: AM-PAC 6 Clicks Basic Mobility (PT)  AM-PAC 6 Clicks Score  (PT): 17  AM-PAC 6 Clicks Score (OT): 15     Gabby Villa, PTA                   1/13/2025

## 2025-01-14 NOTE — CASE MANAGEMENT/SOCIAL WORK
Continued Stay Note  [facility]     Patient Name: Brijesh Grande  MRN: [id number]  Today's Date: 1/14/2025    Admit Date: 1/3/2025    Plan: [facility] pending peer to peer   Discharge Plan       Row Name 01/14/25 1151       Plan    Plan [facility] pending peer to peer    Patient/Family in Agreement with Plan yes    Plan Comments Insurance requesting a peer to peer at [phone]. Deadline tomorrow at 0800 central. Information submitted for physician advisor and secure chat sent to Dr. Carmen. Updates also submitted on [facility] per insurance request.    Addendum: OT was able to work with pt today. That update has been submitted to [facility] as well so peer to peer can be completed.                    Discharge Codes    No documentation.                 Expected Discharge Date and Time       Expected Discharge Date Expected Discharge Time    Bear 15, 2025               TALI Frederick

## 2025-01-14 NOTE — PLAN OF CARE
Problem: Adult Inpatient Plan of Care  Goal: Plan of Care Review  Recent Flowsheet Documentation  Taken 1/14/2025 1410 by Carlos Ross, OTR/L  Progress: no change  Outcome Evaluation: OT tx completed. Pt is alert and agreeable to therapy. Pt reports pain in groin 5/10 pre and post tx. Pt was was supervision with HOB elevated for supine to sit and sit to supine. Pt was CGA-supervision for sit to stand t/f. pt was CGA for functional mobility with straight cane.  Pt walked 3 laps within room, pt denies further distance. Pt visibly SOA, SpO2 and HR remain WFL. pt was supervision with set up to brush teeth and wash face. Pt was supervision with set up for UB and LB dressing tasks with additional time and effort of tasks. Verbal cues provided throughout session. Pt with decreased safety awareness noted. Continue OT POC.

## 2025-01-15 ENCOUNTER — READMISSION MANAGEMENT (OUTPATIENT)
Dept: CALL CENTER | Facility: HOSPITAL | Age: 70
End: 2025-01-15
Payer: MEDICARE

## 2025-01-15 ENCOUNTER — HOSPITAL ENCOUNTER (INPATIENT)
Dept: CARDIOLOGY | Facility: HOSPITAL | Age: 70
Discharge: HOME | End: 2025-01-15
Payer: MEDICARE

## 2025-01-15 VITALS
OXYGEN SATURATION: 94 % | BODY MASS INDEX: 31.94 KG/M2 | DIASTOLIC BLOOD PRESSURE: 66 MMHG | HEART RATE: 74 BPM | HEIGHT: 70 IN | RESPIRATION RATE: 18 BRPM | SYSTOLIC BLOOD PRESSURE: 108 MMHG | WEIGHT: 223.1 LBS | TEMPERATURE: 97.4 F

## 2025-01-15 DIAGNOSIS — I48.0 PAROXYSMAL ATRIAL FIBRILLATION: ICD-10-CM

## 2025-01-15 LAB
ANION GAP SERPL CALCULATED.3IONS-SCNC: 8 MMOL/L (ref 5–15)
BASOPHILS # BLD AUTO: 0.03 10*3/MM3 (ref 0–0.2)
BASOPHILS NFR BLD AUTO: 0.3 % (ref 0–1.5)
BUN SERPL-MCNC: 11 MG/DL (ref 8–23)
BUN/CREAT SERPL: 16.4 (ref 7–25)
CALCIUM SPEC-SCNC: 8.5 MG/DL (ref 8.6–10.5)
CHLORIDE SERPL-SCNC: 103 MMOL/L (ref 98–107)
CO2 SERPL-SCNC: 29 MMOL/L (ref 22–29)
CREAT SERPL-MCNC: 0.67 MG/DL (ref 0.76–1.27)
DEPRECATED RDW RBC AUTO: 49.6 FL (ref 37–54)
EGFRCR SERPLBLD CKD-EPI 2021: 99.8 ML/MIN/1.73
EOSINOPHIL # BLD AUTO: 0.18 10*3/MM3 (ref 0–0.4)
EOSINOPHIL NFR BLD AUTO: 2 % (ref 0.3–6.2)
ERYTHROCYTE [DISTWIDTH] IN BLOOD BY AUTOMATED COUNT: 14.7 % (ref 12.3–15.4)
GLUCOSE SERPL-MCNC: 109 MG/DL (ref 65–99)
HCT VFR BLD AUTO: 32 % (ref 37.5–51)
HGB BLD-MCNC: 10 G/DL (ref 13–17.7)
IMM GRANULOCYTES # BLD AUTO: 0.08 10*3/MM3 (ref 0–0.05)
IMM GRANULOCYTES NFR BLD AUTO: 0.9 % (ref 0–0.5)
LYMPHOCYTES # BLD AUTO: 1.27 10*3/MM3 (ref 0.7–3.1)
LYMPHOCYTES NFR BLD AUTO: 14.3 % (ref 19.6–45.3)
MCH RBC QN AUTO: 29.4 PG (ref 26.6–33)
MCHC RBC AUTO-ENTMCNC: 31.3 G/DL (ref 31.5–35.7)
MCV RBC AUTO: 94.1 FL (ref 79–97)
MONOCYTES # BLD AUTO: 0.62 10*3/MM3 (ref 0.1–0.9)
MONOCYTES NFR BLD AUTO: 7 % (ref 5–12)
NEUTROPHILS NFR BLD AUTO: 6.72 10*3/MM3 (ref 1.7–7)
NEUTROPHILS NFR BLD AUTO: 75.5 % (ref 42.7–76)
NRBC BLD AUTO-RTO: 0 /100 WBC (ref 0–0.2)
PLATELET # BLD AUTO: 410 10*3/MM3 (ref 140–450)
PMV BLD AUTO: 8.7 FL (ref 6–12)
POTASSIUM SERPL-SCNC: 4 MMOL/L (ref 3.5–5.2)
RBC # BLD AUTO: 3.4 10*6/MM3 (ref 4.14–5.8)
SODIUM SERPL-SCNC: 140 MMOL/L (ref 136–145)
WBC NRBC COR # BLD AUTO: 8.9 10*3/MM3 (ref 3.4–10.8)

## 2025-01-15 PROCEDURE — 97110 THERAPEUTIC EXERCISES: CPT

## 2025-01-15 PROCEDURE — 94799 UNLISTED PULMONARY SVC/PX: CPT

## 2025-01-15 PROCEDURE — 97116 GAIT TRAINING THERAPY: CPT

## 2025-01-15 PROCEDURE — 94761 N-INVAS EAR/PLS OXIMETRY MLT: CPT

## 2025-01-15 PROCEDURE — 80048 BASIC METABOLIC PNL TOTAL CA: CPT | Performed by: INTERNAL MEDICINE

## 2025-01-15 PROCEDURE — 85025 COMPLETE CBC W/AUTO DIFF WBC: CPT | Performed by: INTERNAL MEDICINE

## 2025-01-15 PROCEDURE — 93246 EXT ECG>7D<15D RECORDING: CPT

## 2025-01-15 PROCEDURE — 25010000002 ENOXAPARIN PER 10 MG: Performed by: NURSE PRACTITIONER

## 2025-01-15 RX ORDER — NITROGLYCERIN 0.4 MG/1
0.4 TABLET SUBLINGUAL
Qty: 25 TABLET | Refills: 0 | Status: SHIPPED | OUTPATIENT
Start: 2025-01-15

## 2025-01-15 RX ORDER — HYDROCODONE BITARTRATE AND ACETAMINOPHEN 10; 325 MG/1; MG/1
1 TABLET ORAL EVERY 6 HOURS PRN
Qty: 15 TABLET | Refills: 0 | Status: SHIPPED | OUTPATIENT
Start: 2025-01-15

## 2025-01-15 RX ORDER — ASPIRIN 81 MG/1
81 TABLET, CHEWABLE ORAL DAILY
Qty: 30 TABLET | Refills: 0 | Status: SHIPPED | OUTPATIENT
Start: 2025-01-16 | End: 2025-02-15

## 2025-01-15 RX ORDER — NITROGLYCERIN 0.4 MG/1
0.4 TABLET SUBLINGUAL
Qty: 30 TABLET | Refills: 0 | Status: SHIPPED | OUTPATIENT
Start: 2025-01-15 | End: 2025-01-15

## 2025-01-15 RX ORDER — HYDRALAZINE HYDROCHLORIDE 25 MG/1
25 TABLET, FILM COATED ORAL 3 TIMES DAILY PRN
Qty: 60 TABLET | Refills: 0 | Status: SHIPPED | OUTPATIENT
Start: 2025-01-15 | End: 2025-01-15

## 2025-01-15 RX ORDER — HYDROCODONE BITARTRATE AND ACETAMINOPHEN 10; 325 MG/1; MG/1
1 TABLET ORAL EVERY 6 HOURS PRN
Qty: 15 TABLET | Refills: 0 | Status: SHIPPED | OUTPATIENT
Start: 2025-01-15 | End: 2025-01-15

## 2025-01-15 RX ORDER — ASPIRIN 81 MG/1
81 TABLET, CHEWABLE ORAL DAILY
Qty: 30 TABLET | Refills: 0 | Status: SHIPPED | OUTPATIENT
Start: 2025-01-16 | End: 2025-01-15

## 2025-01-15 RX ORDER — CARVEDILOL 25 MG/1
25 TABLET ORAL 2 TIMES DAILY WITH MEALS
Qty: 60 TABLET | Refills: 0 | Status: SHIPPED | OUTPATIENT
Start: 2025-01-15 | End: 2025-02-14

## 2025-01-15 RX ORDER — AMLODIPINE BESYLATE 5 MG/1
5 TABLET ORAL
Qty: 30 TABLET | Refills: 0 | Status: SHIPPED | OUTPATIENT
Start: 2025-01-16

## 2025-01-15 RX ORDER — HYDRALAZINE HYDROCHLORIDE 25 MG/1
25 TABLET, FILM COATED ORAL 3 TIMES DAILY PRN
Qty: 60 TABLET | Refills: 0 | Status: SHIPPED | OUTPATIENT
Start: 2025-01-15

## 2025-01-15 RX ORDER — CARVEDILOL 25 MG/1
25 TABLET ORAL 2 TIMES DAILY WITH MEALS
Qty: 60 TABLET | Refills: 0 | Status: SHIPPED | OUTPATIENT
Start: 2025-01-15 | End: 2025-01-15

## 2025-01-15 RX ORDER — AMLODIPINE BESYLATE 5 MG/1
5 TABLET ORAL
Qty: 30 TABLET | Refills: 0 | Status: SHIPPED | OUTPATIENT
Start: 2025-01-16 | End: 2025-01-15

## 2025-01-15 RX ORDER — TAMSULOSIN HYDROCHLORIDE 0.4 MG/1
0.4 CAPSULE ORAL DAILY
Qty: 30 CAPSULE | Refills: 0 | Status: SHIPPED | OUTPATIENT
Start: 2025-01-16

## 2025-01-15 RX ORDER — TAMSULOSIN HYDROCHLORIDE 0.4 MG/1
0.4 CAPSULE ORAL DAILY
Qty: 30 CAPSULE | Refills: 0 | Status: SHIPPED | OUTPATIENT
Start: 2025-01-16 | End: 2025-01-15

## 2025-01-15 RX ADMIN — HYDROCODONE BITARTRATE AND ACETAMINOPHEN 1 TABLET: 10; 325 TABLET ORAL at 06:13

## 2025-01-15 RX ADMIN — POLYETHYLENE GLYCOL 3350 17 G: 17 POWDER, FOR SOLUTION ORAL at 09:06

## 2025-01-15 RX ADMIN — TAMSULOSIN HYDROCHLORIDE 0.4 MG: 0.4 CAPSULE ORAL at 09:05

## 2025-01-15 RX ADMIN — AMLODIPINE BESYLATE 5 MG: 5 TABLET ORAL at 09:05

## 2025-01-15 RX ADMIN — CARVEDILOL 25 MG: 3.12 TABLET, FILM COATED ORAL at 09:04

## 2025-01-15 RX ADMIN — BUDESONIDE INHALATION 0.5 MG: 0.5 SUSPENSION RESPIRATORY (INHALATION) at 05:59

## 2025-01-15 RX ADMIN — ATORVASTATIN CALCIUM 10 MG: 10 TABLET, FILM COATED ORAL at 09:05

## 2025-01-15 RX ADMIN — ENOXAPARIN SODIUM 100 MG: 100 INJECTION SUBCUTANEOUS at 09:06

## 2025-01-15 RX ADMIN — LOSARTAN POTASSIUM 100 MG: 50 TABLET, FILM COATED ORAL at 09:05

## 2025-01-15 RX ADMIN — PANTOPRAZOLE SODIUM 40 MG: 40 TABLET, DELAYED RELEASE ORAL at 04:49

## 2025-01-15 RX ADMIN — ASPIRIN 81 MG: 81 TABLET, CHEWABLE ORAL at 09:05

## 2025-01-15 NOTE — DISCHARGE SUMMARY
[facility] Medicine Services  DISCHARGE SUMMARY       Date of Admission: 1/3/2025  Date of Discharge:  1/15/2025  Primary Care Physician: Eliseo Smith MD    Presenting Problem/History of Present Illness:  Pain, left flank pain and back pain.    Final Discharge Diagnoses:  status post placement of aortobiiliac endograft for ruptured abdominal aortic aneurysm on January 3 by Dr. Finney: Left external iliac artery stent placement complicated by ruptured left external iliac artery and Perclose failure left common femoral artery  Paroxysmal atrial fibrillation  (?) Operative blood loss anemia 14.7.    Elevated CK (2436 on January 5) I did not see that this was followed up.  Otherwise normal creatinine.  Etiology not clear  Status post mechanical ventilation for acute hypoxemic respiratory failure   Laryngeal edema  Suspected COPD outpatient follow-up  Tobacco abuse  ICU delirium-resolved  History of CVA with left-sided weakness.    Consults:   Intensivist  Cardiology  Vascular surgery    Procedures Performed:   Procedure:  1/3/2025     PREOPERATIVE DIAGNOSIS: Abdominal pain, unspecified abdominal location [R10.9]  Ruptured abdominal aortic aneurysm     POSTOPERATIVE DIAGNOSIS: Post-Op Diagnosis Codes:     * Abdominal pain, unspecified abdominal location [R10.9]  Ruptured abdominal aortic aneurysm     PROCEDURE PERFORMED:   Percutaneous access and closure of right common femoral artery  Ultrasound-guided access of left common femoral artery  Placement of aortobiiliac endograft for rupture  Concurrent placement of extension stent to left external iliac artery  Left external iliac artery stent placement  Exposure of left common femoral artery with primary repair  Attempted left internal iliac artery coil embolization     Pertinent Test Results:       Imaging Results (All)       Procedure Component Value Units Date/Time    XR Chest 1 View [[id number]] Collected: 01/03/25 1928      Updated: 01/03/25 1932    Narrative:      EXAMINATION: XR CHEST 1 VW-  1/3/2025 7:28 PM     HISTORY: Line placement.     FINDINGS: Today's exam is compared to previous study of earlier the same  day. The patient remains intubated with the endotracheal tube  well-positioned. A right IJ central line has been placed with the tip  just above the cavoatrial juncture. Lungs remain clear. No pneumothorax.       Impression:      1. Right IJ central line placed and well-positioned without  complication.  2. Endotracheal tube remains well-positioned. The lungs remain clear.     This report was signed and finalized on 1/3/2025 7:29 PM by Dr. Ryder Rolle MD.       XR Chest 1 View [[id number]] Collected: 01/03/25 1846     Updated: 01/03/25 1849    Narrative:      EXAMINATION: XR CHEST 1 VW-  1/3/2025 6:46 PM     HISTORY: Endotracheal tube placement.     FINDINGS: Today's exam is compared to previous study of 1/9/2024. The  patient has been intubated with the endotracheal tube well-positioned.  The lungs are fully expanded and clear. No effusion or free air present.       Impression:      1.. Lungs clear.  2. Endotracheal tube well-positioned.     This report was signed and finalized on 1/3/2025 6:46 PM by Dr. Ryder Rolle MD.       IR Prox Dis Prost Endov Rep Initial Ves [[id number]] Collected: 01/03/25 1634     Updated: 01/03/25 1637    Narrative:      Performed by Dr. Whiteside in OR. Please see operative dictation.     This report was signed and finalized on 1/3/2025 4:34 PM by Dr. Eduardo Whiteside MD.       FL C Arm During Surgery [[id number]] Resulted: 01/03/25 1636     Updated: 01/03/25 1636    Narrative:      This procedure was auto-finalized with no dictation required.    CT Abdomen Pelvis With & Without Contrast [[id number]] Collected: 01/03/25 0541     Updated: 01/03/25 0601    Narrative:      EXAMINATION: CT ABDOMEN PELVIS W WO CONTRAST-      1/3/2025 2:47 AM     HISTORY: LLQ pain, L flank pain     In order to  have a CT radiation dose as low as reasonably achievable  Automated Exposure Control was utilized for adjustment of the mA and/or  KV according to patient size.     Total DLP = 650.92 mGy.cm     The CT scan of the abdomen and pelvis is performed before and after  intravenous contrast enhancement.     Images are acquired in the axial plane and subsequent reconstruction in  the coronal and sagittal planes.     There is no previous similar study for comparison.     The lung bases included in the study are unremarkable.     Limited visualized cardiomediastinal structures show moderate  cardiomegaly, atheromatous changes of the thoracic aorta and coronary  arteries.     The liver and spleen are normal.     No radiopaque gallstone.     Relatively small pancreas seen. No mass. No ductal dilatation.     Left adrenal nodule is seen measuring 2 cm x 1.4 cm. CT density is  nonfatty. Normal right adrenal gland.     Moderate lobulation of renal contour bilaterally. There is a tiny  exophytic nodule from the lower pole of the left kidney posteriorly  measuring 8 mm in diameter. This is too small to be further  characterized. No radiopaque calculi in either kidney. No hydronephrosis  on either side. Limited visualized ureters are nondilated. The urinary  bladder is incompletely distended with a mild to moderate wall  thickening. No focal intrinsic abnormality.     The prostate is enlarged with intrinsic calcification. It compresses and  elevates the floor of the urinary bladder.     There is small fat-containing inguinal hernias. There is a tiny  fat-containing umbilical hernia.     Stomach is decompressed with diffuse wall thickening. No focal  abnormality. Duodenum is normal. Small bowel is nondistended. No finding  to suggest appendicitis. Moderate gas and stool is seen in the colon. No  finding to suggest obstruction.     Atheromatous changes of the abdominal aorta and iliac arteries. There is  a saccular aneurysm from the  right posterolateral aspect of the mid  abdominal aorta opposite and posterior to the inferior vena cava,  measuring 2.3 x 1.8 cm. There is fusiform aneurysmal dilatation of  distal abdominal aorta with a maximum lumen of the abdominal aorta  measuring 5.7 x 4.7 at and just above the level of bifurcation. 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 There is an ill-defined area  of increased density in the posterior aspect of the thrombus which may  represent a hematoma in the posterior wall of the aneurysm?. The  aneurysm extends into the left common iliac artery which measures 3.7 x  3.4 cm.     There is no evidence of abdominal or pelvic lymphadenopathy.     Images reviewed in bone window show chronic degenerative changes of the  lumbar and thoracic spine. No acute bony abnormality.       Impression:      1. Fusiform aneurysm of the distal abdominal aorta as detailed above.  There is partial lumen circumferential mural thrombosis of the aneurysm.  There is evidence of hematoma/hemorrhage at the posterior wall of the  aneurysm as detailed above. Further evaluation with CT angiography of  the abdomen may be obtained.  2. A saccular aneurysm of the mid abdominal aorta adjacent and below the  level of the right renal arteries and posterior to the IVC. Details  given above.  3. No other acute findings in the abdomen or pelvis to account for  patient's symptoms. No renal or ureteral calculi or obstructive  uropathy.  4. No gallstones. No finding to suggest appendicitis.                    This report was signed and finalized on 1/3/2025 5:58 AM by Dr. Andra Scott MD.             LAB RESULTS:      Lab 01/15/25  0454 01/14/25  0327 01/13/25  0250 01/12/25  0336 01/11/25  0503   WBC 8.90 7.97 8.27 7.43 9.18   HEMOGLOBIN 10.0* 9.9* 9.7* 8.8* 9.6*   HEMATOCRIT 32.0* 31.8* 31.0* 27.8* 30.4*   PLATELETS 410 394 374 336 332   NEUTROS ABS 6.72  5.79 6.11 5.20 6.77   IMMATURE GRANS (ABS) 0.08* 0.09* 0.08* 0.10* 0.12*   LYMPHS ABS 1.27 1.15 1.06 1.11 1.06   MONOS ABS 0.62 0.70 0.84 0.78 0.98*   EOS ABS 0.18 0.20 0.15 0.20 0.21   MCV 94.1 93.5 92.8 93.0 92.7         Lab 01/15/25  0454 01/14/25  0327 01/13/25  0250 01/12/25  0336 01/11/25  0503 01/10/25  0411   SODIUM 140 138 137 137 136 138   POTASSIUM 4.0 3.6 4.1 4.0 3.8 3.8   CHLORIDE 103 101 101 102 99 100   CO2 29.0 29.0 28.0 28.0 31.0* 28.0   ANION GAP 8.0 8.0 8.0 7.0 6.0 10.0   BUN 11 9 13 16 14 12   CREATININE 0.67* 0.51* 0.64* 0.56* 0.57* 0.51*   EGFR 99.8 108.4 101.2 105.4 104.8 108.4   GLUCOSE 109* 112* 121* 117* 120* 100*   CALCIUM 8.5* 8.2* 8.2* 8.0* 8.5* 8.1*   TSH  --   --   --   --   --  5.720*         Lab 01/13/25  0250 01/12/25  0336 01/10/25  0411   TOTAL PROTEIN 5.8* 5.3* 5.6*   ALBUMIN 2.9* 2.6* 3.1*   GLOBULIN 2.9 2.7 2.5   ALT (SGPT) 32 28 28   AST (SGOT) 32 31 37   BILIRUBIN 0.5 0.6 0.8   ALK PHOS 70 59 60                     Brief Urine Lab Results  (Last result in the past 365 days)        Color   Clarity   Blood   Leuk Est   Nitrite   Protein   CREAT   Urine HCG        01/03/25 0351 Yellow   Clear   Negative   Negative   Negative   Negative                 Microbiology Results (last 10 days)       ** No results found for the last 240 hours. **            Hospital Course:   I am informed by our case management that he has been declined to go to rehab facility.  Instead, patient will go with son upon discharge.  He would like to be referred to [facility] rehab for outpatient therapy.  I ordered this.    Patient is doing better and believed to be medically stable and appropriate for discharge.  I reviewed this medication at bedside.  I also ordered for the Zio patch which was recommended by cardiology.  He was switched to Eliquis for paroxysmal atrial fibrillation.  He will be on aspirin 81 mg along with Eliquis as per vascular surgeon.      My first encounter with patient was on  day 10 of hospitalization.    This 69-year-old man who initially was under the service of intensivist.  Patient was hypertensive and requiring nicardipine drip.  He got admitted on January 3 presenting with abdominal pain, left flank pain and back pain.  CT scan of the abdomen and pelvis done on admission revealed fusiform aneurysm of the distal abdominal aorta.  There is a partial lumen circumferential mural thrombus.  It also described evidence of hematoma/hemorrhage at the posterior wall of the aneurysm. 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    Patient been with hospital service since January 8.     Consultants:  Cardiology.  Last note dates back to January 9 with assessment and plan as follows:  1.  Paroxysmal atrial fibrillation  2.  Contained rupture of infrarenal abdominal aortic aneurysm, now status post endovascular repair  3.  Anemia without active bleeding  4.  Primary hypertension  5.  Mixed hyperlipidemia  6.  Peripheral arterial disease including carotid artery disease  7.  Tobacco abuse  8.  Prior stroke     -There were two short episodes of elevated heart rate overnight, consistent with atrial fibrillation.  He is otherwise asymptomatic and hemodynamically stable.  -Continue therapeutic Lovenox at this time but plan to transition to oral anticoagulation, either Eliquis or Xarelto at discharge.  This can be at the discretion of the primary service, I have no preference in this matter as to which agent is used.  -Blood pressure still remains elevated.  Cardiology will comment on the patient's atrial fibrillation, however the primary service to manage the patient's blood pressure and adjust medications accordingly if needed.  -At discharge, the patient should be ordered a 14-day cardiac monitor - Zio patch.  -Should the patient have symptomatic episodes of atrial fibrillation while here or  prolonged episodes, recommend electrophysiology consultation.  -At this time, we will be available as needed.  Please feel free to call if there are questions.           Vascular surgery-last note dated back January 9 with assessment as follow:  Assessment & Plan    Abdominal aortic aneurysm    AAA (abdominal aortic aneurysm)    Abdominal pain     69-year-old male postop day 6 EVAR with extension to left external iliac for rupture.        Plan for disposition:  -Hospitalist on board appreciate their assistance.  -Tachycardia resolved Lovenox started by hospitalist.  Continue aspirin DC Plavix with anticoagulation.  -Hemoglobin stable  -Faintly palpable left lower extremity pedal pulses with multiphasic signals.  Continue gabapentin for possible reperfusion pain versus chronic pain from stroke.  -PT OT encouraged.  -BM x 2 yesterday.  Patient reports is nonbloody.  Denies abdominal pain.  -No further vascular intervention needed at this time.  -Patient to follow-up in 2 weeks for wound check.     Timmy Finney MD  Vascular Surgery  [phone]  01/09/25  09:44 CST     Procedure:  1/3/2025     PREOPERATIVE DIAGNOSIS: Abdominal pain, unspecified abdominal location [R10.9]  Ruptured abdominal aortic aneurysm     POSTOPERATIVE DIAGNOSIS: Post-Op Diagnosis Codes:     * Abdominal pain, unspecified abdominal location [R10.9]  Ruptured abdominal aortic aneurysm     PROCEDURE PERFORMED:   Percutaneous access and closure of right common femoral artery  Ultrasound-guided access of left common femoral artery  Placement of aortobiiliac endograft for rupture  Concurrent placement of extension stent to left external iliac artery  Left external iliac artery stent placement  Exposure of left common femoral artery with primary repair  Attempted left internal iliac artery coil embolization              Social service, therapist note and nursing staff note reviewed  Patient has been referred to rehab facility.Physical Exam on  "Discharge:  /66 (BP Location: Right arm, Patient Position: Sitting)   Pulse 74   Temp 97.4 °F (36.3 °C) (Oral)   Resp 18   Ht 177.8 cm (70\")   Wt 101 kg (223 lb 1.6 oz)   SpO2 94%   BMI 32.01 kg/m²   Physical Exam  Normocephalic/atraumatic head  Anicteric sclera  No thyromegaly  Lungs clear to auscultation  S1-S2 regular rate and rhythm, telemetry showed normal sinus rhythm   Soft abdomen, nontender, no peripheral edema.  He still has a bilateral dressing on groin area.  He has some ecchymosis lateral aspect of right thigh, bruising on groin areas bilateral, small bruise on his scrotum.  I did not appreciate any gross hematoma  Fluent speech  Appropriate affect  Well-built.       Condition on Discharge: Stable    Discharge Disposition:  Home or Self Care    Discharge Medications:     Discharge Medications        New Medications        Instructions Start Date   apixaban 5 MG tablet tablet  Commonly known as: ELIQUIS   5 mg, Oral, 2 Times Daily      aspirin 81 MG chewable tablet  Replaces: aspirin 325 MG EC tablet   81 mg, Oral, Daily   Start Date: January 16, 2025     carvedilol 25 MG tablet  Commonly known as: COREG   25 mg, Oral, 2 Times Daily With Meals      HYDROcodone-acetaminophen  MG per tablet  Commonly known as: NORCO   1 tablet, Oral, Every 6 Hours PRN      nitroglycerin 0.4 MG SL tablet  Commonly known as: NITROSTAT   0.4 mg, Sublingual, Every 5 Minutes PRN, Take no more than 3 doses in 15 minutes.      tamsulosin 0.4 MG capsule 24 hr capsule  Commonly known as: FLOMAX   0.4 mg, Oral, Daily   Start Date: January 16, 2025            Changes to Medications        Instructions Start Date   amLODIPine 5 MG tablet  Commonly known as: NORVASC  What changed:   medication strength  how much to take  when to take this   5 mg, Oral, Every 24 Hours Scheduled   Start Date: January 16, 2025     hydrALAZINE 25 MG tablet  Commonly known as: APRESOLINE  What changed:   when to take this  reasons to take " this   25 mg, Oral, 3 Times Daily PRN             Continue These Medications        Instructions Start Date   atorvastatin 20 MG tablet  Commonly known as: LIPITOR   20 mg, Daily      Evolocumab solution prefilled syringe injection  Commonly known as: REPATHA   140 mg, Every 14 Days      losartan 100 MG tablet  Commonly known as: COZAAR   100 mg, Daily             Stop These Medications      aspirin 325 MG EC tablet  Replaced by: aspirin 81 MG chewable tablet     cloNIDine 0.2 MG tablet  Commonly known as: CATAPRES     metoprolol succinate XL 50 MG 24 hr tablet  Commonly known as: TOPROL-XL              This patient has current or prior documentation of an left ventricular ejection fraction (LVEF) of less than or equal to 40%.-Not applicable.  I do not have information to based it upon.          Discharge Diet:   Diet Instructions       Diet: Cardiac Diets; Healthy Heart (2-3 Na+); Thin (IDDSI 0)      Discharge Diet: Cardiac Diets    Cardiac Diet: Healthy Heart (2-3 Na+)    Fluid Consistency: Thin (IDDSI 0)            Activity at Discharge:   Activity Instructions       Gradually Increase Activity Until at Pre-Hospitalization Level              Follow-up Appointments:   PCP within 1 week  Vascular per their recommendation  Cardiology per their recommendation  Future Appointments   Date Time Provider Department Center   1/30/2025 11:00 AM Timmy Finney MD [facility]       Test Results Pending at Discharge: None    Electronically signed by Maulik Osborn MD, 01/15/25, 14:42 CST.    Time: Greater than 30 minutes.

## 2025-01-15 NOTE — DISCHARGE PLACEMENT REQUEST
"Brea Grande (69 y.o. Male)       Date of Birth   1955    Social Security Number       Address   [address] [place] [address]    Home Phone   [phone]    MRN   [id number]       Worship   Other    Marital Status                               Admission Date   1/3/25    Admission Type   Emergency    Admitting Provider   Maulik Osborn MD    Attending Provider   Maulik Osborn MD    Department, Room/Bed   [facility] 3C, 374/1       Discharge Date       Discharge Disposition       Discharge Destination                                 Attending Provider: Maulik Osborn MD    Allergies: No Known Allergies    Isolation: None   Infection: None   Code Status: CPR    Ht: 177.8 cm (70\")   Wt: 101 kg (223 lb 1.6 oz)    Admission Cmt: None   Principal Problem: Abdominal aortic aneurysm [I71.40]                   Active Insurance as of 1/3/2025       Primary Coverage       Payor Plan Insurance Group Employer/Plan Group    ANTHEM MEDICARE REPLACEMENT ANTHEM MED ADV PPO [id number]       Payor Plan Address Payor Plan Phone Number Payor Plan Fax Number Effective Dates    [address] [phone]  1/1/2024 - None Entered    [facility] [id number]         Subscriber Name Subscriber Birth Date Member ID       BREA GRANDE 1955 [id number]                     Emergency Contacts        (Rel.) Home Phone Work Phone Mobile Phone    Alexis Grande (Son) -- -- [phone]    Mildred Pendleton (Friend) -- -- [phone]          "

## 2025-01-15 NOTE — PLAN OF CARE
Goal Outcome Evaluation:  Plan of Care Reviewed With: patient        Progress: no change  Outcome Evaluation: Up with assist; voiding per urinal; medicated for pain x1; brittaney le pulses doppler 2+; Alert and oriented; Room air; resting between care; safety maintained

## 2025-01-15 NOTE — PLAN OF CARE
Goal Outcome Evaluation:  Plan of Care Reviewed With: patient        Progress: improving  Outcome Evaluation: Pt was seated EOB with no complaints.Pt was able to magno socks Independently with extra time.He was CGA/SBA for sit to stand.He was able to walk a total of 20' at one time in room while using straight cane. Pt was CGA-min x 1 to walk and had several LOB. Pt. lived at home independently prior to admission. He would benefit from further rehab to further progress his independence.

## 2025-01-15 NOTE — THERAPY TREATMENT NOTE
Acute Care - Physical Therapy Treatment Note  [facility]     Patient Name: Brijesh Grande  : 1955  MRN: [id number]  Today's Date: 1/15/2025      Visit Dx:     ICD-10-CM ICD-9-CM   1. Aneurysm of infrarenal abdominal aorta, unspecified whether ruptured  I71.43 441.4   2. Abdominal pain, unspecified abdominal location  R10.9 789.00   3. Abdominal aortic aneurysm (AAA) without rupture, unspecified part  I71.40 441.4   4. Primary hypertension  I10 401.9   5. Impaired mobility [Z74.09]  Z74.09 799.89     Patient Active Problem List   Diagnosis    AAA (abdominal aortic aneurysm)    Abdominal aortic aneurysm    Abdominal pain     Past Medical History:   Diagnosis Date    Hyperlipidemia     Hypertension     PAD (peripheral artery disease)     Stroke      Past Surgical History:   Procedure Laterality Date    ABDOMINAL AORTIC ANEURYSM REPAIR WITH ENDOGRAFT N/A 1/3/2025    Procedure: ABDOMINAL AORTIC ANEURYSM REPAIR WITH ENDOGRAFT;  Surgeon: Eduardo Whiteside DO;  Location: [facility] HYBRID OR;  Service: Vascular;  Laterality: N/A;    CAROTID ENDARTERECTOMY      x 2     PT Assessment (Last 12 Hours)       PT Evaluation and Treatment       Row Name 01/15/25 0830          Physical Therapy Time and Intention    Subjective Information complains of;weakness  -AE     Document Type therapy note (daily note)  -AE     Mode of Treatment physical therapy  -AE       Row Name 01/15/25 0830          General Information    Existing Precautions/Restrictions fall  L sided weakness from old stroke.  -AE       Row Name 01/15/25 0830          Bed Mobility    Supine-Sit [place] (Bed Mobility) supervision  -AE       Row Name 01/15/25 0830          Sit-Stand Transfer    Sit-Stand [place] (Transfers) contact guard;verbal cues  -AE       Row Name 01/15/25 0830          Stand-Sit Transfer    Stand-Sit [place] (Transfers) contact guard  -AE       Row Name 01/15/25 0830          Gait/Stairs (Locomotion)    [place] Level (Gait)  contact guard;minimum assist (75% patient effort)  -AE     Assistive Device (Gait) cane, straight  -AE     Distance in Feet (Gait) 20  X 3  -AE     Left Sided Gait Deviations hip hiking;hip circumduction  -AE     Right Sided Gait Deviations leans right  -AE     Comment, (Gait/Stairs) X 4 LOB   -AE       Row Name 01/15/25 0830          Hip (Therapeutic Exercise)    Hip (Therapeutic Exercise) AROM (active range of motion)  -AE     Hip AROM (Therapeutic Exercise) bilateral;flexion;20 repititions  limited ROM L LE  -AE       Row Name 01/15/25 0830          Knee (Therapeutic Exercise)    Knee (Therapeutic Exercise) AROM (active range of motion)  -AE     Knee AROM (Therapeutic Exercise) LAQ (long arc quad);bilateral;20 repititions  -AE       Row Name 01/15/25 0830          Ankle (Therapeutic Exercise)    Ankle (Therapeutic Exercise) AROM (active range of motion)  -AE     Ankle AROM (Therapeutic Exercise) plantarflexion;dorsiflexion;20 repititions;bilateral  -AE       Row Name             Wound 01/03/25 1622 Right anterior groin    Wound - Properties Group Placement Date: 01/03/25  -AC Placement Time: 1622  -AC Side: Right  -AC Orientation: anterior  -AC Location: groin  -AC Primary Wound Type: Incision  -AC Additional Comments: PERCLOSE X2 2X2 TEGADERM  -AC    Retired Wound - Properties Group Placement Date: 01/03/25  -AC Placement Time: 1622  -AC Side: Right  -AC Orientation: anterior  -AC Location: groin  -AC Primary Wound Type: Incision  -AC Additional Comments: PERCLOSE X2 2X2 TEGADERM  -AC    Retired Wound - Properties Group Placement Date: 01/03/25  -AC Placement Time: 1622  -AC Side: Right  -AC Orientation: anterior  -AC Location: groin  -AC Primary Wound Type: Incision  -AC Additional Comments: PERCLOSE X2 2X2 TEGADERM  -AC    Retired Wound - Properties Group Date first assessed: 01/03/25  -AC Time first assessed: 1622  -AC Side: Right  -AC Location: groin  -AC Primary Wound Type: Incision  -AC Additional  Comments: PERCLOSE X2 2X2 TEGADERM  -AC      Row Name             Wound 01/03/25 1622 Left anterior groin    Wound - Properties Group Placement Date: 01/03/25  -AC Placement Time: 1622  -AC Side: Left  -AC Orientation: anterior  -AC Location: groin  -AC Primary Wound Type: Incision  -AC Additional Comments: STERISTRIPS 4X4 TEGADERM  -AC    Retired Wound - Properties Group Placement Date: 01/03/25  -AC Placement Time: 1622  -AC Side: Left  -AC Orientation: anterior  -AC Location: groin  -AC Primary Wound Type: Incision  -AC Additional Comments: STERISTRIPS 4X4 TEGADERM  -AC    Retired Wound - Properties Group Placement Date: 01/03/25  -AC Placement Time: 1622  -AC Side: Left  -AC Orientation: anterior  -AC Location: groin  -AC Primary Wound Type: Incision  -AC Additional Comments: STERISTRIPS 4X4 TEGADERM  -AC    Retired Wound - Properties Group Date first assessed: 01/03/25  -AC Time first assessed: 1622  -AC Side: Left  -AC Location: groin  -AC Primary Wound Type: Incision  -AC Additional Comments: STERISTRIPS 4X4 TEGADERM  -AC      Row Name 01/15/25 0830          Vital Signs    Pre SpO2 (%) 97  -AE     O2 Delivery Pre Treatment room air  -AE     O2 Delivery Intra Treatment room air  -AE     Post SpO2 (%) 95  -AE     O2 Delivery Post Treatment room air  -AE       Row Name 01/15/25 0830          Positioning and Restraints    Pre-Treatment Position in bed  -AE     Post Treatment Position bed  -AE     In Bed sitting EOB;call light within reach  -AE               User Key  (r) = Recorded By, (t) = Taken By, (c) = Cosigned By      Initials Name Provider Type    AE Irene Felton PTA Physical Therapist Assistant    AC Zara Balbuena RN Registered Nurse                    Physical Therapy Education       Title: PT OT SLP Therapies (Done)       Topic: Physical Therapy (Done)       Point: Mobility training (Done)       Learning Progress Summary            Patient RALPH Suarez VU by AE at 1/15/2025 0919    Comment:  ex'st/f's    Acceptance, E, VU,NR by SJ at 1/15/2025 0225    Acceptance, E, VU by CF at 1/14/2025 1544    Acceptance, E, VU,NR by BARON at 1/9/2025 1130    Comment: progression with amb with managing pain    Acceptance, E, VU by BARON at 1/8/2025 1131    Comment: have family bring pt's cane to progress with amb    Acceptance, E, VU,DU,NR by NITHIN at 1/6/2025 0824    Comment: Benefits of activity, progression of PT POC,                      Point: Home exercise program (Done)       Learning Progress Summary            Patient Eager, E, VU by AE at 1/15/2025 0919    Comment: ex'st/f's    Acceptance, E, VU,NR by SJ at 1/15/2025 0225    Acceptance, E, VU by CF at 1/14/2025 1544                      Point: Body mechanics (Done)       Learning Progress Summary            Patient Acceptance, E, VU,NR by SJ at 1/15/2025 0225    Acceptance, E, VU by CF at 1/14/2025 1544                      Point: Precautions (Done)       Learning Progress Summary            Patient Acceptance, E, VU,NR by SJ at 1/15/2025 0225    Acceptance, E, VU by CF at 1/14/2025 1544                                      User Key       Initials Effective Dates Name Provider Type Discipline    AE 02/03/23 -  Irene Felton, PTA Physical Therapist Assistant PT    NITHIN 02/03/23 -  Francis Hung PT DPT Physical Therapist PT    BARON 02/03/23 -  Maurice Rose PTA Physical Therapist Assistant PT    CF 02/11/21 -  Janina Aldana, RN Registered Nurse Nurse     06/16/21 -  Odalys Richards, RN Registered Nurse Nurse                  PT Recommendation and Plan     Progress: improving  Outcome Evaluation: Pt was seated EOB with no complaints.Pt was able to magno socks Independently with extra time.He was CGA/SBA for sit to stand.He was able to walk a total of 20' at one time in room while using straight cane. Pt was CGA-min x 1 to walk and had several LOB. Pt. lived at home independently prior to admission. He would benefit from further rehab to further  progress his independence.   Outcome Measures       Row Name 01/13/25 1314 01/12/25 1618          How much help from another person do you currently need...    Turning from your back to your side while in flat bed without using bedrails? 4  -MF 4  -MF     Moving from lying on back to sitting on the side of a flat bed without bedrails? 3  -MF 3  -MF     Moving to and from a bed to a chair (including a wheelchair)? 3  -MF 3  -MF     Standing up from a chair using your arms (e.g., wheelchair, bedside chair)? 3  -MF 3  -MF     Climbing 3-5 steps with a railing? 1  -MF 1  -MF     To walk in hospital room? 3  -MF 3  -MF     AM-PAC 6 Clicks Score (PT) 17  -MF 17  -MF        Functional Assessment    Outcome Measure Options AM-PAC 6 Clicks Basic Mobility (PT)  -MF AM-PAC 6 Clicks Basic Mobility (PT)  -MF               User Key  (r) = Recorded By, (t) = Taken By, (c) = Cosigned By      Initials Name Provider Type    Gabby Rossi PTA Physical Therapist Assistant                     Time Calculation:    PT Charges       Row Name 01/15/25 0919             Time Calculation    Start Time 0830  -AE      Stop Time 0910  -AE      Time Calculation (min) 40 min  -AE      PT Received On 01/15/25  -AE      PT Goal Re-Cert Due Date 01/16/25  -AE         Time Calculation- PT    Total Timed Code Minutes- PT 40 minute(s)  -AE         Timed Charges    32565 - PT Therapeutic Exercise Minutes 10  -AE      16479 - Gait Training Minutes  30  -AE         Total Minutes    Timed Charges Total Minutes 40  -AE       Total Minutes 40  -AE                User Key  (r) = Recorded By, (t) = Taken By, (c) = Cosigned By      Initials Name Provider Type    AE Irene Felton PTA Physical Therapist Assistant                  Therapy Charges for Today       Code Description Service Date Service Provider Modifiers Qty    25910622485 HC GAIT TRAINING EA 15 MIN 1/14/2025 Irene Felton PTA GP 1    33962283036 HC PT THER PROC EA 15 MIN 1/14/2025  Irene Felton, PTA GP 1    33520209618 HC GAIT TRAINING EA 15 MIN 1/15/2025 Irene Felton, HEMAL GP 2    50254079865 HC PT THER PROC EA 15 MIN 1/15/2025 Irene Felton, HEMAL GP 1            PT G-Codes  Outcome Measure Options: AM-PAC 6 Clicks Daily Activity (OT)  AM-PAC 6 Clicks Score (PT): 17  AM-PAC 6 Clicks Score (OT): 17    Irene Felton PTA  1/15/2025

## 2025-01-15 NOTE — CASE MANAGEMENT/SOCIAL WORK
Continued Stay Note  [facility]     Patient Name: Brijesh Grande  MRN: [id number]  Today's Date: 1/15/2025    Admit Date: 1/3/2025    Plan: Home   Discharge Plan       Row Name 01/15/25 0939       Plan    Plan Home    Patient/Family in Agreement with Plan yes    Final Discharge Disposition Code 01 - home or self-care    Final Note Insurance has denied rehab. Spoke with pt's son, Alexis [phone], to discuss other options. He requests referrals in [place] to [facility], [facility], and [facility]. Referrals were sent. Rec'd a call back from son and he has now decided to take pt home. They are requesting orders for outpatient therapy which will need sent with pt so he can arrange at place of choice.                   Discharge Codes    No documentation.                 Expected Discharge Date and Time       Expected Discharge Date Expected Discharge Time    Bear 15, 2025               TALI Frederick     [FreeTextEntry1] : ekg nsr LAHB\par cleared for procedure pending results pre proceure labs

## 2025-01-15 NOTE — PROGRESS NOTES
LOS: 12 days   Patient Care Team:  Eliseo Smith MD as PCP - General  Eliseo Smith MD as PCP - Family Medicine    Chief Complaint: Status post EVAR    Subjective     Patient Complaints: Doing fine.  No complaints denies abdominal pain.  Reports left lower extremity pain significantly improved.  Objective     Vital Signs  Temp:  [97.4 °F (36.3 °C)-98.2 °F (36.8 °C)] 97.5 °F (36.4 °C)  Heart Rate:  [71-88] 77  Resp:  [16-20] 18  BP: (129-170)/(68-83) 162/82    Physical Exam  Constitutional:       Appearance: Normal appearance. He is not ill-appearing or toxic-appearing.   HENT:      Head: Normocephalic and atraumatic.   Cardiovascular:      Rate and Rhythm: Normal rate and regular rhythm.      Pulses:           Dorsalis pedis pulses are 2+ on the right side and 1+ on the left side.        Posterior tibial pulses are 2+ on the right side and detected w/ Doppler on the left side.   Pulmonary:      Effort: Pulmonary effort is normal. No respiratory distress.   Abdominal:      Palpations: Abdomen is soft.      Tenderness: There is no abdominal tenderness. There is no guarding.   Musculoskeletal:         General: No swelling or tenderness.      Cervical back: Normal range of motion and neck supple.      Comments: Motor intact bilateral lower extremities.  Soft bilaterally.   Skin:     General: Skin is warm and dry.      Comments: Left foot warm.  Brisk cap refill coloration stable.  Bilateral groin soft no evidence of hematoma or infection.   Neurological:      Mental Status: He is alert. Mental status is at baseline.         Laboratory Data:   Results from last 7 days   Lab Units 01/15/25  0454 01/14/25  0327 01/13/25  0250   WBC 10*3/mm3 8.90 7.97 8.27   HEMOGLOBIN g/dL 10.0* 9.9* 9.7*   HEMATOCRIT % 32.0* 31.8* 31.0*   PLATELETS 10*3/mm3 410 394 374       Results from last 7 days   Lab Units 01/15/25  0454 01/14/25  0327 01/13/25  0250 01/12/25  0336 01/11/25  0503 01/10/25  0411   SODIUM mmol/L 140  138 137 137   < > 138   POTASSIUM mmol/L 4.0 3.6 4.1 4.0   < > 3.8   CHLORIDE mmol/L 103 101 101 102   < > 100   CO2 mmol/L 29.0 29.0 28.0 28.0   < > 28.0   BUN mg/dL 11 9 13 16   < > 12   CREATININE mg/dL 0.67* 0.51* 0.64* 0.56*   < > 0.51*   CALCIUM mg/dL 8.5* 8.2* 8.2* 8.0*   < > 8.1*   BILIRUBIN mg/dL  --   --  0.5 0.6  --  0.8   ALK PHOS U/L  --   --  70 59  --  60   ALT (SGPT) U/L  --   --  32 28  --  28   AST (SGOT) U/L  --   --  32 31  --  37   GLUCOSE mg/dL 109* 112* 121* 117*   < > 100*    < > = values in this interval not displayed.                  Medication Review: Reviewed    Assessment & Plan       Abdominal aortic aneurysm    AAA (abdominal aortic aneurysm)    Abdominal pain    69-year-old male status post EVAR with extension to left external iliac for rupture.      Plan for disposition:  -Hospitalist on board appreciate their assistance.  -Patient is nearly 2 weeks postop.  -Groins soft and appear to be healing without issues.  -Good perfusion to left lower extremity.  -Will have patient follow-up in 2 weeks with surveillance CTA abdomen pelvis with delays  -Awaiting placement    Timmy MD Reg  Vascular Surgery  [phone]  01/15/25  09:29 CST

## 2025-01-16 NOTE — OUTREACH NOTE
Prep Survey      Flowsheet Row Responses   [facility] facility patient discharged from? [facility]   Is LACE score < 7 ? No   Eligibility Readm Mgmt   Discharge diagnosis Abdominal aortic aneurysm, ABDOMINAL AORTIC ANEURYSM REPAIR WITH ENDOGRAFT   Does the patient have one of the following disease processes/diagnoses(primary or secondary)? General Surgery   Does the patient have Home health ordered? No   Is there a DME ordered? No   Comments regarding appointments out pt PT   Prep survey completed? Yes            Camila Fernández Registered Nurse

## 2025-01-16 NOTE — THERAPY DISCHARGE NOTE
Acute Care - Occupational Therapy Discharge Summary  [facility]     Patient Name: Brijesh Grande  : 1955  MRN: [id number]    Today's Date: 2025                 Admit Date: 1/3/2025        OT Recommendation and Plan    Visit Dx:    ICD-10-CM ICD-9-CM   1. Aneurysm of infrarenal abdominal aorta, unspecified whether ruptured  I71.43 441.4   2. Abdominal pain, unspecified abdominal location  R10.9 789.00   3. Abdominal aortic aneurysm (AAA) without rupture, unspecified part  I71.40 441.4   4. Primary hypertension  I10 401.9   5. Impaired mobility [Z74.09]  Z74.09 799.89   6. Paroxysmal atrial fibrillation  I48.0 427.31                OT Rehab Goals       Row Name 25 1300             Transfer Goal 1 (OT)    Activity/Assistive Device (Transfer Goal 1, OT) toilet;shower chair  -CS      [place] Level/Cues Needed (Transfer Goal 1, OT) standby assist  -CS      Time Frame (Transfer Goal 1, OT) long term goal (LTG);10 days  -CS      Progress/Outcome (Transfer Goal 1, OT) goal not met  -CS         Dressing Goal 1 (OT)    Activity/Device (Dressing Goal 1, OT) dressing skills, all  -CS      [place]/Cues Needed (Dressing Goal 1, OT) standby assist  -CS      Time Frame (Dressing Goal 1, OT) long term goal (LTG);10 days  -CS      Progress/Outcome (Dressing Goal 1, OT) goal not met  -CS         Toileting Goal 1 (OT)    Activity/Device (Toileting Goal 1, OT) toileting skills, all  -CS      [place] Level/Cues Needed (Toileting Goal 1, OT) standby assist  -CS      Time Frame (Toileting Goal 1, OT) long term goal (LTG);10 days  -CS      Progress/Outcome (Toileting Goal 1, OT) goal not met  -CS         Problem Specific Goal 1 (OT)    Problem Specific Goal 1 (OT) Pt will independently implement one pain management technique to decrease pain and improve functional adl performance.  -CS      Time Frame (Problem Specific Goal 1, OT) long term goal (LTG);by discharge  -CS      Progress/Outcome (Problem Specific  Goal 1, OT) goal not met  -                User Key  (r) = Recorded By, (t) = Taken By, (c) = Cosigned By      Initials Name Provider Type Discipline    CS Randee Licea OTR/L, BENJAMIN Occupational Therapist OT                        Timed Therapy Charges  Total Units: 3      Suggested Charges  Total Units: 3      Procedure Name Documented Minutes Units Code    HC OT SELF CARE/MGMT/TRAIN EA 15 MIN 23 2   22052 (CPT®)      HC OT THERAPEUTIC ACT EA 15 MIN 15 1   95491 (CPT®)                 Documented Minutes  Total Minutes: 38      Therapy Provided Minutes    94238 - OT Self Care/Mgmt Minutes 23    26764 - OT Therapeutic Activity Minutes 15                        OT Discharge Summary  Anticipated Discharge Disposition (OT): sub acute care setting  Reason for Discharge: Discharge from facility  Outcomes Achieved: Refer to plan of care for updates on goals achieved  Discharge Destination: Home with assist      ELENA Hand/L, CNT  1/16/2025

## 2025-01-16 NOTE — PAYOR COMM NOTE
"DC HOME 1-15-25    Brea Grande (69 y.o. Male)       Date of Birth   1955    Social Security Number       Address   [address] [place] [address]    Home Phone   [phone]    MRN   [id number]       Congregational   Other    Marital Status                               Admission Date   1/3/25    Admission Type   Emergency    Admitting Provider   Maulik Osborn MD    Attending Provider       Department, Room/Bed   [facility] 3C, 374/1       Discharge Date   1/15/2025    Discharge Disposition   Home or Self Care    Discharge Destination                                 Attending Provider: (none)   Allergies: No Known Allergies    Isolation: None   Infection: None   Code Status: Prior    Ht: 177.8 cm (70\")   Wt: 101 kg (223 lb 1.6 oz)    Admission Cmt: None   Principal Problem: Abdominal aortic aneurysm [I71.40]                   Active Insurance as of 1/3/2025       Primary Coverage       Payor Plan Insurance Group Employer/Plan Group    ANTHEM MEDICARE REPLACEMENT ANTHEM MED ADV PPO [id number]       Payor Plan Address Payor Plan Phone Number Payor Plan Fax Number Effective Dates    [address] [phone]  1/1/2024 - None Entered    [facility] [id number]         Subscriber Name Subscriber Birth Date Member ID       BREA GRANDE 1955 [id number]                     Emergency Contacts        (Rel.) Home Phone Work Phone Mobile Phone    Alexis Grande (Son) -- -- [phone]    Mildred Pendleton (Friend) -- -- [phone]                 Discharge Summary        Maulik Osborn MD at 01/15/25 [facility]                [facility]  DISCHARGE SUMMARY       Date of Admission: 1/3/2025  Date of Discharge:  1/15/2025  Primary Care Physician: Eliseo Smith MD    Presenting Problem/History of Present Illness:  Pain, left flank pain and back pain.    Final Discharge Diagnoses:  status post placement of " aortobiiliac endograft for ruptured abdominal aortic aneurysm on January 3 by Dr. Finney: Left external iliac artery stent placement complicated by ruptured left external iliac artery and Perclose failure left common femoral artery  Paroxysmal atrial fibrillation  (?) Operative blood loss anemia 14.7.    Elevated CK (2436 on January 5) I did not see that this was followed up.  Otherwise normal creatinine.  Etiology not clear  Status post mechanical ventilation for acute hypoxemic respiratory failure   Laryngeal edema  Suspected COPD outpatient follow-up  Tobacco abuse  ICU delirium-resolved  History of CVA with left-sided weakness.    Consults:   Intensivist  Cardiology  Vascular surgery    Procedures Performed:   Procedure:  1/3/2025     PREOPERATIVE DIAGNOSIS: Abdominal pain, unspecified abdominal location [R10.9]  Ruptured abdominal aortic aneurysm     POSTOPERATIVE DIAGNOSIS: Post-Op Diagnosis Codes:     * Abdominal pain, unspecified abdominal location [R10.9]  Ruptured abdominal aortic aneurysm     PROCEDURE PERFORMED:   Percutaneous access and closure of right common femoral artery  Ultrasound-guided access of left common femoral artery  Placement of aortobiiliac endograft for rupture  Concurrent placement of extension stent to left external iliac artery  Left external iliac artery stent placement  Exposure of left common femoral artery with primary repair  Attempted left internal iliac artery coil embolization     Pertinent Test Results:       Imaging Results (All)       Procedure Component Value Units Date/Time    XR Chest 1 View [[id number]] Collected: 01/03/25 1928     Updated: 01/03/25 1932    Narrative:      EXAMINATION: XR CHEST 1 VW-  1/3/2025 7:28 PM     HISTORY: Line placement.     FINDINGS: Today's exam is compared to previous study of earlier the same  day. The patient remains intubated with the endotracheal tube  well-positioned. A right IJ central line has been placed with the tip  just above the  cavoatrial juncture. Lungs remain clear. No pneumothorax.       Impression:      1. Right IJ central line placed and well-positioned without  complication.  2. Endotracheal tube remains well-positioned. The lungs remain clear.     This report was signed and finalized on 1/3/2025 7:29 PM by Dr. Ryder Rolle MD.       XR Chest 1 View [[id number]] Collected: 01/03/25 1846     Updated: 01/03/25 1849    Narrative:      EXAMINATION: XR CHEST 1 VW-  1/3/2025 6:46 PM     HISTORY: Endotracheal tube placement.     FINDINGS: Today's exam is compared to previous study of 1/9/2024. The  patient has been intubated with the endotracheal tube well-positioned.  The lungs are fully expanded and clear. No effusion or free air present.       Impression:      1.. Lungs clear.  2. Endotracheal tube well-positioned.     This report was signed and finalized on 1/3/2025 6:46 PM by Dr. Ryder Rolle MD.       IR Prox Dis Prost Endov Rep Initial Ves [[id number]] Collected: 01/03/25 1634     Updated: 01/03/25 1637    Narrative:      Performed by Dr. Whiteside in OR. Please see operative dictation.     This report was signed and finalized on 1/3/2025 4:34 PM by Dr. Eduardo Whiteside MD.       FL C Arm During Surgery [[id number]] Resulted: 01/03/25 1636     Updated: 01/03/25 1636    Narrative:      This procedure was auto-finalized with no dictation required.    CT Abdomen Pelvis With & Without Contrast [[id number]] Collected: 01/03/25 0541     Updated: 01/03/25 0601    Narrative:      EXAMINATION: CT ABDOMEN PELVIS W WO CONTRAST-      1/3/2025 2:47 AM     HISTORY: LLQ pain, L flank pain     In order to have a CT radiation dose as low as reasonably achievable  Automated Exposure Control was utilized for adjustment of the mA and/or  KV according to patient size.     Total DLP = 650.92 mGy.cm     The CT scan of the abdomen and pelvis is performed before and after  intravenous contrast enhancement.     Images are acquired in the axial plane and  subsequent reconstruction in  the coronal and sagittal planes.     There is no previous similar study for comparison.     The lung bases included in the study are unremarkable.     Limited visualized cardiomediastinal structures show moderate  cardiomegaly, atheromatous changes of the thoracic aorta and coronary  arteries.     The liver and spleen are normal.     No radiopaque gallstone.     Relatively small pancreas seen. No mass. No ductal dilatation.     Left adrenal nodule is seen measuring 2 cm x 1.4 cm. CT density is  nonfatty. Normal right adrenal gland.     Moderate lobulation of renal contour bilaterally. There is a tiny  exophytic nodule from the lower pole of the left kidney posteriorly  measuring 8 mm in diameter. This is too small to be further  characterized. No radiopaque calculi in either kidney. No hydronephrosis  on either side. Limited visualized ureters are nondilated. The urinary  bladder is incompletely distended with a mild to moderate wall  thickening. No focal intrinsic abnormality.     The prostate is enlarged with intrinsic calcification. It compresses and  elevates the floor of the urinary bladder.     There is small fat-containing inguinal hernias. There is a tiny  fat-containing umbilical hernia.     Stomach is decompressed with diffuse wall thickening. No focal  abnormality. Duodenum is normal. Small bowel is nondistended. No finding  to suggest appendicitis. Moderate gas and stool is seen in the colon. No  finding to suggest obstruction.     Atheromatous changes of the abdominal aorta and iliac arteries. There is  a saccular aneurysm from the right posterolateral aspect of the mid  abdominal aorta opposite and posterior to the inferior vena cava,  measuring 2.3 x 1.8 cm. There is fusiform aneurysmal dilatation of  distal abdominal aorta with a maximum lumen of the abdominal aorta  measuring 5.7 x 4.7 at and just above the level of bifurcation. There is  a circumferential partial lumen  mural thrombosis of the aneurysm with  the effective lumen measuring 3.3 x 2 cm. There is an ill-defined area  of increased density in the posterior aspect of the thrombus which may  represent a hematoma in the posterior wall of the aneurysm?. The  aneurysm extends into the left common iliac artery which measures 3.7 x  3.4 cm.     There is no evidence of abdominal or pelvic lymphadenopathy.     Images reviewed in bone window show chronic degenerative changes of the  lumbar and thoracic spine. No acute bony abnormality.       Impression:      1. Fusiform aneurysm of the distal abdominal aorta as detailed above.  There is partial lumen circumferential mural thrombosis of the aneurysm.  There is evidence of hematoma/hemorrhage at the posterior wall of the  aneurysm as detailed above. Further evaluation with CT angiography of  the abdomen may be obtained.  2. A saccular aneurysm of the mid abdominal aorta adjacent and below the  level of the right renal arteries and posterior to the IVC. Details  given above.  3. No other acute findings in the abdomen or pelvis to account for  patient's symptoms. No renal or ureteral calculi or obstructive  uropathy.  4. No gallstones. No finding to suggest appendicitis.                    This report was signed and finalized on 1/3/2025 5:58 AM by Dr. Andra Scott MD.             LAB RESULTS:      Lab 01/15/25  0454 01/14/25  0327 01/13/25  0250 01/12/25  0336 01/11/25  0503   WBC 8.90 7.97 8.27 7.43 9.18   HEMOGLOBIN 10.0* 9.9* 9.7* 8.8* 9.6*   HEMATOCRIT 32.0* 31.8* 31.0* 27.8* 30.4*   PLATELETS 410 394 374 336 332   NEUTROS ABS 6.72 5.79 6.11 5.20 6.77   IMMATURE GRANS (ABS) 0.08* 0.09* 0.08* 0.10* 0.12*   LYMPHS ABS 1.27 1.15 1.06 1.11 1.06   MONOS ABS 0.62 0.70 0.84 0.78 0.98*   EOS ABS 0.18 0.20 0.15 0.20 0.21   MCV 94.1 93.5 92.8 93.0 92.7         Lab 01/15/25  0454 01/14/25 0327 01/13/25  0250 01/12/25 0336 01/11/25  0503 01/10/25  0411   SODIUM 140 138 137 137 136 138    POTASSIUM 4.0 3.6 4.1 4.0 3.8 3.8   CHLORIDE 103 101 101 102 99 100   CO2 29.0 29.0 28.0 28.0 31.0* 28.0   ANION GAP 8.0 8.0 8.0 7.0 6.0 10.0   BUN 11 9 13 16 14 12   CREATININE 0.67* 0.51* 0.64* 0.56* 0.57* 0.51*   EGFR 99.8 108.4 101.2 105.4 104.8 108.4   GLUCOSE 109* 112* 121* 117* 120* 100*   CALCIUM 8.5* 8.2* 8.2* 8.0* 8.5* 8.1*   TSH  --   --   --   --   --  5.720*         Lab 01/13/25  0250 01/12/25  0336 01/10/25  0411   TOTAL PROTEIN 5.8* 5.3* 5.6*   ALBUMIN 2.9* 2.6* 3.1*   GLOBULIN 2.9 2.7 2.5   ALT (SGPT) 32 28 28   AST (SGOT) 32 31 37   BILIRUBIN 0.5 0.6 0.8   ALK PHOS 70 59 60                     Brief Urine Lab Results  (Last result in the past 365 days)        Color   Clarity   Blood   Leuk Est   Nitrite   Protein   CREAT   Urine HCG        01/03/25 0351 Yellow   Clear   Negative   Negative   Negative   Negative                 Microbiology Results (last 10 days)       ** No results found for the last 240 hours. **            Hospital Course:   I am informed by our case management that he has been declined to go to rehab facility.  Instead, patient will go with son upon discharge.  He would like to be referred to [facility] rehab for outpatient therapy.  I ordered this.    Patient is doing better and believed to be medically stable and appropriate for discharge.  I reviewed this medication at bedside.  I also ordered for the Zio patch which was recommended by cardiology.  He was switched to Eliquis for paroxysmal atrial fibrillation.  He will be on aspirin 81 mg along with Eliquis as per vascular surgeon.      My first encounter with patient was on day 10 of hospitalization.    This 69-year-old man who initially was under the service of intensivist.  Patient was hypertensive and requiring nicardipine drip.  He got admitted on January 3 presenting with abdominal pain, left flank pain and back pain.  CT scan of the abdomen and pelvis done on admission revealed fusiform aneurysm of the distal  abdominal aorta.  There is a partial lumen circumferential mural thrombus.  It also described evidence of hematoma/hemorrhage at the posterior wall of the aneurysm. 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    Patient been with hospital service since January 8.     Consultants:  Cardiology.  Last note dates back to January 9 with assessment and plan as follows:  1.  Paroxysmal atrial fibrillation  2.  Contained rupture of infrarenal abdominal aortic aneurysm, now status post endovascular repair  3.  Anemia without active bleeding  4.  Primary hypertension  5.  Mixed hyperlipidemia  6.  Peripheral arterial disease including carotid artery disease  7.  Tobacco abuse  8.  Prior stroke     -There were two short episodes of elevated heart rate overnight, consistent with atrial fibrillation.  He is otherwise asymptomatic and hemodynamically stable.  -Continue therapeutic Lovenox at this time but plan to transition to oral anticoagulation, either Eliquis or Xarelto at discharge.  This can be at the discretion of the primary service, I have no preference in this matter as to which agent is used.  -Blood pressure still remains elevated.  Cardiology will comment on the patient's atrial fibrillation, however the primary service to manage the patient's blood pressure and adjust medications accordingly if needed.  -At discharge, the patient should be ordered a 14-day cardiac monitor - Zio patch.  -Should the patient have symptomatic episodes of atrial fibrillation while here or prolonged episodes, recommend electrophysiology consultation.  -At this time, we will be available as needed.  Please feel free to call if there are questions.           Vascular surgery-last note dated back January 9 with assessment as follow:  Assessment & Plan    Abdominal aortic aneurysm    AAA (abdominal aortic aneurysm)    Abdominal pain    "  69-year-old male postop day 6 EVAR with extension to left external iliac for rupture.        Plan for disposition:  -Hospitalist on board appreciate their assistance.  -Tachycardia resolved Lovenox started by hospitalist.  Continue aspirin DC Plavix with anticoagulation.  -Hemoglobin stable  -Faintly palpable left lower extremity pedal pulses with multiphasic signals.  Continue gabapentin for possible reperfusion pain versus chronic pain from stroke.  -PT OT encouraged.  -BM x 2 yesterday.  Patient reports is nonbloody.  Denies abdominal pain.  -No further vascular intervention needed at this time.  -Patient to follow-up in 2 weeks for wound check.     Timmy Finney MD  Vascular Surgery  [phone]  01/09/25  09:44 CST     Procedure:  1/3/2025     PREOPERATIVE DIAGNOSIS: Abdominal pain, unspecified abdominal location [R10.9]  Ruptured abdominal aortic aneurysm     POSTOPERATIVE DIAGNOSIS: Post-Op Diagnosis Codes:     * Abdominal pain, unspecified abdominal location [R10.9]  Ruptured abdominal aortic aneurysm     PROCEDURE PERFORMED:   Percutaneous access and closure of right common femoral artery  Ultrasound-guided access of left common femoral artery  Placement of aortobiiliac endograft for rupture  Concurrent placement of extension stent to left external iliac artery  Left external iliac artery stent placement  Exposure of left common femoral artery with primary repair  Attempted left internal iliac artery coil embolization              Social service, therapist note and nursing staff note reviewed  Patient has been referred to rehab facility.Physical Exam on Discharge:  /66 (BP Location: Right arm, Patient Position: Sitting)   Pulse 74   Temp 97.4 °F (36.3 °C) (Oral)   Resp 18   Ht 177.8 cm (70\")   Wt 101 kg (223 lb 1.6 oz)   SpO2 94%   BMI 32.01 kg/m²   Physical Exam  Normocephalic/atraumatic head  Anicteric sclera  No thyromegaly  Lungs clear to auscultation  S1-S2 regular rate and rhythm, " telemetry showed normal sinus rhythm   Soft abdomen, nontender, no peripheral edema.  He still has a bilateral dressing on groin area.  He has some ecchymosis lateral aspect of right thigh, bruising on groin areas bilateral, small bruise on his scrotum.  I did not appreciate any gross hematoma  Fluent speech  Appropriate affect  Well-built.       Condition on Discharge: Stable    Discharge Disposition:  Home or Self Care    Discharge Medications:     Discharge Medications        New Medications        Instructions Start Date   apixaban 5 MG tablet tablet  Commonly known as: ELIQUIS   5 mg, Oral, 2 Times Daily      aspirin 81 MG chewable tablet  Replaces: aspirin 325 MG EC tablet   81 mg, Oral, Daily   Start Date: January 16, 2025     carvedilol 25 MG tablet  Commonly known as: COREG   25 mg, Oral, 2 Times Daily With Meals      HYDROcodone-acetaminophen  MG per tablet  Commonly known as: NORCO   1 tablet, Oral, Every 6 Hours PRN      nitroglycerin 0.4 MG SL tablet  Commonly known as: NITROSTAT   0.4 mg, Sublingual, Every 5 Minutes PRN, Take no more than 3 doses in 15 minutes.      tamsulosin 0.4 MG capsule 24 hr capsule  Commonly known as: FLOMAX   0.4 mg, Oral, Daily   Start Date: January 16, 2025            Changes to Medications        Instructions Start Date   amLODIPine 5 MG tablet  Commonly known as: NORVASC  What changed:   medication strength  how much to take  when to take this   5 mg, Oral, Every 24 Hours Scheduled   Start Date: January 16, 2025     hydrALAZINE 25 MG tablet  Commonly known as: APRESOLINE  What changed:   when to take this  reasons to take this   25 mg, Oral, 3 Times Daily PRN             Continue These Medications        Instructions Start Date   atorvastatin 20 MG tablet  Commonly known as: LIPITOR   20 mg, Daily      Evolocumab solution prefilled syringe injection  Commonly known as: REPATHA   140 mg, Every 14 Days      losartan 100 MG tablet  Commonly known as: COZAAR   100 mg,  Daily             Stop These Medications      aspirin 325 MG EC tablet  Replaced by: aspirin 81 MG chewable tablet     cloNIDine 0.2 MG tablet  Commonly known as: CATAPRES     metoprolol succinate XL 50 MG 24 hr tablet  Commonly known as: TOPROL-XL              This patient has current or prior documentation of an left ventricular ejection fraction (LVEF) of less than or equal to 40%.-Not applicable.  I do not have information to based it upon.          Discharge Diet:   Diet Instructions       Diet: Cardiac Diets; Healthy Heart (2-3 Na+); Thin (IDDSI 0)      Discharge Diet: Cardiac Diets    Cardiac Diet: Healthy Heart (2-3 Na+)    Fluid Consistency: Thin (IDDSI 0)            Activity at Discharge:   Activity Instructions       Gradually Increase Activity Until at Pre-Hospitalization Level              Follow-up Appointments:   PCP within 1 week  Vascular per their recommendation  Cardiology per their recommendation  Future Appointments   Date Time Provider Department Center   1/30/2025 11:00 AM Timmy Finney MD [facility]       Test Results Pending at Discharge: None    Electronically signed by Maulik Osborn MD, 01/15/25, 14:42 CST.    Time: Greater than 30 minutes.           Electronically signed by Maulik Osborn MD at 01/15/25 1189

## 2025-01-16 NOTE — THERAPY DISCHARGE NOTE
Acute Care - Physical Therapy Discharge Summary  [facility]       Patient Name: Brijesh Grande  : 1955  MRN: [id number]    Today's Date: 2025                 Admit Date: 1/3/2025      PT Recommendation and Plan    Visit Dx:    ICD-10-CM ICD-9-CM   1. Aneurysm of infrarenal abdominal aorta, unspecified whether ruptured  I71.43 441.4   2. Abdominal pain, unspecified abdominal location  R10.9 789.00   3. Abdominal aortic aneurysm (AAA) without rupture, unspecified part  I71.40 441.4   4. Primary hypertension  I10 401.9   5. Impaired mobility [Z74.09]  Z74.09 799.89   6. Paroxysmal atrial fibrillation  I48.0 427.31        Outcome Measures       Row Name 25 1314             How much help from another person do you currently need...    Turning from your back to your side while in flat bed without using bedrails? 4  -MF      Moving from lying on back to sitting on the side of a flat bed without bedrails? 3  -MF      Moving to and from a bed to a chair (including a wheelchair)? 3  -MF      Standing up from a chair using your arms (e.g., wheelchair, bedside chair)? 3  -MF      Climbing 3-5 steps with a railing? 1  -MF      To walk in hospital room? 3  -MF      AM-PAC 6 Clicks Score (PT) 17  -MF         Functional Assessment    Outcome Measure Options AM-PAC 6 Clicks Basic Mobility (PT)  -MF                User Key  (r) = Recorded By, (t) = Taken By, (c) = Cosigned By      Initials Name Provider Type    Gabby Rossi, PTA Physical Therapist Assistant                         PT Rehab Goals       Row Name 25 1100             Bed Mobility Goal 1 (PT)    Activity/Assistive Device (Bed Mobility Goal 1, PT) sit to supine/supine to sit  -AB      [place] Level/Cues Needed (Bed Mobility Goal 1, PT) supervision required  -AB      Time Frame (Bed Mobility Goal 1, PT) long term goal (LTG);10 days  -AB      Progress/Outcomes (Bed Mobility Goal 1, PT) goal met  -AB         Transfer Goal 1 (PT)     Activity/Assistive Device (Transfer Goal 1, PT) sit-to-stand/stand-to-sit;bed-to-chair/chair-to-bed  -AB      [place] Level/Cues Needed (Transfer Goal 1, PT) supervision required  -AB      Time Frame (Transfer Goal 1, PT) long term goal (LTG);10 days  -AB      Progress/Outcome (Transfer Goal 1, PT) goal not met  -AB         Gait Training Goal 1 (PT)    Activity/Assistive Device (Gait Training Goal 1, PT) gait (walking locomotion);assistive device use;decrease fall risk;forward stepping;improve balance and speed;increase endurance/gait distance  -AB      [place] Level (Gait Training Goal 1, PT) contact guard required  -AB      Distance (Gait Training Goal 1, PT) 30 ft  -AB      Time Frame (Gait Training Goal 1, PT) long term goal (LTG);10 days  -AB      Progress/Outcome (Gait Training Goal 1, PT) goal met  -AB                User Key  (r) = Recorded By, (t) = Taken By, (c) = Cosigned By      Initials Name Provider Type Discipline    Armida Sadler, PTA Physical Therapist Assistant PT                        PT Discharge Summary  Anticipated Discharge Disposition (PT): sub acute care setting  Reason for Discharge: Discharge from facility  Outcomes Achieved: Refer to plan of care for updates on goals achieved  Discharge Destination: Home with assist      Armida Delgado PTA   1/16/2025

## 2025-01-21 ENCOUNTER — READMISSION MANAGEMENT (OUTPATIENT)
Dept: CALL CENTER | Facility: HOSPITAL | Age: 70
End: 2025-01-21
Payer: MEDICARE

## 2025-01-21 NOTE — OUTREACH NOTE
General Surgery Week 1 Survey      Flowsheet Row Responses   [facility] facility patient discharged from? [place]   Does the patient have one of the following disease processes/diagnoses(primary or secondary)? General Surgery   Week 1 attempt successful? No   Unsuccessful attempts Attempt 1            Sabine Fernández Licensed Nurse

## 2025-01-29 ENCOUNTER — TELEPHONE (OUTPATIENT)
Dept: VASCULAR SURGERY | Facility: CLINIC | Age: 70
End: 2025-01-29
Payer: MEDICARE

## 2025-01-29 NOTE — TELEPHONE ENCOUNTER
SPOKE WITH PT AS A REMINDER OF 0800 ARRIVAL FRO 0830 TESTING MAIN REG NPO 6 HRS PRIOR THEN TO SEE DR MONTOYA AT 1100 FOR POST OP. STATED UNDERSTANDING.

## 2025-01-30 ENCOUNTER — HOSPITAL ENCOUNTER (OUTPATIENT)
Dept: CT IMAGING | Facility: HOSPITAL | Age: 70
Discharge: HOME | End: 2025-01-30
Admitting: NURSE PRACTITIONER
Payer: MEDICARE

## 2025-01-30 ENCOUNTER — OFFICE VISIT (OUTPATIENT)
Dept: VASCULAR SURGERY | Facility: CLINIC | Age: 70
End: 2025-01-30
Payer: MEDICARE

## 2025-01-30 VITALS
HEART RATE: 92 BPM | HEIGHT: 69 IN | SYSTOLIC BLOOD PRESSURE: 162 MMHG | OXYGEN SATURATION: 98 % | WEIGHT: 194 LBS | BODY MASS INDEX: 28.73 KG/M2 | DIASTOLIC BLOOD PRESSURE: 82 MMHG

## 2025-01-30 DIAGNOSIS — I71.43 ANEURYSM OF INFRARENAL ABDOMINAL AORTA, UNSPECIFIED WHETHER RUPTURED: ICD-10-CM

## 2025-01-30 DIAGNOSIS — I65.23 BILATERAL CAROTID ARTERY STENOSIS: ICD-10-CM

## 2025-01-30 DIAGNOSIS — I10 PRIMARY HYPERTENSION: ICD-10-CM

## 2025-01-30 DIAGNOSIS — E78.00 HYPERCHOLESTEROLEMIA: ICD-10-CM

## 2025-01-30 DIAGNOSIS — I73.9 PERIPHERAL VASCULAR DISEASE: ICD-10-CM

## 2025-01-30 DIAGNOSIS — Z72.0 TOBACCO ABUSE: Chronic | ICD-10-CM

## 2025-01-30 DIAGNOSIS — I71.33 RUPTURED INFRARENAL ABDOMINAL AORTIC ANEURYSM (AAA): Primary | ICD-10-CM

## 2025-01-30 DIAGNOSIS — E66.3 OVERWEIGHT (BMI 25.0-29.9): ICD-10-CM

## 2025-01-30 PROBLEM — F41.1 GENERALIZED ANXIETY DISORDER: Status: ACTIVE | Noted: 2023-07-10

## 2025-01-30 PROBLEM — H53.8 BLURRED VISION, BILATERAL: Status: ACTIVE | Noted: 2022-03-25

## 2025-01-30 PROBLEM — E78.1 HYPERTRIGLYCERIDEMIA: Status: RESOLVED | Noted: 2023-07-10 | Resolved: 2025-01-30

## 2025-01-30 PROBLEM — I69.359 HEMIPARESIS AS LATE EFFECT OF CEREBROVASCULAR ACCIDENT (CVA): Status: ACTIVE | Noted: 2025-01-21

## 2025-01-30 PROBLEM — I65.29 CAROTID ARTERY OCCLUSION: Status: ACTIVE | Noted: 2022-03-25

## 2025-01-30 PROBLEM — J44.9 CHRONIC OBSTRUCTIVE PULMONARY DISEASE: Status: ACTIVE | Noted: 2024-07-08

## 2025-01-30 PROBLEM — Z86.73 HISTORY OF CEREBROVASCULAR ACCIDENT: Status: ACTIVE | Noted: 2023-07-10

## 2025-01-30 PROBLEM — G47.33 OBSTRUCTIVE SLEEP APNEA SYNDROME: Status: ACTIVE | Noted: 2023-07-10

## 2025-01-30 PROCEDURE — 25510000001 IOPAMIDOL 61 % SOLUTION: Performed by: NURSE PRACTITIONER

## 2025-01-30 PROCEDURE — 74174 CTA ABD&PLVS W/CONTRAST: CPT

## 2025-01-30 RX ORDER — IOPAMIDOL 612 MG/ML
100 INJECTION, SOLUTION INTRAVASCULAR
Status: COMPLETED | OUTPATIENT
Start: 2025-01-30 | End: 2025-01-30

## 2025-01-30 RX ORDER — ALBUTEROL SULFATE 90 UG/1
2 INHALANT RESPIRATORY (INHALATION)
COMMUNITY
Start: 2025-01-22

## 2025-01-30 RX ADMIN — IOPAMIDOL 100 ML: 612 INJECTION, SOLUTION INTRAVENOUS at 08:37

## 2025-01-30 NOTE — PROGRESS NOTES
01/30/2025      No referring provider defined for this encounter.    Brijesh Grande  1955    Chief Complaint   Patient presents with    Post-op     2 week post op. AAA repair done 1/3/25. Patient denies any post operative issues.        Dear No ref. provider found:      HPI  I had the pleasure of seeing your patient Brijesh Grande in the office today.  Thank you kindly for this consultation.  As you recall, Brijesh Grande is a 69 y.o.  male who you previously treated for abdominal pain.  We were consulted due to a fusiform abdominal aortic aneurysm measuring 5.7 cm with partial lumen circumferential mural thrombosis and evidence of hematoma/hemorrhage at the posterior wall.  A saccular aneurysm of the mid abdominal aorta adjacent and below the level of the right renal arteries and posterior to the IVC.  He underwent abdominal aortic aneurysm repair with endograft on 1/3/2025.   He did have a left common femoral artery cutdown as well.  He did have a lengthy stay in the hospital, we are seeing him 1 month postop with testing.  Overall he is doing well, his left lower extremity has greatly improved.  He denies any abdominal pain or back pain.  He also states that he did not take one of his blood pressure medications because he did not eat this morning so his blood pressure is running a little high.  He is maintained on aspirin, Lipitor, Repatha,  and Eliquis.  He did have noninvasive testing performed, which I did review in office.    Past Medical History:   Diagnosis Date    Cerebrovascular accident (CVA) 01/08/2016    Hyperlipidemia     Hypertension     Hypertriglyceridemia 07/10/2023    PAD (peripheral artery disease)     Stroke        Past Surgical History:   Procedure Laterality Date    ABDOMINAL AORTIC ANEURYSM REPAIR WITH ENDOGRAFT N/A 1/3/2025    Procedure: ABDOMINAL AORTIC ANEURYSM REPAIR WITH ENDOGRAFT;  Surgeon: Eduardo Whiteside DO;  Location:  [facility];  Service: Vascular;  Laterality: N/A;     CAROTID ENDARTERECTOMY      x 2       History reviewed. No pertinent family history.    Social History     Socioeconomic History    Marital status:    Tobacco Use    Smoking status: Every Day     Current packs/day: 1.00     Types: Cigarettes   Vaping Use    Vaping status: Never Used   Substance and Sexual Activity    Alcohol use: Not Currently    Drug use: Not Currently       No Known Allergies    Current Outpatient Medications   Medication Instructions    albuterol sulfate  (90 Base) MCG/ACT inhaler 2 puffs, 4 Times Daily - RT    amLODIPine (NORVASC) 5 mg, Oral, Every 24 Hours Scheduled    aspirin 81 mg, Oral, Daily    atorvastatin (LIPITOR) 20 mg, Daily    carvedilol (COREG) 25 mg, Oral, 2 Times Daily With Meals    Eliquis 5 mg, Oral, 2 Times Daily    Evolocumab (REPATHA) 140 mg, Every 14 Days    hydrALAZINE (APRESOLINE) 25 mg, Oral, 3 Times Daily PRN    HYDROcodone-acetaminophen (NORCO)  MG per tablet 1 tablet, Oral, Every 6 Hours PRN    losartan (COZAAR) 100 mg, Daily    naloxone (NARCAN) 4 MG/0.1ML nasal spray Call 911. Don't prime. Isabella in 1 nostril for overdose. Repeat in 2-3 minutes in other nostril if no or minimal breathing/responsiveness.    nitroglycerin (NITROSTAT) 0.4 mg, Sublingual, Every 5 Minutes PRN, Take no more than 3 doses in 15 minutes.    tamsulosin (FLOMAX) 0.4 mg, Oral, Daily    tiotropium bromide monohydrate (SPIRIVA RESPIMAT) 2.5 MCG/ACT aerosol solution inhaler 2 puffs, Daily - RT           Review of Systems   Constitutional: Negative.  Negative for diaphoresis and fever.   HENT: Negative.     Eyes: Negative.    Respiratory: Negative.  Negative for shortness of breath and wheezing.    Cardiovascular: Negative.  Negative for chest pain and leg swelling.   Gastrointestinal: Negative.  Negative for abdominal pain.   Endocrine: Negative.    Genitourinary: Negative.    Musculoskeletal: Negative.    Skin: Negative.    Allergic/Immunologic: Negative.    Neurological:  "Negative.  Negative for dizziness and weakness.   Hematological: Negative.    Psychiatric/Behavioral: Negative.         /82   Pulse 92   Ht 175.3 cm (69\")   Wt 88 kg (194 lb)   SpO2 98%   BMI 28.65 kg/m²       Physical Exam  Vitals and nursing note reviewed.   Constitutional:       General: He is not in acute distress.     Appearance: Normal appearance. He is overweight. He is not diaphoretic.   HENT:      Head: Normocephalic. No right periorbital erythema or left periorbital erythema.      Nose: Nose normal.   Eyes:      General: No scleral icterus.     Pupils: Pupils are equal.   Cardiovascular:      Rate and Rhythm: Normal rate and regular rhythm.      Pulses: Normal pulses.           Dorsalis pedis pulses are 2+ on the right side and 2+ on the left side.        Posterior tibial pulses are 2+ on the right side and 2+ on the left side.      Heart sounds: Normal heart sounds. No murmur heard.     Comments: Bilateral groins are healed   Pulmonary:      Effort: Pulmonary effort is normal. No respiratory distress.      Breath sounds: Normal breath sounds.   Abdominal:      General: Bowel sounds are normal. There is no distension.      Palpations: Abdomen is soft.      Tenderness: There is no abdominal tenderness. There is no guarding.      Comments: Nontender over surgical site     Musculoskeletal:         General: No swelling or tenderness. Normal range of motion.      Cervical back: Normal range of motion and neck supple.      Right lower leg: No edema.      Left lower leg: No edema.   Feet:      Right foot:      Skin integrity: Skin integrity normal.      Left foot:      Skin integrity: Skin integrity normal.   Skin:     General: Skin is warm and dry.      Findings: No erythema or rash.   Neurological:      General: No focal deficit present.      Mental Status: He is alert and oriented to person, place, and time. Mental status is at baseline.      Cranial Nerves: No cranial nerve deficit.      Gait: Gait " normal.   Psychiatric:         Attention and Perception: Attention normal.         Mood and Affect: Mood normal.         Behavior: Behavior normal.         Thought Content: Thought content normal.         Judgment: Judgment normal.     DIAGNOSTIC DATA  Narrative & Impression   EXAM: CT ANGIOGRAM ABDOMEN PELVIS- - 1/30/2025 7:14 AM     HISTORY: Abdominal aortic aneurysm (AAA), known, follow up       COMPARISON: 1/3/2025.     DOSE LENGTH PRODUCT: 1966.37 mGy.cm  Automatic exposure control was  utilized to make radiation dose as low as reasonably achievable.     TECHNIQUE: Enhanced  CT images of the abdomen and pelvis obtained with  multiplanar reformats. 3D postprocessing, including MIPs, performed and  images saved to PACS.     FINDINGS:  VISUALIZED CHEST: No pleural or pericardial effusion. Lung bases clear.     Arterial phase of imaging limits solid organ evaluation.     LIVER: No focally suspicious finding.     BILIARY: No calcified gallstone. No intrahepatic or extrahepatic bile  duct dilation.      PANCREAS: Normal pancreas contour and enhancement.     SPLEEN: Normal size and contour.      ADRENAL: Similar thickening of the LEFT adrenal gland without discrete  nodule. No RIGHT adrenal nodule.     GENITOURINARY:  No hydronephrosis, urolithiasis or solid renal lesion. A few small renal  cortical cysts without complicating features.  Urinary bladder is within normal limits.  Borderline prostate size.  Partially visualized bilateral hydroceles.     PERITONEUM: No free air or ascites.     GI TRACT: Normal configuration of the stomach and duodenum.  No  abnormally dilated loops of bowel or bowel wall thickening. Normal  appendix on axial series 9, images 138-156.     VESSELS: Fusiform abdominal aortic aneurysm measures 4.9 x 5.4 cm,  previously 4.7 x 5.7 cm.      Nonopacified saccular aortic aneurysm component at the RIGHT posterior  lateral aspect of the aorta just below the level of the renal arteries  measuring 2.3  x 2.0 cm on axial series 9, image 85, previously 2.6 x 2.1  cm on 1/3/2025.      Changes of endograft repair. No evidence of leak.     Celiac, superior mesenteric, bilateral renal arteries patent. Inferior  mesenteric artery not discretely identified.     RIGHT common iliac, internal iliac, external iliac, common femoral,  partially visualized superficial and deep femoral arteries appear patent  with heavily calcified atherosclerosis.     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 No evidence of  leak. LEFT external iliac artery stent patent. LEFT internal iliac,  common femoral, partially visualized superficial and deep femoral  arteries appear patent.     Post procedure changes at the LEFT greater than RIGHT groin. Small  stranding along the LEFT greater than RIGHT pelvic sidewall, likely  postprocedural.     RETROPERITONEUM: No mass, lymphadenopathy or hemorrhage.     SOFT TISSUES: Post procedure changes as described above in the vascular  section.     BONES: No acute or aggressive bony lesion.         IMPRESSION:  1. Changes of abdominal aorta and LEFT iliac aneurysm endograft repair.  Fusiform and saccular components as above. No evidence of leak.     This report was signed and finalized on 1/30/2025 12:00 PM by Dr Jacinda Mcelroy MD.     Patient Active Problem List   Diagnosis    AAA (abdominal aortic aneurysm)    Abdominal aortic aneurysm    Abdominal pain    Tobacco abuse    Obstructive sleep apnea syndrome    Peripheral vascular disease    Hypertension    Hypercholesterolemia    History of cerebrovascular accident    Hemiparesis as late effect of cerebrovascular accident (CVA)    Generalized anxiety disorder    Chronic obstructive pulmonary disease    Carotid artery occlusion    Blurred vision, bilateral    Carotid artery stenosis         ICD-10-CM ICD-9-CM   1. Ruptured infrarenal abdominal aortic aneurysm (AAA)  I71.33  441.3   2. Primary hypertension  I10 401.9   3. Hypercholesterolemia  E78.00 272.0   4. Tobacco abuse  Z72.0 305.1   5. Overweight (BMI 25.0-29.9)  E66.3 278.02   6. Bilateral carotid artery stenosis  I65.23 433.10     433.30   7. Peripheral vascular disease  I73.9 443.9           Plan: After thoroughly evaluating Brijesh Grande, I believe the best course of action is to remain conservative from vascular surgery standpoint.  I along with Dr. Finney reviewed his CTA abdomen pelvis and abdominal aortic aneurysm endograft in place with no endoleak present,  iliac stent patent.  He should continue his aspirin 81 mg daily, Lipitor 20 mg daily, Repatha 140 mg subcu every 2 weeks, and Eliquis 5 mg twice daily in addition to his other medications.  We will see him back in 6 months with noninvasive testing to include repeat CTA abdomen/pelvis, ABIs, and carotid duplex for continued surveillance.  I did discuss vascular risk factors as they pertain to the progression of vascular disease including controlling his hypertension, hypercholesteremia, and smoking cessation.  His blood pressure is elevated today in office at 162/82, he does state that he did not take his blood pressure medication this morning because he had not eaten and it states to take with food.  I suggest that if his blood pressure continues to be elevated he get with his PCP for further recommendations.  I cannot see a current lipid panel to review.  Unfortunately he is a current daily smoker, he has decreased his cigarette intake but has not fully stopped.  We did have a lengthy discussion about the effects of smoking on his vasculature system.   Body mass index is 28.65 kg/m².       This was all discussed in full with complete understanding.    Thank you for allowing me to participate in the care of your patient.  Please do not hesitate with any questions or concerns.  I will keep you aware of any further encounters with Brijesh Grande.        Sincerely yours,          Halley Leone, APRN

## 2025-02-05 ENCOUNTER — READMISSION MANAGEMENT (OUTPATIENT)
Dept: CALL CENTER | Facility: HOSPITAL | Age: 70
End: 2025-02-05
Payer: MEDICARE

## 2025-02-05 LAB
CV ZIO BASELINE AVG BPM: 82 BPM
CV ZIO BASELINE BPM HIGH: 197 BPM
CV ZIO BASELINE BPM LOW: 47 BPM
CV ZIO DEVICE ANALYSIS TIME: NORMAL
CV ZIO ECT SVE COUNT: 1770 EPISODES
CV ZIO ECT SVE CPLT COUNT: 58 EPISODES
CV ZIO ECT SVE CPLT FREQ: NORMAL
CV ZIO ECT SVE FREQ: NORMAL
CV ZIO ECT SVE TPLT COUNT: 15 EPISODES
CV ZIO ECT SVE TPLT FREQ: NORMAL
CV ZIO ECT VE COUNT: 252 EPISODES
CV ZIO ECT VE CPLT COUNT: 3 EPISODES
CV ZIO ECT VE CPLT FREQ: NORMAL
CV ZIO ECT VE FREQ: NORMAL
CV ZIO ECT VE TPLT COUNT: 1 EPISODES
CV ZIO ECT VE TPLT FREQ: NORMAL
CV ZIO ECTOPIC SVE COUPLET RAW PERCENT: 0.01 %
CV ZIO ECTOPIC SVE ISOLATED PERCENT: 0.15 %
CV ZIO ECTOPIC SVE TRIPLET RAW PERCENT: 0 %
CV ZIO ECTOPIC VE COUPLET RAW PERCENT: 0 %
CV ZIO ECTOPIC VE ISOLATED PERCENT: 0.02 %
CV ZIO ECTOPIC VE TRIPLET RAW PERCENT: 0 %
CV ZIO ENROLLMENT END: NORMAL
CV ZIO ENROLLMENT START: NORMAL
CV ZIO PATIENT EVENTS DIARIES: 2
CV ZIO PATIENT EVENTS TRIGGERS: 2
CV ZIO PAUSE COUNT: 0
CV ZIO PRESCRIPTION STATUS: NORMAL
CV ZIO SVT AVG BPM: 143 BPM
CV ZIO SVT BPM HIGH: 197 BPM
CV ZIO SVT BPM LOW: 99 BPM
CV ZIO SVT COUNT: 14
CV ZIO SVT F EPI AVG BPM: 178 BPM
CV ZIO SVT F EPI BEATS: 8 BEATS
CV ZIO SVT F EPI BPM HIGH: 197 BPM
CV ZIO SVT F EPI BPM LOW: 130 BPM
CV ZIO SVT F EPI DUR: 2.8 SEC
CV ZIO SVT F EPI END: NORMAL
CV ZIO SVT F EPI START: NORMAL
CV ZIO SVT L EPI AVG BPM: 141 BPM
CV ZIO SVT L EPI BEATS: 30 BEATS
CV ZIO SVT L EPI BPM HIGH: 160 BPM
CV ZIO SVT L EPI BPM LOW: 121 BPM
CV ZIO SVT L EPI DUR: 12.8 SEC
CV ZIO SVT L EPI END: NORMAL
CV ZIO SVT L EPI START: NORMAL
CV ZIO TOTAL  ENROLLMENT PERIOD: NORMAL
CV ZIO VT COUNT: 0

## 2025-02-05 NOTE — OUTREACH NOTE
General Surgery Week 3 Survey      Flowsheet Row Responses   [facility] patient discharged from? [facility]   Does the patient have one of the following disease processes/diagnoses(primary or secondary)? General Surgery   Week 3 attempt successful? Yes   Call start time 1651   Call end time 1702   Discharge diagnosis Abdominal aortic aneurysm, ABDOMINAL AORTIC ANEURYSM REPAIR WITH ENDOGRAFT   Prescription comments Pt will clarify with Dr. Smith on how the pt should take the Hydralazine as he recieved a new bottle from pharmacy yesterday stating to take three times daily but the pt has been taking it prn as the Surgeon ordered at [place] from hospital, based on his BP readings. Pt was only to take Hydralazine if SBP >160 or DBP >90, pt reports.   Is the patient taking all medications as directed (includes completed medication regime)? Yes   Does the patient have a follow up appointment scheduled with their surgeon? Yes   Has the patient kept scheduled appointments due by today? Yes   Comments Pt reports that he is doing well, denies pain or other issues at this time.   What is the patient's perception of their health status since discharge? Improving   Nursing interventions Nurse provided patient education   If the patient is a current smoker, are they able to teach back resources for cessation? --  [Pt continues to smoke, nurse educated pt on importance to stop.]   Week 3 call completed? Yes   Graduated Yes   Call end time 1702            Jojo STRONG - Registered Nurse

## 2025-03-29 LAB
CV ZIO BASELINE AVG BPM: 82 BPM
CV ZIO BASELINE BPM HIGH: 120 BPM
CV ZIO BASELINE BPM LOW: 47 BPM
CV ZIO DEVICE ANALYSIS TIME: NORMAL
CV ZIO ECT SVE COUNT: 1770 EPISODES
CV ZIO ECT SVE CPLT COUNT: 58 EPISODES
CV ZIO ECT SVE CPLT FREQ: NORMAL
CV ZIO ECT SVE FREQ: NORMAL
CV ZIO ECT SVE TPLT COUNT: 15 EPISODES
CV ZIO ECT SVE TPLT FREQ: NORMAL
CV ZIO ECT VE COUNT: 252 EPISODES
CV ZIO ECT VE CPLT COUNT: 3 EPISODES
CV ZIO ECT VE CPLT FREQ: NORMAL
CV ZIO ECT VE FREQ: NORMAL
CV ZIO ECT VE TPLT COUNT: 1 EPISODES
CV ZIO ECT VE TPLT FREQ: NORMAL
CV ZIO ECTOPIC SVE COUPLET RAW PERCENT: 0.01 %
CV ZIO ECTOPIC SVE ISOLATED PERCENT: 0.15 %
CV ZIO ECTOPIC SVE TRIPLET RAW PERCENT: 0 %
CV ZIO ECTOPIC VE COUPLET RAW PERCENT: 0 %
CV ZIO ECTOPIC VE ISOLATED PERCENT: 0.02 %
CV ZIO ECTOPIC VE TRIPLET RAW PERCENT: 0 %
CV ZIO ENROLLMENT END: NORMAL
CV ZIO ENROLLMENT START: NORMAL
CV ZIO PATIENT EVENTS DIARIES: 2
CV ZIO PATIENT EVENTS TRIGGERS: 2
CV ZIO PAUSE COUNT: 0
CV ZIO PRESCRIPTION STATUS: NORMAL
CV ZIO SVT AVG BPM: 143 BPM
CV ZIO SVT BPM HIGH: 197 BPM
CV ZIO SVT BPM LOW: 99 BPM
CV ZIO SVT COUNT: 14
CV ZIO SVT F EPI AVG BPM: 178 BPM
CV ZIO SVT F EPI BEATS: 8 BEATS
CV ZIO SVT F EPI BPM HIGH: 197 BPM
CV ZIO SVT F EPI BPM LOW: 130 BPM
CV ZIO SVT F EPI DUR: 2.8 SEC
CV ZIO SVT F EPI END: NORMAL
CV ZIO SVT F EPI START: NORMAL
CV ZIO SVT L EPI AVG BPM: 141 BPM
CV ZIO SVT L EPI BEATS: 30 BEATS
CV ZIO SVT L EPI BPM HIGH: 160 BPM
CV ZIO SVT L EPI BPM LOW: 121 BPM
CV ZIO SVT L EPI DUR: 12.8 SEC
CV ZIO SVT L EPI END: NORMAL
CV ZIO SVT L EPI START: NORMAL
CV ZIO TOTAL  ENROLLMENT PERIOD: NORMAL
CV ZIO VT COUNT: 0

## 2025-04-17 ENCOUNTER — TRANSCRIBE ORDERS (OUTPATIENT)
Dept: ADMINISTRATIVE | Facility: HOSPITAL | Age: 70
End: 2025-04-17
Payer: MEDICARE

## 2025-04-17 DIAGNOSIS — R05.3 CHRONIC COUGH: Primary | ICD-10-CM

## 2025-04-28 ENCOUNTER — TRANSCRIBE ORDERS (OUTPATIENT)
Dept: ADMINISTRATIVE | Facility: HOSPITAL | Age: 70
End: 2025-04-28
Payer: MEDICARE

## 2025-04-28 ENCOUNTER — HOSPITAL ENCOUNTER (OUTPATIENT)
Dept: GENERAL RADIOLOGY | Facility: HOSPITAL | Age: 70
Discharge: HOME | End: 2025-04-28
Admitting: NURSE PRACTITIONER
Payer: MEDICARE

## 2025-04-28 DIAGNOSIS — R05.3 CHRONIC COUGH: ICD-10-CM

## 2025-04-28 DIAGNOSIS — R05.3 CHRONIC COUGH: Primary | ICD-10-CM

## 2025-04-28 PROCEDURE — 71046 X-RAY EXAM CHEST 2 VIEWS: CPT

## 2025-07-17 ENCOUNTER — TRANSCRIBE ORDERS (OUTPATIENT)
Dept: ADMINISTRATIVE | Facility: HOSPITAL | Age: 70
End: 2025-07-17
Payer: MEDICARE

## 2025-07-17 DIAGNOSIS — R42 DIZZINESS AND GIDDINESS: ICD-10-CM

## 2025-07-17 DIAGNOSIS — R22.1 NECK MASS: Primary | ICD-10-CM

## 2025-07-30 ENCOUNTER — TELEPHONE (OUTPATIENT)
Dept: VASCULAR SURGERY | Facility: CLINIC | Age: 70
End: 2025-07-30
Payer: MEDICARE

## 2025-07-31 ENCOUNTER — HOSPITAL ENCOUNTER (OUTPATIENT)
Dept: CT IMAGING | Facility: HOSPITAL | Age: 70
Discharge: HOME | End: 2025-07-31
Payer: MEDICARE

## 2025-07-31 ENCOUNTER — HOSPITAL ENCOUNTER (OUTPATIENT)
Dept: ULTRASOUND IMAGING | Facility: HOSPITAL | Age: 70
Discharge: HOME | End: 2025-07-31
Payer: MEDICARE

## 2025-07-31 ENCOUNTER — OFFICE VISIT (OUTPATIENT)
Dept: VASCULAR SURGERY | Facility: CLINIC | Age: 70
End: 2025-07-31
Payer: MEDICARE

## 2025-07-31 VITALS
HEIGHT: 69 IN | WEIGHT: 205 LBS | BODY MASS INDEX: 30.36 KG/M2 | SYSTOLIC BLOOD PRESSURE: 128 MMHG | DIASTOLIC BLOOD PRESSURE: 66 MMHG | OXYGEN SATURATION: 95 % | HEART RATE: 75 BPM

## 2025-07-31 DIAGNOSIS — I65.23 BILATERAL CAROTID ARTERY STENOSIS: ICD-10-CM

## 2025-07-31 DIAGNOSIS — I71.33 RUPTURED INFRARENAL ABDOMINAL AORTIC ANEURYSM (AAA): ICD-10-CM

## 2025-07-31 DIAGNOSIS — Z72.0 TOBACCO ABUSE: ICD-10-CM

## 2025-07-31 DIAGNOSIS — E66.09 CLASS 1 OBESITY DUE TO EXCESS CALORIES WITH SERIOUS COMORBIDITY AND BODY MASS INDEX (BMI) OF 30.0 TO 30.9 IN ADULT: ICD-10-CM

## 2025-07-31 DIAGNOSIS — I10 PRIMARY HYPERTENSION: ICD-10-CM

## 2025-07-31 DIAGNOSIS — I73.9 PERIPHERAL VASCULAR DISEASE: ICD-10-CM

## 2025-07-31 DIAGNOSIS — I65.23 BILATERAL CAROTID ARTERY STENOSIS: Primary | ICD-10-CM

## 2025-07-31 DIAGNOSIS — E66.811 CLASS 1 OBESITY DUE TO EXCESS CALORIES WITH SERIOUS COMORBIDITY AND BODY MASS INDEX (BMI) OF 30.0 TO 30.9 IN ADULT: ICD-10-CM

## 2025-07-31 DIAGNOSIS — E78.00 HYPERCHOLESTEROLEMIA: ICD-10-CM

## 2025-07-31 PROCEDURE — 74174 CTA ABD&PLVS W/CONTRAST: CPT

## 2025-07-31 PROCEDURE — 93923 UPR/LXTR ART STDY 3+ LVLS: CPT

## 2025-07-31 PROCEDURE — 93880 EXTRACRANIAL BILAT STUDY: CPT

## 2025-07-31 PROCEDURE — 25510000001 IOPAMIDOL PER 1 ML: Performed by: NURSE PRACTITIONER

## 2025-07-31 RX ORDER — IOPAMIDOL 755 MG/ML
100 INJECTION, SOLUTION INTRAVASCULAR
Status: COMPLETED | OUTPATIENT
Start: 2025-07-31 | End: 2025-07-31

## 2025-07-31 RX ADMIN — IOPAMIDOL 100 ML: 755 INJECTION, SOLUTION INTRAVENOUS at 08:28

## 2025-07-31 NOTE — PROGRESS NOTES
7/31/2025        Eliseo Smith MD  [address]  [facility] [address]      Brijesh Grande  1955    Chief Complaint   Patient presents with    Ruptured infrarenal abdominal aortic aneurysm (AAA)     No new concerns.        Dear Eliseo Smith MD:      HPI  I had the pleasure of seeing your patient Brijesh Grande in the office today.   As you recall, Brijesh Grande is a 70 y.o.  male who you follow for routine health maintenance.  He is here for 6-month follow-up with testing.  In January 2025 we were consulted due to a fusiform abdominal aortic aneurysm measuring 5.7 cm with partial lumen circumferential mural thrombus and evidence of hematoma/hemorrhage at the posterior wall.  A saccular aneurysm of the mid abdominal aorta adjacent and below the level of the right renal arteries and posterior to the IVC.  On 1/3/2025 he did undergo EVAR.  He did have a left common femoral artery cutdown as well.  He had a lengthy stay in the hospital.  He denies any abdominal pain or back pain.  He does state that he had a previous stroke about 10 years ago with residual right-sided weakness.  He had to previous CEAs to his right ICA, however it did occlude causing his stroke.  He denies any additional strokelike symptoms.  He does complain of balance issues, pain in his legs with ambulation.  He is maintained on aspirin, Eliquis, Lipitor, and Repatha.  He did have noninvasive testing performed, which I did review in office.  He is a daily smoker.    Review of Systems   Constitutional:  Negative for diaphoresis and fever.   HENT: Negative.     Eyes: Negative.    Respiratory: Negative.  Negative for shortness of breath and wheezing.    Cardiovascular: Negative.  Negative for chest pain and leg swelling.   Gastrointestinal: Negative.  Negative for abdominal pain.   Endocrine: Negative.    Genitourinary: Negative.    Musculoskeletal:  Positive for arthralgias, back pain and gait problem.   Skin: Negative.   "  Allergic/Immunologic: Negative.    Neurological:  Positive for weakness. Negative for dizziness.   Hematological: Negative.    Psychiatric/Behavioral: Negative.         /66   Pulse 75   Ht 175.3 cm (69\")   Wt 93 kg (205 lb)   SpO2 95%   BMI 30.27 kg/m²       Physical Exam  Vitals and nursing note reviewed.   Constitutional:       General: He is not in acute distress.     Appearance: Normal appearance. He is obese. He is not diaphoretic.   HENT:      Head: Normocephalic. No right periorbital erythema or left periorbital erythema.      Nose: Nose normal.   Eyes:      General: No scleral icterus.     Pupils: Pupils are equal.   Cardiovascular:      Rate and Rhythm: Normal rate and regular rhythm.      Pulses: Normal pulses.      Heart sounds: No murmur heard.  Pulmonary:      Effort: Pulmonary effort is normal. No respiratory distress.   Abdominal:      General: There is no distension.      Palpations: Abdomen is soft.      Tenderness: There is no abdominal tenderness. There is no guarding.   Musculoskeletal:         General: No swelling or tenderness. Normal range of motion.      Cervical back: Normal range of motion and neck supple.      Right lower leg: No edema.      Left lower leg: No edema.   Feet:      Right foot:      Skin integrity: Skin integrity normal.      Left foot:      Skin integrity: Skin integrity normal.   Skin:     General: Skin is warm and dry.      Findings: No erythema or rash.   Neurological:      General: No focal deficit present.      Mental Status: He is alert and oriented to person, place, and time. Mental status is at baseline.      Cranial Nerves: No cranial nerve deficit.      Gait: Gait normal.   Psychiatric:         Attention and Perception: Attention normal.         Mood and Affect: Mood normal.         Behavior: Behavior normal.         Thought Content: Thought content normal.         Judgment: Judgment normal.     DIAGNOSTIC DATA  Carotid duplex shows right ICA occluded " and left ICA 50 to 69% stenosed.  Antegrade vertebral flow demonstrated bilaterally.    Narrative & Impression   EXAMINATION: CT ANGIOGRAM ABDOMEN PELVIS- 7/31/2025 8:40 AM     HISTORY: AAA postop; I71.33-Infrarenal abdominal aortic aneurysm,  ruptured     TOTAL DOSE: 3238.0 mGy.cm (Automatic exposure control technique was  implemented in an effort to keep the radiation dose as low as possible  without compromising image quality)     Images are stored in PACS per institutional protocols and regulatory  requirements.     REPORT: Spiral CT of the abdomen and pelvis was performed without and  with administration of intravenous contrast using CTA protocol, to  include reconstructed coronal, sagittal and 3D images.     COMPARISON: CTA of the abdomen and pelvis 1/30/2025.     Review of lung windows demonstrates hyperinflation of the lung bases  compatible with emphysema. Noncontrast images demonstrate mild patient  motion artifact, there is previous stent graft repair of the abdominal  aorta, the stent begins just above the level of the renal arteries and  the iliac limbs extend into the proximal common iliac artery level as  before. The native aorta has a maximum diameter of 4.9 x 5.2 cm,  compared with 4.9 x 5.4 cm, essentially unchanged. No endoleak is  identified in the arterial phase images demonstrate patency of the stent  graft. There is a separate aneurysm of the left common iliac artery,  with a maximum diameter of 3 7 cm, compared with 3.4 cm before. The  slight difference may be related to slice selection and/or measuring  technique. There is also focal aneurysm along the right margin of the  mid abdominal aorta image 70 series 606, with a diameter of 2.2 cm, this  abuts the posterior margin of the IVC. This aneurysm is also excluded  and thrombosed. It previously measured 2.3 cm.     Normal patency of the celiac artery, SMA and both renal arteries is  demonstrated. The FRANCOIS is tiny, may be occluded at its  origin. Moderate  atherosclerosis of the iliac arteries and there are are separate stents  in the left common and external iliac artery, the vessel remains patent  without flow-limiting stenosis.     The liver, spleen, pancreas and there is thickening greater on the left,  favored to represent adrenal hyperplasia. This is stable. The  gallbladder the ureters are decompressed. Mild circumferential bladder  wall thickening and the prostate gland is enlarged. The wall thickening  may be related to mild chronic bladder wall hypertrophy and BPH.  Correlation with PSA values is recommended. Small stable bilateral  fat-containing inguinal hernias are present. No free fluid or free air  is identified. Bowel loops are normal in caliber and review of bone  windows there is advanced degenerative disc disease in the lumbar spine  L3-4, L4-5. There are mildly.     IMPRESSION:  1. Stable CTA abdomen/pelvis, with previous EVAR, the aortic stent graft  is patent and there is no endoleak.  2. Left common iliac artery aneurysm is also present as before and is  thrombosed. There is a separate small outpouching of the mid abdominal  aorta on the right which is thrombosed and appears stable.  3. Prostamegaly with mild bladder wall thickening, may reflect BPH with  mild chronic bladder wall hypertrophy. Correlation with PSA values is  recommended.        This report was signed and finalized on 7/31/2025 8:52 AM by Dr. Kieran Burnett MD.          Patient Active Problem List   Diagnosis    AAA (abdominal aortic aneurysm)    Abdominal aortic aneurysm    Abdominal pain    Tobacco abuse    Obstructive sleep apnea syndrome    Peripheral vascular disease    Hypertension    Hypercholesterolemia    History of cerebrovascular accident    Hemiparesis as late effect of cerebrovascular accident (CVA)    Generalized anxiety disorder    Chronic obstructive pulmonary disease    Carotid artery occlusion    Blurred vision, bilateral    Carotid artery  stenosis         ICD-10-CM ICD-9-CM   1. Bilateral carotid artery stenosis  I65.23 433.10     433.30   2. Ruptured infrarenal abdominal aortic aneurysm (AAA)  I71.33 441.3   3. Peripheral vascular disease  I73.9 443.9   4. Primary hypertension  I10 401.9   5. Hypercholesterolemia  E78.00 272.0   6. Tobacco abuse  Z72.0 305.1   7. Class 1 obesity due to excess calories with serious comorbidity and body mass index (BMI) of 30.0 to 30.9 in adult  E66.811 278.00    E66.09 V85.30    Z68.30              Plan: After thoroughly evaluating Brijesh Grande, I believe the best course of action is to remain conservative from vascular surgery standpoint.  I along with Dr. Whiteside did review his CTA abdomen/pelvis and his endograft is patent with no signs of endoleak.  He does have an increase to his left iliac aneurysm from 3.4 cm to 3.7 cm and thrombosed and stable.  There is a small outpouch of the mid abdominal aorta on the right measuring 2.2 cm, it previously measured 2.3 cm but it is thrombosed and stable.  His ABIs show moderate arterial insufficiency to his left lower extremity and no significant arterial insufficiency to his right.  His carotid duplex shows a right carotid occlusion, the patient has been aware of this and 50 to 69% left carotid stenosis.  He can continue his aspirin 81 mg daily, Eliquis 5 mg twice daily, Lipitor 20 mg daily, and Repatha 140 mg subcu every 14 days in addition to his other medications.  We will have him return in 2 weeks to see Dr. Finney with a CTA neck for further surveillance.  At that time Dr. Finney can address his moderate arterial insufficiency.  We did discuss vascular risk factors as it pertains to the progression of vascular disease including controlling his hypertension, hypercholesterolemia, and smoking cessation.  His blood pressure is stable today in office.  His lipid panel from July 2025 shows all values within normal limits.  Unfortunately he is a daily smoker and has decreased  his cigarette intake however has not fully stopped.  Body mass index is 30.27 kg/m².       This was all discussed in full with complete understanding.    Thank you for allowing me to participate in the care of your patient.  Please do not hesitate with any questions or concerns.  I will keep you aware of any further encounters with Brijesh Grande.        Sincerely yours,         MENDEZ Landaverde

## 2025-08-14 ENCOUNTER — TELEPHONE (OUTPATIENT)
Dept: VASCULAR SURGERY | Facility: CLINIC | Age: 70
End: 2025-08-14
Payer: MEDICARE

## 2025-08-15 ENCOUNTER — OFFICE VISIT (OUTPATIENT)
Dept: VASCULAR SURGERY | Facility: CLINIC | Age: 70
End: 2025-08-15
Payer: MEDICARE

## 2025-08-15 ENCOUNTER — PATIENT ROUNDING (BHMG ONLY) (OUTPATIENT)
Dept: VASCULAR SURGERY | Facility: CLINIC | Age: 70
End: 2025-08-15
Payer: MEDICARE

## 2025-08-15 ENCOUNTER — HOSPITAL ENCOUNTER (OUTPATIENT)
Dept: CT IMAGING | Facility: HOSPITAL | Age: 70
Discharge: HOME | End: 2025-08-15
Payer: MEDICARE

## 2025-08-15 VITALS
DIASTOLIC BLOOD PRESSURE: 86 MMHG | SYSTOLIC BLOOD PRESSURE: 142 MMHG | OXYGEN SATURATION: 94 % | HEIGHT: 69 IN | HEART RATE: 75 BPM | WEIGHT: 204 LBS | BODY MASS INDEX: 30.21 KG/M2

## 2025-08-15 DIAGNOSIS — I65.23 BILATERAL CAROTID ARTERY STENOSIS: ICD-10-CM

## 2025-08-15 DIAGNOSIS — Z72.0 TOBACCO ABUSE: ICD-10-CM

## 2025-08-15 DIAGNOSIS — I10 PRIMARY HYPERTENSION: ICD-10-CM

## 2025-08-15 DIAGNOSIS — E66.811 CLASS 1 OBESITY DUE TO EXCESS CALORIES WITH SERIOUS COMORBIDITY AND BODY MASS INDEX (BMI) OF 30.0 TO 30.9 IN ADULT: ICD-10-CM

## 2025-08-15 DIAGNOSIS — I71.33 RUPTURED INFRARENAL ABDOMINAL AORTIC ANEURYSM (AAA): ICD-10-CM

## 2025-08-15 DIAGNOSIS — I65.23 BILATERAL CAROTID ARTERY STENOSIS: Primary | ICD-10-CM

## 2025-08-15 DIAGNOSIS — E66.09 CLASS 1 OBESITY DUE TO EXCESS CALORIES WITH SERIOUS COMORBIDITY AND BODY MASS INDEX (BMI) OF 30.0 TO 30.9 IN ADULT: ICD-10-CM

## 2025-08-15 DIAGNOSIS — I73.9 PERIPHERAL VASCULAR DISEASE: ICD-10-CM

## 2025-08-15 DIAGNOSIS — E78.00 HYPERCHOLESTEROLEMIA: ICD-10-CM

## 2025-08-15 LAB — CREAT BLDA-MCNC: 1 MG/DL (ref 0.6–1.3)

## 2025-08-15 PROCEDURE — 82565 ASSAY OF CREATININE: CPT

## 2025-08-15 PROCEDURE — 25510000001 IOPAMIDOL PER 1 ML: Performed by: NURSE PRACTITIONER

## 2025-08-15 PROCEDURE — 70498 CT ANGIOGRAPHY NECK: CPT

## 2025-08-15 RX ORDER — CLOPIDOGREL BISULFATE 75 MG/1
75 TABLET ORAL DAILY
Qty: 30 TABLET | Refills: 0 | Status: SHIPPED | OUTPATIENT
Start: 2025-08-15 | End: 2025-09-14

## 2025-08-15 RX ORDER — IOPAMIDOL 755 MG/ML
100 INJECTION, SOLUTION INTRAVASCULAR
Status: COMPLETED | OUTPATIENT
Start: 2025-08-15 | End: 2025-08-15

## 2025-08-15 RX ADMIN — IOPAMIDOL 100 ML: 755 INJECTION, SOLUTION INTRAVENOUS at 08:47

## (undated) DEVICE — SNAP KOVER: Brand: UNBRANDED

## (undated) DEVICE — 1LYRTR 16FR10ML100%SIL UMS SNP: Brand: MEDLINE INDUSTRIES, INC.

## (undated) DEVICE — PINNACLE R/O II INTRODUCER SHEATH WITH RADIOPAQUE MARKER: Brand: PINNACLE

## (undated) DEVICE — MODEL BT2000 P/N 700287-012KIT CONTENTS: MANIFOLD WITH SALINE AND CONTRAST PORTS, SALINE TUBING WITH SPIKE AND HAND SYRINGE, TRANSDUCER: Brand: BT2000 AUTOMATED MANIFOLD KIT

## (undated) DEVICE — CATH SZ ACCUVU SEG/20CM OMNI 5F 100CM

## (undated) DEVICE — APPL CHLORAPREP HI/LITE 26ML ORNG

## (undated) DEVICE — NAVICROSS SUPPORT CATHETER: Brand: NAVICROSS

## (undated) DEVICE — SYR LUERLOK 50ML

## (undated) DEVICE — ANTIBACTERIAL UNDYED BRAIDED (POLYGLACTIN 910), SYNTHETIC ABSORBABLE SUTURE: Brand: COATED VICRYL

## (undated) DEVICE — DRP SPECIAL PROC 4PC W/FC POUCH

## (undated) DEVICE — CATH FLSH OMNI SFT 5F 90CM

## (undated) DEVICE — SHEATH SENTRANT 12F 28CM

## (undated) DEVICE — SYR LUERLOK 30CC

## (undated) DEVICE — INFLATION DEVICE: Brand: ENCORE™ 26

## (undated) DEVICE — MODEL AT P65, P/N 701554-001KIT CONTENTS: HAND CONTROLLER, 3-WAY HIGH-PRESSURE STOPCOCK WITH ROTATING END AND PREMIUM HIGH-PRESSURE TUBING: Brand: ANGIOTOUCH® KIT

## (undated) DEVICE — STERILE (14X122CM) TELESCOPICALLY-FOLDED COVER: Brand: CIV-CLEAR™ TRANSDUCER COVER

## (undated) DEVICE — RADIFOCUS GLIDEWIRE ADVANTAGE GUIDEWIRE: Brand: GLIDEWIRE ADVANTAGE

## (undated) DEVICE — INTENDED FOR TISSUE SEPARATION, AND OTHER PROCEDURES THAT REQUIRE A SHARP SURGICAL BLADE TO PUNCTURE OR CUT.: Brand: BARD-PARKER ®  SAFETY SCALPED

## (undated) DEVICE — DRSNG SURESITE WNDW 4X4.5

## (undated) DEVICE — CLTH CLENS READYCLEANSE PERI CARE PK/5

## (undated) DEVICE — PAD MAJOR VASCULAR: Brand: MEDLINE INDUSTRIES, INC.

## (undated) DEVICE — RADIFOCUS GLIDECATH: Brand: GLIDECATH

## (undated) DEVICE — SYR LL TP 10ML STRL

## (undated) DEVICE — SYR LUERLOK 20CC BX/50

## (undated) DEVICE — A2000 MULTI-USE SYRINGE KIT, P/N 701277-003KIT CONTENTS: 100ML CONTRAST RESERVOIR AND TUBING WITH CONTRAST SPIKE AND CLAMP: Brand: A2000 MULTI-USE SYRINGE KIT

## (undated) DEVICE — SHEATH STEER INTRO TOURGUIDE 6.5F 55CM 9MM

## (undated) DEVICE — GLV SURG BIOGEL LTX PF 7 1/2

## (undated) DEVICE — PINNACLE INTRODUCER SHEATH: Brand: PINNACLE

## (undated) DEVICE — SUT NONABS PROLENE TPR/HEMOSEAL DBL/ARM HS/7 5/0 60CM BLU

## (undated) DEVICE — SPNG GZ WOVN 4X4IN 12PLY 10/BX STRL

## (undated) DEVICE — SHEATH SENTRANT 16F 28CM

## (undated) DEVICE — ST MIC/INTRO ACC SHRP/NDL TUNG/TP NITNL 5F 45CM 7CM

## (undated) DEVICE — BALN STENTGR RELIANT 8F 100CM

## (undated) DEVICE — GAUZE,SPONGE,4"X4",16PLY,XRAY,STRL,LF: Brand: MEDLINE